# Patient Record
Sex: MALE | Race: WHITE | NOT HISPANIC OR LATINO | Employment: OTHER | URBAN - METROPOLITAN AREA
[De-identification: names, ages, dates, MRNs, and addresses within clinical notes are randomized per-mention and may not be internally consistent; named-entity substitution may affect disease eponyms.]

---

## 2017-01-09 DIAGNOSIS — K21.9 GASTROESOPHAGEAL REFLUX DISEASE WITHOUT ESOPHAGITIS: Primary | ICD-10-CM

## 2017-01-09 NOTE — TELEPHONE ENCOUNTER
Omeprazole      Last Written Prescription Date: 03/16/2016  Last Fill Quantity: 90,  # refills: 1   Last Office Visit with [facility], [facility] or [facility] prescribing provider: 09/28/2016                                         Next 5 appointments (look out 90 days)     Jan 12, 2017  8:00 AM   Return Visit with MARAL Caba MD   [facility]    [address] [address]   [phone]                  Humberto SUÁREZ (R)

## 2017-01-10 RX ORDER — OMEPRAZOLE 40 MG/1
40 CAPSULE, DELAYED RELEASE ORAL DAILY
Qty: 90 CAPSULE | Refills: 1 | Status: SHIPPED | OUTPATIENT
Start: 2017-01-10 | End: 2017-07-31

## 2017-02-27 DIAGNOSIS — R35.0 URINARY FREQUENCY: ICD-10-CM

## 2017-02-27 RX ORDER — TOLTERODINE 4 MG/1
4 CAPSULE, EXTENDED RELEASE ORAL DAILY
Qty: 90 CAPSULE | Refills: 1 | Status: SHIPPED | OUTPATIENT
Start: 2017-02-27 | End: 2017-09-05

## 2017-04-03 ENCOUNTER — TELEPHONE (OUTPATIENT)
Dept: FAMILY MEDICINE | Facility: CLINIC | Age: 62
End: 2017-04-03

## 2017-04-03 DIAGNOSIS — L29.9 ITCHING: ICD-10-CM

## 2017-04-03 RX ORDER — TRIAMCINOLONE ACETONIDE 1 MG/G
CREAM TOPICAL
Qty: 80 G | Refills: 2 | Status: SHIPPED | OUTPATIENT
Start: 2017-04-03 | End: 2017-05-16

## 2017-04-03 NOTE — TELEPHONE ENCOUNTER
Traiamcinolone cream      Last Written Prescription Date:  Not on med list  Last Fill Quantity: 0,   # refills: 0  Last Office Visit with [facility], [facility] or [facility] prescribing provider: 09/28/2016  Future Office visit:       Routing refill request to provider for review/approval because:  Drug not active on patient's medication list    Humberto SUÁREZ (R)

## 2017-05-16 DIAGNOSIS — L29.9 ITCHING: ICD-10-CM

## 2017-05-16 NOTE — TELEPHONE ENCOUNTER
Triamcinolone      Last Written Prescription Date:  4/3/2017  Last Fill Quantity: 80g,   # refills: 2  Last Office Visit with [facility], [facility] or [facility] prescribing provider: 9/28/2016  Future Office visit:       Routing refill request to provider for review/approval because:  na

## 2017-05-17 RX ORDER — TRIAMCINOLONE ACETONIDE 1 MG/G
CREAM TOPICAL
Qty: 80 G | Refills: 2 | Status: SHIPPED | OUTPATIENT
Start: 2017-05-17 | End: 2018-02-03

## 2017-06-08 RX ORDER — TRIAMTERENE AND HYDROCHLOROTHIAZIDE 37.5; 25 MG/1; MG/1
1 CAPSULE ORAL EVERY MORNING
Qty: 90 CAPSULE | Refills: 3 | OUTPATIENT
Start: 2017-06-08

## 2017-06-08 NOTE — TELEPHONE ENCOUNTER
Triamt/hctz      Last Written Prescription Date: 09/28/2016  Last Fill Quantity: 90, # refills: 3  Last Office Visit with [facility], [facility] or [facility] prescribing provider: 09/28/2016       Potassium   Date Value Ref Range Status   09/28/2016 4.1 3.4 - 5.3 mmol/L Final     Creatinine   Date Value Ref Range Status   09/28/2016 0.90 0.66 - 1.25 mg/dL Final     BP Readings from Last 3 Encounters:   12/21/16 135/80   12/13/16 136/84   12/01/16 136/86       Humberto SUÁREZ (R)

## 2017-07-25 ENCOUNTER — APPOINTMENT (OUTPATIENT)
Dept: GENERAL RADIOLOGY | Facility: CLINIC | Age: 62
End: 2017-07-25
Attending: EMERGENCY MEDICINE
Payer: COMMERCIAL

## 2017-07-25 ENCOUNTER — HOSPITAL ENCOUNTER (EMERGENCY)
Facility: CLINIC | Age: 62
Discharge: HOME | End: 2017-07-25
Attending: EMERGENCY MEDICINE | Admitting: EMERGENCY MEDICINE
Payer: COMMERCIAL

## 2017-07-25 VITALS
DIASTOLIC BLOOD PRESSURE: 88 MMHG | SYSTOLIC BLOOD PRESSURE: 135 MMHG | RESPIRATION RATE: 18 BRPM | OXYGEN SATURATION: 96 % | TEMPERATURE: 99 F

## 2017-07-25 DIAGNOSIS — J06.9 VIRAL URI: ICD-10-CM

## 2017-07-25 DIAGNOSIS — R50.9 FEVER AND CHILLS: ICD-10-CM

## 2017-07-25 LAB
ANION GAP SERPL CALCULATED.3IONS-SCNC: 10 MMOL/L (ref 3–14)
BASOPHILS # BLD AUTO: 0 10E9/L (ref 0–0.2)
BASOPHILS NFR BLD AUTO: 0.5 %
BUN SERPL-MCNC: 20 MG/DL (ref 7–30)
CALCIUM SERPL-MCNC: 8.8 MG/DL (ref 8.5–10.1)
CHLORIDE SERPL-SCNC: 96 MMOL/L (ref 94–109)
CO2 SERPL-SCNC: 23 MMOL/L (ref 20–32)
CREAT SERPL-MCNC: 1.01 MG/DL (ref 0.66–1.25)
DIFFERENTIAL METHOD BLD: ABNORMAL
EOSINOPHIL # BLD AUTO: 0 10E9/L (ref 0–0.7)
EOSINOPHIL NFR BLD AUTO: 0 %
ERYTHROCYTE [DISTWIDTH] IN BLOOD BY AUTOMATED COUNT: 15.1 % (ref 10–15)
GFR SERPL CREATININE-BSD FRML MDRD: 74 ML/MIN/1.7M2
GLUCOSE SERPL-MCNC: 145 MG/DL (ref 70–99)
HCT VFR BLD AUTO: 45.1 % (ref 40–53)
HGB BLD-MCNC: 14.5 G/DL (ref 13.3–17.7)
IMM GRANULOCYTES # BLD: 0 10E9/L (ref 0–0.4)
IMM GRANULOCYTES NFR BLD: 0.8 %
LACTATE BLD-SCNC: 1.3 MMOL/L (ref 0.7–2.1)
LYMPHOCYTES # BLD AUTO: 0.4 10E9/L (ref 0.8–5.3)
LYMPHOCYTES NFR BLD AUTO: 10.6 %
MCH RBC QN AUTO: 24 PG (ref 26.5–33)
MCHC RBC AUTO-ENTMCNC: 32.2 G/DL (ref 31.5–36.5)
MCV RBC AUTO: 75 FL (ref 78–100)
MONOCYTES # BLD AUTO: 0.2 10E9/L (ref 0–1.3)
MONOCYTES NFR BLD AUTO: 5.7 %
NEUTROPHILS # BLD AUTO: 3 10E9/L (ref 1.6–8.3)
NEUTROPHILS NFR BLD AUTO: 82.4 %
PLATELET # BLD AUTO: 132 10E9/L (ref 150–450)
POTASSIUM SERPL-SCNC: 3.8 MMOL/L (ref 3.4–5.3)
RBC # BLD AUTO: 6.03 10E12/L (ref 4.4–5.9)
SODIUM SERPL-SCNC: 129 MMOL/L (ref 133–144)
TROPONIN I SERPL-MCNC: NORMAL UG/L (ref 0–0.04)
WBC # BLD AUTO: 3.7 10E9/L (ref 4–11)

## 2017-07-25 PROCEDURE — 99285 EMERGENCY DEPT VISIT HI MDM: CPT | Mod: 25 | Performed by: EMERGENCY MEDICINE

## 2017-07-25 PROCEDURE — 99285 EMERGENCY DEPT VISIT HI MDM: CPT | Mod: 25

## 2017-07-25 PROCEDURE — 93005 ELECTROCARDIOGRAM TRACING: CPT

## 2017-07-25 PROCEDURE — 84484 ASSAY OF TROPONIN QUANT: CPT | Performed by: EMERGENCY MEDICINE

## 2017-07-25 PROCEDURE — 93010 ELECTROCARDIOGRAM REPORT: CPT | Performed by: EMERGENCY MEDICINE

## 2017-07-25 PROCEDURE — 71020 XR CHEST 2 VW: CPT

## 2017-07-25 PROCEDURE — 83605 ASSAY OF LACTIC ACID: CPT | Performed by: EMERGENCY MEDICINE

## 2017-07-25 PROCEDURE — 25000132 ZZH RX MED GY IP 250 OP 250 PS 637: Performed by: EMERGENCY MEDICINE

## 2017-07-25 PROCEDURE — 80048 BASIC METABOLIC PNL TOTAL CA: CPT | Performed by: EMERGENCY MEDICINE

## 2017-07-25 PROCEDURE — 25000128 H RX IP 250 OP 636: Performed by: EMERGENCY MEDICINE

## 2017-07-25 PROCEDURE — 85025 COMPLETE CBC W/AUTO DIFF WBC: CPT | Performed by: EMERGENCY MEDICINE

## 2017-07-25 RX ORDER — IBUPROFEN 400 MG/1
800 TABLET, FILM COATED ORAL ONCE
Status: COMPLETED | OUTPATIENT
Start: 2017-07-25 | End: 2017-07-25

## 2017-07-25 RX ADMIN — SODIUM CHLORIDE 1000 ML: 9 INJECTION, SOLUTION INTRAVENOUS at 04:02

## 2017-07-25 RX ADMIN — IBUPROFEN 800 MG: 400 TABLET ORAL at 04:03

## 2017-07-25 NOTE — ED PROVIDER NOTES
History     Chief Complaint   Patient presents with     Chest Pain     mid chest pain/shortness of breath since saturday, 7/22 at 2000 and woke up sunday am with headache asnd generalized weakness     HPI  Jamal Sanchez is a 62 year old male who presents for chest pain and shortness of breath.  Symptoms have been ongoing for the past 3 days, getting worse.  He reports it is associated with subjective fever and chills with diaphoresis.  He has felt weak and run down without an appetite.  He also has bitemporal headache, moderate in severity.  He has been taking acetaminophen with some improvement in symptoms.  He does have sore throat, denies cough, ear pain, abdominal pain, nausea, vomiting, diarrhea, dysuria, or rash.    Past medical history includes hypertension, BPH, GERD  Daily medications include omeprazole, Detrol, sildenafil, triamterene-hydrochlorothiazide  No known drug allergies  Former smoker, quit 2004; occasional alcohol use    I have reviewed the Medications, Allergies, Past Medical and Surgical History, and Social History in the Epic system.         Review of Systems  Pertinent positives and negatives listed in the HPI, all other systems reviewed and are negative.    Physical Exam   BP: (!) 147/100  Temp: 100.5  F (38.1  C)  Resp: 18  SpO2: 97 %  Physical Exam   Constitutional: He is oriented to person, place, and time. He appears well-developed and well-nourished. He appears distressed.   HENT:   Head: Normocephalic and atraumatic.   Right Ear: Tympanic membrane and external ear normal.   Left Ear: Tympanic membrane and external ear normal.   Nose: Nose normal.   Eyes: Conjunctivae are normal. No scleral icterus.   Neck: Normal range of motion.   Cardiovascular: Normal rate and regular rhythm.    Pulses:       Radial pulses are 2+ on the right side, and 2+ on the left side.        Dorsalis pedis pulses are 2+ on the right side, and 2+ on the left side.   Pulmonary/Chest: Effort normal. No  stridor. No respiratory distress.   Abdominal: Soft. He exhibits no distension.   Musculoskeletal: He exhibits no edema.   Neurological: He is alert and oriented to person, place, and time.   Skin: Skin is warm and dry. He is not diaphoretic.   Psychiatric: He has a normal mood and affect. His behavior is normal.   Nursing note and vitals reviewed.      ED Course     ED Course     Procedures             EKG Interpretation:      Interpreted by Humberto Rothman  Time reviewed: 0343  Symptoms at time of EKG: Chest pain   Rhythm: normal sinus   Rate: normal  Axis: normal  Ectopy: none  Conduction: normal  ST Segments/ T Waves: No ST-T wave changes  Q Waves: none  Comparison to prior: Unchanged    Clinical Impression: normal EKG      Critical Care time:  none          Labs Ordered and Resulted from Time of ED Arrival Up to the Time of Departure from the ED   BASIC METABOLIC PANEL - Abnormal; Notable for the following:        Result Value    Sodium 129 (*)     Glucose 145 (*)     All other components within normal limits   CBC WITH PLATELETS DIFFERENTIAL - Abnormal; Notable for the following:     WBC 3.7 (*)     RBC Count 6.03 (*)     MCV 75 (*)     MCH 24.0 (*)     RDW 15.1 (*)     Platelet Count 132 (*)     Absolute Lymphocytes 0.4 (*)     All other components within normal limits   TROPONIN I   LACTIC ACID WHOLE BLOOD   PERIPHERAL IV CATHETER       Assessments & Plan (with Medical Decision Making)   62-year-old male who presents for fever, chest pain, shortness of breath.  Differential includes pneumonia, viral URI, bronchitis, coronary artery disease.  Temperature 38.1 C, heart rate 98, blood pressure 147/100, SPO2 97% on room air.  EKG sinus rhythm without signs of ischemia.  Sodium 129, electrolytes otherwise normal.  White blood cell count 3.7, hemoglobin 0.5.  Lactate 1.3.  Troponin is undetectable.  Chest x-ray obtained, which is reviewed independently as well as radiology report reviewed, no signs of  infiltrate or pneumothorax.  On recheck the patient is feeling much better after acetaminophen and fluids.  He says his headache is nearly completely gone.  He is tolerating oral intake and is safe to discharge home with instructions to return if worse, otherwise follow up in clinic.  The patient is in agreement with this plan.    I have reviewed the nursing notes.    I have reviewed the findings, diagnosis, plan and need for follow up with the patient.       New Prescriptions    No medications on file       Final diagnoses:   Viral URI   Fever and chills       7/25/2017   [facility] EMERGENCY DEPARTMENT     Humberto Rothman MD  07/25/17 0613

## 2017-07-25 NOTE — DISCHARGE INSTRUCTIONS
Drink plenty of fluids.  Use acetaminophen and ibuprofen as needed for symptoms.  Return to the emergency department for severe headache, repeated vomiting, worsening symptoms, difficulty breathing, or other concerns.  Otherwise follow up care if not better in 2-3 days.

## 2017-07-25 NOTE — ED NOTES
mid chest pain/shortness of breath since saturday, 7/22 at 2000 and woke up sunday am with headache asnd generalized weakness.  Patient states he was doing a lot of physical work on Saturday with heat and humidity.  Patient states he had 8 beers and 8 shots of whisky on Saturday over a 12 hour period.

## 2017-07-25 NOTE — ED AVS SNAPSHOT
[facility] Emergency Department    [address] [address]    Phone:  [phone]    Fax:  [phone]                                       Jamal Sanchez   MRN: [id number]    Department:  [facility] Emergency Department   Date of Visit:  7/25/2017           After Visit Summary Signature Page     I have received my discharge instructions, and my questions have been answered. I have discussed any challenges I see with this plan with the nurse or doctor.    ..........................................................................................................................................  Patient/Patient Representative Signature      ..........................................................................................................................................  Patient Representative Print Name and Relationship to Patient    ..................................................               ................................................  Date                                            Time    ..........................................................................................................................................  Reviewed by Signature/Title    ...................................................              ..............................................  Date                                                            Time

## 2017-07-25 NOTE — ED AVS SNAPSHOT
[facility] Emergency Department    [address] [address]    Phone:  [phone]    Fax:  [phone]                                       Jamal Sanchez   MRN: [id number]    Department:  [facility] Emergency Department   Date of Visit:  7/25/2017           Patient Information     Date Of Birth          1955        Your diagnoses for this visit were:     Viral URI     Fever and chills        You were seen by Humberto Rothman MD.        Discharge Instructions       Drink plenty of fluids.  Use acetaminophen and ibuprofen as needed for symptoms.  Return to the emergency department for severe headache, repeated vomiting, worsening symptoms, difficulty breathing, or other concerns.  Otherwise follow up care if not better in 2-3 days.    24 Hour Appointment Hotline       To make an appointment at any [facility] clinic, call [phone] ([phone]). If you don't have a family doctor or clinic, we will help you find one. [facility] clinics are conveniently located to serve the needs of you and your family.             Review of your medicines      Our records show that you are taking the medicines listed below. If these are incorrect, please call your family doctor or clinic.        Dose / Directions Last dose taken    acetaminophen-codeine 300-30 MG per tablet   Commonly known as:  TYLENOL #3   Dose:  1-2 tablet   Quantity:  60 tablet        Take 1-2 tablets by mouth every 4 hours as needed for pain   Refills:  0        omeprazole 40 MG capsule   Commonly known as:  priLOSEC   Dose:  40 mg   Quantity:  90 capsule        Take 1 capsule (40 mg) by mouth daily Take 30-60 minutes before a meal.   Refills:  1        sildenafil 20 MG tablet   Commonly known as:  REVATIO/VIAGRA   Dose:   mg   Quantity:  50 tablet        Take 2-5 tablets ( mg) by mouth as needed for erectile insufficiency.  Never use with nitroglycerin, terazosin or doxazosin.   Refills:  3         tolterodine 4 MG 24 hr capsule   Commonly known as:  DETROL LA   Dose:  4 mg   Quantity:  90 capsule        Take 1 capsule (4 mg) by mouth daily   Refills:  1        triamcinolone 0.1 % cream   Commonly known as:  KENALOG   Quantity:  80 g        Apply sparingly to affected area three times daily as needed   Refills:  2        triamterene-hydrochlorothiazide 37.5-25 MG per capsule   Commonly known as:  DYAZIDE   Dose:  1 capsule   Quantity:  90 capsule        Take 1 capsule by mouth every morning   Refills:  3                Procedures and tests performed during your visit     Basic metabolic panel    CBC with platelets differential    EKG 12 lead    Lactic acid whole blood    Peripheral IV catheter    Troponin I    XR Chest 2 Views      Orders Needing Specimen Collection     None      Pending Results     Date and Time Order Name Status Description    7/25/2017 0351 XR Chest 2 Views Preliminary             Pending Culture Results     No orders found from 7/23/2017 to 7/26/2017.            Pending Results Instructions     If you had any lab results that were not finalized at the time of your Discharge, you can call the ED Lab Result RN at [phone]. You will be contacted by this team for any positive Lab results or changes in treatment. The nurses are available 7 days a week from 10A to 6:30P.  You can leave a message 24 hours per day and they will return your call.        Test Results From Your Hospital Stay        7/25/2017  4:23 AM      Component Results     Component Value Ref Range & Units Status    Sodium 129 (L) 133 - 144 mmol/L Final    Potassium 3.8 3.4 - 5.3 mmol/L Final    Chloride 96 94 - 109 mmol/L Final    Carbon Dioxide 23 20 - 32 mmol/L Final    Anion Gap 10 3 - 14 mmol/L Final    Glucose 145 (H) 70 - 99 mg/dL Final    Urea Nitrogen 20 7 - 30 mg/dL Final    Creatinine 1.01 0.66 - 1.25 mg/dL Final    GFR Estimate 74 >60 mL/min/1.7m2 Final    Non  GFR Calc    GFR Estimate If Black  >90   GFR Calc   >60 mL/min/1.7m2 Final    Calcium 8.8 8.5 - 10.1 mg/dL Final         7/25/2017  5:56 AM      Component Results     Component Value Ref Range & Units Status    WBC 3.7 (L) 4.0 - 11.0 10e9/L Final    RBC Count 6.03 (H) 4.4 - 5.9 10e12/L Final    Hemoglobin 14.5 13.3 - 17.7 g/dL Final    Hematocrit 45.1 40.0 - 53.0 % Final    MCV 75 (L) 78 - 100 fl Final    MCH 24.0 (L) 26.5 - 33.0 pg Final    MCHC 32.2 31.5 - 36.5 g/dL Final    RDW 15.1 (H) 10.0 - 15.0 % Final    Platelet Count 132 (L) 150 - 450 10e9/L Final    Diff Method Automated Method  Final    % Neutrophils 82.4 % Final    % Lymphocytes 10.6 % Final    % Monocytes 5.7 % Final    % Eosinophils 0.0 % Final    % Basophils 0.5 % Final    % Immature Granulocytes 0.8 % Final    Absolute Neutrophil 3.0 1.6 - 8.3 10e9/L Final    Absolute Lymphocytes 0.4 (L) 0.8 - 5.3 10e9/L Final    Absolute Monocytes 0.2 0.0 - 1.3 10e9/L Final    Absolute Eosinophils 0.0 0.0 - 0.7 10e9/L Final    Absolute Basophils 0.0 0.0 - 0.2 10e9/L Final    Abs Immature Granulocytes 0.0 0 - 0.4 10e9/L Final         7/25/2017  4:31 AM      Narrative     XR CHEST 2 VW  7/25/2017 4:15 AM      HISTORY: Chest pain, fever, shortness of breath.     COMPARISON: None.    FINDINGS: The heart size is normal. The lungs are clear. No  pneumothorax or pleural effusion.        Impression     IMPRESSION: No acute abnormality.         7/25/2017  4:23 AM      Component Results     Component Value Ref Range & Units Status    Troponin I ES  0.000 - 0.045 ug/L Final    <0.015  The 99th percentile for upper reference range is 0.045 ug/L.  Troponin values in   the range of 0.045 - 0.120 ug/L may be associated with risks of adverse   clinical events.           7/25/2017  4:08 AM      Component Results     Component Value Ref Range & Units Status    Lactic Acid 1.3 0.7 - 2.1 mmol/L Final                Thank you for choosing [facility]       Thank you for choosing [facility] for your care. Our  goal is always to provide you with excellent care. Hearing back from our patients is one way we can continue to improve our services. Please take a few minutes to complete the written survey that you may receive in the mail after you visit with us. Thank you!        [facility] Information     [facility] gives you secure access to your electronic health record. If you see a primary care provider, you can also send messages to your care team and make appointments. If you have questions, please call your primary care clinic.  If you do not have a primary care provider, please call [phone] and they will assist you.        Care EveryWhere ID     This is your Care EveryWhere ID. This could be used by other organizations to access your [place] medical records  [id number]        Equal Access to Services     BETSEY MULLIGAN : Rosalie Hernandez, gen vaca, mahendra perdue, berto caldwell. So [facility] [phone].    ATENCIÓN: Si habla español, tiene a zamarripa disposición servicios gratuitos de asistencia lingüística. Llame al [phone].    We comply with applicable federal civil rights laws and [place] laws. We do not discriminate on the basis of race, color, national origin, age, disability sex, sexual orientation or gender identity.            After Visit Summary       This is your record. Keep this with you and show to your community pharmacist(s) and doctor(s) at your next visit.

## 2017-07-31 DIAGNOSIS — K21.9 GASTROESOPHAGEAL REFLUX DISEASE WITHOUT ESOPHAGITIS: ICD-10-CM

## 2017-07-31 NOTE — TELEPHONE ENCOUNTER
tamsulosin      Last Written Prescription Date:  Not on med list  Last Fill Quantity: 0,   # refills: 0  Last Office Visit with [facility], [facility] or [facility] prescribing provider: 09/28/2016  Future Office visit:       Routing refill request to provider for review/approval because:  Drug not active on patient's medication list      Omeprazole      Last Written Prescription Date: 01/10/2017  Last Fill Quantity: 90,  # refills: 1   Last Office Visit with [facility], [facility] or [facility] prescribing provider: 09/28/2016                                               Humberto LANZA)

## 2017-08-01 RX ORDER — OMEPRAZOLE 40 MG/1
40 CAPSULE, DELAYED RELEASE ORAL DAILY
Qty: 30 CAPSULE | Refills: 0 | Status: SHIPPED | OUTPATIENT
Start: 2017-08-01 | End: 2017-09-27

## 2017-08-01 NOTE — TELEPHONE ENCOUNTER
Prescription approved per [facility] Refill Protocol.  Patient due for annual appt September 2017    Roseann SANABRIA Rn

## 2017-08-02 ENCOUNTER — OFFICE VISIT (OUTPATIENT)
Dept: FAMILY MEDICINE | Facility: CLINIC | Age: 62
End: 2017-08-02
Payer: COMMERCIAL

## 2017-08-02 VITALS
RESPIRATION RATE: 18 BRPM | TEMPERATURE: 97.3 F | HEIGHT: 70 IN | SYSTOLIC BLOOD PRESSURE: 136 MMHG | HEART RATE: 89 BPM | WEIGHT: 210 LBS | DIASTOLIC BLOOD PRESSURE: 85 MMHG | BODY MASS INDEX: 30.06 KG/M2

## 2017-08-02 DIAGNOSIS — L98.9 SKIN LESION: Primary | ICD-10-CM

## 2017-08-02 PROCEDURE — 99213 OFFICE O/P EST LOW 20 MIN: CPT | Performed by: FAMILY MEDICINE

## 2017-08-02 NOTE — PATIENT INSTRUCTIONS
Thank you for choosing [facility].  You may be receiving a survey in the mail from [facility] regarding your visit today.  Please take a few minutes to complete and return the survey to let us know how we are doing.      Our Clinic hours are:  Mondays    7:20 am - 7 pm  Tues -  Fri  7:20 am - 5 pm    Clinic Phone: [phone]    The clinic lab opens at 7:30 am Mon - Fri and appointments are required.    [facility]. [phone]  Monday-Thursday 8 am - 7pm  Tues/Wed/Fri 8 am - 5:30 pm

## 2017-08-02 NOTE — PROGRESS NOTES
SUBJECTIVE:                                                    Jamal Sanchez is a 62 year old male who presents to clinic today for the following health issues:      Chief Complaint   Patient presents with     Derm Problem     patient has 2 spots on his back he would like to have looked at.              Problem list and histories reviewed & adjusted, as indicated.  Additional history: as documented        Reviewed and updated as needed this visit by clinical staff  Tobacco  Allergies  Meds       Reviewed and updated as needed this visit by Provider      Further history obtained, clarified or corrected by physician:  He has some spots on his back that he is worried about. He does not think they're changing. He is wondering about having a number of these removed.    OBJECTIVE:  LUNGS: clear to auscultation, normal breath sounds  CV: RRR without murmur  ABD: BS+, soft, nontender, no masses, no hepatosplenomegaly  SKIN: No rashes or abnormalities, except for multiple macules and scaly papules on the back that all seem to be well-circumscribed and mostly flat.    ASSESSMENT:  Skin lesion    PLAN:  He has multiple lesions which appear to be benign and possibly keratoses however he is quite nervous about these so we'll set up a dermatology consultation.    Orders Placed This Encounter     DERMATOLOGY REFERRAL

## 2017-08-02 NOTE — MR AVS SNAPSHOT
After Visit Summary   8/2/2017    Jamal Sanchez    MRN: [id number]           Patient Information     Date Of Birth          1955        Visit Information        Provider Department      8/2/2017 3:20 PM Anjel Jimenes MD [facility]        Today's Diagnoses     Skin lesion    -  1      Care Instructions          Thank you for choosing [facility].  You may be receiving a survey in the mail from [facility] regarding your visit today.  Please take a few minutes to complete and return the survey to let us know how we are doing.      Our Clinic hours are:  Mondays    7:20 am - 7 pm  Tues -  Fri  7:20 am - 5 pm    Clinic Phone: [phone]    The clinic lab opens at 7:30 am Mon - Fri and appointments are required.    [facility]  Ph. [phone]  Monday-Thursday 8 am - 7pm  Tues/Wed/Fri 8 am - 5:30 pm                 Follow-ups after your visit        Additional Services     DERMATOLOGY REFERRAL       Your provider has referred you to: [facility]: [facility] [phone]   [email or web address]    Please be aware that coverage of these services is subject to the terms and limitations of your health insurance plan.  Call member services at your health plan with any benefit or coverage questions.      Please bring the following with you to your appointment:    (1) Any X-Rays, CTs or MRIs which have been performed.  Contact the facility where they were done to arrange for  prior to your scheduled appointment.    (2) List of current medications  (3) This referral request   (4) Any documents/labs given to you for this referral                  Who to contact     If you have questions or need follow up information about today's clinic visit or your schedule please contact [facility] directly at [phone].  Normal or non-critical lab and imaging results will be communicated to you by  "MyChart, letter or phone within 4 business days after the clinic has received the results. If you do not hear from us within 7 days, please contact the clinic through [employer] or phone. If you have a critical or abnormal lab result, we will notify you by phone as soon as possible.  Submit refill requests through [employer] or call your pharmacy and they will forward the refill request to us. Please allow 3 business days for your refill to be completed.          Additional Information About Your Visit        [employer] Information     [employer] gives you secure access to your electronic health record. If you see a primary care provider, you can also send messages to your care team and make appointments. If you have questions, please call your primary care clinic.  If you do not have a primary care provider, please call [phone] and they will assist you.        Care EveryWhere ID     This is your Care EveryWhere ID. This could be used by other organizations to access your [facility] medical records  [id number]        Your Vitals Were     Pulse Temperature Respirations Height BMI (Body Mass Index)       89 97.3  F (36.3  C) 18 5' 10\" (1.778 m) 30.13 kg/m2        Blood Pressure from Last 3 Encounters:   08/02/17 136/85   07/25/17 135/88   12/21/16 135/80    Weight from Last 3 Encounters:   08/02/17 210 lb (95.3 kg)   12/21/16 220 lb (99.8 kg)   12/13/16 220 lb (99.8 kg)              We Performed the Following     DERMATOLOGY REFERRAL        Primary Care Provider Office Phone # Fax #    Anjel Jimenes [phone]       [facility] [id number] [place] [id number]        Equal Access to Services     [facility]: Hadii aad ku hadasho Soomaali, waaxda luqadaha, qaybta kaalmada adeegyada, berto davis . So [facility] [phone].    ATENCIÓN: Si habla español, tiene a zamarripa disposición servicios gratuitos de asistencia lingüística. Llame al [phone].    We comply with " applicable federal civil rights laws and [place] laws. We do not discriminate on the basis of race, color, national origin, age, disability sex, sexual orientation or gender identity.            Thank you!     Thank you for choosing [facility]  for your care. Our goal is always to provide you with excellent care. Hearing back from our patients is one way we can continue to improve our services. Please take a few minutes to complete the written survey that you may receive in the mail after your visit with us. Thank you!             Your Updated Medication List - Protect others around you: Learn how to safely use, store and throw away your medicines at www.disposemymeds.org.          This list is accurate as of: 8/2/17  4:27 PM.  Always use your most recent med list.                   Brand Name Dispense Instructions for use Diagnosis    acetaminophen-codeine 300-30 MG per tablet    TYLENOL #3    60 tablet    Take 1-2 tablets by mouth every 4 hours as needed for pain    Post-op pain       omeprazole 40 MG capsule    priLOSEC    30 capsule    Take 1 capsule (40 mg) by mouth daily Take 30-60 minutes before a meal.    Gastroesophageal reflux disease without esophagitis       sildenafil 20 MG tablet    REVATIO/VIAGRA    50 tablet    Take 2-5 tablets ( mg) by mouth as needed for erectile insufficiency.  Never use with nitroglycerin, terazosin or doxazosin.    Combined arterial insufficiency and corporo-venous occlusive erectile dysfunction       tolterodine 4 MG 24 hr capsule    DETROL LA    90 capsule    Take 1 capsule (4 mg) by mouth daily    Urinary frequency       triamcinolone 0.1 % cream    KENALOG    80 g    Apply sparingly to affected area three times daily as needed    Itching       triamterene-hydrochlorothiazide 37.5-25 MG per capsule    DYAZIDE    90 capsule    Take 1 capsule by mouth every morning    Essential hypertension, hypertension with unspecified goal

## 2017-08-02 NOTE — NURSING NOTE
"Chief Complaint   Patient presents with     Derm Problem     patient has 2 spots on his back he would like to have looked at.        Initial /85  Pulse 89  Temp 97.3  F (36.3  C)  Resp 18  Ht 5' 10\" (1.778 m)  Wt 210 lb (95.3 kg)  BMI 30.13 kg/m2 Estimated body mass index is 30.13 kg/(m^2) as calculated from the following:    Height as of this encounter: 5' 10\" (1.778 m).    Weight as of this encounter: 210 lb (95.3 kg).  Medication Reconciliation: complete   Leila Blanco CMA      "

## 2017-08-26 ENCOUNTER — NURSE TRIAGE (OUTPATIENT)
Dept: NURSING | Facility: CLINIC | Age: 62
End: 2017-08-26

## 2017-08-26 NOTE — TELEPHONE ENCOUNTER
"Since 8/24/17 fatigued , muscle ache , intermittent  chills , has no thermometer .    Seen for Dehydrated 1 month ago , WBC's were off and Dx was  viral infection . Currently : voided at 730am and has saliva and intermittent dizziness , fluid intake is good but not eating much . Agrees to go to [facility] .Cara Gilbert RN [place] nurse advisors.    Reason for Disposition    [1] MODERATE weakness (i.e., interferes with work, school, normal activities) AND [2] cause unknown  (Exceptions: weakness with acute minor illness, or weakness from poor fluid intake)    Additional Information    Negative: Severe difficulty breathing (e.g., struggling for each breath, speaks in single words)    Negative: Shock suspected (e.g., cold/pale/clammy skin, too weak to stand, low BP, rapid pulse)    Negative: Difficult to awaken or acting confused  (e.g., disoriented, slurred speech)    Negative: [1] Fainted > 15 minutes ago AND [2] still feels too weak or dizzy to stand    Negative: [1] SEVERE weakness (i.e., unable to walk or barely able to walk, requires support) AND     [2] new onset or worsening    Negative: Sounds like a life-threatening emergency to the triager    Negative: Weakness of the face, arm or leg on one side of the body    Negative: [1] Known diabetic AND [2] weakness from low blood sugar (i.e., < 60 mg/dl or 3.5 mmol/l)    Negative: Heat exhaustion suspected (i.e., dehydration from heat exposure)    Negative: Vomiting is main symptom    Negative: Diarrhea is main symptom    Negative: Difficulty breathing    Negative: Heart beating < 50 beats per minute OR > 140 beats per minute    Negative: Extra heart beats OR irregular heart beating   (i.e., \"palpitations\")    Negative: Follows bleeding (e.g., from vomiting, rectum, vagina; EXCEPTION: small brief weakness from sight of a small amount blood)    Negative: Black or tarry bowel movements    Negative: [1] Drinking very little AND [2] dehydration suspected " (e.g., no urine > 12 hours, very dry mouth, very lightheaded)    Negative: Patient sounds very sick or weak to the triager    Protocols used: WEAKNESS (GENERALIZED) AND FATIGUE-ADULT-AH

## 2017-08-29 ENCOUNTER — OFFICE VISIT (OUTPATIENT)
Dept: FAMILY MEDICINE | Facility: CLINIC | Age: 62
End: 2017-08-29
Payer: COMMERCIAL

## 2017-08-29 VITALS
DIASTOLIC BLOOD PRESSURE: 71 MMHG | HEART RATE: 63 BPM | SYSTOLIC BLOOD PRESSURE: 118 MMHG | HEIGHT: 70 IN | TEMPERATURE: 98.3 F | BODY MASS INDEX: 30.06 KG/M2 | WEIGHT: 210 LBS | OXYGEN SATURATION: 98 %

## 2017-08-29 DIAGNOSIS — R42 DIZZINESS: ICD-10-CM

## 2017-08-29 DIAGNOSIS — M79.10 MYALGIA: ICD-10-CM

## 2017-08-29 DIAGNOSIS — R53.83 FATIGUE, UNSPECIFIED TYPE: Primary | ICD-10-CM

## 2017-08-29 DIAGNOSIS — R10.84 ABDOMINAL PAIN, GENERALIZED: ICD-10-CM

## 2017-08-29 DIAGNOSIS — R51.9 NONINTRACTABLE HEADACHE, UNSPECIFIED CHRONICITY PATTERN, UNSPECIFIED HEADACHE TYPE: ICD-10-CM

## 2017-08-29 LAB
BASOPHILS # BLD AUTO: 0 10E9/L (ref 0–0.2)
BASOPHILS NFR BLD AUTO: 0 %
DIFFERENTIAL METHOD BLD: ABNORMAL
EOSINOPHIL # BLD AUTO: 0 10E9/L (ref 0–0.7)
EOSINOPHIL NFR BLD AUTO: 0 %
ERYTHROCYTE [DISTWIDTH] IN BLOOD BY AUTOMATED COUNT: 17.3 % (ref 10–15)
ERYTHROCYTE [SEDIMENTATION RATE] IN BLOOD BY WESTERGREN METHOD: 9 MM/H (ref 0–20)
HCT VFR BLD AUTO: 39 % (ref 40–53)
HGB BLD-MCNC: 12.3 G/DL (ref 13.3–17.7)
LYMPHOCYTES # BLD AUTO: 3.1 10E9/L (ref 0.8–5.3)
LYMPHOCYTES NFR BLD AUTO: 53 %
MCH RBC QN AUTO: 24.6 PG (ref 26.5–33)
MCHC RBC AUTO-ENTMCNC: 31.5 G/DL (ref 31.5–36.5)
MCV RBC AUTO: 78 FL (ref 78–100)
METAMYELOCYTES # BLD: 0.2 10E9/L
METAMYELOCYTES NFR BLD MANUAL: 4 %
MONOCYTES # BLD AUTO: 0.8 10E9/L (ref 0–1.3)
MONOCYTES NFR BLD AUTO: 13 %
MYELOCYTES # BLD: 0.1 10E9/L
MYELOCYTES NFR BLD MANUAL: 2 %
NEUTROPHILS # BLD AUTO: 1.6 10E9/L (ref 1.6–8.3)
NEUTROPHILS NFR BLD AUTO: 28 %
PLATELET # BLD AUTO: 187 10E9/L (ref 150–450)
RBC # BLD AUTO: 4.99 10E12/L (ref 4.4–5.9)
WBC # BLD AUTO: 5.8 10E9/L (ref 4–11)

## 2017-08-29 PROCEDURE — 99000 SPECIMEN HANDLING OFFICE-LAB: CPT | Performed by: FAMILY MEDICINE

## 2017-08-29 PROCEDURE — 80053 COMPREHEN METABOLIC PANEL: CPT | Performed by: FAMILY MEDICINE

## 2017-08-29 PROCEDURE — 85025 COMPLETE CBC W/AUTO DIFF WBC: CPT | Performed by: FAMILY MEDICINE

## 2017-08-29 PROCEDURE — 99214 OFFICE O/P EST MOD 30 MIN: CPT | Performed by: FAMILY MEDICINE

## 2017-08-29 PROCEDURE — 86666 EHRLICHIA ANTIBODY: CPT | Mod: 90 | Performed by: FAMILY MEDICINE

## 2017-08-29 PROCEDURE — 36415 COLL VENOUS BLD VENIPUNCTURE: CPT | Performed by: FAMILY MEDICINE

## 2017-08-29 PROCEDURE — 85652 RBC SED RATE AUTOMATED: CPT | Performed by: FAMILY MEDICINE

## 2017-08-29 PROCEDURE — 86618 LYME DISEASE ANTIBODY: CPT | Performed by: FAMILY MEDICINE

## 2017-08-29 NOTE — NURSING NOTE
"Chief Complaint   Patient presents with     Dizziness     Headache     Fatigue       Initial /71  Pulse 63  Temp 98.3  F (36.8  C) (Tympanic)  Ht 5' 10\" (1.778 m)  Wt 210 lb (95.3 kg)  SpO2 98%  BMI 30.13 kg/m2 Estimated body mass index is 30.13 kg/(m^2) as calculated from the following:    Height as of this encounter: 5' 10\" (1.778 m).    Weight as of this encounter: 210 lb (95.3 kg).  Medication Reconciliation: complete    "

## 2017-08-29 NOTE — PROGRESS NOTES
SUBJECTIVE:   Jamal Sanchez is a 62 year old male who presents to clinic today for the following health issues:    Dizziness  Onset: 5 days ago    Description:   Do you feel faint:  YES  Does it feel like the surroundings (bed, room) are moving: no   Unsteady/off balance: YES  Have you passed out or fallen: no     Intensity: severe    Progression of Symptoms:  worsening and intermittent    Accompanying Signs & Symptoms:  Heart palpitations: YES  Nausea, vomiting: YES  Weakness in arms or legs: YES  Fatigue: YES  Vision or speech changes: YES  Ringing in ears (Tinnitus): YES- yesterday  Hearing Loss: no     History:   Head trauma/concussion hx: no   Previous similar symptoms: no   Recent bleeding history: no     Precipitating factors:   Worse with activity or head movement: YES  Any new medications (BP?): no   Alcohol/drug abuse/withdrawal: no     Alleviating factors:   Does staying in a fixed position give relief:  YES    Therapies Tried and outcome: none          Problem list and histories reviewed & adjusted, as indicated.  Additional history:         Reviewed and updated as needed this visit by clinical staffTobacco  Allergies  Meds  Med Hx  Surg Hx  Fam Hx  Soc Hx      Reviewed and updated as needed this visit by Provider      Further history obtained, clarified or corrected by physician:  He says last Thursday he felt fairly suddenly dizzy and then fatigue and for 2 or 3 days he continued with the dizziness and fogginess and seemingly difficulty concentrating and was sleeping long hours. He also had associated mild generalized abdominal discomfort, myalgias, headache. 2 days ago he took a Tylenol No. 3 and that relieved many of his symptoms and he has improved since then but he continues to have very mild headache, mild fogginess of thinking, and mild fatigue though he has been back to work. He did not have any fever or respiratory symptoms. He denies skin rash or joint inflammation. He doesn't know  "of any specific unusual exposures, no tick bites. Other review of systems is as follows.    ROS:  Eyes: Negative for vision changes or eye problems  ENT: No problems with ears, nose or throat.  No difficulty swallowing.  CV: No chest pains or palpitations  : No urinary frequency or dysuria, bladder or kidney problems  Psychiatric: No problems with anxiety, depression or mental health    OBJECTIVE:  /71  Pulse 63  Temp 98.3  F (36.8  C) (Tympanic)  Ht 5' 10\" (1.778 m)  Wt 210 lb (95.3 kg)  SpO2 98%  BMI 30.13 kg/m2   HEAD: AT/NC  EYES: PERRLA, EOMI, Sclerae clear, Fundi normal with sharp discs  EARS: TMs clear, canals normal  NOSE & THROAT: clear   LUNGS: clear to auscultation, normal breath sounds  CV: RRR without murmur  ABD: BS+, soft, nontender, no masses, no hepatosplenomegaly  EXTREMITIES: without joint tenderness, swelling or erythema.  No muscle tenderness or abnormality.  SKIN: No rashes or abnormalities  NEURO:non focal exam    ASSESSMENT:     Fatigue, unspecified type  Dizziness  Myalgia  Nonintractable headache, unspecified chronicity pattern, unspecified headache type  Abdominal pain, generalized    Etiology of these symptoms remains unknown and workup is needed. I strongly suspect viral syndrome that is resolving on its own.    PLAN:  Orders Placed This Encounter     CBC with platelets differential     Anaplasma phagocytoph antibody IgG IgM     Lyme Disease [place] with reflex to WB Serum     Comprehensive metabolic panel     Erythrocyte sedimentation rate auto             "

## 2017-08-29 NOTE — MR AVS SNAPSHOT
After Visit Summary   8/29/2017    Jamal Sanchez    MRN: [id number]           Patient Information     Date Of Birth          1955        Visit Information        Provider Department      8/29/2017 3:20 PM Anjel Jimenes MD [facility]        Today's Diagnoses     Fatigue, unspecified type    -  1    Dizziness        Myalgia        Nonintractable headache, unspecified chronicity pattern, unspecified headache type        Abdominal pain, generalized           Follow-ups after your visit        Who to contact     If you have questions or need follow up information about today's clinic visit or your schedule please contact [facility] directly at [phone].  Normal or non-critical lab and imaging results will be communicated to you by MyChart, letter or phone within 4 business days after the clinic has received the results. If you do not hear from us within 7 days, please contact the clinic through [employer] or phone. If you have a critical or abnormal lab result, we will notify you by phone as soon as possible.  Submit refill requests through [employer] or call your pharmacy and they will forward the refill request to us. Please allow 3 business days for your refill to be completed.          Additional Information About Your Visit        MyChart Information     [employer] gives you secure access to your electronic health record. If you see a primary care provider, you can also send messages to your care team and make appointments. If you have questions, please call your primary care clinic.  If you do not have a primary care provider, please call [phone] and they will assist you.        Care EveryWhere ID     This is your Care EveryWhere ID. This could be used by other organizations to access your [facility] medical records  [id number]        Your Vitals Were     Pulse Temperature Height Pulse Oximetry BMI (Body Mass Index)       63 98.3  F (36.8  C) (Tympanic)  "5' 10\" (1.778 m) 98% 30.13 kg/m2        Blood Pressure from Last 3 Encounters:   08/29/17 118/71   08/02/17 136/85   07/25/17 135/88    Weight from Last 3 Encounters:   08/29/17 210 lb (95.3 kg)   08/02/17 210 lb (95.3 kg)   12/21/16 220 lb (99.8 kg)              We Performed the Following     Anaplasma phagocytoph antibody IgG IgM     CBC with platelets differential     Comprehensive metabolic panel     Erythrocyte sedimentation rate auto     Lyme Disease [place] with reflex to WB Serum        Primary Care Provider Office Phone # Fax #    Anjel Jimenes [phone] [address] [facility] [address]        Equal Access to Services     [facility] : Hadii conchita felipe hadasho Soomaali, waaxda luqadaha, qaybta kaalmada adeegyada, berto davis . So [facility] [phone].    ATENCIÓN: Si habla español, tiene a zamarripa disposición servicios gratuitos de asistencia lingüística. Llame al [phone].    We comply with applicable federal civil rights laws and [place] laws. We do not discriminate on the basis of race, color, national origin, age, disability sex, sexual orientation or gender identity.            Thank you!     Thank you for choosing [facility]  for your care. Our goal is always to provide you with excellent care. Hearing back from our patients is one way we can continue to improve our services. Please take a few minutes to complete the written survey that you may receive in the mail after your visit with us. Thank you!             Your Updated Medication List - Protect others around you: Learn how to safely use, store and throw away your medicines at www.disposemymeds.org.          This list is accurate as of: 8/29/17  3:49 PM.  Always use your most recent med list.                   Brand Name Dispense Instructions for use Diagnosis    acetaminophen-codeine 300-30 MG per tablet    TYLENOL #3    60 tablet    Take 1-2 tablets by mouth every 4 hours " as needed for pain    Post-op pain       omeprazole 40 MG capsule    priLOSEC    30 capsule    Take 1 capsule (40 mg) by mouth daily Take 30-60 minutes before a meal.    Gastroesophageal reflux disease without esophagitis       sildenafil 20 MG tablet    REVATIO/VIAGRA    50 tablet    Take 2-5 tablets ( mg) by mouth as needed for erectile insufficiency.  Never use with nitroglycerin, terazosin or doxazosin.    Combined arterial insufficiency and corporo-venous occlusive erectile dysfunction       tolterodine 4 MG 24 hr capsule    DETROL LA    90 capsule    Take 1 capsule (4 mg) by mouth daily    Urinary frequency       triamcinolone 0.1 % cream    KENALOG    80 g    Apply sparingly to affected area three times daily as needed    Itching       triamterene-hydrochlorothiazide 37.5-25 MG per capsule    DYAZIDE    90 capsule    Take 1 capsule by mouth every morning    Essential hypertension, hypertension with unspecified goal

## 2017-08-29 NOTE — LETTER
[facility]  [address] [place] [address]  Phone: [phone]      9/6/2017     Jamal Sanchez  [id number] [place] [id number]      Dear Jamal:    Thank you for allowing me to participate in your care. Your recent test results were reviewed and listed below.  tests are acceptable, except hgb just slightly low, should recheck in 3-4 months     Your results are provided below for your review  Results for orders placed or performed in visit on 08/29/17   CBC with platelets differential   Result Value Ref Range    WBC 5.8 4.0 - 11.0 10e9/L    RBC Count 4.99 4.4 - 5.9 10e12/L    Hemoglobin 12.3 (L) 13.3 - 17.7 g/dL    Hematocrit 39.0 (L) 40.0 - 53.0 %    MCV 78 78 - 100 fl    MCH 24.6 (L) 26.5 - 33.0 pg    MCHC 31.5 31.5 - 36.5 g/dL    RDW 17.3 (H) 10.0 - 15.0 %    Platelet Count 187 150 - 450 10e9/L    Diff Method Manual Differential     % Neutrophils 28.0 %    % Lymphocytes 53.0 %    % Monocytes 13.0 %    % Eosinophils 0.0 %    % Basophils 0.0 %    % Metamyelocytes 4.0 %    % Myelocytes 2.0 %    Absolute Neutrophil 1.6 1.6 - 8.3 10e9/L    Absolute Lymphocytes 3.1 0.8 - 5.3 10e9/L    Absolute Monocytes 0.8 0.0 - 1.3 10e9/L    Absolute Eosinophils 0.0 0.0 - 0.7 10e9/L    Absolute Basophils 0.0 0.0 - 0.2 10e9/L    Absolute Metamyelocytes 0.2 (H) 0 10e9/L    Absolute Myelocytes 0.1 (H) 0 10e9/L   Anaplasma phagocytoph antibody IgG IgM   Result Value Ref Range    A Phagocytophil IgG <1:80 <1:80    A Phagocytophil IgM < 1:16 <1:16   Lyme Disease [place] with reflex to WB Serum   Result Value Ref Range    Lyme Disease Antibodies Serum 0.44 0.00 - 0.89   Comprehensive metabolic panel   Result Value Ref Range    Sodium 137 133 - 144 mmol/L    Potassium 4.3 3.4 - 5.3 mmol/L    Chloride 102 94 - 109 mmol/L    Carbon Dioxide 32 20 - 32 mmol/L    Anion Gap 3 3 - 14 mmol/L    Glucose 100 (H) 70 - 99 mg/dL    Urea Nitrogen 12 7 - 30 mg/dL    Creatinine 0.89 0.66 - 1.25 mg/dL    GFR Estimate  86 >60 mL/min/1.7m2    GFR Estimate If Black >90 >60 mL/min/1.7m2    Calcium 9.2 8.5 - 10.1 mg/dL    Bilirubin Total 0.3 0.2 - 1.3 mg/dL    Albumin 4.2 3.4 - 5.0 g/dL    Protein Total 7.6 6.8 - 8.8 g/dL    Alkaline Phosphatase 70 40 - 150 U/L    ALT 34 0 - 70 U/L    AST 23 0 - 45 U/L   Erythrocyte sedimentation rate auto   Result Value Ref Range    Sed Rate 9 0 - 20 mm/h                 Thank you for choosing [facility]. As a result, please continue with the treatment plan discussed in the office. Return as discussed or sooner if symptoms worsen or fail to improve. If you have any further questions or concerns, please do not hesitate to contact us.      Sincerely,        Dr. Anjel Jimenes

## 2017-08-30 LAB
ALBUMIN SERPL-MCNC: 4.2 G/DL (ref 3.4–5)
ALP SERPL-CCNC: 70 U/L (ref 40–150)
ALT SERPL W P-5'-P-CCNC: 34 U/L (ref 0–70)
ANION GAP SERPL CALCULATED.3IONS-SCNC: 3 MMOL/L (ref 3–14)
AST SERPL W P-5'-P-CCNC: 23 U/L (ref 0–45)
B BURGDOR IGG+IGM SER QL: 0.44 (ref 0–0.89)
BILIRUB SERPL-MCNC: 0.3 MG/DL (ref 0.2–1.3)
BUN SERPL-MCNC: 12 MG/DL (ref 7–30)
CALCIUM SERPL-MCNC: 9.2 MG/DL (ref 8.5–10.1)
CHLORIDE SERPL-SCNC: 102 MMOL/L (ref 94–109)
CO2 SERPL-SCNC: 32 MMOL/L (ref 20–32)
CREAT SERPL-MCNC: 0.89 MG/DL (ref 0.66–1.25)
GFR SERPL CREATININE-BSD FRML MDRD: 86 ML/MIN/1.7M2
GLUCOSE SERPL-MCNC: 100 MG/DL (ref 70–99)
POTASSIUM SERPL-SCNC: 4.3 MMOL/L (ref 3.4–5.3)
PROT SERPL-MCNC: 7.6 G/DL (ref 6.8–8.8)
SODIUM SERPL-SCNC: 137 MMOL/L (ref 133–144)

## 2017-09-01 LAB
A PHAGOCYTOPH IGG TITR SER IF: NORMAL {TITER}
A PHAGOCYTOPH IGM TITR SER IF: NORMAL {TITER}

## 2017-09-05 DIAGNOSIS — R35.0 URINARY FREQUENCY: ICD-10-CM

## 2017-09-05 NOTE — TELEPHONE ENCOUNTER
I left a message for Jamal to return our call regarding his refill request. When he was seen by Dr Caba on 12-1-16, he was supposed to return in 6 weeks for follow up. He need an appointment with Dr Caba and then will call fill his medication to make it to that appointment. Isadora CLARK RN

## 2017-09-15 DIAGNOSIS — I10 HYPERTENSION, GOAL BELOW 140/90: Primary | ICD-10-CM

## 2017-09-15 RX ORDER — TRIAMTERENE AND HYDROCHLOROTHIAZIDE 37.5; 25 MG/1; MG/1
1 CAPSULE ORAL EVERY MORNING
Qty: 90 CAPSULE | Refills: 3 | Status: SHIPPED | OUTPATIENT
Start: 2017-09-15 | End: 2018-10-08

## 2017-09-15 NOTE — TELEPHONE ENCOUNTER
Prescription approved per [facility] Refill Protocol.  Pt was seen and labs done 8/29/17, refill given.[email or web address]

## 2017-09-15 NOTE — TELEPHONE ENCOUNTER
Traimt-hctz      Last Written Prescription Date: 09/28/2016  Last Fill Quantity: 90, # refills: 3  Last Office Visit with [facility] or [facility] prescribing provider: 08/29/2017       Potassium   Date Value Ref Range Status   08/29/2017 4.3 3.4 - 5.3 mmol/L Final     Creatinine   Date Value Ref Range Status   08/29/2017 0.89 0.66 - 1.25 mg/dL Final     BP Readings from Last 3 Encounters:   08/29/17 118/71   08/02/17 136/85   07/25/17 135/88     Humberto SUÁREZ (R)

## 2017-09-19 NOTE — TELEPHONE ENCOUNTER
I left another message for Jamal to return our call regarding his refill request. See previous message. Isadora CLARK RN

## 2017-09-20 RX ORDER — TOLTERODINE 4 MG/1
4 CAPSULE, EXTENDED RELEASE ORAL DAILY
Qty: 30 CAPSULE | Refills: 0 | Status: SHIPPED | OUTPATIENT
Start: 2017-09-20 | End: 2017-09-26

## 2017-09-20 NOTE — TELEPHONE ENCOUNTER
Appointment scheduled for 10/3/17  Will refill through to appointment per note below.  Patient aware for additional refills, clinic appointment is needed.  Refill for 30 tablets sent.    Tarsha Louis   [facility]  RN

## 2017-09-26 DIAGNOSIS — R35.0 URINARY FREQUENCY: ICD-10-CM

## 2017-09-26 RX ORDER — TOLTERODINE 4 MG/1
4 CAPSULE, EXTENDED RELEASE ORAL DAILY
Qty: 30 CAPSULE | Refills: 0 | Status: SHIPPED | OUTPATIENT
Start: 2017-09-26 | End: 2017-10-03

## 2017-09-27 DIAGNOSIS — K21.9 GASTROESOPHAGEAL REFLUX DISEASE WITHOUT ESOPHAGITIS: ICD-10-CM

## 2017-09-27 NOTE — TELEPHONE ENCOUNTER
Omeprazole      Last Written Prescription Date: 08/01/2017  Last Fill Quantity: 30,  # refills: 0   Last Office Visit with [facility], [facility] or [facility] prescribing provider: 08/29/2017                                         Next 5 appointments (look out 90 days)     Oct 03, 2017  1:00 PM CDT   Return Visit with MARAL [facility]aba MD   [facility]    [address] [address]   [phone]                  Humberto SUÁREZ (R)

## 2017-09-28 NOTE — TELEPHONE ENCOUNTER
Routing refill request to provider for review/approval because:  Felipa given x1   Patient needs to be seen because it has been more than 1 year since last office visit for diagnoses for medication    Routing to provider.    Roseann SANABRIA Rn

## 2017-09-29 RX ORDER — OMEPRAZOLE 40 MG/1
40 CAPSULE, DELAYED RELEASE ORAL DAILY
Qty: 30 CAPSULE | Refills: 0 | Status: SHIPPED | OUTPATIENT
Start: 2017-09-29 | End: 2017-11-13

## 2017-10-03 ENCOUNTER — OFFICE VISIT (OUTPATIENT)
Dept: UROLOGY | Facility: CLINIC | Age: 62
End: 2017-10-03
Payer: COMMERCIAL

## 2017-10-03 VITALS
BODY MASS INDEX: 30.06 KG/M2 | SYSTOLIC BLOOD PRESSURE: 126 MMHG | DIASTOLIC BLOOD PRESSURE: 77 MMHG | TEMPERATURE: 98.1 F | HEART RATE: 76 BPM | WEIGHT: 210 LBS | HEIGHT: 70 IN

## 2017-10-03 DIAGNOSIS — R39.15 URINARY URGENCY: Primary | ICD-10-CM

## 2017-10-03 DIAGNOSIS — R35.0 URINARY FREQUENCY: ICD-10-CM

## 2017-10-03 PROCEDURE — 51798 US URINE CAPACITY MEASURE: CPT | Performed by: UROLOGY

## 2017-10-03 PROCEDURE — 99214 OFFICE O/P EST MOD 30 MIN: CPT | Mod: 25 | Performed by: UROLOGY

## 2017-10-03 RX ORDER — TAMSULOSIN HYDROCHLORIDE 0.4 MG/1
CAPSULE ORAL
COMMUNITY
Start: 2017-07-31 | End: 2017-10-03

## 2017-10-03 RX ORDER — TAMSULOSIN HYDROCHLORIDE 0.4 MG/1
0.4 CAPSULE ORAL DAILY
Qty: 90 CAPSULE | Refills: 3 | Status: SHIPPED | OUTPATIENT
Start: 2017-10-03 | End: 2017-12-19

## 2017-10-03 RX ORDER — TOLTERODINE 4 MG/1
4 CAPSULE, EXTENDED RELEASE ORAL DAILY
Qty: 90 CAPSULE | Refills: 3 | Status: SHIPPED | OUTPATIENT
Start: 2017-10-03 | End: 2017-11-07 | Stop reason: ALTCHOICE

## 2017-10-03 RX ORDER — OXYBUTYNIN CHLORIDE 10 MG/1
10 TABLET, EXTENDED RELEASE ORAL DAILY
Qty: 30 TABLET | Refills: 3 | Status: SHIPPED | OUTPATIENT
Start: 2017-10-03 | End: 2017-11-08 | Stop reason: DRUGHIGH

## 2017-10-03 NOTE — NURSING NOTE
"Chief Complaint   Patient presents with     RECHECK     medication - urinary frequency - up multiple times a night       Initial /77 (BP Location: Right arm, Patient Position: Chair, Cuff Size: Adult Regular)  Pulse 76  Temp 98.1  F (36.7  C) (Oral)  Ht 1.778 m (5' 10\")  Wt 95.3 kg (210 lb)  BMI 30.13 kg/m2 Estimated body mass index is 30.13 kg/(m^2) as calculated from the following:    Height as of this encounter: 1.778 m (5' 10\").    Weight as of this encounter: 95.3 kg (210 lb).  Medication Reconciliation: complete     Ivan Rolle CMA      "

## 2017-10-03 NOTE — NURSING NOTE
Active order to obtain bladder scan? Yes   Name of ordering provider:  Dr. Caba  Bladder scan preformed post void Yes  Bladder scan reveled 25ML  Provider notified?  Yes    Ivan Rolle CMA

## 2017-10-03 NOTE — MR AVS SNAPSHOT
After Visit Summary   10/3/2017    Jamal Sanchez    MRN: [id number]           Patient Information     Date Of Birth          1955        Visit Information        Provider Department      10/3/2017 1:00 PM MARAL Caba MD [facility]        Today's Diagnoses     Urinary urgency    -  1    Urinary frequency          Care Instructions    Per Physician's instructions            Follow-ups after your visit        Your next 10 appointments already scheduled     Nov 07, 2017  8:30 AM CST   Return Visit with MARAL Caba MD   [facility]    [address] [facility] [address]   [phone]              Who to contact     If you have questions or need follow up information about today's clinic visit or your schedule please contact [facility] directly at [phone].  Normal or non-critical lab and imaging results will be communicated to you by Skelta Softwarehart, letter or phone within 4 business days after the clinic has received the results. If you do not hear from us within 7 days, please contact the clinic through Sk[employer] or phone. If you have a critical or abnormal lab result, we will notify you by phone as soon as possible.  Submit refill requests through [employer]Tango or call your pharmacy and they will forward the refill request to us. Please allow 3 business days for your refill to be completed.          Additional Information About Your Visit        MyChart Information     [employer] gives you secure access to your electronic health record. If you see a primary care provider, you can also send messages to your care team and make appointments. If you have questions, please call your primary care clinic.  If you do not have a primary care provider, please call [phone] and they will assist you.        Care EveryWhere ID     This is your Care EveryWhere ID. This could be used by other organizations to access your [facility]  "medical records  [id number]        Your Vitals Were     Pulse Temperature Height BMI (Body Mass Index)          76 98.1  F (36.7  C) (Oral) 1.778 m (5' 10\") 30.13 kg/m2         Blood Pressure from Last 3 Encounters:   10/03/17 126/77   08/29/17 118/71   08/02/17 136/85    Weight from Last 3 Encounters:   10/03/17 95.3 kg (210 lb)   08/29/17 95.3 kg (210 lb)   08/02/17 95.3 kg (210 lb)              We Performed the Following     MEASURE POST-VOID RESIDUAL URINE/BLADDER CAPACITY, US NON-IMAGING          Today's Medication Changes          These changes are accurate as of: 10/3/17 11:59 PM.  If you have any questions, ask your nurse or doctor.               Start taking these medicines.        Dose/Directions    oxybutynin 10 MG 24 hr tablet   Commonly known as:  DITROPAN XL   Used for:  Urinary urgency   Started by:  MARAL Caba MD        Dose:  10 mg   Take 1 tablet (10 mg) by mouth daily   Quantity:  30 tablet   Refills:  3         These medicines have changed or have updated prescriptions.        Dose/Directions    tamsulosin 0.4 MG capsule   Commonly known as:  FLOMAX   This may have changed:    - how much to take  - how to take this  - when to take this   Used for:  Urinary urgency   Changed by:  MARAL Caba MD        Dose:  0.4 mg   Take 1 capsule (0.4 mg) by mouth daily   Quantity:  90 capsule   Refills:  3            Where to get your medicines      [facility] [place] [facility]  [address]  [address], [place] [address]    Hours:  [facility] Phone:  [phone]     oxybutynin 10 MG 24 hr tablet    tamsulosin 0.4 MG capsule    tolterodine 4 MG 24 hr capsule                Primary Care Provider Office Phone # Fax #    Anjel Jimenes [phone] [address] [facility] [address]        Equal Access to Services     ERWIN MULLIGAN AH: Hadii conchita felipe hadasho Soomaali, waaxda luqadaha, qaybta kaalmada " berto perduegregorio rsoaliebrendan tejadaaan ah. So [facility] [phone].    ATENCIÓN: Si habla kade, tiene a zamarripa disposición servicios gratuitos de asistencia lingüística. Alon al [phone].    We comply with applicable federal civil rights laws and [place] laws. We do not discriminate on the basis of race, color, national origin, age, disability, sex, sexual orientation, or gender identity.            Thank you!     Thank you for choosing [facility]  for your care. Our goal is always to provide you with excellent care. Hearing back from our patients is one way we can continue to improve our services. Please take a few minutes to complete the written survey that you may receive in the mail after your visit with us. Thank you!             Your Updated Medication List - Protect others around you: Learn how to safely use, store and throw away your medicines at www.disposemymeds.org.          This list is accurate as of: 10/3/17 11:59 PM.  Always use your most recent med list.                   Brand Name Dispense Instructions for use Diagnosis    omeprazole 40 MG capsule    priLOSEC    30 capsule    Take 1 capsule (40 mg) by mouth daily Take 30-60 minutes before a meal.    Gastroesophageal reflux disease without esophagitis       oxybutynin 10 MG 24 hr tablet    DITROPAN XL    30 tablet    Take 1 tablet (10 mg) by mouth daily    Urinary urgency       sildenafil 20 MG tablet    REVATIO    50 tablet    Take 2-5 tablets ( mg) by mouth as needed for erectile insufficiency.  Never use with nitroglycerin, terazosin or doxazosin.    Combined arterial insufficiency and corporo-venous occlusive erectile dysfunction       tamsulosin 0.4 MG capsule    FLOMAX    90 capsule    Take 1 capsule (0.4 mg) by mouth daily    Urinary urgency       tolterodine 4 MG 24 hr capsule    DETROL LA    90 capsule    Take 1 capsule (4 mg) by mouth daily Due for appt in Dec    Urinary frequency       triamcinolone 0.1 %  cream    KENALOG    80 g    Apply sparingly to affected area three times daily as needed    Itching       triamterene-hydrochlorothiazide 37.5-25 MG per capsule    DYAZIDE    90 capsule    Take 1 capsule by mouth every morning    Hypertension, goal below 140/90

## 2017-10-04 NOTE — PROGRESS NOTES
Appointment source: Established Patient  Patient name: Jamal Sanchez  Urology Staff: Matteo Caba MD    Subjective: This is a 62 year old year old male returning for follow up of urinary urgency and erectile dysfunction.    Objective:  Sildenafil is proving useful for erectile dysfunction.    He has noted increased urinary urgency.    Post void residual 25 cc.    Assessment:  Possible decline in effectiveness of tolterodine for control of urinary urgency.    Plan:  Will try oxybutynin as a replacement for tolterodine. Return in about a month for follow up.    Total time 25 minutes, counseling 15 minutes discussing urinary urgency.

## 2017-10-11 DIAGNOSIS — R30.0 DYSURIA: Primary | ICD-10-CM

## 2017-10-17 DIAGNOSIS — R30.0 DYSURIA: ICD-10-CM

## 2017-10-17 LAB
ALBUMIN UR-MCNC: NEGATIVE MG/DL
APPEARANCE UR: CLEAR
BILIRUB UR QL STRIP: NEGATIVE
COLOR UR AUTO: YELLOW
GLUCOSE UR STRIP-MCNC: NEGATIVE MG/DL
HGB UR QL STRIP: NEGATIVE
KETONES UR STRIP-MCNC: NEGATIVE MG/DL
LEUKOCYTE ESTERASE UR QL STRIP: NEGATIVE
NITRATE UR QL: NEGATIVE
PH UR STRIP: 6 PH (ref 5–7)
RBC #/AREA URNS AUTO: NORMAL /HPF
SOURCE: NORMAL
SP GR UR STRIP: <=1.005 (ref 1–1.03)
UROBILINOGEN UR STRIP-ACNC: 0.2 EU/DL (ref 0.2–1)
WBC #/AREA URNS AUTO: NORMAL /HPF

## 2017-10-17 PROCEDURE — 87086 URINE CULTURE/COLONY COUNT: CPT | Performed by: UROLOGY

## 2017-10-17 PROCEDURE — 81001 URINALYSIS AUTO W/SCOPE: CPT | Performed by: UROLOGY

## 2017-10-19 LAB
BACTERIA SPEC CULT: NO GROWTH
Lab: NORMAL
SPECIMEN SOURCE: NORMAL

## 2017-11-07 ENCOUNTER — OFFICE VISIT (OUTPATIENT)
Dept: UROLOGY | Facility: CLINIC | Age: 62
End: 2017-11-07
Payer: COMMERCIAL

## 2017-11-07 DIAGNOSIS — R39.15 URINARY URGENCY: Primary | ICD-10-CM

## 2017-11-07 LAB
ALBUMIN UR-MCNC: NEGATIVE MG/DL
APPEARANCE UR: CLEAR
BILIRUB UR QL STRIP: NEGATIVE
COLOR UR AUTO: YELLOW
GLUCOSE UR STRIP-MCNC: NEGATIVE MG/DL
HGB UR QL STRIP: NEGATIVE
KETONES UR STRIP-MCNC: NEGATIVE MG/DL
LEUKOCYTE ESTERASE UR QL STRIP: NEGATIVE
NITRATE UR QL: NEGATIVE
PH UR STRIP: 7 PH (ref 5–7)
RBC #/AREA URNS AUTO: NORMAL /HPF
SOURCE: NORMAL
SP GR UR STRIP: 1.01 (ref 1–1.03)
UROBILINOGEN UR STRIP-ACNC: 0.2 EU/DL (ref 0.2–1)
WBC #/AREA URNS AUTO: NORMAL /HPF

## 2017-11-07 PROCEDURE — 99213 OFFICE O/P EST LOW 20 MIN: CPT | Performed by: UROLOGY

## 2017-11-07 PROCEDURE — 81001 URINALYSIS AUTO W/SCOPE: CPT | Performed by: UROLOGY

## 2017-11-07 PROCEDURE — 87086 URINE CULTURE/COLONY COUNT: CPT | Performed by: UROLOGY

## 2017-11-07 RX ORDER — OXYBUTYNIN CHLORIDE 15 MG/1
15 TABLET, EXTENDED RELEASE ORAL DAILY
Qty: 30 TABLET | Refills: 3 | Status: SHIPPED | OUTPATIENT
Start: 2017-11-07 | End: 2017-12-19

## 2017-11-07 NOTE — MR AVS SNAPSHOT
After Visit Summary   11/7/2017    Jamal Sanchez    MRN: [id number]           Patient Information     Date Of Birth          1955        Visit Information        Provider Department      11/7/2017 8:30 AM MARAL Caba MD [facility]        Today's Diagnoses     Urinary urgency    -  1       Follow-ups after your visit        Your next 10 appointments already scheduled     Dec 19, 2017  3:30 PM CST   [facility] Visit with MARAL Caba MD   [facility]    [address] [facility] [id number] [phone]              Who to contact     If you have questions or need follow up information about today's clinic visit or your schedule please contact [facility] directly at [phone].  Normal or non-critical lab and imaging results will be communicated to you by MyChart, letter or phone within 4 business days after the clinic has received the results. If you do not hear from us within 7 days, please contact the clinic through MyChart or phone. If you have a critical or abnormal lab result, we will notify you by phone as soon as possible.  Submit refill requests through [employer] or call your pharmacy and they will forward the refill request to us. Please allow 3 business days for your refill to be completed.          Additional Information About Your Visit        MyChart Information     [employer] gives you secure access to your electronic health record. If you see a primary care provider, you can also send messages to your care team and make appointments. If you have questions, please call your primary care clinic.  If you do not have a primary care provider, please call [phone] and they will assist you.        Care EveryWhere ID     This is your Care EveryWhere ID. This could be used by other organizations to access your [place] medical records  [id number]         Blood Pressure from Last 3 Encounters:   10/03/17  126/77   08/29/17 118/71   08/02/17 136/85    Weight from Last 3 Encounters:   10/03/17 95.3 kg (210 lb)   08/29/17 95.3 kg (210 lb)   08/02/17 95.3 kg (210 lb)              We Performed the Following     UA with Microscopic     Urine Culture Aerobic Bacterial          Today's Medication Changes          These changes are accurate as of: 11/7/17 11:59 PM.  If you have any questions, ask your nurse or doctor.               These medicines have changed or have updated prescriptions.        Dose/Directions    oxybutynin chloride 15 MG Tb24   Commonly known as:  DITROPAN XL   This may have changed:    - medication strength  - how much to take   Used for:  Urinary urgency        Dose:  15 mg   Take 1 tablet (15 mg) by mouth daily   Quantity:  30 tablet   Refills:  3         Stop taking these medicines if you haven't already. Please contact your care team if you have questions.     tolterodine 4 MG 24 hr capsule   Commonly known as:  DETROL LA                Where to get your medicines      These medications were sent to [facility] [place] - [address]  [address], [place] [address]    Hours:  THOM[facility] Phone:  [phone]     oxybutynin chloride 15 MG Tb24                Primary Care Provider Office Phone # Fax #    Anjel Jimenes [phone] [address] [facility] [address]        Equal Access to Services     [facility] AH: Hadii conchita ku hadasho Soomaali, waaxda luqadaha, qaybta kaalmada adeegyada, berto [place]. So [place] [phone].    ATENCIÓN: Si habla español, tiene a zamarripa disposición servicios gratuitos de asistencia lingüística. Llame al [phone].    We comply with applicable federal civil rights laws and [place] laws. We do not discriminate on the basis of race, color, national origin, age, disability, sex, sexual orientation, or gender identity.            Thank you!     Thank you for  choosing [facility]  for your care. Our goal is always to provide you with excellent care. Hearing back from our patients is one way we can continue to improve our services. Please take a few minutes to complete the written survey that you may receive in the mail after your visit with us. Thank you!             Your Updated Medication List - Protect others around you: Learn how to safely use, store and throw away your medicines at www.disposemymeds.org.          This list is accurate as of: 11/7/17 11:59 PM.  Always use your most recent med list.                   Brand Name Dispense Instructions for use Diagnosis    omeprazole 40 MG capsule    priLOSEC    30 capsule    Take 1 capsule (40 mg) by mouth daily Take 30-60 minutes before a meal.    Gastroesophageal reflux disease without esophagitis       oxybutynin chloride 15 MG Tb24    DITROPAN XL    30 tablet    Take 1 tablet (15 mg) by mouth daily    Urinary urgency       sildenafil 20 MG tablet    REVATIO    50 tablet    Take 2-5 tablets ( mg) by mouth as needed for erectile insufficiency.  Never use with nitroglycerin, terazosin or doxazosin.    Combined arterial insufficiency and corporo-venous occlusive erectile dysfunction       tamsulosin 0.4 MG capsule    FLOMAX    90 capsule    Take 1 capsule (0.4 mg) by mouth daily    Urinary urgency       triamcinolone 0.1 % cream    KENALOG    80 g    Apply sparingly to affected area three times daily as needed    Itching       triamterene-hydrochlorothiazide 37.5-25 MG per capsule    DYAZIDE    90 capsule    Take 1 capsule by mouth every morning    Hypertension, goal below 140/90

## 2017-11-08 LAB
BACTERIA SPEC CULT: NORMAL
Lab: NORMAL
SPECIMEN SOURCE: NORMAL

## 2017-11-08 NOTE — PROGRESS NOTES
Appointment source: Established Patient  Patient name: Jamal Sanchez  Urology Staff: Matteo Caba MD    Subjective: This is a 62 year old year old male returning for follow up of urinary urgency    Objective:  Has had moderate improvement on 10 mg XR oxybutynin and will now try 15 mg XR oxybutynin.    Post void residual is 105 cc and will recheck in one month on his higher dose of anticholinergic.    Assessment:  Probable congenital neurogenic bladder with additional BPH component.    Plan:  Continue alpha blockade and anticholinergic therapy.    Return in one month.    Total time 20 minutes, counseling 15 minutes discussing urgency.

## 2017-11-13 DIAGNOSIS — K21.9 GASTROESOPHAGEAL REFLUX DISEASE WITHOUT ESOPHAGITIS: ICD-10-CM

## 2017-11-13 NOTE — TELEPHONE ENCOUNTER
Omeprazole      Last Written Prescription Date: 09/29/2017  Last Fill Quantity: 30,  # refills: 0   Last Office Visit with G, [facility] or [facility] prescribing provider: 08/29/2017                                         Next 5 appointments (look out 90 days)     Dec 19, 2017  3:30 PM CST   [facility] Visit with MARAL Caba MD   [facility]    [address] [facility] [facility] [address]   [phone]                    Humberto SUÁREZ (R)

## 2017-11-14 RX ORDER — OMEPRAZOLE 40 MG/1
40 CAPSULE, DELAYED RELEASE ORAL DAILY
Qty: 90 CAPSULE | Refills: 1 | Status: SHIPPED | OUTPATIENT
Start: 2017-11-14 | End: 2018-08-28

## 2017-11-29 DIAGNOSIS — N52.03 COMBINED ARTERIAL INSUFFICIENCY AND CORPORO-VENOUS OCCLUSIVE ERECTILE DYSFUNCTION: ICD-10-CM

## 2017-11-29 RX ORDER — SILDENAFIL CITRATE 20 MG/1
40-100 TABLET ORAL PRN
Qty: 50 TABLET | Refills: 3 | Status: SHIPPED | OUTPATIENT
Start: 2017-11-29 | End: 2018-12-19

## 2017-11-29 NOTE — TELEPHONE ENCOUNTER
Medication refilled per [facility] RN protocol. Pt has follow up appt 12/2017    Mirela COVARRUBIAS RN BSN PHN  [facility] Clin[facility]

## 2017-11-29 NOTE — TELEPHONE ENCOUNTER
sildenafil (REVATIO/VIAGRA) 20 MG tablet    Date Last Filled: 11/29/17  QTY: 30    [facility]

## 2017-12-19 ENCOUNTER — OFFICE VISIT (OUTPATIENT)
Dept: UROLOGY | Facility: CLINIC | Age: 62
End: 2017-12-19
Payer: COMMERCIAL

## 2017-12-19 VITALS
HEIGHT: 70 IN | HEART RATE: 61 BPM | DIASTOLIC BLOOD PRESSURE: 86 MMHG | WEIGHT: 202.2 LBS | SYSTOLIC BLOOD PRESSURE: 133 MMHG | BODY MASS INDEX: 28.95 KG/M2

## 2017-12-19 DIAGNOSIS — R39.15 URINARY URGENCY: ICD-10-CM

## 2017-12-19 DIAGNOSIS — R30.0 DYSURIA: Primary | ICD-10-CM

## 2017-12-19 PROCEDURE — 81001 URINALYSIS AUTO W/SCOPE: CPT | Performed by: UROLOGY

## 2017-12-19 PROCEDURE — 99213 OFFICE O/P EST LOW 20 MIN: CPT | Performed by: UROLOGY

## 2017-12-19 PROCEDURE — 87086 URINE CULTURE/COLONY COUNT: CPT | Performed by: UROLOGY

## 2017-12-19 RX ORDER — TAMSULOSIN HYDROCHLORIDE 0.4 MG/1
0.4 CAPSULE ORAL DAILY
Qty: 90 CAPSULE | Refills: 3 | Status: SHIPPED | OUTPATIENT
Start: 2017-12-19 | End: 2018-11-20

## 2017-12-19 RX ORDER — OXYBUTYNIN CHLORIDE 15 MG/1
15 TABLET, EXTENDED RELEASE ORAL DAILY
Qty: 90 TABLET | Refills: 3 | Status: SHIPPED | OUTPATIENT
Start: 2017-12-19 | End: 2018-11-20

## 2017-12-19 NOTE — NURSING NOTE
"Chief Complaint   Patient presents with     RECHECK       Initial /86 (BP Location: Right arm, Patient Position: Sitting, Cuff Size: Adult Regular)  Pulse 61  Ht 1.778 m (5' 10\")  Wt 91.7 kg (202 lb 3.2 oz)  BMI 29.01 kg/m2 Estimated body mass index is 29.01 kg/(m^2) as calculated from the following:    Height as of this encounter: 1.778 m (5' 10\").    Weight as of this encounter: 91.7 kg (202 lb 3.2 oz).  Medication Reconciliation: complete   Madalyn Aguila CMA      "

## 2017-12-19 NOTE — MR AVS SNAPSHOT
After Visit Summary   12/19/2017    Jamal Sanchez    MRN: [id number]           Patient Information     Date Of Birth          1955        Visit Information        Provider Department      12/19/2017 3:30 PM MARAL Caba MD [facility]        Today's Diagnoses     Dysuria    -  1    Urinary urgency           Follow-ups after your visit        Your next 10 appointments already scheduled     Jun 19, 2018  8:00 AM CDT   Return Visit with MARAL Caba MD   [facility] ([facility]    [address] [facility] [address]   [phone]              Future tests that were ordered for you today     Open Future Orders        Priority Expected Expires Ordered    MR Shoulder Right w/o Contrast Routine  12/19/2018 12/19/2017            Who to contact     If you have questions or need follow up information about today's clinic visit or your schedule please contact [facility] directly at [phone].  Normal or non-critical lab and imaging results will be communicated to you by [employer]hart, letter or phone within 4 business days after the clinic has received the results. If you do not hear from us within 7 days, please contact the clinic through [employer] or phone. If you have a critical or abnormal lab result, we will notify you by phone as soon as possible.  Submit refill requests through [employer] or call your pharmacy and they will forward the refill request to us. Please allow 3 business days for your refill to be completed.          Additional Information About Your Visit        MyChart Information     [employer] gives you secure access to your electronic health record. If you see a primary care provider, you can also send messages to your care team and make appointments. If you have questions, please call your primary care clinic.  If you do not have a primary care provider, please call [phone] and they will assist you.        Care  "EveryWhere ID     This is your Care EveryWhere ID. This could be used by other organizations to access your [place] medical records  [id number]        Your Vitals Were     Pulse Height BMI (Body Mass Index)             61 1.778 m (5' 10\") 29.01 kg/m2          Blood Pressure from Last 3 Encounters:   12/19/17 133/86   10/03/17 126/77   08/29/17 118/71    Weight from Last 3 Encounters:   12/19/17 91.7 kg (202 lb 3.2 oz)   10/03/17 95.3 kg (210 lb)   08/29/17 95.3 kg (210 lb)              We Performed the Following     UA with Microscopic     Urine Culture Aerobic Bacterial          Where to get your medicines      [facility] [address]  [address], [place] [address]    Hours:  [facility] Phone:  [phone]     oxybutynin chloride 15 MG Tb24    tamsulosin 0.4 MG capsule          Primary Care Provider Office Phone # Fax #    Anjel Jimenes [phone] [address] [facility] [address]        Equal Access to Services     BETSEY MULLIGAN AH: Hadii aad ku hadasho Soomaali, waaxda luqadaha, qaybta kaalmada adeegyada, waxay idiin hayabriln severo [place]. So [facility] [phone].    ATENCIÓN: Si habla español, tiene a zamarripa disposición servicios gratuitos de asistencia lingüística. Llame al [phone].    We comply with applicable federal civil rights laws and [place] laws. We do not discriminate on the basis of race, color, national origin, age, disability, sex, sexual orientation, or gender identity.            Thank you!     Thank you for choosing [facility]  for your care. Our goal is always to provide you with excellent care. Hearing back from our patients is one way we can continue to improve our services. Please take a few minutes to complete the written survey that you may receive in the mail after your visit with us. Thank you!             Your Updated Medication List - " Protect others around you: Learn how to safely use, store and throw away your medicines at www.disposemymeds.org.          This list is accurate as of: 12/19/17 11:59 PM.  Always use your most recent med list.                   Brand Name Dispense Instructions for use Diagnosis    omeprazole 40 MG capsule    priLOSEC    90 capsule    Take 1 capsule (40 mg) by mouth daily Take 30-60 minutes before a meal.    Gastroesophageal reflux disease without esophagitis       oxybutynin chloride 15 MG Tb24    DITROPAN XL    90 tablet    Take 1 tablet (15 mg) by mouth daily    Urinary urgency       sildenafil 20 MG tablet    REVATIO    50 tablet    Take 2-5 tablets ( mg) by mouth as needed for erectile insufficiency.  Never use with nitroglycerin, terazosin or doxazosin.    Combined arterial insufficiency and corporo-venous occlusive erectile dysfunction       tamsulosin 0.4 MG capsule    FLOMAX    90 capsule    Take 1 capsule (0.4 mg) by mouth daily    Urinary urgency       triamcinolone 0.1 % cream    KENALOG    80 g    Apply sparingly to affected area three times daily as needed    Itching       triamterene-hydrochlorothiazide 37.5-25 MG per capsule    DYAZIDE    90 capsule    Take 1 capsule by mouth every morning    Hypertension, goal below 140/90

## 2017-12-20 LAB
BACTERIA SPEC CULT: NO GROWTH
Lab: NORMAL
SPECIMEN SOURCE: NORMAL

## 2017-12-20 NOTE — PROGRESS NOTES
Appointment source: Established Patient  Patient name: Jamal Sanchez  Urology Staff: Matteo Caba MD    Subjective: This is a 62 year old year old male returning for follow up of urinary urgency    Objective:  Responded satisfactorily to an increased dose of oxybutynin from 10 mg XR to 15 mg XR.    Post void residual is negligible.    Plan:  Return for follow up in about 6 months.    Total time 20 minutes, counseling 15 minutes discussing urinary urgency

## 2017-12-28 ENCOUNTER — HOSPITAL ENCOUNTER (OUTPATIENT)
Dept: MRI IMAGING | Facility: CLINIC | Age: 62
Discharge: HOME | End: 2017-12-28
Attending: ORTHOPAEDIC SURGERY | Admitting: ORTHOPAEDIC SURGERY
Payer: COMMERCIAL

## 2017-12-28 DIAGNOSIS — M25.519 SHOULDER PAIN: ICD-10-CM

## 2017-12-28 PROCEDURE — 73221 MRI JOINT UPR EXTREM W/O DYE: CPT | Mod: RT

## 2018-01-09 ENCOUNTER — TRANSFERRED RECORDS (OUTPATIENT)
Dept: HEALTH INFORMATION MANAGEMENT | Facility: CLINIC | Age: 63
End: 2018-01-09

## 2018-01-15 ENCOUNTER — OFFICE VISIT (OUTPATIENT)
Dept: FAMILY MEDICINE | Facility: CLINIC | Age: 63
End: 2018-01-15
Payer: COMMERCIAL

## 2018-01-15 VITALS
DIASTOLIC BLOOD PRESSURE: 81 MMHG | WEIGHT: 206 LBS | RESPIRATION RATE: 18 BRPM | TEMPERATURE: 98.3 F | SYSTOLIC BLOOD PRESSURE: 132 MMHG | BODY MASS INDEX: 29.49 KG/M2 | HEART RATE: 71 BPM | HEIGHT: 70 IN

## 2018-01-15 DIAGNOSIS — K21.9 GASTROESOPHAGEAL REFLUX DISEASE WITHOUT ESOPHAGITIS: ICD-10-CM

## 2018-01-15 DIAGNOSIS — Z01.818 PREOP GENERAL PHYSICAL EXAM: Primary | ICD-10-CM

## 2018-01-15 DIAGNOSIS — I10 HYPERTENSION, GOAL BELOW 140/90: ICD-10-CM

## 2018-01-15 DIAGNOSIS — M75.101 TEAR OF RIGHT ROTATOR CUFF, UNSPECIFIED TEAR EXTENT: ICD-10-CM

## 2018-01-15 PROCEDURE — 99214 OFFICE O/P EST MOD 30 MIN: CPT | Performed by: FAMILY MEDICINE

## 2018-01-15 NOTE — PROGRESS NOTES
[facility] [place]  [address] [facility] [address]  [phone]  Dept: [phone]    PRE-OP EVALUATION:  Today's date: 1/15/2018    Jamal Sanchez (: 1955) presents for pre-operative evaluation assessment as requested by Dr. Sigala .  He requires evaluation and anesthesia risk assessment prior to undergoing surgery/procedure for treatment of  Right shoulder pain  .  Proposed procedure: Arthroscopy  Right shoulder     Date of Surgery/ Procedure: 18  Time of Surgery/ Procedure: ?  Hospital/Surgical Facility: [place]   Fax number for surgical facility:   Primary Physician: Anjel Jimenes  Type of Anesthesia Anticipated: General    Patient has a Health Care Directive or Living Will:  NO    1. NO - Do you have a history of heart attack, stroke, stent, bypass or surgery on an artery in the head, neck, heart or legs?  2. NO - Do you ever have any pain or discomfort in your chest?  3. NO - Do you have a history of  Heart Failure?  4. NO - Are you troubled by shortness of breath when: walking on the level, up a slight hill or at night?  5. NO - Do you currently have a cold, bronchitis or other respiratory infection?  6. NO - Do you have a cough, shortness of breath or wheezing?  7. NO - Do you sometimes get pains in the calves of your legs when you walk?  8. NO - Do you or anyone in your family have previous history of blood clots?  9. NO - Do you or does anyone in your family have a serious bleeding problem such as prolonged bleeding following surgeries or cuts?  10. NO - Have you ever had problems with anemia or been told to take iron pills?  11. NO - Have you had any abnormal blood loss such as black, tarry or bloody stools, or abnormal vaginal bleeding?  12. NO - Have you ever had a blood transfusion?  13. NO - Have you or any of your relatives ever had problems with anesthesia?  14. NO - Do you have sleep apnea, excessive snoring or daytime drowsiness?  15. NO - Do you  have any prosthetic heart valves?  16. NO - Do you have prosthetic joints?  17. NO - Is there any chance that you may be pregnant?        HPI:                                                      Brief HPI related to upcoming procedure:       He is scheduled for right rotator cuff repair. The surgeon is requesting consultation regarding anesthesia and surgical risk for this patient with respect to current and past medical conditions.      MEDICAL HISTORY:                                                    Patient Active Problem List    Diagnosis Date Noted     Appendicitis 01/06/2016     Priority: Medium     Hypertension, goal below 140/90 07/06/2015     Priority: Medium     Advanced directives, counseling/discussion 10/16/2013     Priority: Medium     Patient does not have an Advance/Health Care Directive (HCD), requests blank HCD form.    Nina Shukla  October 16, 2013         GERD (gastroesophageal reflux disease) 08/07/2013     Priority: Medium     Benign prostatic hypertrophy 04/16/2008     Priority: Medium     CARDIOVASCULAR SCREENING; LDL GOAL LESS THAN 160 10/31/2010     Priority: Low      Past Medical History:   Diagnosis Date     BPH (benign prostatic hyperplasia)      GERD (gastroesophageal reflux disease)      Hypertension      Past Surgical History:   Procedure Laterality Date     COLONOSCOPY  3/5/2008     COLONOSCOPY  10/9/2013    Procedure: COLONOSCOPY;;  Surgeon: Nicolas Lizama MD;  Location: [facility]     ESOPHAGOSCOPY, GASTROSCOPY, DUODENOSCOPY (EGD), COMBINED  10/9/2013    Procedure: COMBINED ESOPHAGOSCOPY, GASTROSCOPY, DUODENOSCOPY (EGD), BIOPSY SINGLE OR MULTIPLE;;  Surgeon: Nicolas Lizama MD;  Location: [facility]     LAPAROSCOPIC APPENDECTOMY N/A 1/7/2016    Procedure: LAPAROSCOPIC APPENDECTOMY;  Surgeon: Ab Guzman MD;  Location: [facility]     LAPAROSCOPIC HERNIORRHAPHY INGUINAL BILATERAL Bilateral 12/13/2016    Procedure: LAPAROSCOPIC HERNIORRHAPHY INGUINAL BILATERAL;  Surgeon:  "Ab Guzman MD;  Location: [facility]     Current Outpatient Prescriptions   Medication Sig Dispense Refill     sildenafil (REVATIO) 20 MG tablet Take 2-5 tablets ( mg) by mouth as needed for erectile insufficiency.  Never use with nitroglycerin, terazosin or doxazosin. 50 tablet 3     omeprazole (PRILOSEC) 40 MG capsule Take 1 capsule (40 mg) by mouth daily Take 30-60 minutes before a meal. 90 capsule 1     triamterene-hydrochlorothiazide (DYAZIDE) 37.5-25 MG per capsule Take 1 capsule by mouth every morning 90 capsule 3     oxybutynin chloride (DITROPAN XL) 15 MG TB24 Take 1 tablet (15 mg) by mouth daily 90 tablet 3     tamsulosin (FLOMAX) 0.4 MG capsule Take 1 capsule (0.4 mg) by mouth daily 90 capsule 3     triamcinolone (KENALOG) 0.1 % cream Apply sparingly to affected area three times daily as needed (Patient not taking: Reported on 1/15/2018) 80 g 2     OTC products: None, except as noted above    Allergies   Allergen Reactions     Nka [No Known Allergies]       Latex Allergy: NO    Social History   Substance Use Topics     Smoking status: Former Smoker     Types: Cigarettes     Quit date: 5/22/2004     Smokeless tobacco: Never Used     Alcohol use Yes      Comment: 6 pack beer on weekend.     History   Drug Use No       REVIEW OF SYSTEMS:                                                    Constitutional, neuro, ENT, endocrine, pulmonary, cardiac, gastrointestinal, genitourinary, musculoskeletal, integument and psychiatric systems are negative, except as otherwise noted.      EXAM:                                                    /81  Pulse 71  Temp 98.3  F (36.8  C)  Resp 18  Ht 5' 10\" (1.778 m)  Wt 206 lb (93.4 kg)  BMI 29.56 kg/m2    GENERAL APPEARANCE: healthy, alert and no distress     EYES: EOMI,  PERRL     HENT: ear canals and TM's normal and nose and mouth without ulcers or lesions     NECK: no adenopathy, no asymmetry, masses, or scars and thyroid normal to palpation     RESP: lungs " clear to auscultation - no rales, rhonchi or wheezes     CV: regular rates and rhythm, normal S1 S2, no S3 or S4 and no murmur, click or rub     ABDOMEN:  soft, nontender, no HSM or masses and bowel sounds normal     MS: extremities normal- no gross deformities noted, no evidence of inflammation in joints, FROM in all extremities.     SKIN: no suspicious lesions or rashes     NEURO: Normal strength and tone, sensory exam grossly normal, mentation intact and speech normal     PSYCH: mentation appears normal. and affect normal/bright     LYMPHATICS: No axillary, cervical, or supraclavicular nodes    DIAGNOSTICS:                                                    EK2017 appears normal, NSR, normal axis, normal intervals, no acute ST/T changes c/w ischemia, no LVH by voltage criteria, unchanged from previous tracings    Recent Labs   Lab Test  17   1545  17   0348   HGB  12.3*  14.5   PLT  187  132*   NA  137  129*   POTASSIUM  4.3  3.8   CR  0.89  1.01        IMPRESSION:                                                    Reason for surgery/procedure: rotator cuff tear  Diagnosis/reason for consult:    Preop general physical exam  Tear of right rotator cuff, unspecified tear extent  Hypertension, goal below 140/90  Gastroesophageal reflux disease without esophagitis      The proposed surgical procedure is considered INTERMEDIATE risk.    REVISED CARDIAC RISK INDEX  The patient has the following serious cardiovascular risks for perioperative complications such as (MI, PE, VFib and 3  AV Block):  No serious cardiac risks  INTERPRETATION: 0 risks: Class I (very low risk - 0.4% complication rate)    The patient has the following additional risks for perioperative complications:  No identified additional risks    No diagnosis found.    RECOMMENDATIONS:                                                          --Patient is to take all scheduled medications on the day of surgery EXCEPT for modifications listed  below.    APPROVAL GIVEN to proceed with proposed procedure, without further diagnostic evaluation       Signed Electronically by: Anjel Jimenes MD    Copy of this evaluation report is provided to requesting physician.    [facility] Preop Guidelines

## 2018-01-15 NOTE — NURSING NOTE
"Chief Complaint   Patient presents with     Pre-Op Exam       Initial /81  Pulse 71  Temp 98.3  F (36.8  C)  Resp 18  Ht 5' 10\" (1.778 m)  Wt 206 lb (93.4 kg)  BMI 29.56 kg/m2 Estimated body mass index is 29.56 kg/(m^2) as calculated from the following:    Height as of this encounter: 5' 10\" (1.778 m).    Weight as of this encounter: 206 lb (93.4 kg).  Medication Reconciliation: complete   Leila Blanco CMA      "

## 2018-01-15 NOTE — MR AVS SNAPSHOT
After Visit Summary   1/15/2018    Jamal Sanchez    MRN: [id number]           Patient Information     Date Of Birth          1955        Visit Information        Provider Department      1/15/2018 8:20 AM Anjel Jimenes MD [facility]        Today's Diagnoses     Preop general physical exam    -  1    Tear of right rotator cuff, unspecified tear extent        Hypertension, goal below 140/90        Gastroesophageal reflux disease without esophagitis          Care Instructions      Before Your Surgery      Call your surgeon if there is any change in your health. This includes signs of a cold or flu (such as a sore throat, runny nose, cough, rash or fever).    Do not smoke, drink alcohol or take over the counter medicine (unless your surgeon or primary care doctor tells you to) for the 24 hours before and after surgery.    If you take prescribed drugs: Follow your doctor s orders about which medicines to take and which to stop until after surgery.    Eating and drinking prior to surgery: follow the instructions from your surgeon    Take a shower or bath the night before surgery. Use the soap your surgeon gave you to gently clean your skin. If you do not have soap from your surgeon, use your regular soap. Do not shave or scrub the surgery site.  Wear clean pajamas and have clean sheets on your bed.           Follow-ups after your visit        Your next 10 appointments already scheduled     Jun 19, 2018  8:00 AM CDT   [facility]   [facility]    [address] [address] [phone]              Who to contact     If you have questions or need follow up information about today's clinic visit or your schedule please contact [facility] directly at [phone].  Normal or non-critical lab and imaging results will be communicated to you by MyChart, letter or phone within 4  "business days after the clinic has received the results. If you do not hear from us within 7 days, please contact the clinic through [employer] or phone. If you have a critical or abnormal lab result, we will notify you by phone as soon as possible.  Submit refill requests through [employer] or call your pharmacy and they will forward the refill request to us. Please allow 3 business days for your refill to be completed.          Additional Information About Your Visit        [employer] Information     [employer] gives you secure access to your electronic health record. If you see a primary care provider, you can also send messages to your care team and make appointments. If you have questions, please call your primary care clinic.  If you do not have a primary care provider, please call [phone] and they will assist you.        Care EveryWhere ID     This is your Care EveryWhere ID. This could be used by other organizations to access your [facility] medical records  [id number]        Your Vitals Were     Pulse Temperature Respirations Height BMI (Body Mass Index)       71 98.3  F (36.8  C) 18 5' 10\" (1.778 m) 29.56 kg/m2        Blood Pressure from Last 3 Encounters:   01/15/18 132/81   12/19/17 133/86   10/03/17 126/77    Weight from Last 3 Encounters:   01/15/18 206 lb (93.4 kg)   12/19/17 202 lb 3.2 oz (91.7 kg)   10/03/17 210 lb (95.3 kg)              Today, you had the following     No orders found for display       Primary Care Provider Office Phone # Fax #    Anjel Jimenes [phone] [address] [facility] [address]        Equal Access to Services     ERWIN MULLIGAN : Hadii conchita bennett Socarloz, waaxda luqadaha, qaybta kaalberto dockery. So [facility] [phone].    ATENCIÓN: Si habla español, tiene a zamarripa disposición servicios gratuitos de asistencia lingüística. Alon al [phone].    We comply with applicable federal civil rights laws and " [place] laws. We do not discriminate on the basis of race, color, national origin, age, disability, sex, sexual orientation, or gender identity.            Thank you!     Thank you for choosing [facility]  for your care. Our goal is always to provide you with excellent care. Hearing back from our patients is one way we can continue to improve our services. Please take a few minutes to complete the written survey that you may receive in the mail after your visit with us. Thank you!             Your Updated Medication List - Protect others around you: Learn how to safely use, store and throw away your medicines at www.disposemymeds.org.          This list is accurate as of: 1/15/18  8:43 AM.  Always use your most recent med list.                   Brand Name Dispense Instructions for use Diagnosis    omeprazole 40 MG capsule    priLOSEC    90 capsule    Take 1 capsule (40 mg) by mouth daily Take 30-60 minutes before a meal.    Gastroesophageal reflux disease without esophagitis       oxybutynin chloride 15 MG Tb24    DITROPAN XL    90 tablet    Take 1 tablet (15 mg) by mouth daily    Urinary urgency       sildenafil 20 MG tablet    REVATIO    50 tablet    Take 2-5 tablets ( mg) by mouth as needed for erectile insufficiency.  Never use with nitroglycerin, terazosin or doxazosin.    Combined arterial insufficiency and corporo-venous occlusive erectile dysfunction       tamsulosin 0.4 MG capsule    FLOMAX    90 capsule    Take 1 capsule (0.4 mg) by mouth daily    Urinary urgency       triamcinolone 0.1 % cream    KENALOG    80 g    Apply sparingly to affected area three times daily as needed    Itching       triamterene-hydrochlorothiazide 37.5-25 MG per capsule    DYAZIDE    90 capsule    Take 1 capsule by mouth every morning    Hypertension, goal below 140/90

## 2018-02-03 ENCOUNTER — MYC REFILL (OUTPATIENT)
Dept: FAMILY MEDICINE | Facility: CLINIC | Age: 63
End: 2018-02-03

## 2018-02-03 DIAGNOSIS — L29.9 ITCHING: ICD-10-CM

## 2018-02-05 RX ORDER — TRIAMCINOLONE ACETONIDE 1 MG/G
CREAM TOPICAL
Qty: 80 G | Refills: 1 | Status: SHIPPED | OUTPATIENT
Start: 2018-02-05 | End: 2018-11-20

## 2018-02-05 NOTE — TELEPHONE ENCOUNTER
Message from MyChart:  Original authorizing provider: MD Gopal Rincon would like a refill of the following medications:  triamcinolone (KENALOG) 0.1 % cream [Anjel Jimenes MD]    Preferred pharmacy: [facility] - [facility] [id number] [address]    Comment:

## 2018-02-09 ENCOUNTER — HOSPITAL ENCOUNTER (OUTPATIENT)
Dept: PHYSICAL THERAPY | Facility: CLINIC | Age: 63
Setting detail: THERAPIES SERIES
End: 2018-02-09
Attending: ORTHOPAEDIC SURGERY
Payer: COMMERCIAL

## 2018-02-09 ENCOUNTER — TRANSFERRED RECORDS (OUTPATIENT)
Dept: HEALTH INFORMATION MANAGEMENT | Facility: CLINIC | Age: 63
End: 2018-02-09

## 2018-02-09 PROCEDURE — 97110 THERAPEUTIC EXERCISES: CPT | Mod: GP | Performed by: PHYSICAL THERAPIST

## 2018-02-09 PROCEDURE — 97161 PT EVAL LOW COMPLEX 20 MIN: CPT | Mod: GP | Performed by: PHYSICAL THERAPIST

## 2018-02-09 PROCEDURE — 40000718 ZZHC STATISTIC PT DEPARTMENT ORTHO VISIT: Performed by: PHYSICAL THERAPIST

## 2018-02-09 NOTE — PROGRESS NOTES
Jamal Sanchez   PHYSICAL THERAPY EVALUATION    02/09/18 1300   General Information   Type of Visit Initial OP Ortho PT Evaluation   Start of Care Date 02/09/18   Referring Physician Dr. Rojas   Patient/Family Goals Statement regain full use R shoulder   Orders Evaluate and Treat   Date of Order 02/09/18   Insurance Type [employer]   Insurance Comments/Visits Authorized no limits   Medical Diagnosis RCR   Body Part(s)   Body Part(s) Shoulder   Presentation and Etiology   Pertinent history of current problem (include personal factors and/or comorbidities that impact the POC) R massive RCR 1/29/18, with bicep tenodesis.     Onset date of current episode/exacerbation 01/29/18   Current Level of Function   Patient role/employment history A. 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 No   Have you fallen and had an injury in the past year? No   Is patient a fall risk? No   Shoulder Objective Findings   Side (if bilateral, select both right and left) Right   Observation pt presents in immobilizer   Right Shoulder ER PROM 30*   Planned Therapy Interventions   Planned Therapy Interventions ROM;strengthening   Clinical Impression   Criteria for Skilled Therapeutic Interventions Met yes, treatment indicated   PT Diagnosis limited ROM and strength post op rotator cuff repair   Functional limitations due to impairments any R UE use   Clinical Presentation Evolving/Changing   Clinical Presentation Rationale no factors to affect plan   Clinical Decision Making (Complexity) Low complexity   Therapy Frequency 1 time/week   Predicted Duration of Therapy Intervention (days/wks) 12 wks   Risk & Benefits of therapy have been explained Yes   Patient, Family & other staff in agreement with plan of care Yes   Education Assessment   Preferred Learning Style Listening   Barriers to  Learning No barriers   ORTHO GOALS   PT Ortho Eval Goals 1;2;3   Ortho Goal 1   Goal Description will be able to reach lowest shelf of cupboard in 8wk   Target Date 04/09/18   Ortho Goal 2   Goal Description return to work in 11 wks - when FLMA expires   Target Date 04/24/18   Ortho Goal 3   Goal Description will be able to dress and do personal cares with ease in 8wks   Target Date 04/09/18   Total Evaluation Time   Total Evaluation Time 15   Kris Hoenk, PT #[id number]  [facility]

## 2018-02-15 ENCOUNTER — HOSPITAL ENCOUNTER (OUTPATIENT)
Dept: PHYSICAL THERAPY | Facility: CLINIC | Age: 63
Setting detail: THERAPIES SERIES
End: 2018-02-15
Attending: ORTHOPAEDIC SURGERY
Payer: COMMERCIAL

## 2018-02-15 PROCEDURE — 97110 THERAPEUTIC EXERCISES: CPT | Mod: GP | Performed by: PHYSICAL THERAPIST

## 2018-02-15 PROCEDURE — 40000718 ZZHC STATISTIC PT DEPARTMENT ORTHO VISIT: Performed by: PHYSICAL THERAPIST

## 2018-02-15 NOTE — ADDENDUM NOTE
Encounter addended by: Hoenk, Kris, PT on: 2/15/2018 10:01 AM<BR>     Actions taken: Flowsheet accepted

## 2018-02-22 ENCOUNTER — HOSPITAL ENCOUNTER (OUTPATIENT)
Dept: PHYSICAL THERAPY | Facility: CLINIC | Age: 63
Setting detail: THERAPIES SERIES
End: 2018-02-22
Attending: ORTHOPAEDIC SURGERY
Payer: COMMERCIAL

## 2018-02-22 PROCEDURE — 40000718 ZZHC STATISTIC PT DEPARTMENT ORTHO VISIT: Performed by: PHYSICAL THERAPIST

## 2018-02-22 PROCEDURE — 97110 THERAPEUTIC EXERCISES: CPT | Mod: GP | Performed by: PHYSICAL THERAPIST

## 2018-03-09 ENCOUNTER — HOSPITAL ENCOUNTER (OUTPATIENT)
Dept: PHYSICAL THERAPY | Facility: CLINIC | Age: 63
Setting detail: THERAPIES SERIES
End: 2018-03-09
Attending: ORTHOPAEDIC SURGERY
Payer: COMMERCIAL

## 2018-03-09 PROCEDURE — 40000718 ZZHC STATISTIC PT DEPARTMENT ORTHO VISIT: Performed by: PHYSICAL THERAPIST

## 2018-03-09 PROCEDURE — 97110 THERAPEUTIC EXERCISES: CPT | Mod: GP | Performed by: PHYSICAL THERAPIST

## 2018-03-09 PROCEDURE — 97140 MANUAL THERAPY 1/> REGIONS: CPT | Mod: GP | Performed by: PHYSICAL THERAPIST

## 2018-03-13 ENCOUNTER — TRANSFERRED RECORDS (OUTPATIENT)
Dept: HEALTH INFORMATION MANAGEMENT | Facility: CLINIC | Age: 63
End: 2018-03-13

## 2018-03-13 ENCOUNTER — MEDICAL CORRESPONDENCE (OUTPATIENT)
Dept: HEALTH INFORMATION MANAGEMENT | Facility: CLINIC | Age: 63
End: 2018-03-13

## 2018-03-15 ENCOUNTER — HOSPITAL ENCOUNTER (OUTPATIENT)
Dept: PHYSICAL THERAPY | Facility: CLINIC | Age: 63
Setting detail: THERAPIES SERIES
End: 2018-03-15
Attending: ORTHOPAEDIC SURGERY
Payer: COMMERCIAL

## 2018-03-15 PROCEDURE — 40000718 ZZHC STATISTIC PT DEPARTMENT ORTHO VISIT: Performed by: PHYSICAL THERAPIST

## 2018-03-15 PROCEDURE — 97110 THERAPEUTIC EXERCISES: CPT | Mod: GP | Performed by: PHYSICAL THERAPIST

## 2018-03-20 ENCOUNTER — HOSPITAL ENCOUNTER (OUTPATIENT)
Dept: PHYSICAL THERAPY | Facility: CLINIC | Age: 63
Setting detail: THERAPIES SERIES
End: 2018-03-20
Attending: ORTHOPAEDIC SURGERY
Payer: COMMERCIAL

## 2018-03-20 PROCEDURE — 97110 THERAPEUTIC EXERCISES: CPT | Mod: GP | Performed by: PHYSICAL THERAPIST

## 2018-03-20 PROCEDURE — 40000718 ZZHC STATISTIC PT DEPARTMENT ORTHO VISIT: Performed by: PHYSICAL THERAPIST

## 2018-03-27 ENCOUNTER — HOSPITAL ENCOUNTER (OUTPATIENT)
Dept: PHYSICAL THERAPY | Facility: CLINIC | Age: 63
Setting detail: THERAPIES SERIES
End: 2018-03-27
Attending: ORTHOPAEDIC SURGERY
Payer: COMMERCIAL

## 2018-03-27 PROCEDURE — 97110 THERAPEUTIC EXERCISES: CPT | Mod: GP | Performed by: PHYSICAL THERAPIST

## 2018-03-27 PROCEDURE — 40000718 ZZHC STATISTIC PT DEPARTMENT ORTHO VISIT: Performed by: PHYSICAL THERAPIST

## 2018-03-30 ENCOUNTER — TRANSFERRED RECORDS (OUTPATIENT)
Dept: HEALTH INFORMATION MANAGEMENT | Facility: CLINIC | Age: 63
End: 2018-03-30

## 2018-04-03 ENCOUNTER — HOSPITAL ENCOUNTER (OUTPATIENT)
Dept: PHYSICAL THERAPY | Facility: CLINIC | Age: 63
Setting detail: THERAPIES SERIES
End: 2018-04-03
Attending: ORTHOPAEDIC SURGERY
Payer: COMMERCIAL

## 2018-04-03 PROCEDURE — 40000718 ZZHC STATISTIC PT DEPARTMENT ORTHO VISIT: Performed by: PHYSICAL THERAPIST

## 2018-04-03 PROCEDURE — 97110 THERAPEUTIC EXERCISES: CPT | Mod: GP | Performed by: PHYSICAL THERAPIST

## 2018-04-09 ENCOUNTER — HOSPITAL ENCOUNTER (OUTPATIENT)
Dept: PHYSICAL THERAPY | Facility: CLINIC | Age: 63
Setting detail: THERAPIES SERIES
End: 2018-04-09
Attending: ORTHOPAEDIC SURGERY
Payer: COMMERCIAL

## 2018-04-09 PROCEDURE — 40000718 ZZHC STATISTIC PT DEPARTMENT ORTHO VISIT: Performed by: PHYSICAL THERAPIST

## 2018-04-09 PROCEDURE — 97110 THERAPEUTIC EXERCISES: CPT | Mod: GP | Performed by: PHYSICAL THERAPIST

## 2018-04-17 ENCOUNTER — HOSPITAL ENCOUNTER (OUTPATIENT)
Dept: PHYSICAL THERAPY | Facility: CLINIC | Age: 63
Setting detail: THERAPIES SERIES
End: 2018-04-17
Attending: ORTHOPAEDIC SURGERY
Payer: COMMERCIAL

## 2018-04-17 PROCEDURE — 97110 THERAPEUTIC EXERCISES: CPT | Mod: GP | Performed by: PHYSICAL THERAPIST

## 2018-04-17 PROCEDURE — 40000718 ZZHC STATISTIC PT DEPARTMENT ORTHO VISIT: Performed by: PHYSICAL THERAPIST

## 2018-04-17 NOTE — PROGRESS NOTES
Jamal Sanchez   PHYSICAL THERAPY PROGRESS NOTE  04/17/18 0900   Signing Clinician's Name / Credentials   Signing clinician's name / credentials Kris Hoenk, PT   Session Number   Session Number 10 medica   Ortho Goal 1   Goal Description will be able to reach lowest shelf of cupboard in 8wk  (can reach it but not lift anything)   Target Date 04/09/18   Date Met 03/27/18   Ortho Goal 2   Goal Description return to work in 11 wks - when FLMA expires   Target Date 04/24/18   Ortho Goal 3   Goal Description will be able to dress and do personal cares with ease in 8wks   Target Date 04/09/18   Date Met 04/09/18   Subjective Report   Subjective Report hurt his arm Friday, tried some weights at [facility] - knows he was told not to do any weights   Objective Measure 1   Details AROM flexion and ABD WFL,min scap elev at end range shld flex and abd, PROM ER 90* at 90abd, IR 55* at 90abd, IR behind back to L1 with pain, MMT IR 5, ER 4 with pain   Treatment Interventions   Interventions Therapeutic Procedure/Exercise   Therapeutic Procedure/exercise   Minutes 23   Skilled Intervention adding AROM , scap stab  progression of HEP to improve functional use   Patient Response understood cues for ex   Treatment Detail UBE 4min, Pulleys, SL ER 0# x25, active flex and scaption, wall push ups, PROM R shld   Progress suggested stretching only couple days, let shoulder ecover, then resume ex   Plan   Plan for next session cont 1x/wk, follow protocol, now doing active ex, no weights til 12 wks   Comments   Comments DOS 1/29/18   Kris Hoenk, PT #[id number]  [facility]

## 2018-04-20 ENCOUNTER — TRANSFERRED RECORDS (OUTPATIENT)
Dept: HEALTH INFORMATION MANAGEMENT | Facility: CLINIC | Age: 63
End: 2018-04-20

## 2018-04-24 ENCOUNTER — HOSPITAL ENCOUNTER (OUTPATIENT)
Dept: PHYSICAL THERAPY | Facility: CLINIC | Age: 63
Setting detail: THERAPIES SERIES
End: 2018-04-24
Attending: ORTHOPAEDIC SURGERY
Payer: COMMERCIAL

## 2018-04-24 PROCEDURE — 40000718 ZZHC STATISTIC PT DEPARTMENT ORTHO VISIT: Performed by: PHYSICAL THERAPIST

## 2018-04-24 PROCEDURE — 97110 THERAPEUTIC EXERCISES: CPT | Mod: GP | Performed by: PHYSICAL THERAPIST

## 2018-05-01 ENCOUNTER — HOSPITAL ENCOUNTER (OUTPATIENT)
Dept: PHYSICAL THERAPY | Facility: CLINIC | Age: 63
Setting detail: THERAPIES SERIES
End: 2018-05-01
Attending: ORTHOPAEDIC SURGERY
Payer: COMMERCIAL

## 2018-05-01 PROCEDURE — 97110 THERAPEUTIC EXERCISES: CPT | Mod: GP | Performed by: PHYSICAL THERAPIST

## 2018-05-01 PROCEDURE — 40000718 ZZHC STATISTIC PT DEPARTMENT ORTHO VISIT: Performed by: PHYSICAL THERAPIST

## 2018-05-15 ENCOUNTER — HOSPITAL ENCOUNTER (OUTPATIENT)
Dept: PHYSICAL THERAPY | Facility: CLINIC | Age: 63
Setting detail: THERAPIES SERIES
End: 2018-05-15
Attending: ORTHOPAEDIC SURGERY
Payer: COMMERCIAL

## 2018-05-15 PROCEDURE — 97110 THERAPEUTIC EXERCISES: CPT | Mod: GP | Performed by: PHYSICAL THERAPIST

## 2018-05-15 PROCEDURE — 40000718 ZZHC STATISTIC PT DEPARTMENT ORTHO VISIT: Performed by: PHYSICAL THERAPIST

## 2018-06-19 ENCOUNTER — OFFICE VISIT (OUTPATIENT)
Dept: UROLOGY | Facility: CLINIC | Age: 63
End: 2018-06-19
Payer: COMMERCIAL

## 2018-06-19 VITALS
RESPIRATION RATE: 16 BRPM | HEART RATE: 55 BPM | SYSTOLIC BLOOD PRESSURE: 133 MMHG | BODY MASS INDEX: 29.84 KG/M2 | TEMPERATURE: 97.7 F | WEIGHT: 208 LBS | DIASTOLIC BLOOD PRESSURE: 85 MMHG

## 2018-06-19 DIAGNOSIS — N30.00 ACUTE CYSTITIS WITHOUT HEMATURIA: Primary | ICD-10-CM

## 2018-06-19 DIAGNOSIS — R35.1 NOCTURIA: ICD-10-CM

## 2018-06-19 LAB
ALBUMIN UR-MCNC: NEGATIVE MG/DL
AMORPH CRY #/AREA URNS HPF: ABNORMAL /HPF
APPEARANCE UR: ABNORMAL
BILIRUB UR QL STRIP: NEGATIVE
COLOR UR AUTO: YELLOW
GLUCOSE UR STRIP-MCNC: NEGATIVE MG/DL
HGB UR QL STRIP: NEGATIVE
KETONES UR STRIP-MCNC: NEGATIVE MG/DL
LEUKOCYTE ESTERASE UR QL STRIP: NEGATIVE
NITRATE UR QL: NEGATIVE
PH UR STRIP: 7 PH (ref 5–7)
RBC #/AREA URNS AUTO: ABNORMAL /HPF
SOURCE: ABNORMAL
SP GR UR STRIP: 1.01 (ref 1–1.03)
UROBILINOGEN UR STRIP-ACNC: 0.2 EU/DL (ref 0.2–1)
WBC #/AREA URNS AUTO: ABNORMAL /HPF

## 2018-06-19 PROCEDURE — 51798 US URINE CAPACITY MEASURE: CPT | Performed by: UROLOGY

## 2018-06-19 PROCEDURE — 81001 URINALYSIS AUTO W/SCOPE: CPT | Performed by: UROLOGY

## 2018-06-19 PROCEDURE — 99214 OFFICE O/P EST MOD 30 MIN: CPT | Mod: 25 | Performed by: UROLOGY

## 2018-06-19 PROCEDURE — 87086 URINE CULTURE/COLONY COUNT: CPT | Performed by: UROLOGY

## 2018-06-19 RX ORDER — FINASTERIDE 5 MG/1
5 TABLET, FILM COATED ORAL DAILY
Qty: 90 TABLET | Refills: 3 | Status: SHIPPED | OUTPATIENT
Start: 2018-06-19 | End: 2019-10-01

## 2018-06-19 NOTE — MR AVS SNAPSHOT
After Visit Summary   6/19/2018    Jamal Sanchez    MRN: [id number]           Patient Information     Date Of Birth          1955        Visit Information        Provider Department      6/19/2018 8:00 AM MARAL Caba MD [facility]        Today's Diagnoses     Acute cystitis without hematuria    -  1    Nocturia          Care Instructions    Per Physician's instructions                    Follow-ups after your visit        Who to contact     If you have questions or need follow up information about today's clinic visit or your schedule please contact [facility] directly at [phone].  Normal or non-critical lab and imaging results will be communicated to you by AdExt[employer], letter or phone within 4 business days after the clinic has received the results. If you do not hear from us within 7 days, please contact the clinic through AdExtenth[employer] or phone. If you have a critical or abnormal lab result, we will notify you by phone as soon as possible.  Submit refill requests through [employer] or call your pharmacy and they will forward the refill request to us. Please allow 3 business days for your refill to be completed.          Additional Information About Your Visit        MyChart Information     [employer] gives you secure access to your electronic health record. If you see a primary care provider, you can also send messages to your care team and make appointments. If you have questions, please call your primary care clinic.  If you do not have a primary care provider, please call [phone] and they will assist you.        Care EveryWhere ID     This is your Care EveryWhere ID. This could be used by other organizations to access your [place] medical records  [id number]        Your Vitals Were     Pulse Temperature Respirations BMI (Body Mass Index)          55 97.7  F (36.5  C) (Oral) 16 29.84 kg/m2         Blood Pressure from Last 3 Encounters:   06/19/18 133/85    01/15/18 132/81   12/19/17 133/86    Weight from Last 3 Encounters:   06/19/18 94.3 kg (208 lb)   01/15/18 93.4 kg (206 lb)   12/19/17 91.7 kg (202 lb 3.2 oz)              We Performed the Following     MEASURE POST-VOID RESIDUAL URINE/BLADDER CAPACITY, US NON-IMAGING     UA with Microscopic     Urine Culture Aerobic Bacterial          Today's Medication Changes          These changes are accurate as of 6/19/18 11:37 AM.  If you have any questions, ask your nurse or doctor.               Start taking these medicines.        Dose/Directions    finasteride 5 MG tablet   Commonly known as:  PROSCAR   Used for:  Nocturia   Started by:  MARAL Caba MD        Dose:  5 mg   Take 1 tablet (5 mg) by mouth daily   Quantity:  90 tablet   Refills:  3            Where to get your medicines      These medications were sent to [facility] - - [place] - [id number] [address]    Hours:  THOM[facility] Phone:  [phone]     finasteride 5 MG tablet                Primary Care Provider Office Phone # Fax #    Anjel Jimenes [phone] [address] [facility] [address]        Equal Access to Services     BETSEY MULLIGAN AH: Hadii conchita felipe hadasho Soomaali, waaxda luqadaha, qaybta kaalmada adeegyada, waxay leonidin hayeliseo caldwell. So [facility] [phone].    ATENCIÓN: Si habla español, tiene a zamarripa disposición servicios gratuitos de asistencia lingüística. Llame al [phone].    We comply with applicable federal civil rights laws and [place] laws. We do not discriminate on the basis of race, color, national origin, age, disability, sex, sexual orientation, or gender identity.            Thank you!     Thank you for choosing [facility]  for your care. Our goal is always to provide you with excellent care. Hearing back from our patients is one way we can continue to improve our services. Please take  a few minutes to complete the written survey that you may receive in the mail after your visit with us. Thank you!             Your Updated Medication List - Protect others around you: Learn how to safely use, store and throw away your medicines at www.disposemymeds.org.          This list is accurate as of 6/19/18 11:37 AM.  Always use your most recent med list.                   Brand Name Dispense Instructions for use Diagnosis    finasteride 5 MG tablet    PROSCAR    90 tablet    Take 1 tablet (5 mg) by mouth daily    Nocturia       omeprazole 40 MG capsule    priLOSEC    90 capsule    Take 1 capsule (40 mg) by mouth daily Take 30-60 minutes before a meal.    Gastroesophageal reflux disease without esophagitis       oxybutynin chloride 15 MG Tb24    DITROPAN XL    90 tablet    Take 1 tablet (15 mg) by mouth daily    Urinary urgency       sildenafil 20 MG tablet    REVATIO    50 tablet    Take 2-5 tablets ( mg) by mouth as needed for erectile insufficiency.  Never use with nitroglycerin, terazosin or doxazosin.    Combined arterial insufficiency and corporo-venous occlusive erectile dysfunction       tamsulosin 0.4 MG capsule    FLOMAX    90 capsule    Take 1 capsule (0.4 mg) by mouth daily    Urinary urgency       triamcinolone 0.1 % cream    KENALOG    80 g    Apply sparingly to affected area three times daily as needed    Itching       triamterene-hydrochlorothiazide 37.5-25 MG per capsule    DYAZIDE    90 capsule    Take 1 capsule by mouth every morning    Hypertension, goal below 140/90

## 2018-06-19 NOTE — PROGRESS NOTES
Appointment source: Established Patient  Patient name: Jamal Sanchez  Urology Staff: Matteo Caba MD    Subjective: This is a 63 year old year old male returning for follow up of urinary urgency    Objective:  Continues to be responding well to oxybutynin and tamsulosin. Does have some issues with post void dribbling and nocturia and would like additional effort directed to control of these voiding problems.    Post void residual 68 cc.    Also has had moderate results with sildenafil at the 60 mg dose level.    Assessment:  Mild but bothersome bladder outlet obstruction symptoms and mild erectile dysfunction partially responding to sildenafil.    Plan:  Will add finasteride 5 mg daily for voiding symptoms and suggested gradually increasing the dose of sildenafil up to a maximum of 100 mg.    Return in one year for follow up.    Total time 25 minutes, counseling 15 minutes discussing bladder outlet obstruction.

## 2018-06-19 NOTE — NURSING NOTE
Active order to obtain bladder scan? Yes   Name of ordering provider: Dr. Caba  Bladder scan preformed post void Yes.  Bladder scan reveled 68ML  Provider notified?  Yes    Fabiano ANDRES CMA

## 2018-06-20 LAB
BACTERIA SPEC CULT: NORMAL
Lab: NORMAL
SPECIMEN SOURCE: NORMAL

## 2018-06-27 NOTE — PROGRESS NOTES
Jamal Sanchez   PHYSICAL THERAPY DISCHARGE  06/27/18 1500   Signing Clinician's Name / Credentials   Signing clinician's name / credentials Kris Hoenk, PT   Session Number   Session Number 13 medica, seen from 2/9/18-5/15/18   Ortho Goal 1   Goal Description will be able to reach lowest shelf of cupboard in 8wk  (can reach it but not lift anything)   Target Date 04/09/18   Date Met 03/27/18   Ortho Goal 2   Goal Description return to work in 11 wks - when FLMA expires  (self limiting)   Target Date 04/24/18   Date Met 05/01/18   Ortho Goal 3   Goal Description will be able to dress and do personal cares with ease in 8wks   Target Date 04/09/18   Date Met 04/09/18   Subjective Report   Subjective Report stil aches, very little is it painfree   Objective Measure 1   Objective Measure 4/17/18 data - pt did not attend a last session   Details AROM flexion and ABD WFL,min scap elev at end range shld flex and abd, PROM ER 90* at 90abd, IR 55* at 90abd, IR behind back to L1 with pain, MMT IR 5, ER 4 with pain   Therapeutic Procedure/exercise   Minutes 25   Skilled Intervention modify HEP,progression of strength ex to improve functional use   Patient Response cues to slow down   Treatment Detail UBE 3min, SL ER 2# x15, 0# x10, flex 1# x20, bent over shld ext 2# x25, rows 2# x25, counter push up x15, PROM R shld   Plan   Plan for next session see every other week, monitor strengthening  Patient failed to show for his last appt, now has not been seen in over 30 days and will be discharged at this time.  Final status not known.     Total Session Time   Timed Code Treatment Minutes 25   Total Treatment Time (sum of timed and untimed services) 25   Kris Hoenk, PT #[id number]  [facility]

## 2018-06-27 NOTE — ADDENDUM NOTE
Encounter addended by: Hoenk, Kris, PT on: 6/27/2018  9:54 AM<BR>     Actions taken: Flowsheet data copied forward, Sign clinical note, Flowsheet accepted, Episode resolved

## 2018-07-10 ENCOUNTER — TRANSFERRED RECORDS (OUTPATIENT)
Dept: HEALTH INFORMATION MANAGEMENT | Facility: CLINIC | Age: 63
End: 2018-07-10

## 2018-07-24 DIAGNOSIS — K21.9 GASTROESOPHAGEAL REFLUX DISEASE WITHOUT ESOPHAGITIS: ICD-10-CM

## 2018-07-24 NOTE — TELEPHONE ENCOUNTER
"Requested Prescriptions   Pending Prescriptions Disp Refills     omeprazole (PRILOSEC) 40 MG capsule [Pharmacy Med Name: OMEPRAZOLE 40 MG CAPSULE DR] 30 capsule      Sig: TAKE 1 CAPSULE BY MOUTH D AILY. TAKE 30-60 MINUTES BEFORE A MEAL.    PPI Protocol Passed    7/24/2018  8:39 AM       Passed - Not on Clopidogrel (unless Pantoprazole ordered)       Passed - No diagnosis of osteoporosis on record       Passed - Recent (12 mo) or future (30 days) visit within the authorizing provider's specialty    Patient had office visit in the last 12 months or has a visit in the next 30 days with authorizing provider or within the authorizing provider's specialty.  See \"Patient Info\" tab in inbasket, or \"Choose Columns\" in Meds & Orders section of the refill encounter.           Passed - Patient is age 18 or older        Last Written Prescription Date:  11/14/2017  Last Fill Quantity: 90,  # refills: 1   Last office visit: 1/15/2018 with prescribing provider:  Jalil   Future Office Visit:      "

## 2018-07-25 RX ORDER — OMEPRAZOLE 40 MG/1
CAPSULE, DELAYED RELEASE ORAL
Qty: 30 CAPSULE | OUTPATIENT
Start: 2018-07-25

## 2018-07-25 NOTE — TELEPHONE ENCOUNTER
Denied.  Note from pharmacy - was just refilled 7-24-18.  Patient is due for OV for further refills.  Cat SANABRIA RN

## 2018-08-28 DIAGNOSIS — K21.9 GASTROESOPHAGEAL REFLUX DISEASE WITHOUT ESOPHAGITIS: ICD-10-CM

## 2018-08-29 RX ORDER — OMEPRAZOLE 40 MG/1
CAPSULE, DELAYED RELEASE ORAL
Qty: 90 CAPSULE | Refills: 0 | Status: SHIPPED | OUTPATIENT
Start: 2018-08-29 | End: 2019-02-05

## 2018-08-29 NOTE — TELEPHONE ENCOUNTER
"Requested Prescriptions   Pending Prescriptions Disp Refills     omeprazole (PRILOSEC) 40 MG capsule [Pharmacy Med Name: OMEPRAZOLE 40 MG CAPSULE DR]  Last Written Prescription Date:  11/14/17  Last Fill Quantity: 90,  # refills: 1   Last office visit: 1/15/2018 with prescribing provider:  01/15/18   Future Office Visit:     30 capsule      Sig: TAKE 1 CAPSULE BY MOUTH D AILY. TAKE 30-60 MINUTES BEFORE A MEAL.    PPI Protocol Passed    8/28/2018 12:14 PM       Passed - Not on Clopidogrel (unless Pantoprazole ordered)       Passed - No diagnosis of osteoporosis on record       Passed - Recent (12 mo) or future (30 days) visit within the authorizing provider's specialty    Patient had office visit in the last 12 months or has a visit in the next 30 days with authorizing provider or within the authorizing provider's specialty.  See \"Patient Info\" tab in inbasket, or \"Choose Columns\" in Meds & Orders section of the refill encounter.           Passed - Patient is age 18 or older          "

## 2018-09-10 DIAGNOSIS — I10 HYPERTENSION, GOAL BELOW 140/90: Primary | ICD-10-CM

## 2018-09-10 NOTE — TELEPHONE ENCOUNTER
"Requested Prescriptions   Pending Prescriptions Disp Refills     triamterene-hydrochlorothiazide (MAXZIDE-25) 37.5-25 MG per tablet [Pharmacy Med Name: TRIAMTERENE-HYDROCHLOROTHIAZID 37.5-25  Last Written Prescription Date:  09/15/17  Last Fill Quantity: 90,  # refills: 3   Last office visit: 1/15/2018 with prescribing provider:  01/15/18   Future Office Visit:     MG TABLET] 90 tablet      Sig: TAKE 1 TABLET BY MOUTH EVERY MORNING    Diuretics (Including Combos) Protocol Failed    9/10/2018  8:00 AM       Failed - Normal serum creatinine on file in past 12 months    Recent Labs   Lab Test  08/29/17   1545   CR  0.89          Failed - Normal serum potassium on file in past 12 months    Recent Labs   Lab Test  08/29/17   1545   POTASSIUM  4.3          Failed - Normal serum sodium on file in past 12 months    Recent Labs   Lab Test  08/29/17   1545   NA  137          Passed - Blood pressure under 140/90 in past 12 months    BP Readings from Last 3 Encounters:   06/19/18 133/85   01/15/18 132/81   12/19/17 133/86          Passed - Recent (12 mo) or future (30 days) visit within the authorizing provider's specialty    Patient had office visit in the last 12 months or has a visit in the next 30 days with authorizing provider or within the authorizing provider's specialty.  See \"Patient Info\" tab in inbasket, or \"Choose Columns\" in Meds & Orders section of the refill encounter.           Passed - Patient is age 18 or older          "

## 2018-09-11 RX ORDER — TRIAMTERENE/HYDROCHLOROTHIAZID 37.5-25 MG
TABLET ORAL
Qty: 30 TABLET | Refills: 0 | Status: SHIPPED | OUTPATIENT
Start: 2018-09-11 | End: 2018-09-18

## 2018-09-11 NOTE — TELEPHONE ENCOUNTER
Medication is being filled for 1 time refill only due to:  Patient needs labs BMP. Future labs ordered No, has appt 9-18-18, will let MD order at that appt. Patient needs to be seen because he is due for HTN f/u.     Cat SANABRIA RN

## 2018-09-18 ENCOUNTER — OFFICE VISIT (OUTPATIENT)
Dept: FAMILY MEDICINE | Facility: CLINIC | Age: 63
End: 2018-09-18
Payer: COMMERCIAL

## 2018-09-18 VITALS
RESPIRATION RATE: 12 BRPM | TEMPERATURE: 98 F | SYSTOLIC BLOOD PRESSURE: 130 MMHG | HEART RATE: 74 BPM | DIASTOLIC BLOOD PRESSURE: 80 MMHG | BODY MASS INDEX: 29.78 KG/M2 | OXYGEN SATURATION: 96 % | WEIGHT: 208 LBS | HEIGHT: 70 IN

## 2018-09-18 DIAGNOSIS — Z11.3 SCREEN FOR STD (SEXUALLY TRANSMITTED DISEASE): Primary | ICD-10-CM

## 2018-09-18 DIAGNOSIS — Z11.59 NEED FOR HEPATITIS C SCREENING TEST: ICD-10-CM

## 2018-09-18 DIAGNOSIS — I10 HYPERTENSION, GOAL BELOW 140/90: ICD-10-CM

## 2018-09-18 LAB
ANION GAP SERPL CALCULATED.3IONS-SCNC: 4 MMOL/L (ref 3–14)
BUN SERPL-MCNC: 13 MG/DL (ref 7–30)
CALCIUM SERPL-MCNC: 8.7 MG/DL (ref 8.5–10.1)
CHLORIDE SERPL-SCNC: 100 MMOL/L (ref 94–109)
CO2 SERPL-SCNC: 31 MMOL/L (ref 20–32)
CREAT SERPL-MCNC: 0.92 MG/DL (ref 0.66–1.25)
GFR SERPL CREATININE-BSD FRML MDRD: 82 ML/MIN/1.7M2
GLUCOSE SERPL-MCNC: 85 MG/DL (ref 70–99)
POTASSIUM SERPL-SCNC: 3.8 MMOL/L (ref 3.4–5.3)
SODIUM SERPL-SCNC: 135 MMOL/L (ref 133–144)

## 2018-09-18 PROCEDURE — 86780 TREPONEMA PALLIDUM: CPT | Performed by: FAMILY MEDICINE

## 2018-09-18 PROCEDURE — 86706 HEP B SURFACE ANTIBODY: CPT | Performed by: FAMILY MEDICINE

## 2018-09-18 PROCEDURE — 99213 OFFICE O/P EST LOW 20 MIN: CPT | Performed by: FAMILY MEDICINE

## 2018-09-18 PROCEDURE — 87389 HIV-1 AG W/HIV-1&-2 AB AG IA: CPT | Performed by: FAMILY MEDICINE

## 2018-09-18 PROCEDURE — 80048 BASIC METABOLIC PNL TOTAL CA: CPT | Performed by: FAMILY MEDICINE

## 2018-09-18 PROCEDURE — 36415 COLL VENOUS BLD VENIPUNCTURE: CPT | Performed by: FAMILY MEDICINE

## 2018-09-18 PROCEDURE — 86803 HEPATITIS C AB TEST: CPT | Performed by: FAMILY MEDICINE

## 2018-09-18 RX ORDER — TRIAMTERENE/HYDROCHLOROTHIAZID 37.5-25 MG
1 TABLET ORAL EVERY MORNING
Qty: 90 TABLET | Refills: 3 | Status: SHIPPED | OUTPATIENT
Start: 2018-09-18 | End: 2019-10-19

## 2018-09-18 NOTE — MR AVS SNAPSHOT
"              After Visit Summary   9/18/2018    Jamal Sanchez    MRN: [id number]           Patient Information     Date Of Birth          1955        Visit Information        Provider Department      9/18/2018 4:20 PM Anjel Jimenes MD [facility]        Today's Diagnoses     Screen for STD (sexually transmitted disease)    -  1    Hypertension, goal below 140/90        Need for hepatitis C screening test           Follow-ups after your visit        Who to contact     If you have questions or need follow up information about today's clinic visit or your schedule please contact [facility] directly at [phone].  Normal or non-critical lab and imaging results will be communicated to you by MyChart, letter or phone within 4 business days after the clinic has received the results. If you do not hear from us within 7 days, please contact the clinic through [employer] or phone. If you have a critical or abnormal lab result, we will notify you by phone as soon as possible.  Submit refill requests through [email or web address] or call your pharmacy and they will forward the refill request to us. Please allow 3 business days for your refill to be completed.          Additional Information About Your Visit        MyChart Information     [email or web address] gives you secure access to your electronic health record. If you see a primary care provider, you can also send messages to your care team and make appointments. If you have questions, please call your primary care clinic.  If you do not have a primary care provider, please call [phone] and they will assist you.        Care EveryWhere ID     This is your Care EveryWhere ID. This could be used by other organizations to access your [place] medical records  [id number]        Your Vitals Were     Pulse Temperature Respirations Height Pulse Oximetry BMI (Body Mass Index)    74 98  F (36.7  C) (Tympanic) 12 5' 10\" (1.778 m) 96% 29.84 kg/m2       " Blood Pressure from Last 3 Encounters:   09/18/18 130/80   06/19/18 133/85   01/15/18 132/81    Weight from Last 3 Encounters:   09/18/18 208 lb (94.3 kg)   06/19/18 208 lb (94.3 kg)   01/15/18 206 lb (93.4 kg)              We Performed the Following     **Hepatitis C Screen Reflex to RNA FUTURE anytime     Basic metabolic panel     Hepatitis B Surface Antibody     HIV Antigen Antibody Combo     Treponema Abs w Reflex to RPR and Titer          Where to get your medicines      These medications were sent to [facility] - - [place], [place] - [address] [facility]  [id number] [place] [address], [facility] [address]    [facility]:  [facility] Phone:  [phone]     triamterene-hydrochlorothiazide 37.5-25 MG per tablet          Primary Care Provider Office Phone # Fax #    Anjel Jimenes [phone] [address] [facility] [address]        Equal Access to Services     [facility] AH: Hadii aad ku hadasho Soomaali, waaxda luqadaha, qaybta kaalmada adeegyada, waxay idiin hayeliseo davis . So [facility] [phone].    ATENCIÓN: Si habla español, tiene a zamarripa disposición servicios gratuitos de asistencia lingüística. Llame al [phone].    We comply with applicable federal civil rights laws and [place] laws. We do not discriminate on the basis of race, color, national origin, age, disability, sex, sexual orientation, or gender identity.            Thank you!     Thank you for choosing [facility]  for your care. Our goal is always to provide you with excellent care. Hearing back from our patients is one way we can continue to improve our services. Please take a few minutes to complete the written survey that you may receive in the mail after your visit with us. Thank you!             Your Updated Medication List - Protect others around you: Learn how to safely use, store and throw away your medicines at www.disposemymeds.org.          This  list is accurate as of 9/18/18  4:37 PM.  Always use your most recent med list.                   Brand Name Dispense Instructions for use Diagnosis    finasteride 5 MG tablet    PROSCAR    90 tablet    Take 1 tablet (5 mg) by mouth daily    Nocturia       omeprazole 40 MG capsule    priLOSEC    90 capsule    TAKE 1 CAPSULE BY MOUTH D AILY. TAKE 30-60 MINUTES BEFORE A MEAL.    Gastroesophageal reflux disease without esophagitis       oxybutynin chloride 15 MG Tb24    DITROPAN XL    90 tablet    Take 1 tablet (15 mg) by mouth daily    Urinary urgency       sildenafil 20 MG tablet    REVATIO    50 tablet    Take 2-5 tablets ( mg) by mouth as needed for erectile insufficiency.  Never use with nitroglycerin, terazosin or doxazosin.    Combined arterial insufficiency and corporo-venous occlusive erectile dysfunction       tamsulosin 0.4 MG capsule    FLOMAX    90 capsule    Take 1 capsule (0.4 mg) by mouth daily    Urinary urgency       triamcinolone 0.1 % cream    KENALOG    80 g    Apply sparingly to affected area three times daily as needed    Itching       * triamterene-hydrochlorothiazide 37.5-25 MG per capsule    DYAZIDE    90 capsule    Take 1 capsule by mouth every morning    Hypertension, goal below 140/90       * triamterene-hydrochlorothiazide 37.5-25 MG per tablet    MAXZIDE-25    90 tablet    Take 1 tablet by mouth every morning    Hypertension, goal below 140/90       * Notice:  This list has 2 medication(s) that are the same as other medications prescribed for you. Read the directions carefully, and ask your doctor or other care provider to review them with you.

## 2018-09-18 NOTE — LETTER
[facility]  [address]  [place] [address]  Phone: [phone]      9/19/2018     Jamal Sanchez  [address] [address] [place] [id number]      Dear Jamal:    Thank you for allowing me to participate in your care. Your recent test results were reviewed and listed below.      Your results are provided below for your review  Results for orders placed or performed in visit on 09/18/18   Basic metabolic panel   Result Value Ref Range    Sodium 135 133 - 144 mmol/L    Potassium 3.8 3.4 - 5.3 mmol/L    Chloride 100 94 - 109 mmol/L    Carbon Dioxide 31 20 - 32 mmol/L    Anion Gap 4 3 - 14 mmol/L    Glucose 85 70 - 99 mg/dL    Urea Nitrogen 13 7 - 30 mg/dL    Creatinine 0.92 0.66 - 1.25 mg/dL    GFR Estimate 82 >60 mL/min/1.7m2    GFR Estimate If Black >90 >60 mL/min/1.7m2    Calcium 8.7 8.5 - 10.1 mg/dL   HIV Antigen Antibody Combo   Result Value Ref Range    HIV Antigen Antibody Combo Nonreactive NR^Nonreactive       **Hepatitis C Screen Reflex to RNA FUTURE anytime   Result Value Ref Range    Hepatitis C Antibody Nonreactive NR^Nonreactive   Treponema Abs w Reflex to RPR and Titer   Result Value Ref Range    Treponema Antibodies Nonreactive NR^Nonreactive   Hepatitis B Surface Antibody   Result Value Ref Range    Hepatitis B Surface Antibody >1000.00 (H) <8.00 m[IU]/mL                 Thank you for choosing [place] [place]. As a result, please continue with the treatment plan discussed in the office. Return as discussed or sooner if symptoms worsen or fail to improve.     If you have any further questions or concerns, please do not hesitate to contact us.      Sincerely,        Dr. Anjel Jimenes/[facility]

## 2018-09-18 NOTE — PROGRESS NOTES
"  SUBJECTIVE:   Jamal Sanchez is a 63 year old male who presents to clinic today for the following health issues:      Hypertension Follow-up      Outpatient blood pressures are not being checked.    Low Salt Diet: not monitoring salt      Amount of exercise or physical activity: 2-3 days/week for an average of 45-60 minutes    Problems taking medications regularly: No    Medication side effects: none    Diet: regular (no restrictions)            Problem list and histories reviewed & adjusted, as indicated.  Additional history:         Reviewed and updated as needed this visit by clinical staff  Tobacco  Allergies  Meds  Med Hx  Surg Hx  Fam Hx  Soc Hx      Reviewed and updated as needed this visit by Provider      Further history obtained, clarified or corrected by physician:  He wants to be screened for STDs because he is getting a divorce.  He is here for refills for his blood pressure.  His blood pressure readings have been normal.    OBJECTIVE:  /80 (BP Location: Right arm, Patient Position: Chair, Cuff Size: Adult Regular)  Pulse 74  Temp 98  F (36.7  C) (Tympanic)  Resp 12  Ht 5' 10\" (1.778 m)  Wt 208 lb (94.3 kg)  SpO2 96%  BMI 29.84 kg/m2  LUNGS: clear to auscultation, normal breath sounds  CV: RRR without murmur  ABD: BS+, soft, nontender, no masses, no hepatosplenomegaly  EXTREMITIES: without joint tenderness, swelling or erythema.  No muscle tenderness or abnormality.  SKIN: No rashes or abnormalities  NEURO:non focal exam    ASSESSMENT:     Hypertension, goal below 140/90  Need for hepatitis C screening test  Screen for STD (sexually transmitted disease)    PLAN:  Orders Placed This Encounter     Basic metabolic panel     **Hepatitis C Screen Reflex to RNA FUTURE anytime     HIV Antigen Antibody Combo     Treponema Abs w Reflex to RPR and Titer     Hepatitis B Surface Antibody     triamterene-hydrochlorothiazide (MAXZIDE-25) 37.5-25 MG per tablet       "

## 2018-09-19 LAB
HBV SURFACE AB SERPL IA-ACNC: >1000 M[IU]/ML
HCV AB SERPL QL IA: NONREACTIVE
HIV 1+2 AB+HIV1 P24 AG SERPL QL IA: NONREACTIVE
T PALLIDUM AB SER QL: NONREACTIVE

## 2018-09-28 ENCOUNTER — TRANSFERRED RECORDS (OUTPATIENT)
Dept: HEALTH INFORMATION MANAGEMENT | Facility: CLINIC | Age: 63
End: 2018-09-28

## 2018-10-08 ENCOUNTER — OFFICE VISIT (OUTPATIENT)
Dept: FAMILY MEDICINE | Facility: CLINIC | Age: 63
End: 2018-10-08
Payer: COMMERCIAL

## 2018-10-08 VITALS
TEMPERATURE: 100.2 F | WEIGHT: 210 LBS | HEIGHT: 70 IN | SYSTOLIC BLOOD PRESSURE: 139 MMHG | HEART RATE: 81 BPM | OXYGEN SATURATION: 98 % | RESPIRATION RATE: 18 BRPM | DIASTOLIC BLOOD PRESSURE: 80 MMHG | BODY MASS INDEX: 30.06 KG/M2

## 2018-10-08 DIAGNOSIS — R10.9 ABDOMINAL WALL PAIN: ICD-10-CM

## 2018-10-08 DIAGNOSIS — R35.0 INCREASED FREQUENCY OF URINATION: Primary | ICD-10-CM

## 2018-10-08 DIAGNOSIS — M54.50 LOW BACK PAIN WITHOUT SCIATICA, UNSPECIFIED BACK PAIN LATERALITY, UNSPECIFIED CHRONICITY: ICD-10-CM

## 2018-10-08 LAB
ALBUMIN UR-MCNC: NEGATIVE MG/DL
APPEARANCE UR: CLEAR
BILIRUB UR QL STRIP: NEGATIVE
COLOR UR AUTO: YELLOW
GLUCOSE UR STRIP-MCNC: 100 MG/DL
HGB UR QL STRIP: NEGATIVE
KETONES UR STRIP-MCNC: NEGATIVE MG/DL
LEUKOCYTE ESTERASE UR QL STRIP: NEGATIVE
NITRATE UR QL: NEGATIVE
PH UR STRIP: 6 PH (ref 5–7)
SOURCE: ABNORMAL
SP GR UR STRIP: <=1.005 (ref 1–1.03)
UROBILINOGEN UR STRIP-ACNC: 0.2 EU/DL (ref 0.2–1)

## 2018-10-08 PROCEDURE — 99213 OFFICE O/P EST LOW 20 MIN: CPT | Performed by: FAMILY MEDICINE

## 2018-10-08 PROCEDURE — 81003 URINALYSIS AUTO W/O SCOPE: CPT | Performed by: FAMILY MEDICINE

## 2018-10-08 PROCEDURE — 87086 URINE CULTURE/COLONY COUNT: CPT | Performed by: FAMILY MEDICINE

## 2018-10-08 NOTE — MR AVS SNAPSHOT
After Visit Summary   10/8/2018    Jamal Sanchez    MRN: [id number]           Patient Information     Date Of Birth          1955        Visit Information        Provider Department      10/8/2018 4:40 PM Anjel Jimenes MD [facility]        Today's Diagnoses     Increased frequency of urination    -  1    Low back pain without sciatica, unspecified back pain laterality, unspecified chronicity        Abdominal wall pain          Care Instructions          Thank you for choosing [facility].  You may be receiving a survey in the mail from Eliecer Gutierrez regarding your visit today.  Please take a few minutes to complete and return the survey to let us know how we are doing.      Our Clinic hours are:  Mondays    7:20 am - 7 pm  Tues -  Fri  7:20 am - 5 pm    Clinic Phone: [phone]    The clinic lab opens at 7:30 am Mon - Fri and appointments are required.    [facility]  Ph. [phone]  Monday  8 am - 7pm  Tues - Fri 8 am - 5:30 pm                 Follow-ups after your visit        Who to contact     If you have questions or need follow up information about today's clinic visit or your schedule please contact [facility] directly at [phone].  Normal or non-critical lab and imaging results will be communicated to you by MyChart, letter or phone within 4 business days after the clinic has received the results. If you do not hear from us within 7 days, please contact the clinic through [employer] or phone. If you have a critical or abnormal lab result, we will notify you by phone as soon as possible.  Submit refill requests through [employer] or call your pharmacy and they will forward the refill request to us. Please allow 3 business days for your refill to be completed.          Additional Information About Your Visit        [employer] Information     [employer] gives you secure access to your electronic health record. If you see a primary  "care provider, you can also send messages to your care team and make appointments. If you have questions, please call your primary care clinic.  If you do not have a primary care provider, please call [phone] and they will assist you.        Care EveryWhere ID     This is your Care EveryWhere ID. This could be used by other organizations to access your [place] medical records  [id number]        Your Vitals Were     Pulse Temperature Respirations Height Pulse Oximetry BMI (Body Mass Index)    81 100.2  F (37.9  C) 18 5' 10\" (1.778 m) 98% 30.13 kg/m2       Blood Pressure from Last 3 Encounters:   10/08/18 139/80   09/18/18 130/80   06/19/18 133/85    Weight from Last 3 Encounters:   10/08/18 210 lb (95.3 kg)   09/18/18 208 lb (94.3 kg)   06/19/18 208 lb (94.3 kg)              We Performed the Following     UA reflex to Microscopic and Culture     Urine Culture Aerobic Bacterial          Today's Medication Changes          These changes are accurate as of 10/8/18  5:26 PM.  If you have any questions, ask your nurse or doctor.               These medicines have changed or have updated prescriptions.        Dose/Directions    triamterene-hydrochlorothiazide 37.5-25 MG per tablet   Commonly known as:  MAXZIDE-25   This may have changed:  Another medication with the same name was removed. Continue taking this medication, and follow the directions you see here.   Used for:  Hypertension, goal below 140/90   Changed by:  Anjel Jimenes MD        Dose:  1 tablet   Take 1 tablet by mouth every morning   Quantity:  90 tablet   Refills:  3                Primary Care Provider Office Phone # Fax #    Anjel Jimenes [phone] [address] [facility] [address]        Equal Access to Services     [facility] ISAAK AH: Rosalie Hernandez, gen vaca, qarupalta kaalberto dockery. So [place] [phone].    ATENCIÓN: Si grisella espdanielle, willem a zamarripa " disposición servicios gratuitos de asistencia lingüística. Alon cervantes [phone].    We comply with applicable federal civil rights laws and [place] laws. We do not discriminate on the basis of race, color, national origin, age, disability, sex, sexual orientation, or gender identity.            Thank you!     Thank you for choosing [facility]  for your care. Our goal is always to provide you with excellent care. Hearing back from our patients is one way we can continue to improve our services. Please take a few minutes to complete the written survey that you may receive in the mail after your visit with us. Thank you!             Your Updated Medication List - Protect others around you: Learn how to safely use, store and throw away your medicines at www.disposemymeds.org.          This list is accurate as of 10/8/18  5:26 PM.  Always use your most recent med list.                   Brand Name Dispense Instructions for use Diagnosis    finasteride 5 MG tablet    PROSCAR    90 tablet    Take 1 tablet (5 mg) by mouth daily    Nocturia       omeprazole 40 MG capsule    priLOSEC    90 capsule    TAKE 1 CAPSULE BY MOUTH D AILY. TAKE 30-60 MINUTES BEFORE A MEAL.    Gastroesophageal reflux disease without esophagitis       oxybutynin chloride 15 MG Tb24    DITROPAN XL    90 tablet    Take 1 tablet (15 mg) by mouth daily    Urinary urgency       sildenafil 20 MG tablet    REVATIO    50 tablet    Take 2-5 tablets ( mg) by mouth as needed for erectile insufficiency.  Never use with nitroglycerin, terazosin or doxazosin.    Combined arterial insufficiency and corporo-venous occlusive erectile dysfunction       tamsulosin 0.4 MG capsule    FLOMAX    90 capsule    Take 1 capsule (0.4 mg) by mouth daily    Urinary urgency       triamcinolone 0.1 % cream    KENALOG    80 g    Apply sparingly to affected area three times daily as needed    Itching       triamterene-hydrochlorothiazide 37.5-25 MG per  tablet    MAXZIDE-25    90 tablet    Take 1 tablet by mouth every morning    Hypertension, goal below 140/90

## 2018-10-08 NOTE — PROGRESS NOTES
"  SUBJECTIVE:   Jamal Sanchez is a 63 year old male who presents to clinic today for the following health issues:      Genitourinary - Male  Onset: x 2 days     Description:   Dysuria (painful urination): no   Hematuria (blood in urine): no   Frequency: YES  Are you urinating at night : YES  Hesitancy (delay in urine): no   Retention (unable to empty): YES  Decrease in urinary flow: YES  Incontinence: no     Progression of Symptoms:  worsening    Accompanying Signs & Symptoms:  Fever: YES  Back/Flank pain: YES  Urethral discharge: no   Testicle lumps/masses/pain: no   Nausea and/or vomiting: no   Abdominal pain: YES    History:   History of frequent UTI's: YES  History of kidney stones: no   History of hernias: YES  Personal or Family history of Prostate problems: YES  Sexually active: no     Precipitating factors:   none    Alleviating factors:  none          Problem list and histories reviewed & adjusted, as indicated.  Additional history:         Reviewed and updated as needed this visit by clinical staff       Reviewed and updated as needed this visit by Provider      Further history obtained, clarified or corrected by physician:  He says he been urinating a little more frequently and had some lower abdominal discomfort.  He has had multiple urine tests in the last several years and never have the cultures been positive.  He has had some bladder outlet obstruction symptoms treated by the urologist.  He does state that last week he was lifting some heavy logs and he developed back pain which then seemed to radiate around to the lower abdomen and it hurts when he coughs.    OBJECTIVE:  /80  Pulse 81  Temp 100.2  F (37.9  C)  Resp 18  Ht 5' 10\" (1.778 m)  Wt 210 lb (95.3 kg)  SpO2 98%  BMI 30.13 kg/m2  LUNGS: clear to auscultation, normal breath sounds  CV: RRR without murmur  ABD: BS+, soft, nontender other than slight discomfort to palpation against Valsalva maneuver in the lower abdomen, no " masses, no hepatosplenomegaly  EXTREMITIES: without joint tenderness, swelling or erythema.  No muscle tenderness or abnormality.  SKIN: No rashes or abnormalities  NEURO:non focal exam    ASSESSMENT:     Increased frequency of urination  Low back pain without sciatica, unspecified back pain laterality, unspecified chronicity  Abdominal wall pain     PLAN:  I told him we would culture the urine from today to be sure  I told him I suspect this is muscle strain and he needs to back off on lifting and use ibuprofen as needed  He did have some glucose in the urine though multiple urine tests recently and a recent BMP showed no signs of glucosuria or hyperglycemia.    Orders Placed This Encounter     UA reflex to Microscopic and Culture

## 2018-10-08 NOTE — PATIENT INSTRUCTIONS
Thank you for choosing [facility] ([facility]  You may be receiving a survey in the mail from [facility] regarding your visit today.  Please take a few minutes to complete and return the survey to let us know how we are doing.      Our Clinic hours are:  Mondays    7:20 am - 7 pm  Tues - Fri  7:20 am - 5 pm    Clinic Phone: [phone]    The clinic lab opens at 7:30 am Mon - Fri and appointments are required.    [facility]. [phone]  Monday  8 am - 7pm  Tues - Fri 8 am - 5:30 pm

## 2018-10-09 LAB
BACTERIA SPEC CULT: NO GROWTH
Lab: NORMAL
SPECIMEN SOURCE: NORMAL

## 2018-11-12 ENCOUNTER — OFFICE VISIT (OUTPATIENT)
Dept: FAMILY MEDICINE | Facility: CLINIC | Age: 63
End: 2018-11-12
Payer: COMMERCIAL

## 2018-11-12 VITALS
WEIGHT: 207 LBS | DIASTOLIC BLOOD PRESSURE: 62 MMHG | TEMPERATURE: 96.6 F | RESPIRATION RATE: 18 BRPM | HEART RATE: 67 BPM | SYSTOLIC BLOOD PRESSURE: 130 MMHG | HEIGHT: 70 IN | BODY MASS INDEX: 29.63 KG/M2 | OXYGEN SATURATION: 98 %

## 2018-11-12 DIAGNOSIS — M19.019 ARTHROPATHY OF SHOULDER REGION: ICD-10-CM

## 2018-11-12 DIAGNOSIS — I10 HYPERTENSION, GOAL BELOW 140/90: ICD-10-CM

## 2018-11-12 DIAGNOSIS — Z01.818 PREOP GENERAL PHYSICAL EXAM: Primary | ICD-10-CM

## 2018-11-12 DIAGNOSIS — K21.9 GASTROESOPHAGEAL REFLUX DISEASE WITHOUT ESOPHAGITIS: ICD-10-CM

## 2018-11-12 PROCEDURE — 99214 OFFICE O/P EST MOD 30 MIN: CPT | Performed by: FAMILY MEDICINE

## 2018-11-12 NOTE — MR AVS SNAPSHOT
After Visit Summary   11/12/2018    Jamal Sanchez    MRN: [id number]           Patient Information     Date Of Birth          1955        Visit Information        Provider Department      11/12/2018 4:20 PM Anjel Jimenes MD [facility]        Today's Diagnoses     Preop general physical exam    -  1    Arthropathy of shoulder region        Hypertension, goal below 140/90        Gastroesophageal reflux disease without esophagitis          Care Instructions      Before Your Surgery      Call your surgeon if there is any change in your health. This includes signs of a cold or flu (such as a sore throat, runny nose, cough, rash or fever).    Do not smoke, drink alcohol or take over the counter medicine (unless your surgeon or primary care doctor tells you to) for the 24 hours before and after surgery.    If you take prescribed drugs: Follow your doctor s orders about which medicines to take and which to stop until after surgery.    Eating and drinking prior to surgery: follow the instructions from your surgeon    Take a shower or bath the night before surgery. Use the soap your surgeon gave you to gently clean your skin. If you do not have soap from your surgeon, use your regular soap. Do not shave or scrub the surgery site.  Wear clean pajamas and have clean sheets on your bed.           Follow-ups after your visit        Who to contact     If you have questions or need follow up information about today's clinic visit or your schedule please contact [facility] directly at [phone].  Normal or non-critical lab and imaging results will be communicated to you by MyChart, letter or phone within 4 business days after the clinic has received the results. If you do not hear from us within 7 days, please contact the clinic through MyChart or phone. If you have a critical or abnormal lab result, we will notify you by phone as soon as possible.  Submit refill  "requests through [employer] or call your pharmacy and they will forward the refill request to us. Please allow 3 business days for your refill to be completed.          Additional Information About Your Visit        [employer] Information     [employer] gives you secure access to your electronic health record. If you see a primary care provider, you can also send messages to your care team and make appointments. If you have questions, please call your primary care clinic.  If you do not have a primary care provider, please call [phone] and they will assist you.        Care EveryWhere ID     This is your Care EveryWhere ID. This could be used by other organizations to access your [place] medical records  [id number]        Your Vitals Were     Pulse Temperature Respirations Height Pulse Oximetry BMI (Body Mass Index)    67 96.6  F (35.9  C) 18 5' 10\" (1.778 m) 98% 29.7 kg/m2       Blood Pressure from Last 3 Encounters:   11/12/18 130/62   10/08/18 139/80   09/18/18 130/80    Weight from Last 3 Encounters:   11/12/18 207 lb (93.9 kg)   10/08/18 210 lb (95.3 kg)   09/18/18 208 lb (94.3 kg)              Today, you had the following     No orders found for display       Primary Care Provider Office Phone # Fax #    Anjel Jimenes [phone] [address] [facility] [address]        Equal Access to Services     [facility]: Hadii aad ku hadasho Soomaali, waaxda luqadaha, qaybta kaalmada adeegyada, berto fernandes hayeliseo davis . So [facility] [phone].    ATENCIÓN: Si habla español, tiene a zamarripa disposición servicios gratuitos de asistencia lingüística. Llame al [phone].    We comply with applicable federal civil rights laws and [place] laws. We do not discriminate on the basis of race, color, national origin, age, disability, sex, sexual orientation, or gender identity.            Thank you!     Thank you for choosing [facility]  for your care. Our goal is " always to provide you with excellent care. Hearing back from our patients is one way we can continue to improve our services. Please take a few minutes to complete the written survey that you may receive in the mail after your visit with us. Thank you!             Your Updated Medication List - Protect others around you: Learn how to safely use, store and throw away your medicines at www.disposemymeds.org.          This list is accurate as of 11/12/18  4:47 PM.  Always use your most recent med list.                   Brand Name Dispense Instructions for use Diagnosis    finasteride 5 MG tablet    PROSCAR    90 tablet    Take 1 tablet (5 mg) by mouth daily    Nocturia       omeprazole 40 MG capsule    priLOSEC    90 capsule    TAKE 1 CAPSULE BY MOUTH D AILY. TAKE 30-60 MINUTES BEFORE A MEAL.    Gastroesophageal reflux disease without esophagitis       oxybutynin chloride 15 MG Tb24    DITROPAN XL    90 tablet    Take 1 tablet (15 mg) by mouth daily    Urinary urgency       sildenafil 20 MG tablet    REVATIO    50 tablet    Take 2-5 tablets ( mg) by mouth as needed for erectile insufficiency.  Never use with nitroglycerin, terazosin or doxazosin.    Combined arterial insufficiency and corporo-venous occlusive erectile dysfunction       tamsulosin 0.4 MG capsule    FLOMAX    90 capsule    Take 1 capsule (0.4 mg) by mouth daily    Urinary urgency       triamcinolone 0.1 % cream    KENALOG    80 g    Apply sparingly to affected area three times daily as needed    Itching       triamterene-hydrochlorothiazide 37.5-25 MG per tablet    MAXZIDE-25    90 tablet    Take 1 tablet by mouth every morning    Hypertension, goal below 140/90

## 2018-11-12 NOTE — PROGRESS NOTES
[facility]  [address] [facility] [address]  [phone]  Dept: [phone]    PRE-OP EVALUATION:  Today's date: 2018    Jamal Sanchez (: 1955) presents for pre-operative evaluation assessment as requested by Dr. Rojas .  He requires evaluation and anesthesia risk assessment prior to undergoing surgery/procedure for treatment of left shoulder pain  .    Proposed Surgery/ Procedure: Left shoulder rotator cuff repair   Date of Surgery/ Procedure:18  Time of Surgery/ Procedure: ?  Hospital/Surgical Facility: [facility]   Fax number for surgical facility: [phone]  Primary Physician: Anjel Jimenes  Type of Anesthesia Anticipated: General    Patient has a Health Care Directive or Living Will:  NO    1. NO - Do you have a history of heart attack, stroke, stent, bypass or surgery on an artery in the head, neck, heart or legs?  2. NO - Do you ever have any pain or discomfort in your chest?  3. NO - Do you have a history of  Heart Failure?  4. NO - Are you troubled by shortness of breath when: walking on the level, up a slight hill or at night?  5. NO - Do you currently have a cold, bronchitis or other respiratory infection?  6. NO - Do you have a cough, shortness of breath or wheezing?  7. NO - Do you sometimes get pains in the calves of your legs when you walk?  8. NO - Do you or anyone in your family have previous history of blood clots?  9. NO - Do you or does anyone in your family have a serious bleeding problem such as prolonged bleeding following surgeries or cuts?  10. NO - Have you ever had problems with anemia or been told to take iron pills?  11. NO - Have you had any abnormal blood loss such as black, tarry or bloody stools, or abnormal vaginal bleeding?  12. NO - Have you ever had a blood transfusion?  13. YES   - Have you or any of your relatives ever had problems with anesthesia?  14. NO - Do you have sleep apnea, excessive  snoring or daytime drowsiness?  15. NO - Do you have any prosthetic heart valves?  16. NO - Do you have prosthetic joints?  17. NO - Is there any chance that you may be pregnant?      HPI:     HPI related to upcoming procedure:       He is scheduled for left shoulder rotator cuff repair. The surgeon is requesting consultation regarding anesthesia and surgical risk for this patient with respect to current and past medical conditions.      MEDICAL HISTORY:     Patient Active Problem List    Diagnosis Date Noted     Appendicitis 01/06/2016     Priority: Medium     Hypertension, goal below 140/90 07/06/2015     Priority: Medium     Advanced directives, counseling/discussion 10/16/2013     Priority: Medium     Patient does not have an Advance/Health Care Directive (HCD), requests blank HCD form.    Nina Shulka  October 16, 2013         GERD (gastroesophageal reflux disease) 08/07/2013     Priority: Medium     Benign prostatic hypertrophy 04/16/2008     Priority: Medium     CARDIOVASCULAR SCREENING; LDL GOAL LESS THAN 160 10/31/2010     Priority: Low      Past Medical History:   Diagnosis Date     BPH (benign prostatic hyperplasia)      GERD (gastroesophageal reflux disease)      Hypertension      Past Surgical History:   Procedure Laterality Date     COLONOSCOPY  3/5/2008     COLONOSCOPY  10/9/2013    Procedure: COLONOSCOPY;;  Surgeon: Nicolas Lizama MD;  Location: [facility]     ESOPHAGOSCOPY, GASTROSCOPY, DUODENOSCOPY (EGD), COMBINED  10/9/2013    Procedure: COMBINED ESOPHAGOSCOPY, GASTROSCOPY, DUODENOSCOPY (EGD), BIOPSY SINGLE OR MULTIPLE;;  Surgeon: Nicolas Lizama MD;  Location: [facility]     LAPAROSCOPIC APPENDECTOMY N/A 1/7/2016    Procedure: LAPAROSCOPIC APPENDECTOMY;  Surgeon: Ab Guzman MD;  Location: [facility]     LAPAROSCOPIC HERNIORRHAPHY INGUINAL BILATERAL Bilateral 12/13/2016    Procedure: LAPAROSCOPIC HERNIORRHAPHY INGUINAL BILATERAL;  Surgeon: Ab Guzman MD;  Location: [facility]     Current  "Outpatient Prescriptions   Medication Sig Dispense Refill     finasteride (PROSCAR) 5 MG tablet Take 1 tablet (5 mg) by mouth daily 90 tablet 3     omeprazole (PRILOSEC) 40 MG capsule TAKE 1 CAPSULE BY MOUTH D AILY. TAKE 30-60 MINUTES BEFORE A MEAL. 90 capsule 0     sildenafil (REVATIO) 20 MG tablet Take 2-5 tablets ( mg) by mouth as needed for erectile insufficiency.  Never use with nitroglycerin, terazosin or doxazosin. 50 tablet 3     triamterene-hydrochlorothiazide (MAXZIDE-25) 37.5-25 MG per tablet Take 1 tablet by mouth every morning 90 tablet 3     oxybutynin chloride (DITROPAN XL) 15 MG TB24 Take 1 tablet (15 mg) by mouth daily (Patient not taking: Reported on 9/18/2018) 90 tablet 3     tamsulosin (FLOMAX) 0.4 MG capsule Take 1 capsule (0.4 mg) by mouth daily (Patient not taking: Reported on 9/18/2018) 90 capsule 3     triamcinolone (KENALOG) 0.1 % cream Apply sparingly to affected area three times daily as needed (Patient not taking: Reported on 11/12/2018) 80 g 1     OTC products: None, except as noted above    Allergies   Allergen Reactions     Nka [No Known Allergies]       Latex Allergy: NO    Social History   Substance Use Topics     Smoking status: Never Smoker     Smokeless tobacco: Never Used     Alcohol use Yes      Comment: 6 pack beer on weekend.     History   Drug Use No       REVIEW OF SYSTEMS:   Constitutional, neuro, ENT, endocrine, pulmonary, cardiac, gastrointestinal, genitourinary, musculoskeletal, integument and psychiatric systems are negative, except as otherwise noted.    EXAM:   /62 (BP Location: Right arm, Patient Position: Chair, Cuff Size: Adult Large)  Pulse 67  Temp 96.6  F (35.9  C)  Resp 18  Ht 5' 10\" (1.778 m)  Wt 207 lb (93.9 kg)  SpO2 98%  BMI 29.7 kg/m2    GENERAL APPEARANCE: healthy, alert and no distress     EYES: EOMI,  PERRL     HENT: ear canals and TM's normal and nose and mouth without ulcers or lesions     NECK: no adenopathy, no asymmetry, masses, " or scars and thyroid normal to palpation     RESP: lungs clear to auscultation - no rales, rhonchi or wheezes     CV: regular rates and rhythm, normal S1 S2, no S3 or S4 and no murmur, click or rub     ABDOMEN:  soft, nontender, no HSM or masses and bowel sounds normal     MS: extremities normal- no gross deformities noted, no evidence of inflammation in joints, FROM in all extremities.     SKIN: no suspicious lesions or rashes     NEURO: Normal strength and tone, sensory exam grossly normal, mentation intact and speech normal     PSYCH: mentation appears normal. and affect normal/bright     LYMPHATICS: No cervical adenopathy    DIAGNOSTICS:   EKG: Not indicated due to non-vascular surgery and low risk of event (age <65 and without cardiac risk factors)    Recent Labs   Lab Test  09/18/18   1635  08/29/17   1545  07/25/17   0348   HGB   --   12.3*  14.5   PLT   --   187  132*   NA  135  137  129*   POTASSIUM  3.8  4.3  3.8   CR  0.92  0.89  1.01        IMPRESSION:   Reason for surgery/procedure: shoulder djd  Diagnosis/reason for consult:    Preop general physical exam  Arthropathy of shoulder region  Hypertension, goal below 140/90  Gastroesophageal reflux disease without esophagitis      The proposed surgical procedure is considered INTERMEDIATE risk.    REVISED CARDIAC RISK INDEX  The patient has the following serious cardiovascular risks for perioperative complications such as (MI, PE, VFib and 3  AV Block):  No serious cardiac risks  INTERPRETATION: 1 risks: Class II (low risk - 0.9% complication rate)    The patient has the following additional risks for perioperative complications:  No identified additional risks      ICD-10-CM    1. Preop general physical exam Z01.818        RECOMMENDATIONS:         --Patient is to take all scheduled medications on the day of surgery EXCEPT for modifications listed below.    APPROVAL GIVEN to proceed with proposed procedure, without further diagnostic evaluation    He  reports difficulty with general anesthesia with lots of nausea and discomfort so he would prefer a regional anesthetic.       Signed Electronically by: Anjel Jimenes MD    Copy of this evaluation report is provided to requesting physician.    [place] Preop Guidelines    Revised Cardiac Risk Index

## 2018-11-20 ENCOUNTER — HOSPITAL ENCOUNTER (EMERGENCY)
Facility: CLINIC | Age: 63
Discharge: HOME | End: 2018-11-20
Attending: EMERGENCY MEDICINE | Admitting: EMERGENCY MEDICINE
Payer: COMMERCIAL

## 2018-11-20 VITALS
RESPIRATION RATE: 16 BRPM | WEIGHT: 210 LBS | DIASTOLIC BLOOD PRESSURE: 91 MMHG | HEIGHT: 70 IN | TEMPERATURE: 98.1 F | BODY MASS INDEX: 30.06 KG/M2 | SYSTOLIC BLOOD PRESSURE: 127 MMHG | OXYGEN SATURATION: 97 %

## 2018-11-20 DIAGNOSIS — K59.01 SLOW TRANSIT CONSTIPATION: ICD-10-CM

## 2018-11-20 PROCEDURE — 99284 EMERGENCY DEPT VISIT MOD MDM: CPT | Mod: Z6 | Performed by: EMERGENCY MEDICINE

## 2018-11-20 PROCEDURE — 25000132 ZZH RX MED GY IP 250 OP 250 PS 637: Performed by: EMERGENCY MEDICINE

## 2018-11-20 PROCEDURE — 99283 EMERGENCY DEPT VISIT LOW MDM: CPT | Performed by: EMERGENCY MEDICINE

## 2018-11-20 RX ORDER — CALCIUM CARBONATE 500 MG/1
1-2 TABLET, CHEWABLE ORAL DAILY PRN
COMMUNITY

## 2018-11-20 RX ORDER — HYDROXYZINE HYDROCHLORIDE 10 MG/1
10 TABLET, FILM COATED ORAL EVERY 6 HOURS PRN
COMMUNITY
End: 2019-01-21

## 2018-11-20 RX ORDER — OXYCODONE HYDROCHLORIDE 5 MG/1
5 TABLET ORAL
COMMUNITY
End: 2019-01-21

## 2018-11-20 RX ORDER — MAGNESIUM CARB/ALUMINUM HYDROX 105-160MG
296 TABLET,CHEWABLE ORAL ONCE
Status: COMPLETED | OUTPATIENT
Start: 2018-11-20 | End: 2018-11-20

## 2018-11-20 RX ORDER — DOCUSATE SODIUM 100 MG/1
100 CAPSULE, LIQUID FILLED ORAL ONCE
COMMUNITY
End: 2018-11-27

## 2018-11-20 RX ADMIN — MAGESIUM CITRATE 296 ML: 1.75 LIQUID ORAL at 16:07

## 2018-11-20 NOTE — ED AVS SNAPSHOT
[facility] Emergency Department    [address] [address]    Phone:  [phone]    Fax:  [phone]                                       Jamal Sanchez   MRN: [id number]    Department:  [facility] Emergency Department   Date of Visit:  11/20/2018           Patient Information     Date Of Birth          1955        Your diagnoses for this visit were:     Slow transit constipation Postoperative constipation, due to opiate analgesic.       You were seen by Yaniv Sheikh MD.      Follow-up Information     Follow up with [facility] Emergency Department.    Specialty:  EMERGENCY MEDICINE    Why:  If symptoms worsen, vomiting, new problems or concerns.    Contact information:    [address] [place] [id number] [phone]    Additional information:    The medical center is located at   [address] ([address] in [place], [place] miles north   of [place]).        Discharge Instructions         Use one or more over the counter meds as needed to prevent constipation from opiate analgesic (Percocet/Oxycodone):     Colace (stool softener)  Fiber supplement  Milk of Magnesia  Miralax powder daily  Dulcolax or Senokot (laxative)    These are available over-the-counter and can be used together or in combination, as needed to prevent constipation.          Constipation (Adult)  Constipation means that you have bowel movements that are less frequent than usual. Stools often become very hard and difficult to pass.  Constipation is very common. At some point in life, it affects almost everyone. Since everyone's bowel habits are different, what is constipation to one person may not be to another. Your healthcare provider may do tests to diagnose constipation. It depends on what he or she finds when evaluating you.    Symptoms of constipation include:    Abdominal pain    Bloating    Vomiting    Painful bowel movements    Itching, swelling,  bleeding, or pain around the anus  Causes  Constipation can have many causes. These include:    Diet low in fiber    Too much dairy    Not drinking enough liquids    Lack of exercise or physical activity (especially true for older adults)    Changes in lifestyle or daily routine, including pregnancy, aging, work, and travel    Frequent use or misuse of laxatives    Ignoring the urge to have a bowel movement or delaying it until later    Medicines, such as certain prescription pain medicines, iron supplements, antacids, certain antidepressants, and calcium supplements    Diseases like irritable bowel syndrome, bowel obstructions, stroke, diabetes, thyroid disease, Parkinson disease, hemorrhoids, and colon cancer  Complications  Potential complications of constipation can include:    Hemorrhoids    Rectal bleeding from hemorrhoids or anal fissures (skin tears)    Hernias    Dependency on laxatives    Chronic constipation    Fecal impaction, a severe form of constipation in which a large amount of hard stool is in your rectum that you can't pass    Bowel obstruction or perforation  Home care  All treatment should be done after talking with your healthcare provider. This is especially true if you have another medical problems, are taking prescription medicines, or are an older adult. Treatment most often involves lifestyle changes. You may also need medicines. Your healthcare provider will tell you which will work best for you. Follow the advice below to help avoid this problem in the future.  Lifestyle changes  These lifestyle changes can help prevent constipation:    Diet. Eat a high-fiber diet, with fresh fruit and vegetables, and reduce dairy intake, meats, and processed foods    Fluids. It's important to get enough fluids each day. Drink plenty of water when you eat more fiber. If you are on diet that limits the amount of fluid you can have, talk about this with your healthcare provider.    Regular exercise. Check  with your healthcare provider first.  Medicines  Take any medicines as directed. Some laxatives are safe to use only every now and then. Others can be taken on a regular basis. While laxatives don't cause bowel dependence, they are treating the symptoms. So your constipation may return if you don't make other changes. Talk with your healthcare provider or pharmacist if you have questions.  Prescription pain medicines can cause constipation. If you are taking this kind of medicine, ask your healthcare provider if you should also take a stool softener.  Medicines you may take to treat constipation include:    Fiber supplements    Stool softeners    Laxatives    Enemas    Rectal suppositories  Follow-up care  Follow up with your healthcare provider if symptoms don't get better in the next few days. You may need to have more tests or see a specialist.  Call 911  Call 911 if any of these occur:    Trouble breathing    Stiff, rigid abdomen that is severely painful to touch    Confusion    Fainting or loss of consciousness    Rapid heart rate    Chest pain  When to seek medical advice  Call your healthcare provider right away if any of these occur:    Fever of 100.4 F (38 C) or higher, or as directed by your healthcare provider    Failure to resume normal bowel movements    Pain in your abdomen or back gets worse    Nausea or vomiting    Swelling in your abdomen    Blood in the stool    Black, tarry stool    Involuntary weight loss    Weakness  Date Last Reviewed: 6/1/2018 2000-2018 [employer]. [address] [place] [address], [place] [address]. All rights reserved. This information is not intended as a substitute for professional medical care. Always follow your healthcare professional's instructions.          Eating a High-Fiber Diet  Fiber is what gives strength and structure to plants. Most grains, beans, vegetables, and fruits contain fiber. Foods rich in fiber are often low in calories and fat, and they fill  you up more. They may also reduce your risks for certain health problems. To find out the amount of fiber in canned, packaged, or frozen foods, read the Nutrition Facts label. It tells you how much fiber is in one serving.    Types of fiber and their benefits  There are two types of fiber: insoluble and soluble. They both aid digestion and help you maintain a healthy weight.    Insoluble fiber. This is found in whole grains, cereals, certain fruits and vegetables such as apple skin, corn, and carrots. Insoluble fiber may prevent constipation and reduce the risk for certain types of cancer. It is called insoluble because it does not dissolve in water.    Soluble fiber. This type of fiber is in oats, beans, and certain fruits and vegetables such as strawberries and peas. Soluble fiber can reduce cholesterol, which may help lower the risk for heart disease. It also helps control blood sugar levels.  Look for high-fiber foods  Try these foods to add fiber to your diet:    Whole-grain breads and cereals. Try to eat 6 to 8 ounces a day. Include wheat and oat bran cereals, whole-wheat muffins or toast, and corn tortillas in your meals.    Fruits. Try to eat 2 cups a day. Apples, oranges, strawberries, pears, and bananas are good sources. (Note: Fruit juice is low in fiber.)    Vegetables. Try to eat at least 2.5 cups a day. Add asparagus, carrots, broccoli, peas, and corn to your meals.    Beans. One cup of cooked lentils gives you over 15 grams of fiber. Try navy beans, lentils, and chickpeas.    Seeds. A small handful of seeds gives you about 3 grams of fiber. Try sunflower or gorge seeds.  Keep track of your fiber  Keep track of how much fiber you eat. Start by reading food labels. Then eat a variety of foods high in fiber. As you start to eat more fiber, ask your healthcare provider how much water you should be drinking to keep your digestive system working smoothly.  Aim for a certain amount of fiber in your diet each  day. If you are a woman, that amount is between 25 and 28 grams per day. Men should aim for 30 to 33 grams per day. After age 50, your daily fiber needs drop to 22 grams for women and 28 grams for men.  Before you reach for the fiber supplements, think about this. Fiber is found naturally in healthy whole foods. It gives you that feeling of fullness after you eat. Taking fiber supplements or eating fiber-enriched foods will not give you this full feeling.  Your fiber intake is a good measure for the quality of your overall diet. If you are missing out on your daily amount of fiber, you may be lacking other important nutrients as well.  Date Last Reviewed: 6/1/2017 2000-2018 [employer]. [address] [place] [address], [place] [address]. All rights reserved. This information is not intended as a substitute for professional medical care. Always follow your healthcare professional's instructions.          Treating Constipation    Constipation is a common and often uncomfortable problem. Constipation means you have bowel movements fewer than 3 times per week, or strain to pass hard, dry stool. It can last a short time. Or it can be a problem that never seems to go away. The good news is that it can often be treated and controlled.  Eat more fiber  One of the best ways to help treat constipation is to increase your fiber intake. You can do this either through diet or by using fiber supplements. Fiber (in whole grains, fruits, and vegetables) adds bulk and absorbs water to soften the stool. This helps the stool pass through the colon more easily. When you increase your fiber intake, do it slowly to avoid side effects such as bloating. Also increase the amount of water that you drink. Eating more of the following foods can add fiber to your diet.    High-fiber cereals    Whole grains, bran, and brown rice    Vegetables such as carrots, broccoli, and greens    Fresh fruits (especially apples, pears, and dried  fruits like raisins and apricots)    Nuts and legumes (especially beans such as lentils, kidney beans, and lima beans)  Get physically active  Exercise helps improve the working of your colon which helps ease constipation. Try to get some physical activity every day. If you haven t been active for a while, talk to your healthcare provider before starting again.  Laxatives  Your healthcare provider may suggest an over-the-counter product to help ease your constipation. He or she may suggest the use of bulk-forming agents or laxatives. The use of laxatives, if used as directed, is common and safe. Follow directions carefully when using them. See your healthcare provider for new-onset constipation, or long-term constipation, to rule out other causes such as medicines or thyroid disease.  Date Last Reviewed: 7/1/2016 2000-2018 [employer]. [address] [place] [address]. All rights reserved. This information is not intended as a substitute for professional medical care. Always follow your healthcare professional's instructions.          24 Hour Appointment Hotline       To make an appointment at any [facility], call [phone] ([phone]). If you don't have a family doctor or clinic, we will help you find one. [facility] clinics are conveniently located to serve the needs of you and your family.             Review of your medicines      Our records show that you are taking the medicines listed below. If these are incorrect, please call your family doctor or clinic.        Dose / Directions Last dose taken    calcium carbonate 500 MG chewable tablet   Commonly known as:  TUMS   Dose:  1 chew tab        Take 1 chew tab by mouth once   Refills:  0        docusate sodium 100 MG capsule   Commonly known as:  COLACE   Dose:  100 mg        Take 100 mg by mouth once   Refills:  0        finasteride 5 MG tablet   Commonly known as:  PROSCAR   Dose:  5 mg   Quantity:  90 tablet        Take  1 tablet (5 mg) by mouth daily   Refills:  3        hydrOXYzine 10 MG tablet   Commonly known as:  ATARAX   Dose:  10 mg        Take 10 mg by mouth every 6 hours as needed for itching (Nausea)   Refills:  0        omeprazole 40 MG capsule   Commonly known as:  priLOSEC   Quantity:  90 capsule        TAKE 1 CAPSULE BY MOUTH D AILY. TAKE 30-60 MINUTES BEFORE A MEAL.   Refills:  0        oxyCODONE IR 5 MG tablet   Commonly known as:  ROXICODONE   Dose:  5 mg        Take 5 mg by mouth every 3 hours Max of ten tablets in 24 hours   Refills:  0        sildenafil 20 MG tablet   Commonly known as:  REVATIO   Dose:   mg   Quantity:  50 tablet        Take 2-5 tablets ( mg) by mouth as needed for erectile insufficiency.  Never use with nitroglycerin, terazosin or doxazosin.   Refills:  3        triamterene-hydrochlorothiazide 37.5-25 MG per tablet   Commonly known as:  MAXZIDE-25   Dose:  1 tablet   Quantity:  90 tablet        Take 1 tablet by mouth every morning   Refills:  3                Information about OPIOIDS     PRESCRIPTION OPIOIDS: WHAT YOU NEED TO KNOW   We gave you an opioid (narcotic) pain medicine. It is important to manage your pain, but opioids are not always the best choice. You should first try all the other options your care team gave you. Take this medicine for as short a time (and as few doses) as possible.    Some activities can increase your pain, such as bandage changes or therapy sessions. It may help to take your pain medicine 30 to 60 minutes before these activities. Reduce your stress by getting enough sleep, working on hobbies you enjoy and practicing relaxation or meditation. Talk to your care team about ways to manage your pain beyond prescription opioids.    These medicines have risks:    DO NOT drive when on new or higher doses of pain medicine. These medicines can affect your alertness and reaction times, and you could be arrested for driving under the influence (DUI). If you need  to use opioids long-term, talk to your care team about driving.    DO NOT operate heavy machinery    DO NOT do any other dangerous activities while taking these medicines.    DO NOT drink any alcohol while taking these medicines.     If the opioid prescribed includes acetaminophen, DO NOT take with any other medicines that contain acetaminophen. Read all labels carefully. Look for the word  acetaminophen  or  Tylenol.  Ask your pharmacist if you have questions or are unsure.    You can get addicted to pain medicines, especially if you have a history of addiction (chemical, alcohol or substance dependence). Talk to your care team about ways to reduce this risk.    All opioids tend to cause constipation. Drink plenty of water and eat foods that have a lot of fiber, such as fruits, vegetables, prune juice, apple juice and high-fiber cereal. Take a laxative (Miralax, milk of magnesia, Colace, Senna) if you don t move your bowels at least every other day. Other side effects include upset stomach, sleepiness, dizziness, throwing up, tolerance (needing more of the medicine to have the same effect), physical dependence and slowed breathing.    Store your pills in a secure place, locked if possible. We will not replace any lost or stolen medicine. If you don t finish your medicine, please throw away (dispose) as directed by your pharmacist. The [place] [employer] has more information about safe disposal: [email or web address]        Orders Needing Specimen Collection     None      Pending Results     No orders found from 11/18/2018 to 11/21/2018.            Pending Culture Results     No orders found from 11/18/2018 to 11/21/2018.            Pending Results Instructions     If you had any lab results that were not finalized at the time of your Discharge, you can call the ED Lab Result RN at [phone]. You will be contacted by this team for any positive Lab  results or changes in treatment. The nurses are available 7 days a week from 10A to 6:30P.  You can leave a message 24 hours per day and they will return your call.        Test Results From Your Hospital Stay               Thank you for choosing [facility]       Thank you for choosing [facility] for your care. Our goal is always to provide you with excellent care. Hearing back from our patients is one way we can continue to improve our services. Please take a few minutes to complete the written survey that you may receive in the mail after you visit with us. Thank you!        [facility] Information     [facility] gives you secure access to your electronic health record. If you see a primary care provider, you can also send messages to your care team and make appointments. If you have questions, please call your primary care clinic.  If you do not have a primary care provider, please call [phone] and they will assist you.        Care EveryWhere ID     This is your Care EveryWhere ID. This could be used by other organizations to access your [facility] medical records  [id number]        Equal Access to Services     BETSEY MULLIGAN : Hadii conchita Hernandez, waaxda lio, qaybta kaalmasaurav perdue, berto davis . [facility] [phone].    ATENCIÓN: Si habla español, tiene a zamarripa disposición servicios gratuitos de asistencia lingüística. Llame al [phone].    We comply with applicable federal civil rights laws and [place] laws. We do not discriminate on the basis of race, color, national origin, age, disability, sex, sexual orientation, or gender identity.            After Visit Summary       This is your record. Keep this with you and show to your community pharmacist(s) and doctor(s) at your next visit.

## 2018-11-20 NOTE — DISCHARGE INSTRUCTIONS
Use one or more over the counter meds as needed to prevent constipation from opiate analgesic (Percocet/Oxycodone):     Colace (stool softener)  Fiber supplement  Milk of Magnesia  Miralax powder daily  Dulcolax or Senokot (laxative)    These are available over-the-counter and can be used together or in combination, as needed to prevent constipation.          Constipation (Adult)  Constipation means that you have bowel movements that are less frequent than usual. Stools often become very hard and difficult to pass.  Constipation is very common. At some point in life, it affects almost everyone. Since everyone's bowel habits are different, what is constipation to one person may not be to another. Your healthcare provider may do tests to diagnose constipation. It depends on what he or she finds when evaluating you.    Symptoms of constipation include:    Abdominal pain    Bloating    Vomiting    Painful bowel movements    Itching, swelling, bleeding, or pain around the anus  Causes  Constipation can have many causes. These include:    Diet low in fiber    Too much dairy    Not drinking enough liquids    Lack of exercise or physical activity (especially true for older adults)    Changes in lifestyle or daily routine, including pregnancy, aging, work, and travel    Frequent use or misuse of laxatives    Ignoring the urge to have a bowel movement or delaying it until later    Medicines, such as certain prescription pain medicines, iron supplements, antacids, certain antidepressants, and calcium supplements    Diseases like irritable bowel syndrome, bowel obstructions, stroke, diabetes, thyroid disease, Parkinson disease, hemorrhoids, and colon cancer  Complications  Potential complications of constipation can include:    Hemorrhoids    Rectal bleeding from hemorrhoids or anal fissures (skin tears)    Hernias    Dependency on laxatives    Chronic constipation    Fecal impaction, a severe form of constipation in which a  large amount of hard stool is in your rectum that you can't pass    Bowel obstruction or perforation  Home care  All treatment should be done after talking with your healthcare provider. This is especially true if you have another medical problems, are taking prescription medicines, or are an older adult. Treatment most often involves lifestyle changes. You may also need medicines. Your healthcare provider will tell you which will work best for you. Follow the advice below to help avoid this problem in the future.  Lifestyle changes  These lifestyle changes can help prevent constipation:    Diet. Eat a high-fiber diet, with fresh fruit and vegetables, and reduce dairy intake, meats, and processed foods    Fluids. It's important to get enough fluids each day. Drink plenty of water when you eat more fiber. If you are on diet that limits the amount of fluid you can have, talk about this with your healthcare provider.    Regular exercise. Check with your healthcare provider first.  Medicines  Take any medicines as directed. Some laxatives are safe to use only every now and then. Others can be taken on a regular basis. While laxatives don't cause bowel dependence, they are treating the symptoms. So your constipation may return if you don't make other changes. Talk with your healthcare provider or pharmacist if you have questions.  Prescription pain medicines can cause constipation. If you are taking this kind of medicine, ask your healthcare provider if you should also take a stool softener.  Medicines you may take to treat constipation include:    Fiber supplements    Stool softeners    Laxatives    Enemas    Rectal suppositories  Follow-up care  Follow up with your healthcare provider if symptoms don't get better in the next few days. You may need to have more tests or see a specialist.  Call 911  Call 911 if any of these occur:    Trouble breathing    Stiff, rigid abdomen that is severely painful to  touch    Confusion    Fainting or loss of consciousness    Rapid heart rate    Chest pain  When to seek medical advice  Call your healthcare provider right away if any of these occur:    Fever of 100.4 F (38 C) or higher, or as directed by your healthcare provider    Failure to resume normal bowel movements    Pain in your abdomen or back gets worse    Nausea or vomiting    Swelling in your abdomen    Blood in the stool    Black, tarry stool    Involuntary weight loss    Weakness  Date Last Reviewed: 6/1/2018 2000-2018 [employer]. [address] [place] [id number]. All rights reserved. This information is not intended as a substitute for professional medical care. Always follow your healthcare professional's instructions.          Eating a High-Fiber Diet  Fiber is what gives strength and structure to plants. Most grains, beans, vegetables, and fruits contain fiber. Foods rich in fiber are often low in calories and fat, and they fill you up more. They may also reduce your risks for certain health problems. To find out the amount of fiber in canned, packaged, or frozen foods, read the Nutrition Facts label. It tells you how much fiber is in one serving.    Types of fiber and their benefits  There are two types of fiber: insoluble and soluble. They both aid digestion and help you maintain a healthy weight.    Insoluble fiber. This is found in whole grains, cereals, certain fruits and vegetables such as apple skin, corn, and carrots. Insoluble fiber may prevent constipation and reduce the risk for certain types of cancer. It is called insoluble because it does not dissolve in water.    Soluble fiber. This type of fiber is in oats, beans, and certain fruits and vegetables such as strawberries and peas. Soluble fiber can reduce cholesterol, which may help lower the risk for heart disease. It also helps control blood sugar levels.  Look for high-fiber foods  Try these foods to add fiber to your  diet:    Whole-grain breads and cereals. Try to eat 6 to 8 ounces a day. Include wheat and oat bran cereals, whole-wheat muffins or toast, and corn tortillas in your meals.    Fruits. Try to eat 2 cups a day. Apples, oranges, strawberries, pears, and bananas are good sources. (Note: Fruit juice is low in fiber.)    Vegetables. Try to eat at least 2.5 cups a day. Add asparagus, carrots, broccoli, peas, and corn to your meals.    Beans. One cup of cooked lentils gives you over 15 grams of fiber. Try navy beans, lentils, and chickpeas.    Seeds. A small handful of seeds gives you about 3 grams of fiber. Try sunflower or gorge seeds.  Keep track of your fiber  Keep track of how much fiber you eat. Start by reading food labels. Then eat a variety of foods high in fiber. As you start to eat more fiber, ask your healthcare provider how much water you should be drinking to keep your digestive system working smoothly.  Aim for a certain amount of fiber in your diet each day. If you are a woman, that amount is between 25 and 28 grams per day. Men should aim for 30 to 33 grams per day. After age 50, your daily fiber needs drop to 22 grams for women and 28 grams for men.  Before you reach for the fiber supplements, think about this. Fiber is found naturally in healthy whole foods. It gives you that feeling of fullness after you eat. Taking fiber supplements or eating fiber-enriched foods will not give you this full feeling.  Your fiber intake is a good measure for the quality of your overall diet. If you are missing out on your daily amount of fiber, you may be lacking other important nutrients as well.  Date Last Reviewed: 6/1/2017 2000-2018 [employer]. [address] [place] [address], [place] [address]. All rights reserved. This information is not intended as a substitute for professional medical care. Always follow your healthcare professional's instructions.          Treating Constipation    Constipation is a common  and often uncomfortable problem. Constipation means you have bowel movements fewer than 3 times per week, or strain to pass hard, dry stool. It can last a short time. Or it can be a problem that never seems to go away. The good news is that it can often be treated and controlled.  Eat more fiber  One of the best ways to help treat constipation is to increase your fiber intake. You can do this either through diet or by using fiber supplements. Fiber (in whole grains, fruits, and vegetables) adds bulk and absorbs water to soften the stool. This helps the stool pass through the colon more easily. When you increase your fiber intake, do it slowly to avoid side effects such as bloating. Also increase the amount of water that you drink. Eating more of the following foods can add fiber to your diet.    High-fiber cereals    Whole grains, bran, and brown rice    Vegetables such as carrots, broccoli, and greens    Fresh fruits (especially apples, pears, and dried fruits like raisins and apricots)    Nuts and legumes (especially beans such as lentils, kidney beans, and lima beans)  Get physically active  Exercise helps improve the working of your colon which helps ease constipation. Try to get some physical activity every day. If you haven t been active for a while, talk to your healthcare provider before starting again.  Laxatives  Your healthcare provider may suggest an over-the-counter product to help ease your constipation. He or she may suggest the use of bulk-forming agents or laxatives. The use of laxatives, if used as directed, is common and safe. Follow directions carefully when using them. See your healthcare provider for new-onset constipation, or long-term constipation, to rule out other causes such as medicines or thyroid disease.  Date Last Reviewed: 7/1/2016 2000-2018 [employer]. [address] [place] [address], [place] [address]. All rights reserved. This information is not intended as a substitute  for professional medical care. Always follow your healthcare professional's instructions.

## 2018-11-20 NOTE — ED NOTES
Discharge instructions were reviewed with pt and pt's daughter. Pt was provided Mag Citrate to drink at home, per Provider's order. Pt was ambulatory from the ER.

## 2018-11-20 NOTE — ED PROVIDER NOTES
History     Chief Complaint   Patient presents with     Abdominal Pain     abd pain and constipation, on pain meds for shoulder surgery     Constipation     HPI  Jamal Sanchez is a 63 year old male who underwent left shoulder surgery 4 days ago who presents with postoperative constipation with migratory abdominal cramping.  Abdominal pains began a day or so after surgery and have been primarily located in the low mid abdomen and left mid abdomen. Currently he has no abdominal pain.  Abdominal pains, suddenly, wax and wane in intensity and well localized and nonradiating.  Nothing taken for pain relief.  No nausea or vomiting.  No fever or chills.  He is passing gas/flatus.  No BM since prior to surgery.  He took a dose of MiraLAX yesterday and today, he took a stool softener/Colace earlier today.  No vomiting.  No fever or chills.  No other acute complaints or concerns.  Past abdominal surgery: Laparoscopic appendectomy.    Problem List:    Patient Active Problem List    Diagnosis Date Noted     Appendicitis 01/06/2016     Priority: Medium     Hypertension, goal below 140/90 07/06/2015     Priority: Medium     Advanced directives, counseling/discussion 10/16/2013     Priority: Medium     Patient does not have an Advance/Health Care Directive (HCD), requests blank HCD form.    Nina Shukla  October 16, 2013         GERD (gastroesophageal reflux disease) 08/07/2013     Priority: Medium     Benign prostatic hypertrophy 04/16/2008     Priority: Medium     CARDIOVASCULAR SCREENING; LDL GOAL LESS THAN 160 10/31/2010     Priority: Low        Past Medical History:    Past Medical History:   Diagnosis Date     BPH (benign prostatic hyperplasia)      GERD (gastroesophageal reflux disease)      Hypertension        Past Surgical History:    Past Surgical History:   Procedure Laterality Date     ARTHROSCOPY SHOULDER RT/LT Right 01/2018     COLONOSCOPY  3/5/2008     COLONOSCOPY  10/9/2013    Procedure: COLONOSCOPY;;   Surgeon: Nicolas Lizama MD;  Location: [facility]     ESOPHAGOSCOPY, GASTROSCOPY, DUODENOSCOPY (EGD), COMBINED  10/9/2013    Procedure: COMBINED ESOPHAGOSCOPY, GASTROSCOPY, DUODENOSCOPY (EGD), BIOPSY SINGLE OR MULTIPLE;;  Surgeon: Nicolas Lizama MD;  Location: [facility]     LAPAROSCOPIC APPENDECTOMY N/A 1/7/2016    Procedure: LAPAROSCOPIC APPENDECTOMY;  Surgeon: Ab Guzman MD;  Location: [facility]     LAPAROSCOPIC HERNIORRHAPHY INGUINAL BILATERAL Bilateral 12/13/2016    Procedure: LAPAROSCOPIC HERNIORRHAPHY INGUINAL BILATERAL;  Surgeon: Ab Guzman MD;  Location: [facility]       Family History:    Family History   Problem Relation Age of Onset     Diabetes Mother      Cerebrovascular Disease Mother      Neurologic Disorder Father      parkinson's     Cancer - colorectal Maternal Grandfather      Hypertension Brother      Cerebrovascular Disease Brother      stroke     Neurologic Disorder Brother      Alcohol/Drug Brother      Obesity Sister        Social History:  Marital Status:   [2]  Social History   Substance Use Topics     Smoking status: Never Smoker     Smokeless tobacco: Never Used     Alcohol use Yes      Comment: 6 pack beer on weekend.        Medications:      calcium carbonate (TUMS) 500 MG chewable tablet   docusate sodium (COLACE) 100 MG capsule   finasteride (PROSCAR) 5 MG tablet   hydrOXYzine (ATARAX) 10 MG tablet   omeprazole (PRILOSEC) 40 MG capsule   oxyCODONE IR (ROXICODONE) 5 MG tablet   triamterene-hydrochlorothiazide (MAXZIDE-25) 37.5-25 MG per tablet   sildenafil (REVATIO) 20 MG tablet       Review of Systems   Constitutional: Negative.    Respiratory: Negative.    Cardiovascular: Negative.    Gastrointestinal: Positive for abdominal pain and constipation. Negative for abdominal distention, diarrhea, nausea and vomiting.   Genitourinary: Negative.    Musculoskeletal: Negative.    Skin: Negative.        Physical Exam   BP: (!) 127/91  Heart Rate: 95  Temp: 98.1  F (36.7  C)  Resp:  "16  Height: 177.8 cm (5' 10\")  Weight: 95.3 kg (210 lb)  SpO2: 97 %      Physical Exam   Constitutional: He is oriented to person, place, and time. He appears well-developed and well-nourished. No distress.   HENT:   Head: Normocephalic and atraumatic.   Mouth/Throat: Oropharynx is clear and moist.   Eyes: Conjunctivae and EOM are normal. No scleral icterus.   Neck: Normal range of motion. Neck supple. No tracheal deviation present.   Cardiovascular: Normal rate, regular rhythm and normal heart sounds.  Exam reveals no gallop and no friction rub.    No murmur heard.  Pulmonary/Chest: Effort normal and breath sounds normal. No respiratory distress. He has no wheezes. He has no rales.   Abdominal: Soft. Bowel sounds are normal. He exhibits no distension and no mass. There is no tenderness. There is no rebound and no guarding.   Musculoskeletal: Normal range of motion. He exhibits no edema.   Neurological: He is alert and oriented to person, place, and time.   Skin: Skin is warm and dry. No rash noted. He is not diaphoretic. No erythema. No pallor.   Psychiatric: He has a normal mood and affect. His behavior is normal.   Nursing note and vitals reviewed.      ED Course     ED Course     Procedures                 No results found for this or any previous visit (from the past 24 hour(s)).    Medications   magnesium citrate solution 296 mL (not administered)     We discussed the possibility of bowel obstruction and discussed performing imaging evaluation, and risks and benefits of this.  Informed decision making: He elected to defer imaging evaluation, which appears to be clinically acceptable and appropriate present time.    Assessments & Plan (with Medical Decision Making)   Postoperative constipation with colicky poorly localized intermittent abdominal pains consistent with benign intestinal spasm.  No nausea or vomiting.  Passing flatus and bowel sounds present on exam.  I doubt bowel obstruction and we elected to " defer imaging evaluation.  He was discharged with a bottle of magnesium citrate and provided instructions for supportive care and will return as needed for worsened condition or worsening symptoms, or new problems or concerns.    I have reviewed the nursing notes.    I have reviewed the findings, diagnosis, plan and need for follow up with the patient.      New Prescriptions    Magnesium citrate 1 bottle po       Final diagnoses:   Slow transit constipation - Postoperative constipation, due to opiate analgesic.       11/20/2018   [facility] EMERGENCY DEPARTMENT     Yaniv Sheikh MD  11/22/18 [id number]

## 2018-11-20 NOTE — ED AVS SNAPSHOT
[facility] Emergency Department    [address] [address]    Phone:  [phone]    Fax:  [phone]                                       Jamal Sanchez   MRN: [id number]    Department:  [facility] Emergency Department   Date of Visit:  11/20/2018           After Visit Summary Signature Page     I have received my discharge instructions, and my questions have been answered. I have discussed any challenges I see with this plan with the nurse or doctor.    ..........................................................................................................................................  Patient/Patient Representative Signature      ..........................................................................................................................................  Patient Representative Print Name and Relationship to Patient    ..................................................               ................................................  Date                                   Time    ..........................................................................................................................................  Reviewed by Signature/Title    ...................................................              ..............................................  Date                                               Time          22EPIC Rev 08/18

## 2018-11-20 NOTE — ED NOTES
Pt presented to the ER accompanied by his daughter, c/o Abd pain since Friday following L shoulder surgery.     Pt stated his last bowel movement was Friday. Pt stated he has been using Rx Oxycodone, stated he has used Vicky lax but has not used his Colace.     Pt stated he has BPH, taking Finasteride, stated he has been experiencing increased urgency but decreased volume.     Pt denied cardiac/ pulmonary Hx but stated he does get short of breath more easily since Friday than normal. Lung sounds were clear, pt denied chest pain/ pressure.

## 2018-11-22 ENCOUNTER — HOSPITAL ENCOUNTER (EMERGENCY)
Facility: CLINIC | Age: 63
Discharge: ANOTHER HOSPITAL | End: 2018-11-22
Attending: FAMILY MEDICINE | Admitting: FAMILY MEDICINE
Payer: COMMERCIAL

## 2018-11-22 ENCOUNTER — APPOINTMENT (OUTPATIENT)
Dept: CT IMAGING | Facility: CLINIC | Age: 63
End: 2018-11-22
Attending: FAMILY MEDICINE
Payer: COMMERCIAL

## 2018-11-22 ENCOUNTER — TRANSFERRED RECORDS (OUTPATIENT)
Dept: HEALTH INFORMATION MANAGEMENT | Facility: CLINIC | Age: 63
End: 2018-11-22

## 2018-11-22 VITALS
WEIGHT: 210 LBS | OXYGEN SATURATION: 93 % | DIASTOLIC BLOOD PRESSURE: 73 MMHG | BODY MASS INDEX: 30.06 KG/M2 | TEMPERATURE: 98.3 F | SYSTOLIC BLOOD PRESSURE: 117 MMHG | HEART RATE: 68 BPM | HEIGHT: 70 IN | RESPIRATION RATE: 18 BRPM

## 2018-11-22 DIAGNOSIS — K57.20 DIVERTICULITIS OF LARGE INTESTINE WITH PERFORATION WITHOUT BLEEDING: ICD-10-CM

## 2018-11-22 LAB
ALBUMIN SERPL-MCNC: 3.2 G/DL (ref 3.4–5)
ALP SERPL-CCNC: 134 U/L (ref 40–150)
ALT SERPL W P-5'-P-CCNC: 29 U/L (ref 0–70)
ALT SERPL-CCNC: 28 U/L (ref 0–45)
ANION GAP SERPL CALCULATED.3IONS-SCNC: 7 MMOL/L (ref 3–14)
AST SERPL W P-5'-P-CCNC: 26 U/L (ref 0–45)
AST SERPL-CCNC: 27 U/L (ref 0–40)
BASOPHILS # BLD AUTO: 0 10E9/L (ref 0–0.2)
BASOPHILS NFR BLD AUTO: 0.2 %
BILIRUB SERPL-MCNC: 0.6 MG/DL (ref 0.2–1.3)
BUN SERPL-MCNC: 18 MG/DL (ref 7–30)
CALCIUM SERPL-MCNC: 9.2 MG/DL (ref 8.5–10.1)
CHLORIDE SERPL-SCNC: 100 MMOL/L (ref 94–109)
CO2 SERPL-SCNC: 30 MMOL/L (ref 20–32)
CREAT SERPL-MCNC: 0.84 MG/DL (ref 0.66–1.25)
DIFFERENTIAL METHOD BLD: ABNORMAL
EOSINOPHIL # BLD AUTO: 0 10E9/L (ref 0–0.7)
EOSINOPHIL NFR BLD AUTO: 0.2 %
ERYTHROCYTE [DISTWIDTH] IN BLOOD BY AUTOMATED COUNT: 13.3 % (ref 10–15)
GFR SERPL CREATININE-BSD FRML MDRD: >90 ML/MIN/1.7M2
GLUCOSE SERPL-MCNC: 182 MG/DL (ref 70–99)
HCT VFR BLD AUTO: 42.4 % (ref 40–53)
HGB BLD-MCNC: 14.4 G/DL (ref 13.3–17.7)
IMM GRANULOCYTES # BLD: 0.1 10E9/L (ref 0–0.4)
IMM GRANULOCYTES NFR BLD: 0.8 %
INR PPP: 1.1 (ref 0.9–1.1)
LIPASE SERPL-CCNC: 70 U/L (ref 73–393)
LYMPHOCYTES # BLD AUTO: 0.7 10E9/L (ref 0.8–5.3)
LYMPHOCYTES NFR BLD AUTO: 7 %
MCH RBC QN AUTO: 30.8 PG (ref 26.5–33)
MCHC RBC AUTO-ENTMCNC: 34 G/DL (ref 31.5–36.5)
MCV RBC AUTO: 91 FL (ref 78–100)
MONOCYTES # BLD AUTO: 1.2 10E9/L (ref 0–1.3)
MONOCYTES NFR BLD AUTO: 11.5 %
NEUTROPHILS # BLD AUTO: 8.6 10E9/L (ref 1.6–8.3)
NEUTROPHILS NFR BLD AUTO: 80.3 %
NRBC # BLD AUTO: 0 10*3/UL
NRBC BLD AUTO-RTO: 0 /100
PLATELET # BLD AUTO: 262 10E9/L (ref 150–450)
POTASSIUM SERPL-SCNC: 4 MMOL/L (ref 3.4–5.3)
PROT SERPL-MCNC: 7.7 G/DL (ref 6.8–8.8)
RBC # BLD AUTO: 4.67 10E12/L (ref 4.4–5.9)
SODIUM SERPL-SCNC: 137 MMOL/L (ref 133–144)
WBC # BLD AUTO: 10.6 10E9/L (ref 4–11)

## 2018-11-22 PROCEDURE — 25000128 H RX IP 250 OP 636: Performed by: FAMILY MEDICINE

## 2018-11-22 PROCEDURE — 80053 COMPREHEN METABOLIC PANEL: CPT | Performed by: FAMILY MEDICINE

## 2018-11-22 PROCEDURE — 25000131 ZZH RX MED GY IP 250 OP 636 PS 637: Performed by: FAMILY MEDICINE

## 2018-11-22 PROCEDURE — 96365 THER/PROPH/DIAG IV INF INIT: CPT

## 2018-11-22 PROCEDURE — 25000132 ZZH RX MED GY IP 250 OP 250 PS 637: Performed by: FAMILY MEDICINE

## 2018-11-22 PROCEDURE — 99284 EMERGENCY DEPT VISIT MOD MDM: CPT | Mod: Z6 | Performed by: FAMILY MEDICINE

## 2018-11-22 PROCEDURE — 83690 ASSAY OF LIPASE: CPT | Performed by: FAMILY MEDICINE

## 2018-11-22 PROCEDURE — 25000125 ZZHC RX 250: Performed by: FAMILY MEDICINE

## 2018-11-22 PROCEDURE — 85025 COMPLETE CBC W/AUTO DIFF WBC: CPT | Performed by: FAMILY MEDICINE

## 2018-11-22 PROCEDURE — 96375 TX/PRO/DX INJ NEW DRUG ADDON: CPT

## 2018-11-22 PROCEDURE — 74177 CT ABD & PELVIS W/CONTRAST: CPT

## 2018-11-22 PROCEDURE — 99285 EMERGENCY DEPT VISIT HI MDM: CPT | Mod: 25

## 2018-11-22 PROCEDURE — 96361 HYDRATE IV INFUSION ADD-ON: CPT

## 2018-11-22 RX ORDER — ONDANSETRON 4 MG/1
4 TABLET, ORALLY DISINTEGRATING ORAL EVERY 6 HOURS PRN
Status: CANCELLED | OUTPATIENT
Start: 2018-11-22

## 2018-11-22 RX ORDER — KETOROLAC TROMETHAMINE 15 MG/ML
15 INJECTION, SOLUTION INTRAMUSCULAR; INTRAVENOUS ONCE
Status: COMPLETED | OUTPATIENT
Start: 2018-11-22 | End: 2018-11-22

## 2018-11-22 RX ORDER — ONDANSETRON 4 MG/1
4 TABLET, ORALLY DISINTEGRATING ORAL ONCE
Status: COMPLETED | OUTPATIENT
Start: 2018-11-22 | End: 2018-11-22

## 2018-11-22 RX ORDER — SODIUM CHLORIDE 9 MG/ML
INJECTION, SOLUTION INTRAVENOUS CONTINUOUS
Status: DISCONTINUED | OUTPATIENT
Start: 2018-11-22 | End: 2018-11-22 | Stop reason: HOSPADM

## 2018-11-22 RX ORDER — IOPAMIDOL 755 MG/ML
100 INJECTION, SOLUTION INTRAVASCULAR ONCE
Status: COMPLETED | OUTPATIENT
Start: 2018-11-22 | End: 2018-11-22

## 2018-11-22 RX ORDER — ONDANSETRON 2 MG/ML
4 INJECTION INTRAMUSCULAR; INTRAVENOUS ONCE
Status: COMPLETED | OUTPATIENT
Start: 2018-11-22 | End: 2018-11-22

## 2018-11-22 RX ORDER — NALOXONE HYDROCHLORIDE 0.4 MG/ML
.1-.4 INJECTION, SOLUTION INTRAMUSCULAR; INTRAVENOUS; SUBCUTANEOUS
Status: CANCELLED | OUTPATIENT
Start: 2018-11-22

## 2018-11-22 RX ORDER — ONDANSETRON 2 MG/ML
4 INJECTION INTRAMUSCULAR; INTRAVENOUS EVERY 6 HOURS PRN
Status: CANCELLED | OUTPATIENT
Start: 2018-11-22

## 2018-11-22 RX ADMIN — IOPAMIDOL 100 ML: 755 INJECTION, SOLUTION INTRAVENOUS at 04:40

## 2018-11-22 RX ADMIN — SODIUM CHLORIDE 66 ML: 9 INJECTION, SOLUTION INTRAVENOUS at 04:40

## 2018-11-22 RX ADMIN — DOCUSATE SODIUM 286 ML: 50 LIQUID ORAL at 03:10

## 2018-11-22 RX ADMIN — ONDANSETRON 4 MG: 4 TABLET, ORALLY DISINTEGRATING ORAL at 03:03

## 2018-11-22 RX ADMIN — SODIUM CHLORIDE 500 ML: 9 INJECTION, SOLUTION INTRAVENOUS at 04:22

## 2018-11-22 RX ADMIN — KETOROLAC TROMETHAMINE 15 MG: 15 INJECTION, SOLUTION INTRAMUSCULAR; INTRAVENOUS at 05:25

## 2018-11-22 RX ADMIN — ONDANSETRON 4 MG: 2 INJECTION INTRAMUSCULAR; INTRAVENOUS at 05:26

## 2018-11-22 RX ADMIN — SODIUM CHLORIDE: 9 INJECTION, SOLUTION INTRAVENOUS at 05:22

## 2018-11-22 RX ADMIN — TAZOBACTAM SODIUM AND PIPERACILLIN SODIUM 4.5 G: 500; 4 INJECTION, SOLUTION INTRAVENOUS at 05:29

## 2018-11-22 ASSESSMENT — ENCOUNTER SYMPTOMS
MUSCULOSKELETAL NEGATIVE: 1
NAUSEA: 0
ABDOMINAL DISTENTION: 0
RESPIRATORY NEGATIVE: 1
CONSTITUTIONAL NEGATIVE: 1
DIARRHEA: 0
CARDIOVASCULAR NEGATIVE: 1
ABDOMINAL PAIN: 1
CONSTIPATION: 1
VOMITING: 0

## 2018-11-22 NOTE — ED PROVIDER NOTES
HPI  Patient is a 63-year-old male presenting with concerns about constipation.  He had shoulder surgery on the left side about a week ago.  He was put on opioid medication for pain control.  Since he started this medicine he has not had a bowel movement (last Friday).  He was seen in the ED yesterday and had magnesium citrate by mouth with small amount of output but nothing substantial, per his report.  The note from yesterday is not available.  The patient also comments on having some right-sided abdominal discomfort that would come and go even prior to his shoulder surgery and the use of opioid medicine.  Known history of appendectomy.    The patient complains of primarily left-sided pain.  It is occasionally felt in the lower abdomen and sometimes in the left upper abdomen.  It is also felt occasionally in the low back and upper back.  He has had progressive pain since yesterday.  He has nausea now.  No vomiting.  No fever.  No focal abdominal tenderness.  No dysuria, urgency, or frequency.  He has not had pain or symptoms like this previously.    ROS: All other review of systems are negative other than that noted above.     Past Medical History:   Diagnosis Date     BPH (benign prostatic hyperplasia)      GERD (gastroesophageal reflux disease)      Hypertension      Past Surgical History:   Procedure Laterality Date     ARTHROSCOPY SHOULDER RT/LT Right 01/2018     COLONOSCOPY  3/5/2008     COLONOSCOPY  10/9/2013    Procedure: COLONOSCOPY;;  Surgeon: Nicolas Lizama MD;  Location: [facility]     ESOPHAGOSCOPY, GASTROSCOPY, DUODENOSCOPY (EGD), COMBINED  10/9/2013    Procedure: COMBINED ESOPHAGOSCOPY, GASTROSCOPY, DUODENOSCOPY (EGD), BIOPSY SINGLE OR MULTIPLE;;  Surgeon: Nicolas Lizama MD;  Location: [facility]     LAPAROSCOPIC APPENDECTOMY N/A 1/7/2016    Procedure: LAPAROSCOPIC APPENDECTOMY;  Surgeon: Ab Guzman MD;  Location: [facility]     LAPAROSCOPIC HERNIORRHAPHY INGUINAL BILATERAL Bilateral 12/13/2016  "   Procedure: LAPAROSCOPIC HERNIORRHAPHY INGUINAL BILATERAL;  Surgeon: bA Guzman MD;  Location: [facility]     Family History   Problem Relation Age of Onset     Diabetes Mother      Cerebrovascular Disease Mother      Neurologic Disorder Father      parkinson's     Cancer - colorectal Maternal Grandfather      Hypertension Brother      Cerebrovascular Disease Brother      stroke     Neurologic Disorder Brother      Alcohol/Drug Brother      Obesity Sister      Social History   Substance Use Topics     Smoking status: Never Smoker     Smokeless tobacco: Never Used     Alcohol use Yes      Comment: 6 pack beer on weekend.         PHYSICAL  /87 (BP Location: Right arm)  Pulse 68  Temp 98.3  F (36.8  C) (Oral)  Resp 18  Ht 1.778 m (5' 10\")  Wt 95.3 kg (210 lb)  SpO2 96%  BMI 30.13 kg/m2  General: Patient is alert and in mild to moderate distress.  Frustrated.  Neurological: Alert.  Moving upper and lower extremities equally, bilaterally.  Head / Neck: Atraumatic.  Ears: Not done.  Eyes: Pupils are equal, round, and reactive.  Normal conjunctiva.  Nose: Midline.  No epistaxis.  Mouth / Throat: No ulcerations or lesions.  Upper pharynx is not erythematous.  Moist.  Respiratory: No respiratory distress. CTA B.  Cardiovascular: Regular rhythm.  Peripheral extremities are warm.  No edema.  No calf tenderness.  Abdomen / Pelvis: Mild to moderate distention.  Mild discomfort when palpating throughout.  Soft throughout.  Genitalia: Not done.  Musculoskeletal: No tenderness over major muscles and joints.  Skin: No evidence of rash or trauma.        ED COURSE  0240.  The patient has a history and physical examination that is consistent with constipation.  Pink lady Fleet enema given here.  Zofran given here.  If not improving with medications here in broader workup will be obtained.    Labs Ordered and Resulted from Time of ED Arrival Up to the Time of Departure from the ED   CBC WITH PLATELETS DIFFERENTIAL - " Abnormal; Notable for the following:        Result Value    Absolute Neutrophil 8.6 (*)     Absolute Lymphocytes 0.7 (*)     All other components within normal limits   COMPREHENSIVE METABOLIC PANEL - Abnormal; Notable for the following:     Glucose 182 (*)     Albumin 3.2 (*)     All other components within normal limits   LIPASE - Abnormal; Notable for the following:     Lipase 70 (*)     All other components within normal limits   SOAP SUDS ENEMA     0411.  The patient has had mild results after the pink lady enema and soapsuds enema.  He feels less distended his lower abdomen but he continues to have discomfort and nausea.  Further work up no ordered including blood and CT scan.    IMAGING  Images reviewed by me.  Radiology report also reviewed.  Abd/pelvis CT, IV contrast only TRAUMA  / AAA   Preliminary Result   IMPRESSION:   1. Findings most consistent with perforated diverticulitis.   2. Two associated localized gas and fluid collections in the lower   abdomen and pelvis as described compatible with evolving abscesses,   though these are not well encapsulated at this time.   3. Additional scattered free air in the mid and upper abdomen.   4. Several mildly dilated small bowel loops likely ileus related to   above findings and peritonitis.      Findings discussed with the ER physician at 5:05 a.m.        0509.  Spoke with the radiologist on the phone.  There is evidence of perforated diverticulitis with early abscess formation and diffuse contamination within the peritoneum.  IV Zosyn ordered.  Toradol and Zofran ordered.  His vital signs are excellent.  Normal saline infusion at 150 mL/h.  I am awaiting callback from the hospitalist.      0549.  The general surgeon here, Dr. Jacinto, tells me that the patient will most likely need a percutaneous drain given the size of his fluid collection on CT imaging.  Unfortunately, we did not have interventional radiology here over the weekend and so he has asked that the  patient be transferred to a different hospital.  The patient has [employer] insurance.  I asked that [facility] be called first.  They do not have beds available.  They did recommend that I transfer the patient to [facility] but they will not have any ability to provide an escalated level of care compared to [facility].  Therefore, I am calling [facility] to see if they have bed availability.    Medications   piperacillin-tazobactam (ZOSYN) intermittent infusion 4.5 g (4.5 g Intravenous New Bag 11/22/18 0529)   sodium chloride 0.9% infusion ( Intravenous New Bag 11/22/18 0522)   ondansetron (ZOFRAN-ODT) ODT tab 4 mg (4 mg Oral Given 11/22/18 0303)   pink lady enema (286 mLs Rectal Given 11/22/18 0310)   0.9% sodium chloride BOLUS (0 mLs Intravenous Stopped 11/22/18 0514)   iopamidol (ISOVUE-370) solution 100 mL (100 mLs Intravenous Given 11/22/18 0440)   sodium chloride 0.9 % bag 500mL for CT scan flush use (66 mLs Intravenous Given 11/22/18 0440)   ketorolac (TORADOL) injection 15 mg (15 mg Intravenous Given 11/22/18 0525)   ondansetron (ZOFRAN) injection 4 mg (4 mg Intravenous Given 11/22/18 0526)       IMPRESSION    ICD-10-CM    1. Diverticulitis of large intestine with perforation without bleeding K57.20            Critical Care time:  none                    Bernard Mabry MD  11/22/18 9921

## 2018-11-22 NOTE — ED TRIAGE NOTES
"Pt states \"they gave me opioids after my surgery and I have been backed up.\" Pt states pain and abdominal distention have become worse and he started vomiting last night. Pt denies blood in his secretions. States his last normal BM was a week ago. States was seen here for the same thing yesterday.  "

## 2018-11-22 NOTE — ED NOTES
"Pt sitting on the bedside commode stating, \"not making much progress.\" Pt states he has only passed small amount of soft stool. Soft stool noted, no solid formed stool noted. Pt states, \"but I do feel less distended.\"  "

## 2018-11-22 NOTE — ED NOTES
Enema explained to pt. Instructed to retain for as long as possible. Bedside commode placed at bedside with call light in reach.

## 2018-11-23 LAB
CREAT SERPL-MCNC: 0.86 MG/DL (ref 0.7–1.3)
CREAT SERPL-MCNC: 0.86 MG/DL (ref 0.7–1.3)
GFR SERPL CREATININE-BSD FRML MDRD: >60 ML/MIN/1.73M2
GFR SERPL CREATININE-BSD FRML MDRD: >60 ML/MIN/1.73M2
GLUCOSE SERPL-MCNC: 103 MG/DL (ref 70–125)
GLUCOSE SERPL-MCNC: 103 MG/DL (ref 70–125)
POTASSIUM SERPL-SCNC: 4 MMOL/L (ref 3.5–5)
POTASSIUM SERPL-SCNC: 4 MMOL/L (ref 3.5–5)

## 2018-11-27 ENCOUNTER — HOSPITAL ENCOUNTER (EMERGENCY)
Facility: CLINIC | Age: 63
Discharge: HOME | End: 2018-11-27
Attending: PHYSICIAN ASSISTANT | Admitting: PHYSICIAN ASSISTANT
Payer: COMMERCIAL

## 2018-11-27 ENCOUNTER — APPOINTMENT (OUTPATIENT)
Dept: CT IMAGING | Facility: CLINIC | Age: 63
End: 2018-11-27
Attending: PHYSICIAN ASSISTANT
Payer: COMMERCIAL

## 2018-11-27 VITALS
BODY MASS INDEX: 28.63 KG/M2 | HEIGHT: 70 IN | OXYGEN SATURATION: 98 % | WEIGHT: 200 LBS | DIASTOLIC BLOOD PRESSURE: 87 MMHG | TEMPERATURE: 97.6 F | RESPIRATION RATE: 16 BRPM | SYSTOLIC BLOOD PRESSURE: 130 MMHG

## 2018-11-27 DIAGNOSIS — T81.9XXA COMPLICATION OF PROCEDURE, INITIAL ENCOUNTER: Primary | ICD-10-CM

## 2018-11-27 LAB
ALBUMIN SERPL-MCNC: 3.4 G/DL (ref 3.4–5)
ALP SERPL-CCNC: 115 U/L (ref 40–150)
ALT SERPL W P-5'-P-CCNC: 44 U/L (ref 0–70)
ANION GAP SERPL CALCULATED.3IONS-SCNC: 8 MMOL/L (ref 3–14)
AST SERPL W P-5'-P-CCNC: 29 U/L (ref 0–45)
BASOPHILS # BLD AUTO: 0.1 10E9/L (ref 0–0.2)
BASOPHILS NFR BLD AUTO: 0.5 %
BILIRUB SERPL-MCNC: 0.5 MG/DL (ref 0.2–1.3)
BUN SERPL-MCNC: 15 MG/DL (ref 7–30)
CALCIUM SERPL-MCNC: 9.1 MG/DL (ref 8.5–10.1)
CHLORIDE SERPL-SCNC: 103 MMOL/L (ref 94–109)
CO2 SERPL-SCNC: 25 MMOL/L (ref 20–32)
CREAT SERPL-MCNC: 0.9 MG/DL (ref 0.66–1.25)
DIFFERENTIAL METHOD BLD: ABNORMAL
EOSINOPHIL # BLD AUTO: 0 10E9/L (ref 0–0.7)
EOSINOPHIL NFR BLD AUTO: 0.2 %
ERYTHROCYTE [DISTWIDTH] IN BLOOD BY AUTOMATED COUNT: 13.1 % (ref 10–15)
GFR SERPL CREATININE-BSD FRML MDRD: 85 ML/MIN/1.7M2
GLUCOSE SERPL-MCNC: 115 MG/DL (ref 70–99)
HCT VFR BLD AUTO: 44.4 % (ref 40–53)
HEMOCCULT STL QL: NORMAL
HGB BLD-MCNC: 14.5 G/DL (ref 13.3–17.7)
IMM GRANULOCYTES # BLD: 0.2 10E9/L (ref 0–0.4)
IMM GRANULOCYTES NFR BLD: 1.1 %
INTERNAL QC OK POCT: YES
LYMPHOCYTES # BLD AUTO: 2.3 10E9/L (ref 0.8–5.3)
LYMPHOCYTES NFR BLD AUTO: 16.3 %
MCH RBC QN AUTO: 29.5 PG (ref 26.5–33)
MCHC RBC AUTO-ENTMCNC: 32.7 G/DL (ref 31.5–36.5)
MCV RBC AUTO: 90 FL (ref 78–100)
MONOCYTES # BLD AUTO: 0.7 10E9/L (ref 0–1.3)
MONOCYTES NFR BLD AUTO: 5 %
NEUTROPHILS # BLD AUTO: 10.8 10E9/L (ref 1.6–8.3)
NEUTROPHILS NFR BLD AUTO: 76.9 %
NRBC # BLD AUTO: 0 10*3/UL
NRBC BLD AUTO-RTO: 0 /100
PLATELET # BLD AUTO: 415 10E9/L (ref 150–450)
POTASSIUM SERPL-SCNC: 4.2 MMOL/L (ref 3.4–5.3)
PROT SERPL-MCNC: 7.7 G/DL (ref 6.8–8.8)
RBC # BLD AUTO: 4.91 10E12/L (ref 4.4–5.9)
SODIUM SERPL-SCNC: 136 MMOL/L (ref 133–144)
TEST CARD LOT NUMBER: NORMAL
WBC # BLD AUTO: 14 10E9/L (ref 4–11)

## 2018-11-27 PROCEDURE — 74177 CT ABD & PELVIS W/CONTRAST: CPT

## 2018-11-27 PROCEDURE — 99285 EMERGENCY DEPT VISIT HI MDM: CPT | Mod: 25 | Performed by: PHYSICIAN ASSISTANT

## 2018-11-27 PROCEDURE — 85025 COMPLETE CBC W/AUTO DIFF WBC: CPT | Performed by: PHYSICIAN ASSISTANT

## 2018-11-27 PROCEDURE — 99285 EMERGENCY DEPT VISIT HI MDM: CPT | Mod: Z6 | Performed by: PHYSICIAN ASSISTANT

## 2018-11-27 PROCEDURE — 80053 COMPREHEN METABOLIC PANEL: CPT | Performed by: PHYSICIAN ASSISTANT

## 2018-11-27 PROCEDURE — 25000125 ZZHC RX 250: Performed by: PHYSICIAN ASSISTANT

## 2018-11-27 PROCEDURE — 25000128 H RX IP 250 OP 636: Performed by: PHYSICIAN ASSISTANT

## 2018-11-27 PROCEDURE — 82272 OCCULT BLD FECES 1-3 TESTS: CPT | Performed by: PHYSICIAN ASSISTANT

## 2018-11-27 RX ORDER — IOPAMIDOL 755 MG/ML
97 INJECTION, SOLUTION INTRAVASCULAR ONCE
Status: COMPLETED | OUTPATIENT
Start: 2018-11-27 | End: 2018-11-27

## 2018-11-27 RX ORDER — METRONIDAZOLE 500 MG/1
500 TABLET ORAL 3 TIMES DAILY
COMMUNITY
Start: 2018-11-26 | End: 2018-12-06

## 2018-11-27 RX ORDER — CIPROFLOXACIN 500 MG/1
500 TABLET, FILM COATED ORAL 2 TIMES DAILY
COMMUNITY
Start: 2018-11-26 | End: 2018-12-06

## 2018-11-27 RX ADMIN — IOPAMIDOL 97 ML: 755 INJECTION, SOLUTION INTRAVENOUS at 10:44

## 2018-11-27 RX ADMIN — SODIUM CHLORIDE 64 ML: 9 INJECTION, SOLUTION INTRAVENOUS at 10:45

## 2018-11-27 NOTE — ED AVS SNAPSHOT
[facility] Emergency Department    [address] [address]    Phone:  [phone]    Fax:  [phone]                                       Jamal Sanchez   MRN: [id number]    Department:  [facility] Emergency Department   Date of Visit:  11/27/2018           After Visit Summary Signature Page     I have received my discharge instructions, and my questions have been answered. I have discussed any challenges I see with this plan with the nurse or doctor.    ..........................................................................................................................................  Patient/Patient Representative Signature      ..........................................................................................................................................  Patient Representative Print Name and Relationship to Patient    ..................................................               ................................................  Date                                   Time    ..........................................................................................................................................  Reviewed by Signature/Title    ...................................................              ..............................................  Date                                               Time          22EPIC Rev 08/18

## 2018-11-27 NOTE — ED AVS SNAPSHOT
[facility]    [address] [address]    Phone:  [phone]    Fax:  [phone]                                       Jamal Sanchez   MRN: [id number]    Department:  [facility]   Date of Visit:  11/27/2018           Patient Information     Date Of Birth          1955        Your diagnoses for this visit were:     Complication of procedure, initial encounter Bloody drainage in DANIELA abdominal drain       You were seen by Ghazal Shepard PA-C.      Follow-up Information     Follow up with [facility].    Why:  Tomorrow for follow-up as scheduled    Contact information:    [address] [place] [id number] [phone]        Follow up with [facility].    Specialty:  EMERGENCY MEDICINE    Why:  As needed, If symptoms worsen    Contact information:    [address] [place] [id number] [phone]    Additional information:    The medical center is located at   [address] [place] [address] (between [facility] and   [facility] in [place], four miles north   of [place]).      Your next 10 appointments already scheduled     Nov 30, 2018  3:00 PM CST   Office Visit with Anjel Jimenes MD   [facility] ([facility])    [address] [place] [id number] [phone]           Bring a current list of meds and any records pertaining to this visit. For Physicals, please bring immunization records and any forms needing to be filled out. Please arrive 10 minutes early to complete paperwork.              24 Hour Appointment Hotline       To make an appointment at any [facility], call [phone] ([phone]). If you don't have a family doctor or clinic, we will help you find one. [facility] are conveniently located to serve the needs of you and your family.             Review of your medicines      Our records show that you are  taking the medicines listed below. If these are incorrect, please call your family doctor or clinic.        Dose / Directions Last dose taken    calcium carbonate 500 MG chewable tablet   Commonly known as:  TUMS   Dose:  1 chew tab        Take 1 chew tab by mouth once   Refills:  0        ciprofloxacin 500 MG tablet   Commonly known as:  CIPRO   Dose:  500 mg        Take 500 mg by mouth 2 times daily 0600, 4 pm   Refills:  0        finasteride 5 MG tablet   Commonly known as:  PROSCAR   Dose:  5 mg   Quantity:  90 tablet        Take 1 tablet (5 mg) by mouth daily   Refills:  3        hydrOXYzine 10 MG tablet   Commonly known as:  ATARAX   Dose:  10 mg        Take 10 mg by mouth every 6 hours as needed for itching (Nausea)   Refills:  0        metroNIDAZOLE 500 MG tablet   Commonly known as:  FLAGYL   Dose:  500 mg        Take 500 mg by mouth 3 times daily   Refills:  0        omeprazole 40 MG DR capsule   Commonly known as:  priLOSEC   Quantity:  90 capsule        TAKE 1 CAPSULE BY MOUTH D AILY. TAKE 30-60 MINUTES BEFORE A MEAL.   Refills:  0        oxyCODONE 5 MG tablet   Commonly known as:  ROXICODONE   Dose:  5 mg        Take 5 mg by mouth every 3 hours Max of ten tablets in 24 hours   Refills:  0        sildenafil 20 MG tablet   Commonly known as:  REVATIO   Dose:   mg   Quantity:  50 tablet        Take 2-5 tablets ( mg) by mouth as needed for erectile insufficiency.  Never use with nitroglycerin, terazosin or doxazosin.   Refills:  3        triamterene-hydrochlorothiazide 37.5-25 MG per tablet   Commonly known as:  MAXZIDE-25   Dose:  1 tablet   Quantity:  90 tablet        Take 1 tablet by mouth every morning   Refills:  3                Procedures and tests performed during your visit     CBC with platelets differential    CT Abdomen Pelvis w Contrast    Comprehensive metabolic panel    Peripheral IV catheter      Orders Needing Specimen Collection     None      Pending Results     Date and Time  Order Name Status Description    11/27/2018 1034 CT Abdomen Pelvis w Contrast Preliminary             Pending Culture Results     No orders found from 11/25/2018 to 11/28/2018.            Pending Results Instructions     If you had any lab results that were not finalized at the time of your Discharge, you can call the ED Lab Result RN at [phone]. You will be contacted by this team for any positive Lab results or changes in treatment. The nurses are available 7 days a week from 10A to 6:30P.  You can leave a message 24 hours per day and they will return your call.        Test Results From Your Hospital Stay        11/27/2018 10:33 AM      Component Results     Component Value Ref Range & Units Status    WBC 14.0 (H) 4.0 - 11.0 10e9/L Final    RBC Count 4.91 4.4 - 5.9 10e12/L Final    Hemoglobin 14.5 13.3 - 17.7 g/dL Final    Hematocrit 44.4 40.0 - 53.0 % Final    MCV 90 78 - 100 fl Final    MCH 29.5 26.5 - 33.0 pg Final    MCHC 32.7 31.5 - 36.5 g/dL Final    RDW 13.1 10.0 - 15.0 % Final    Platelet Count 415 150 - 450 10e9/L Final    Diff Method Automated Method  Final    % Neutrophils 76.9 % Final    % Lymphocytes 16.3 % Final    % Monocytes 5.0 % Final    % Eosinophils 0.2 % Final    % Basophils 0.5 % Final    % Immature Granulocytes 1.1 % Final    Nucleated RBCs 0 0 /100 Final    Absolute Neutrophil 10.8 (H) 1.6 - 8.3 10e9/L Final    Absolute Lymphocytes 2.3 0.8 - 5.3 10e9/L Final    Absolute Monocytes 0.7 0.0 - 1.3 10e9/L Final    Absolute Eosinophils 0.0 0.0 - 0.7 10e9/L Final    Absolute Basophils 0.1 0.0 - 0.2 10e9/L Final    Abs Immature Granulocytes 0.2 0 - 0.4 10e9/L Final    Absolute Nucleated RBC 0.0  Final         11/27/2018 10:51 AM      Component Results     Component Value Ref Range & Units Status    Sodium 136 133 - 144 mmol/L Final    Potassium 4.2 3.4 - 5.3 mmol/L Final    Chloride 103 94 - 109 mmol/L Final    Carbon Dioxide 25 20 - 32 mmol/L Final    Anion Gap 8 3 - 14 mmol/L Final    Glucose  115 (H) 70 - 99 mg/dL Final    Urea Nitrogen 15 7 - 30 mg/dL Final    Creatinine 0.90 0.66 - 1.25 mg/dL Final    GFR Estimate 85 >60 mL/min/1.7m2 Final    Non  GFR Calc    GFR Estimate If Black >90 >60 mL/min/1.7m2 Final    African American GFR Calc    Calcium 9.1 8.5 - 10.1 mg/dL Final    Bilirubin Total 0.5 0.2 - 1.3 mg/dL Final    Albumin 3.4 3.4 - 5.0 g/dL Final    Protein Total 7.7 6.8 - 8.8 g/dL Final    Alkaline Phosphatase 115 40 - 150 U/L Final    ALT 44 0 - 70 U/L Final    AST 29 0 - 45 U/L Final         11/27/2018 11:15 AM      Narrative     CT ABDOMEN/PELVIS WITH CONTRAST November 27, 2018 10:57 AM     HISTORY: Left lower abdominal drain in place for perforated  diverticulitis/peritonitis, now with bloody drainage from drain and  increased pain with flushing drain.     TECHNIQUE: 97 ml Isovue 370. Radiation dose for this scan was reduced  using automated exposure control, adjustment of the mA and/or kV  according to patient size, or iterative reconstruction technique.    COMPARISON: 11/22/2018.    FINDINGS: Included lung bases are unremarkable.    The liver, gall bladder, spleen and pancreas are unremarkable.    No adrenal lesions. No kidney stones or hydronephrosis. No suspicious  renal lesions. No retroperitoneal adenopathy or evidence of aortic  aneurysm.    There is a pigtail drain in place in the mid to lower abdomen. The  previously seen abscess has decreased significantly in size with a  small pocket remaining inferiorly measuring approximately 4.5 x 2.5 cm  in size. There is inflammation in the mesentery surrounding this  region.    There is no free air. There is new small amount of free fluid  surrounding the dome of the liver. Small amount of probable free fluid  axial series 2 images 41-45 adjacent to a somewhat thickened loop of  bowel. No evidence of dilated bowel or bowel obstruction.        Impression     IMPRESSION:   1. There is a pigtail catheter for drainage in place in  the lower  midabdomen. There is a 4.5 x 2.5 cm residual fluid collection just  inferior to the catheter. Its possible this may communicate and likely  does communicate with the main abscess.  2. Small amount of free fluid surrounding the dome of the liver is new  since the comparison study. Small amount of free fluid in the abdomen  just medial to the drain. There is a thickened loop of bowel in this  region as well likely reactive.  3. Previously seen free air has resolved. Mild inflammation in the  mesentery surrounding the drained area.                Thank you for choosing [facility]       Thank you for choosing [facility] for your care. Our goal is always to provide you with excellent care. Hearing back from our patients is one way we can continue to improve our services. Please take a few minutes to complete the written survey that you may receive in the mail after you visit with us. Thank you!        [facility] Information     [facility] gives you secure access to your electronic health record. If you see a primary care provider, you can also send messages to your care team and make appointments. If you have questions, please call your primary care clinic.  If you do not have a primary care provider, please call [phone] and they will assist you.        Care EveryWhere ID     This is your Care EveryWhere ID. This could be used by other organizations to access your [facility] medical records  [id number]        Equal Access to Services     BETSEY MULLIGAN : Rosalie mcintosho Socarloz, waaxda luqadaha, qaybta kaalmada adeegyada, berto caldwell. So [facility] [phone].    ATENCIÓN: Si habla español, tiene a zamarripa disposición servicios gratuitos de asistencia lingüística. Llame al [phone].    We comply with applicable federal civil rights laws and [place] laws. We do not discriminate on the basis of race, color, national origin, age, disability, sex, sexual orientation, or gender  identity.            After Visit Summary       This is your record. Keep this with you and show to your community pharmacist(s) and doctor(s) at your next visit.

## 2018-11-27 NOTE — ED NOTES
Pt arrives with DANIELA left lower abdomen -was discharged from [facility] yesterday d/t ruptured diverticulum - now with increased bleeding and discharge from site - states fluid is now more serous whereas prior it was a bit foamy and mixed. Patient denies any fever or vomiting. Also c/o black stools and this is new for him.

## 2018-11-27 NOTE — ED PROVIDER NOTES
History     Chief Complaint   Patient presents with     Drainage from Incision     blood in abd drain-also having black stools     HPI  Jamal Sanchez is a 63 year old male with history of hypertension, GERD, BPH who presents with complaints of bloody drainage coming from abdominal drain since last night along with black stools.  Patient was transferred to [facility] 5 days ago at which point an abdominal drain was placed via IR for treatment of perforated diverticulitis associated with evolving abscesses and peritonitis.  Patient was discharged from the hospital yesterday and states things had been improving until he noticed that the drainage was a brighter red in consistency which is a change from the pink frothy drainage he had been experiencing since the drain placement.  Patient states he also tried to flush his drain and experienced severe pain and reported that the surrounding dressing became wet and saturated.  Patient is concerned his drain may be dislodged.  Patient has noticed that since yesterday his stools are black.  Patient does not take any blood thinners.  Denies fevers, chills, nausea, vomiting, diarrhea, constipation, chest pain, shortness of breath, or urinary symptoms.  Pt is taking Ciprofloxacin and Flagyl since discharge.  [facility] completed patient's CT-guided percutaneous drain placement into the sigmoid diverticular abscess.        CT abdomen/pelvis results from 5 days ago:  IMPRESSION:   1. Findings most consistent with perforated diverticulitis.   2. Two associated localized gas and fluid collections in the lower   abdomen and pelvis as described compatible with evolving abscesses,   though these are not well encapsulated at this time.   3. Additional scattered free air in the mid and upper abdomen.   4. Several mildly dilated small bowel loops likely ileus related to   above findings and peritonitis.         Problem List:    Patient Active Problem List     Diagnosis Date Noted     Appendicitis 01/06/2016     Priority: Medium     Hypertension, goal below 140/90 07/06/2015     Priority: Medium     Advanced directives, counseling/discussion 10/16/2013     Priority: Medium     Patient does not have an Advance/Health Care Directive (HCD), requests blank HCD form.    Nina Bourgeoisdomenicoon  October 16, 2013         GERD (gastroesophageal reflux disease) 08/07/2013     Priority: Medium     Benign prostatic hypertrophy 04/16/2008     Priority: Medium     CARDIOVASCULAR SCREENING; LDL GOAL LESS THAN 160 10/31/2010     Priority: Low        Past Medical History:    Past Medical History:   Diagnosis Date     BPH (benign prostatic hyperplasia)      GERD (gastroesophageal reflux disease)      Hypertension        Past Surgical History:    Past Surgical History:   Procedure Laterality Date     ARTHROSCOPY SHOULDER RT/LT Right 01/2018     COLONOSCOPY  3/5/2008     COLONOSCOPY  10/9/2013    Procedure: COLONOSCOPY;;  Surgeon: Nicolas Lizama MD;  Location: [facility]     ESOPHAGOSCOPY, GASTROSCOPY, DUODENOSCOPY (EGD), COMBINED  10/9/2013    Procedure: COMBINED ESOPHAGOSCOPY, GASTROSCOPY, DUODENOSCOPY (EGD), BIOPSY SINGLE OR MULTIPLE;;  Surgeon: Nicolas Lizama MD;  Location: [facility]     LAPAROSCOPIC APPENDECTOMY N/A 1/7/2016    Procedure: LAPAROSCOPIC APPENDECTOMY;  Surgeon: Ab Guzman MD;  Location: [facility]     LAPAROSCOPIC HERNIORRHAPHY INGUINAL BILATERAL Bilateral 12/13/2016    Procedure: LAPAROSCOPIC HERNIORRHAPHY INGUINAL BILATERAL;  Surgeon: Ab Guzman MD;  Location: [facility]       Family History:    Family History   Problem Relation Age of Onset     Diabetes Mother      Cerebrovascular Disease Mother      Neurologic Disorder Father      parkinson's     Cancer - colorectal Maternal Grandfather      Hypertension Brother      Cerebrovascular Disease Brother      stroke     Neurologic Disorder Brother      Alcohol/Drug Brother      Obesity Sister        Social History:  Marital  "Status:   [2]  Social History   Substance Use Topics     Smoking status: Never Smoker     Smokeless tobacco: Never Used     Alcohol use Yes      Comment: 6 pack beer on weekend.        Medications:      calcium carbonate (TUMS) 500 MG chewable tablet   ciprofloxacin (CIPRO) 500 MG tablet   finasteride (PROSCAR) 5 MG tablet   hydrOXYzine (ATARAX) 10 MG tablet   metroNIDAZOLE (FLAGYL) 500 MG tablet   omeprazole (PRILOSEC) 40 MG capsule   oxyCODONE IR (ROXICODONE) 5 MG tablet   sildenafil (REVATIO) 20 MG tablet   triamterene-hydrochlorothiazide (MAXZIDE-25) 37.5-25 MG per tablet         Review of Systems   Constitutional: Negative.    Respiratory: Negative.    Cardiovascular: Negative.    Gastrointestinal:        Increased abdominal pain with attempting to flush abdominal drain.  Black stools   Genitourinary: Negative.    Musculoskeletal: Negative.    Skin:        Blood in abdominal drain   All other systems reviewed and are negative.      Physical Exam   BP: 130/87  Heart Rate: 92  Temp: 97.6  F (36.4  C)  Resp: 16  Height: 177.8 cm (5' 10\")  Weight: 90.7 kg (200 lb)  SpO2: 98 %      Physical Exam   Constitutional: He appears well-developed and well-nourished. No distress.   HENT:   Head: Normocephalic and atraumatic.   Eyes: Conjunctivae and EOM are normal. Pupils are equal, round, and reactive to light.   Neck: Normal range of motion. Neck supple.   Cardiovascular: Normal rate, regular rhythm and normal heart sounds.    Pulmonary/Chest: Effort normal and breath sounds normal. No respiratory distress. He has no wheezes. He has no rales.   Abdominal: Soft. He exhibits no distension. There is tenderness. There is no rigidity, no rebound and no guarding.   Mild tenderness to left side of abdomen surrounding abdominal drain that is in place.  No erythema or warmth to the skin surrounding the drain.  There is bloody output in the abdominal drain   Genitourinary: Rectum normal. Rectal exam shows no external " hemorrhoid, no fissure, no mass, no tenderness, anal tone normal and guaiac negative stool.   Musculoskeletal: Normal range of motion.   Neurological: He is alert.   Skin: Skin is warm and dry. No rash noted.   Psychiatric: He has a normal mood and affect.       ED Course     ED Course     Procedures    No results found for this or any previous visit (from the past 24 hour(s)).    Medications   iopamidol (ISOVUE-370) solution 97 mL (97 mLs Intravenous Given 11/27/18 1044)   sodium chloride 0.9 % bag 500mL for CT scan flush use (64 mLs Intravenous Given 11/27/18 1045)     Results for orders placed or performed during the hospital encounter of 11/27/18   CT Abdomen Pelvis w Contrast    Narrative    CT ABDOMEN/PELVIS WITH CONTRAST November 27, 2018 10:57 AM     HISTORY: Left lower abdominal drain in place for perforated  diverticulitis/peritonitis, now with bloody drainage from drain and  increased pain with flushing drain.     TECHNIQUE: 97 ml Isovue 370. Radiation dose for this scan was reduced  using automated exposure control, adjustment of the mA and/or kV  according to patient size, or iterative reconstruction technique.    COMPARISON: 11/22/2018.    FINDINGS: Included lung bases are unremarkable.    The liver, gall bladder, spleen and pancreas are unremarkable.    No adrenal lesions. No kidney stones or hydronephrosis. No suspicious  renal lesions. No retroperitoneal adenopathy or evidence of aortic  aneurysm.    There is a pigtail drain in place in the mid to lower abdomen. The  previously seen abscess has decreased significantly in size with a  small pocket remaining inferiorly measuring approximately 4.5 x 2.5 cm  in size. There is inflammation in the mesentery surrounding this  region.    There is no free air. There is new small amount of free fluid  surrounding the dome of the liver. Small amount of probable free fluid  axial series 2 images 41-45 adjacent to a somewhat thickened loop of  bowel. No evidence  of dilated bowel or bowel obstruction.      Impression    IMPRESSION:   1. There is a pigtail catheter for drainage in place in the lower  midabdomen. There is a 4.5 x 2.5 cm residual fluid collection just  inferior to the catheter. Its possible this may communicate and likely  does communicate with the main abscess.  2. Small amount of free fluid surrounding the dome of the liver is new  since the comparison study. Small amount of free fluid in the abdomen  just medial to the drain. There is a thickened loop of bowel in this  region as well likely reactive.  3. Previously seen free air has resolved. Mild inflammation in the  mesentery surrounding the drained area.    JACQUELINE AGUERO MD   CBC with platelets differential   Result Value Ref Range    WBC 14.0 (H) 4.0 - 11.0 10e9/L    RBC Count 4.91 4.4 - 5.9 10e12/L    Hemoglobin 14.5 13.3 - 17.7 g/dL    Hematocrit 44.4 40.0 - 53.0 %    MCV 90 78 - 100 fl    MCH 29.5 26.5 - 33.0 pg    MCHC 32.7 31.5 - 36.5 g/dL    RDW 13.1 10.0 - 15.0 %    Platelet Count 415 150 - 450 10e9/L    Diff Method Automated Method     % Neutrophils 76.9 %    % Lymphocytes 16.3 %    % Monocytes 5.0 %    % Eosinophils 0.2 %    % Basophils 0.5 %    % Immature Granulocytes 1.1 %    Nucleated RBCs 0 0 /100    Absolute Neutrophil 10.8 (H) 1.6 - 8.3 10e9/L    Absolute Lymphocytes 2.3 0.8 - 5.3 10e9/L    Absolute Monocytes 0.7 0.0 - 1.3 10e9/L    Absolute Eosinophils 0.0 0.0 - 0.7 10e9/L    Absolute Basophils 0.1 0.0 - 0.2 10e9/L    Abs Immature Granulocytes 0.2 0 - 0.4 10e9/L    Absolute Nucleated RBC 0.0    Comprehensive metabolic panel   Result Value Ref Range    Sodium 136 133 - 144 mmol/L    Potassium 4.2 3.4 - 5.3 mmol/L    Chloride 103 94 - 109 mmol/L    Carbon Dioxide 25 20 - 32 mmol/L    Anion Gap 8 3 - 14 mmol/L    Glucose 115 (H) 70 - 99 mg/dL    Urea Nitrogen 15 7 - 30 mg/dL    Creatinine 0.90 0.66 - 1.25 mg/dL    GFR Estimate 85 >60 mL/min/1.7m2    GFR Estimate If Black >90 >60 mL/min/1.7m2     Calcium 9.1 8.5 - 10.1 mg/dL    Bilirubin Total 0.5 0.2 - 1.3 mg/dL    Albumin 3.4 3.4 - 5.0 g/dL    Protein Total 7.7 6.8 - 8.8 g/dL    Alkaline Phosphatase 115 40 - 150 U/L    ALT 44 0 - 70 U/L    AST 29 0 - 45 U/L   Occult blood stool POCT   Result Value Ref Range    Occult Blood neg neg    Internal QC OK Yes     Test Card Lot Number [id number]        Assessments & Plan (with Medical Decision Making)     Pt is a 63 year old male with history of hypertension, GERD, BPH who presents with complaints of bloody drainage coming from abdominal drain since last night along with black stools.  Patient was transferred to [facility] 5 days ago at which point an abdominal drain was placed via IR for treatment of perforated diverticulitis associated with evolving abscesses and peritonitis.  Patient was discharged from the hospital yesterday and states things had been improving until he noticed that the drainage was a brighter red in consistency which is a change from the pink frothy drainage he had been experiencing since the drain placement.  Patient states he also tried to flush his drain and experienced severe pain and reported that the surrounding dressing became wet and saturated.  Patient is concerned his drain may be dislodged.  Patient has noticed that since yesterday his stools are black.  Patient does not take any blood thinners.  Pt is taking Ciprofloxacin and Flagyl since discharge.  [facility] completed patient's CT-guided percutaneous drain placement into the sigmoid diverticular abscess.  Pt is afebrile on arrival.  Exam as above.  White count is elevated to 14,000 today.  Normal hemoglobin.  Comprehensive metabolic panel is normal.  CT of the abdomen and pelvis shows a pigtail catheter in place to lower mid-abdomen with a residual fluid collection just inferior to the catheter and it is possible that this communicates with the main abscess.  Small amount of free fluid around liver is new as  well as small amount of free fluid in the abdomen just medial to the drain.  Mild surrounding inflammation around drained area also noted as well as a likely reactive thickened loop of bowel in the region.  Discussed results with patient.  Also discussed patient's case with on-call nurse practitioner, Brittany Hedrick, through [facility] with the interventional radiology team.  She states they will schedule an appointment for patient to be seen tomorrow and call him with this appointment time.  Since patient does not have increased amount of drainage from his drain and given his stable CT findings, she feels it is appropriate for him to follow-up as an outpatient tomorrow and I feel this is appropriate as well.  I do not see any emergent cause for transfer of patient at this time.  She states patients may have developed an increase in bloody drainage output from the drain with increased activity and/or possible tugging of the drain that can occur.  Patient states he was doing some increased activity that may have contributed.  Patient's hemoglobin has remained stable.  Nurse practitioner states that patient does not need to flush his drain as long as it continues to drain since he is experiencing pain with doing so (they will help patient with flushing tomorrow).  Discussed this plan with patient and he is in agreement with the plan.  Return precautions were reviewed.  Hand-outs were provided.    Patient was instructed to follow-up with [facility] tomorrow as scheduled for continued care and management or sooner if new or worsening symptoms.  He is to return to the ED for persistent and/or worsening symptoms.  Patient expressed understanding of the diagnosis and plan and was discharged home in good condition.    I have reviewed the nursing notes.    I have reviewed the findings, diagnosis, plan and need for follow up with the patient.    Discharge Medication List as of 11/27/2018 12:51 PM          Final  diagnoses:   Complication of procedure, initial encounter - Bloody drainage in DANIELA abdominal drain       11/27/2018   [facility] EMERGENCY DEPARTMENT      Disclaimer:  This note consists of symbols derived from keyboarding, dictation and/or voice recognition software.  As a result, there may be errors in the script that have gone undetected.  Please consider this when interpreting information found in this chart.     Ghazal Shepard PA-C  11/28/18 2542

## 2018-11-28 ENCOUNTER — HOSPITAL ENCOUNTER (OUTPATIENT)
Dept: INTERVENTIONAL RADIOLOGY/VASCULAR | Facility: HOSPITAL | Age: 63
Discharge: HOME | End: 2018-11-28
Admitting: RADIOLOGY

## 2018-11-28 ENCOUNTER — RECORDS - HEALTHEAST (OUTPATIENT)
Dept: ADMINISTRATIVE | Facility: OTHER | Age: 63
End: 2018-11-28

## 2018-11-28 ENCOUNTER — TRANSFERRED RECORDS (OUTPATIENT)
Dept: HEALTH INFORMATION MANAGEMENT | Facility: CLINIC | Age: 63
End: 2018-11-28

## 2018-11-28 DIAGNOSIS — L02.91 ABSCESS: ICD-10-CM

## 2018-11-28 ASSESSMENT — ENCOUNTER SYMPTOMS
CARDIOVASCULAR NEGATIVE: 1
RESPIRATORY NEGATIVE: 1
CONSTITUTIONAL NEGATIVE: 1
MUSCULOSKELETAL NEGATIVE: 1

## 2018-11-29 ENCOUNTER — HOSPITAL ENCOUNTER (OUTPATIENT)
Dept: CT IMAGING | Facility: HOSPITAL | Age: 63
Discharge: HOME | End: 2018-11-29

## 2018-11-29 DIAGNOSIS — L02.91 ABSCESS: ICD-10-CM

## 2018-11-30 ENCOUNTER — TRANSFERRED RECORDS (OUTPATIENT)
Dept: HEALTH INFORMATION MANAGEMENT | Facility: CLINIC | Age: 63
End: 2018-11-30

## 2018-11-30 ENCOUNTER — OFFICE VISIT (OUTPATIENT)
Dept: FAMILY MEDICINE | Facility: CLINIC | Age: 63
End: 2018-11-30
Payer: COMMERCIAL

## 2018-11-30 VITALS
HEART RATE: 80 BPM | RESPIRATION RATE: 20 BRPM | TEMPERATURE: 98.2 F | BODY MASS INDEX: 28.04 KG/M2 | WEIGHT: 195.4 LBS | DIASTOLIC BLOOD PRESSURE: 68 MMHG | SYSTOLIC BLOOD PRESSURE: 112 MMHG

## 2018-11-30 DIAGNOSIS — K57.32 DIVERTICULITIS OF COLON: Primary | ICD-10-CM

## 2018-11-30 PROCEDURE — 99213 OFFICE O/P EST LOW 20 MIN: CPT | Performed by: FAMILY MEDICINE

## 2018-11-30 RX ORDER — MULTIVITAMIN WITH IRON
1 TABLET ORAL 2 TIMES DAILY
COMMUNITY
End: 2019-10-01

## 2018-11-30 ASSESSMENT — PAIN SCALES - GENERAL: PAINLEVEL: NO PAIN (0)

## 2018-11-30 NOTE — MR AVS SNAPSHOT
After Visit Summary   11/30/2018    Jamal Sanchez    MRN: [id number]           Patient Information     Date Of Birth          1955        Visit Information        Provider Department      11/30/2018 3:00 PM Anjel Jimenes MD [facility]        Today's Diagnoses     Diverticulitis of colon    -  1      Care Instructions          Thank you for choosing [facility].  You may be receiving a survey in the mail from [facility] regarding your visit today.  Please take a few minutes to complete and return the survey to let us know how we are doing.      Our Clinic hours are:  Mondays    7:20 am - 7 pm  Tues -  Fri  7:20 am - 5 pm    Clinic Phone: [phone]    The clinic lab opens at 7:30 am Mon - Fri and appointments are required.    [facility] [place]  Ph. [phone]  Monday  8 am - 7pm  Tues - Fri 8 am - 5:30 pm                 Follow-ups after your visit        Who to contact     If you have questions or need follow up information about today's clinic visit or your schedule please contact [facility] directly at [phone].  Normal or non-critical lab and imaging results will be communicated to you by MyChart, letter or phone within 4 business days after the clinic has received the results. If you do not hear from us within 7 days, please contact the clinic through [employer] or phone. If you have a critical or abnormal lab result, we will notify you by phone as soon as possible.  Submit refill requests through [employer] or call your pharmacy and they will forward the refill request to us. Please allow 3 business days for your refill to be completed.          Additional Information About Your Visit        MyChart Information     [employer] gives you secure access to your electronic health record. If you see a primary care provider, you can also send messages to your care team and make appointments. If you have questions, please call your primary  [facility] clinic.  If you do not have a primary care provider, please call [phone] and they will assist you.        Care EveryWhere ID     This is your Care EveryWhere ID. This could be used by other organizations to access your [facility] medical records  [id number]        Your Vitals Were     Pulse Temperature Respirations BMI (Body Mass Index)          80 98.2  F (36.8  C) (Tympanic) 20 28.04 kg/m2         Blood Pressure from Last 3 Encounters:   11/30/18 112/68   11/27/18 130/87   11/22/18 117/73    Weight from Last 3 Encounters:   11/30/18 195 lb 6.4 oz (88.6 kg)   11/27/18 200 lb (90.7 kg)   11/22/18 210 lb (95.3 kg)              Today, you had the following     No orders found for display         Today's Medication Changes          These changes are accurate as of 11/30/18  3:54 PM.  If you have any questions, ask your nurse or doctor.               These medicines have changed or have updated prescriptions.        Dose/Directions    finasteride 5 MG tablet   Commonly known as:  PROSCAR   This may have changed:  when to take this   Used for:  Nocturia        Dose:  5 mg   Take 1 tablet (5 mg) by mouth daily   Quantity:  90 tablet   Refills:  3                Primary Care Provider Office Phone # Fax #    Anjel Jimenes [phone] [address] [facility] [address]        Equal Access to Services     [facility]: Hadii conchita felipe hadnahumo Socarloz, waaxda luqadaha, qaybta kaalmada nette, berto caldwell. So [facility] [phone].    ATENCIÓN: Si habla español, tiene a zamarripa disposición servicios gratuitos de asistencia lingüística. Llame al [phone].    We comply with applicable federal civil rights laws and [place] laws. We do not discriminate on the basis of race, color, national origin, age, disability, sex, sexual orientation, or gender identity.            Thank you!     Thank you for choosing [facility]  for your care. Our goal is  always to provide you with excellent care. Hearing back from our patients is one way we can continue to improve our services. Please take a few minutes to complete the written survey that you may receive in the mail after your visit with us. Thank you!             Your Updated Medication List - Protect others around you: Learn how to safely use, store and throw away your medicines at www.disposemymeds.org.          This list is accurate as of 11/30/18  3:54 PM.  Always use your most recent med list.                   Brand Name Dispense Instructions for use Diagnosis    calcium carbonate 500 MG chewable tablet    TUMS     Take 1 chew tab by mouth daily as needed        ciprofloxacin 500 MG tablet    CIPRO     Take 500 mg by mouth 2 times daily 0600, 4 pm        finasteride 5 MG tablet    PROSCAR    90 tablet    Take 1 tablet (5 mg) by mouth daily    Nocturia       FOLIC ACID PO      Take 800 mcg by mouth 2 times daily        hydrOXYzine 10 MG tablet    ATARAX     Take 10 mg by mouth every 6 hours as needed for itching (Nausea)        magnesium 250 MG tablet      Take 1 tablet by mouth 2 times daily        metroNIDAZOLE 500 MG tablet    FLAGYL     Take 500 mg by mouth 3 times daily        MULTI VITAMIN PO           omeprazole 40 MG DR capsule    priLOSEC    90 capsule    TAKE 1 CAPSULE BY MOUTH D AILY. TAKE 30-60 MINUTES BEFORE A MEAL.    Gastroesophageal reflux disease without esophagitis       oxyCODONE 5 MG tablet    ROXICODONE     Take 5 mg by mouth every 3 hours Max of ten tablets in 24 hours        sildenafil 20 MG tablet    REVATIO    50 tablet    Take 2-5 tablets ( mg) by mouth as needed for erectile insufficiency.  Never use with nitroglycerin, terazosin or doxazosin.    Combined arterial insufficiency and corporo-venous occlusive erectile dysfunction       triamterene-HCTZ 37.5-25 MG tablet    MAXZIDE-25    90 tablet    Take 1 tablet by mouth every morning    Hypertension, goal below 140/90

## 2018-11-30 NOTE — PROGRESS NOTES
SUBJECTIVE:   Jamal Sanchez is a 63 year old male who presents to clinic today for the following health issues:          Hospital Follow-up Visit:    Hospital/Nursing Home/IP Rehab Facility: [facility]  Date of Admission: 11/21/18  Date of Discharge: 11/26/18  Reason(s) for Admission: diverticulitis            Problems taking medications regularly:  None       Medication changes since discharge: None       Problems adhering to non-medication therapy:  none    Summary of hospitalization:  [facility] hospital discharge summary reviewed  Diagnostic Tests/Treatments reviewed.  Follow up needed: none  Other Healthcare Providers Involved in Patient s Care:         None  Update since discharge: improved.     Post Discharge Medication Reconciliation: .  Plan of care communicated with      Coding guidelines for this visit:  Type of Medical   Decision Making Face-to-Face Visit       within 7 Days of discharge Face-to-Face Visit        within 14 days of discharge   Moderate Complexity [id number]   High Complexity 48729 [id number]                  Problem list and histories reviewed & adjusted, as indicated.  Additional history:         Reviewed and updated as needed this visit by clinical staff  Tobacco  Allergies  Meds       Reviewed and updated as needed this visit by Provider      Further history obtained, clarified or corrected by physician:  He was hospitalized for what was apparently ruptured diverticulitis.  He has a drain still in place but he says he is markedly better.  He is taking Cipro and Flagyl.  He has follow-up next week for CT scan and possible drain removal.    OBJECTIVE:  /68 (Cuff Size: Adult Large)  Pulse 80  Temp 98.2  F (36.8  C) (Tympanic)  Resp 20  Wt 195 lb 6.4 oz (88.6 kg)  BMI 28.04 kg/m2  LUNGS: clear to auscultation, normal breath sounds  CV: RRR without murmur  ABD: BS+, soft, nontender, no masses, no hepatosplenomegaly, there is a bulb type drain in place in the left  lower quadrant  EXTREMITIES: without joint tenderness, swelling or erythema.  No muscle tenderness or abnormality.  SKIN: No rashes or abnormalities  NEURO:non focal exam    ASSESSMENT:  Diverticulitis of colon    PLAN:  Continue antibiotics  Monitor carefully for fever or other worsening symptoms  Follow-up as scheduled.

## 2018-11-30 NOTE — PATIENT INSTRUCTIONS
Thank you for choosing [facility].  You may be receiving a survey in the mail from [facility] regarding your visit today.  Please take a few minutes to complete and return the survey to let us know how we are doing.      Our Clinic hours are:  Mondays    7:20 am - 7 pm  Tues - Fri  7:20 am - 5 pm    Clinic Phone: [phone]    The clinic lab opens at 7:30 am Mon - Fri and appointments are required.    [facility]. [phone]  Monday  8 am - 7pm  Tues - Fri 8 am - 5:30 pm

## 2018-12-05 ENCOUNTER — TRANSFERRED RECORDS (OUTPATIENT)
Dept: HEALTH INFORMATION MANAGEMENT | Facility: CLINIC | Age: 63
End: 2018-12-05

## 2018-12-05 ENCOUNTER — HOSPITAL ENCOUNTER (OUTPATIENT)
Dept: CT IMAGING | Facility: HOSPITAL | Age: 63
Discharge: HOME | End: 2018-12-05
Admitting: RADIOLOGY

## 2018-12-05 ENCOUNTER — HOSPITAL ENCOUNTER (OUTPATIENT)
Dept: INTERVENTIONAL RADIOLOGY/VASCULAR | Facility: HOSPITAL | Age: 63
Discharge: HOME | End: 2018-12-05

## 2018-12-05 DIAGNOSIS — L02.91 ABSCESS: ICD-10-CM

## 2018-12-05 ASSESSMENT — MIFFLIN-ST. JEOR: SCORE: 1688.44

## 2018-12-06 ENCOUNTER — HOSPITAL ENCOUNTER (OUTPATIENT)
Dept: PHYSICAL THERAPY | Facility: CLINIC | Age: 63
Setting detail: THERAPIES SERIES
End: 2018-12-06
Attending: ORTHOPAEDIC SURGERY
Payer: COMMERCIAL

## 2018-12-06 PROCEDURE — 97110 THERAPEUTIC EXERCISES: CPT | Mod: GP | Performed by: PHYSICAL THERAPIST

## 2018-12-06 PROCEDURE — 40000718 ZZHC STATISTIC PT DEPARTMENT ORTHO VISIT: Performed by: PHYSICAL THERAPIST

## 2018-12-06 NOTE — PROGRESS NOTES
Jamal Sanchez   PHYSICAL THERAPY EVALUATION    12/06/18 1100   General Information   Type of Visit Initial OP Ortho PT Evaluation   Start of Care Date 12/06/18   Referring Physician DR. Rojas   Patient/Family Goals Statement regain full use L shoulder   Orders Evaluate and Treat   Date of Order 11/30/18   Insurance Type [employer]   Insurance Comments/Visits Authorized no limits   Medical Diagnosis L RCR massive   Surgical/Medical history reviewed Yes   Body Part(s)   Body Part(s) Shoulder   Presentation and Etiology   Pertinent history of current problem (include personal factors and/or comorbidities that impact the POC) L RCR 11/16/18, Pain 3-7/10, aches.  Then was sick after in hospital, unrelated reasons.  HX R RCR 1/29/18   Onset date of current episode/exacerbation 11/16/18   Current Level of Function   Patient role/employment history A. 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 No   Have you fallen and had an injury in the past year? No   Is patient a fall risk? No   Shoulder Objective Findings   Side (if bilateral, select both right and left) Left   Left Shoulder ER PROM 60*   Planned Therapy Interventions   Planned Therapy Interventions ROM;strengthening   Clinical Impression   Criteria for Skilled Therapeutic Interventions Met yes, treatment indicated   PT Diagnosis limited ROM and strength post op rotator cuff repair   Functional limitations due to impairments any L UE use   Clinical Presentation Evolving/Changing   Clinical Presentation Rationale no factors to affect plan   Clinical Decision Making (Complexity) Low complexity   Therapy Frequency 1 time/week   Predicted Duration of Therapy Intervention (days/wks) 12 wks   Risk & Benefits of therapy have been explained Yes   Patient, Family & other staff in agreement with plan of care Yes   Education  Assessment   Preferred Learning Style Listening   Barriers to Learning No barriers   ORTHO GOALS   PT Ortho Eval Goals 1;2;3   Ortho Goal 1   Goal Description will be able to reach lowest shelf of cupboard in 8wk   Target Date 01/31/19   Ortho Goal 2   Goal Description pt will be able to chop wood for wood stove heating in 12wk   Target Date 02/28/19   Ortho Goal 3   Goal Description will be able to dress and do personal cares with ease in 8wks   Target Date 01/31/19   Total Evaluation Time   Total Evaluation Time 15   Kris Hoenk, PT #[id number]  [facility]

## 2018-12-19 DIAGNOSIS — N52.03 COMBINED ARTERIAL INSUFFICIENCY AND CORPORO-VENOUS OCCLUSIVE ERECTILE DYSFUNCTION: ICD-10-CM

## 2018-12-19 RX ORDER — SILDENAFIL CITRATE 20 MG/1
40-100 TABLET ORAL PRN
Qty: 50 TABLET | Refills: 5 | Status: SHIPPED | OUTPATIENT
Start: 2018-12-19 | End: 2019-07-02

## 2018-12-19 NOTE — TELEPHONE ENCOUNTER
Reason for Call:  Medication or medication refill:    Do you use a [facility]?  Name of the pharmacy and phone number for the current request[phone]    Name of the medication requested: sildenafil    Other request: LOV:  6/19/18  LAST REFILL:  11/5/18      Can we leave a detailed message on this number? YES    Phone number patient can be reached at: Home number on file [phone] (home)    Best Time: any     Call taken on 12/19/2018 at 10:14 AM by Myra Monterroso

## 2018-12-28 ENCOUNTER — HOSPITAL ENCOUNTER (OUTPATIENT)
Dept: PHYSICAL THERAPY | Facility: CLINIC | Age: 63
Setting detail: THERAPIES SERIES
End: 2018-12-28
Attending: ORTHOPAEDIC SURGERY
Payer: COMMERCIAL

## 2018-12-28 ENCOUNTER — TRANSFERRED RECORDS (OUTPATIENT)
Dept: HEALTH INFORMATION MANAGEMENT | Facility: CLINIC | Age: 63
End: 2018-12-28

## 2018-12-28 PROCEDURE — 97110 THERAPEUTIC EXERCISES: CPT | Mod: GP | Performed by: PHYSICAL THERAPIST

## 2019-01-03 ENCOUNTER — HOSPITAL ENCOUNTER (OUTPATIENT)
Dept: PHYSICAL THERAPY | Facility: CLINIC | Age: 64
Setting detail: THERAPIES SERIES
End: 2019-01-03
Attending: ORTHOPAEDIC SURGERY
Payer: COMMERCIAL

## 2019-01-03 PROCEDURE — 97110 THERAPEUTIC EXERCISES: CPT | Mod: GP | Performed by: PHYSICAL THERAPIST

## 2019-01-07 ENCOUNTER — OFFICE VISIT (OUTPATIENT)
Dept: FAMILY MEDICINE | Facility: CLINIC | Age: 64
End: 2019-01-07
Payer: COMMERCIAL

## 2019-01-07 VITALS
TEMPERATURE: 97.8 F | HEART RATE: 65 BPM | HEIGHT: 70 IN | DIASTOLIC BLOOD PRESSURE: 82 MMHG | RESPIRATION RATE: 20 BRPM | SYSTOLIC BLOOD PRESSURE: 134 MMHG | BODY MASS INDEX: 28.49 KG/M2 | OXYGEN SATURATION: 96 % | WEIGHT: 199 LBS

## 2019-01-07 DIAGNOSIS — K57.32 DIVERTICULITIS OF COLON: Primary | ICD-10-CM

## 2019-01-07 LAB
ERYTHROCYTE [DISTWIDTH] IN BLOOD BY AUTOMATED COUNT: 14 % (ref 10–15)
HCT VFR BLD AUTO: 45.6 % (ref 40–53)
HGB BLD-MCNC: 14.7 G/DL (ref 13.3–17.7)
MCH RBC QN AUTO: 29.5 PG (ref 26.5–33)
MCHC RBC AUTO-ENTMCNC: 32.2 G/DL (ref 31.5–36.5)
MCV RBC AUTO: 91 FL (ref 78–100)
PLATELET # BLD AUTO: 232 10E9/L (ref 150–450)
RBC # BLD AUTO: 4.99 10E12/L (ref 4.4–5.9)
WBC # BLD AUTO: 8.2 10E9/L (ref 4–11)

## 2019-01-07 PROCEDURE — 36415 COLL VENOUS BLD VENIPUNCTURE: CPT | Performed by: NURSE PRACTITIONER

## 2019-01-07 PROCEDURE — 85027 COMPLETE CBC AUTOMATED: CPT | Performed by: NURSE PRACTITIONER

## 2019-01-07 PROCEDURE — 99214 OFFICE O/P EST MOD 30 MIN: CPT | Performed by: NURSE PRACTITIONER

## 2019-01-07 RX ORDER — METRONIDAZOLE 500 MG/1
500 TABLET ORAL 3 TIMES DAILY
Qty: 30 TABLET | Refills: 0 | Status: SHIPPED | OUTPATIENT
Start: 2019-01-07 | End: 2019-06-18

## 2019-01-07 RX ORDER — CIPROFLOXACIN 500 MG/1
500 TABLET, FILM COATED ORAL 2 TIMES DAILY
Qty: 20 TABLET | Refills: 0 | Status: SHIPPED | OUTPATIENT
Start: 2019-01-07 | End: 2019-06-18

## 2019-01-07 ASSESSMENT — MIFFLIN-ST. JEOR: SCORE: 1698.91

## 2019-01-07 NOTE — PROGRESS NOTES
"  SUBJECTIVE:   Jamal Sanchez is a 63 year old male who presents to clinic today for the following health issues:      ABDOMINAL   PAIN     Onset: one week    Description:   Character: Sharp;  Usually dull, but has been sharp  Location: left lower quadrant  Radiation: None    Intensity: mild, sometimes moderate    Progression of Symptoms:  Constant dull ache    Accompanying Signs & Symptoms:  Fever/Chills?: no   Gas/Bloating: YES- tiny bit  Nausea: no   Vomitting: no   Diarrhea?: no   Has had soft stool since end of November  Constipation:no   Dysuria or Hematuria: no    History:   Trauma: no   Previous similar pain: YES   Previous tests done: CT  Hospitalized with diverticulitis end of Nobember 2018  Is going to be traveling tomorrow to [place]- is concerned for reoccurance    Precipitating factors:   Does the pain change with:     Food: no      BM: no     Urination: no     Alleviating factors:  none    Therapies Tried and outcome: took a stool softener    LMP:             Problem list and histories reviewed & adjusted, as indicated.  Additional history: as documented    Reviewed and updated as needed this visit by clinical staff  Tobacco  Allergies  Meds       Reviewed and updated as needed this visit by Provider         ROS:  Constitutional, HEENT, cardiovascular, pulmonary, gi and gu systems are negative, except as otherwise noted.    OBJECTIVE:     /82 (BP Location: Right arm)   Pulse 65   Temp 97.8  F (36.6  C) (Tympanic)   Resp 20   Ht 1.778 m (5' 10\")   Wt 90.3 kg (199 lb)   SpO2 96%   BMI 28.55 kg/m    Body mass index is 28.55 kg/m .  GENERAL: healthy, alert and no distress  NECK: no adenopathy, no asymmetry, masses, or scars and thyroid normal to palpation  RESP: lungs clear to auscultation - no rales, rhonchi or wheezes  CV: regular rate and rhythm, normal S1 S2, no S3 or S4, no murmur, click or rub, no peripheral edema and peripheral pulses strong  ABDOMEN: soft, nondistended, no " hepatosplenomegaly, no masses and bowel sounds normal. Tender in the LLQ  MS: no gross musculoskeletal defects noted, no edema    Diagnostic Test Results:  Results for orders placed or performed in visit on 01/07/19 (from the past 24 hour(s))   CBC with platelets   Result Value Ref Range    WBC 8.2 4.0 - 11.0 10e9/L    RBC Count 4.99 4.4 - 5.9 10e12/L    Hemoglobin 14.7 13.3 - 17.7 g/dL    Hematocrit 45.6 40.0 - 53.0 %    MCV 91 78 - 100 fl    MCH 29.5 26.5 - 33.0 pg    MCHC 32.2 31.5 - 36.5 g/dL    RDW 14.0 10.0 - 15.0 %    Platelet Count 232 150 - 450 10e9/L       ASSESSMENT/PLAN:       ICD-10-CM    1. Diverticulitis of colon K57.32 CBC with platelets     ciprofloxacin (CIPRO) 500 MG tablet     metroNIDAZOLE (FLAGYL) 500 MG tablet     Pain is c/w diverticulitis.  WBC normal today - no need for imaging at this time.  Will treat with bowel rest and antibiotics.  He may travel tomorrow as scheduled.  Follow up for any worsening of symptoms, or failure to improve within 48-72 hours.      Patient Instructions   For diverticulitis:    Prescription written for antibiotics to be taken as prescribed.  Advised to continue entire course of antibiotics despite symptom improvement.    Clear liquids for next 1-2 days.  If symptoms improve may slowly advance diet starting with full liquids (cream soups, pudding, juices, etc...) and then slowly adding in soft foods (eggs, yogurt, etc...).    Return to clinic or go to emergency department immediately if you have increasing pain, fever, inability to tolerate fluids, vomiting or bloody stools.            The risks, benefits and treatment options of prescribed medications or other treatments have been discussed with the patient. The patient verbalized their understanding and should call or follow up if no improvement or if they develop further problems.    HERBERTH [facility]

## 2019-01-07 NOTE — PATIENT INSTRUCTIONS
For diverticulitis:    Prescription written for antibiotics to be taken as prescribed.  Advised to continue entire course of antibiotics despite symptom improvement.    Clear liquids for next 1-2 days.  If symptoms improve may slowly advance diet starting with full liquids (cream soups, pudding, juices, etc...) and then slowly adding in soft foods (eggs, yogurt, etc...).    Return to clinic or go to emergency department immediately if you have increasing pain, fever, inability to tolerate fluids, vomiting or bloody stools.            Thank you for choosing [facility] ([facility]  You may be receiving a survey in the mail from [employer] regarding your visit today.  Please take a few minutes to complete and return the survey to let us know how we are doing.      If you have questions or concerns, please contact us via [facility] or you can contact your care team at [phone].    Our Clinic hours are:  Monday 6:40 am  to 7:00 pm  Tuesday -Friday 6:40 am to 5:00 pm    The [place] outpatient lab hours are:  Monday - Friday 6:10 am to 4:45 pm  Saturdays 7:00 am to 11:00 am  Appointments are required, call [phone]    If you have clinical questions after hours or would like to schedule an appointment,  call the clinic at [phone].

## 2019-01-21 ENCOUNTER — OFFICE VISIT (OUTPATIENT)
Dept: FAMILY MEDICINE | Facility: CLINIC | Age: 64
End: 2019-01-21
Payer: COMMERCIAL

## 2019-01-21 VITALS
SYSTOLIC BLOOD PRESSURE: 118 MMHG | RESPIRATION RATE: 18 BRPM | WEIGHT: 202 LBS | DIASTOLIC BLOOD PRESSURE: 70 MMHG | HEART RATE: 67 BPM | OXYGEN SATURATION: 97 % | HEIGHT: 70 IN | TEMPERATURE: 98.3 F | BODY MASS INDEX: 28.92 KG/M2

## 2019-01-21 DIAGNOSIS — K57.30 DIVERTICULOSIS OF LARGE INTESTINE WITHOUT HEMORRHAGE: ICD-10-CM

## 2019-01-21 DIAGNOSIS — K57.32 DIVERTICULITIS OF COLON: Primary | ICD-10-CM

## 2019-01-21 PROCEDURE — 99213 OFFICE O/P EST LOW 20 MIN: CPT | Performed by: FAMILY MEDICINE

## 2019-01-21 ASSESSMENT — MIFFLIN-ST. JEOR: SCORE: 1707.52

## 2019-01-21 NOTE — PATIENT INSTRUCTIONS
Our Clinic hours are:  Mondays    7:20 am - 7 pm  Tues -  Fri  7:20 am - 5 pm    Clinic Phone: [phone]    The clinic lab opens at 7:30 am Mon - Fri and appointments are required.    [facility]. [phone]  Monday  8 am - 7pm  Tues - Fri 8 am - 5:30 pm

## 2019-01-21 NOTE — PROGRESS NOTES
"  SUBJECTIVE:   Jamal Sanchez is a 64 year old male who presents to clinic today for the following health issues:      Chief Complaint   Patient presents with     Bowel Problems     patient would like to discuss his long  history of Diverticulitis              Problem list and histories reviewed & adjusted, as indicated.  Additional history:         Reviewed and updated as needed this visit by clinical staff  Tobacco  Allergies       Reviewed and updated as needed this visit by Provider       Further history obtained, clarified or corrected by physician:  He is very concerned about developing a repeat episode of severe diverticulitis like he had several months ago.  He was recently treated for possible early diverticulitis with antibiotics which he has now finished and he is feeling well.  That did not require any hospital stay.  He wants to know if there is specific diet he needs to be adhering to and he would like to talk to a dietitian about that.    OBJECTIVE:  /70   Pulse 67   Temp 98.3  F (36.8  C)   Resp 18   Ht 1.778 m (5' 10\")   Wt 91.6 kg (202 lb)   SpO2 97%   BMI 28.98 kg/m    LUNGS: clear to auscultation, normal breath sounds  CV: RRR without murmur  ABD: BS+, soft, nontender, no masses, no hepatosplenomegaly    ASSESSMENT:     Diverticulitis of colon  Diverticulosis of large intestine without hemorrhage    PLAN:  I talked to him at length about diet, fiber, fluids  We talked at length about symptoms of recurring diverticulitis for which he should get attention promptly  At his request we will have him talk to the dietitian.    Orders Placed This Encounter     NUTRITION REFERRAL       "

## 2019-01-22 ENCOUNTER — HOSPITAL ENCOUNTER (OUTPATIENT)
Dept: NUTRITION | Facility: CLINIC | Age: 64
Discharge: HOME | End: 2019-01-22
Attending: FAMILY MEDICINE | Admitting: FAMILY MEDICINE
Payer: COMMERCIAL

## 2019-01-22 ENCOUNTER — HOSPITAL ENCOUNTER (OUTPATIENT)
Dept: PHYSICAL THERAPY | Facility: CLINIC | Age: 64
Setting detail: THERAPIES SERIES
End: 2019-01-22
Attending: ORTHOPAEDIC SURGERY
Payer: COMMERCIAL

## 2019-01-22 PROCEDURE — 97802 MEDICAL NUTRITION INDIV IN: CPT | Performed by: DIETITIAN, REGISTERED

## 2019-01-22 PROCEDURE — 97110 THERAPEUTIC EXERCISES: CPT | Mod: GP | Performed by: PHYSICAL THERAPIST

## 2019-01-22 NOTE — PROGRESS NOTES
"[facility]  Medical Nutrition Therapy    Visit Type: Initial Assessment    Jamal Sanchez referred by Anjel Jimenes MD for MNT related to Diverticulosis    Nutrition Assessment:    Anthropometrics  Height: 5'10\"      Weight: 202 lbs/92 kg      IBW (kg): 75 kg    BMI: 29.0 (overweight range)       Nutrition History  -Pt was dx w/diverticulitis in Nov. '18; extreme abdominal pain, admitted to hospital  -No abdominal pain since Nov, and has regular stools   -Pt was recently recommended to follow a high-fiber diet by his provider, but he hasn't started following this yet, and had many questions  -Pt lives alone and doesn't cook  -Pt reports that he knows he doesn't drink enough water; has sx of dry throat daily; likes to eat frozen and cold foods   -Dislikes milk products- makes him feel nauseated; takes Vit D daily    Nutrition Prescription  Energy: 3910-7141 kcal/day  (25-30kcal/kg Adj BW 79 kg) Protein: 79-95 g/day  (1-1.2 g/kg Adj BW 79 kg) Fluid: 2400 mL/day  (1 mL/kcal)      Fiber: 36-40 g/day  (NCM guidelines)       Food Record  6am: small bowl of ice cream   6:30am: 1 Tb honey  9am: split top wheat toast with butter  10am: boiled egg, cheese ball, 2 granola bars, coffee  11:30: fast food- [employer] or [employer] fish fillets or hamburger & fries or [employer] chicken  2-3pm: frozen avocado cubes  4-6pm: fruit- banana, tangerines   6-8pm: nuts & raisins or PB&J sandwich or whatever he has available  Beverages: 16oz pH water, sometimes 6oz juice   -Pt grazes on food throughout the day and doesn't have structured meal times  -Pt reports that his food intake varies each day and depends on what's available  -Usual dietary intake appears unbalanced, inadequate in fruit, vegies, dairy, and fiber, adequate in overall kcal and protein  -Fluid intake is inadequate (approximately 900mL/day) and only meets 35% of his estimated needs     Nutrition Diagnosis:  PES: Inadequate fluid intake r/t food and nutrition " knowledge deficit aeb fluid intake of 900mL per day, fluid intake meeting 35% of estimated needs, and symptom of dry throat     Nutrition Intervention:  -Educated pt on the importance of hydration and encouraged pt to strive to drink more fluids throughout the day, focusing on water as his main choice   -Educated pt on diverticulitis and diverticulosis nutrition recommendations- follow a high fiber diet, emphasizing insoluble fiber sources; during times of inflammation, follow a low fiber diet   -Educated pt on ways to increase fiber in the diet- choosing WGs, fruits, veggies, nuts and seeds; try flaxseed, Benefiber, lentils  -Discussed general healthy nutrition recommendations for a balanced diet; used MyPlate as an example for meal planning- went over recommended foods/not recommended foods and areas in his diet where he can improve using very simple cooking methods  -Discussed probiotics and prebiotics and recommended adding them to his diet; try yogurt and kefir, can make a smoothie for a better taste  -Educated pt on label reading   -Discussed ways to add calcium to his diet- he likes cottage cheese, try yogurt    Nutrition Goals:  1) Increase daily fiber intake to meet estimated needs of 36-40g/day  2) Increase daily fluid intake to meet estimated needs of 2400 mL/day     Nutrition Follow Up / Monitoring:  Food/beverage intake, weight, diverticulosis sx     Nutrition Recommendations:  Follow-up not recommended at this time unless patient feels the need for one (this was discussed with patient).   Patient has RD contact information to call/email if needed.    Start Time: 12:57pm  End Time: 2:02pm       Macarena Perez RD, EVERT  Clinical Dietitian  [facility]: [phone]  [facility]: [phone]

## 2019-01-29 ENCOUNTER — HOSPITAL ENCOUNTER (OUTPATIENT)
Dept: PHYSICAL THERAPY | Facility: CLINIC | Age: 64
Setting detail: THERAPIES SERIES
End: 2019-01-29
Attending: ORTHOPAEDIC SURGERY
Payer: COMMERCIAL

## 2019-01-29 PROCEDURE — 97110 THERAPEUTIC EXERCISES: CPT | Mod: GP | Performed by: PHYSICAL THERAPIST

## 2019-02-05 ENCOUNTER — TRANSFERRED RECORDS (OUTPATIENT)
Dept: HEALTH INFORMATION MANAGEMENT | Facility: CLINIC | Age: 64
End: 2019-02-05

## 2019-02-05 ENCOUNTER — HOSPITAL ENCOUNTER (OUTPATIENT)
Dept: PHYSICAL THERAPY | Facility: CLINIC | Age: 64
Setting detail: THERAPIES SERIES
End: 2019-02-05
Attending: ORTHOPAEDIC SURGERY
Payer: COMMERCIAL

## 2019-02-05 DIAGNOSIS — K21.9 GASTROESOPHAGEAL REFLUX DISEASE WITHOUT ESOPHAGITIS: ICD-10-CM

## 2019-02-05 PROCEDURE — 97110 THERAPEUTIC EXERCISES: CPT | Mod: GP | Performed by: PHYSICAL THERAPIST

## 2019-02-05 NOTE — PROGRESS NOTES
Jamal Sanchez   PHYSICAL THERAPY PROGRESS NOTE  02/05/19 0800   Signing Clinician's Name / Credentials   Signing clinician's name / credentials Kris Hoenk, PT   Session Number   Session Number 6 HP   Ortho Goal 1   Goal Description will be able to reach lowest shelf of cupboard in 8wk  (can reach, not with any weight)   Target Date 01/31/19   Date Met 01/22/19   Ortho Goal 2   Goal Description pt will be able to chop wood/chain saw for wood stove heating in 12wk   Target Date 02/28/19   Ortho Goal 3   Goal Description will be able to dress and do personal cares with ease in 8wks   Target Date 01/31/19   Date Met 02/05/19   Subjective Report   Subjective Report aches at rest more than his other RCR did, can't sleep on it, not lifting anything yet,   Objective Measure 1   Details active flexion 125*, abd 90*, IR behind back to L5, all pain end range. PROM felx 145*, abd 100*   Therapeutic Procedure/exercise   Therapeutic Procedures: strength, endurance, ROM, flexibillity minutes (35727) 25   Skilled Intervention add AROM, scap stab   Patient Response flexion most sx   Treatment Detail UBE 3min, pulleys flex and abd,  SL ER x20, bentr over shld exten x20, rows x20, add horiz abd x20, active flexion x 15, scaption x8 fatigues,  cues for painfree ranges , PROM L shld   Plan   Plan for next session see Clive nieves MD, do note   Total Session Time   Timed Code Treatment Minutes 25   Total Treatment Time (sum of timed and untimed services) 25   Kris Hoenk, PT #[id number]  [facility]

## 2019-02-06 RX ORDER — OMEPRAZOLE 40 MG/1
CAPSULE, DELAYED RELEASE ORAL
Qty: 90 CAPSULE | Refills: 1 | Status: SHIPPED | OUTPATIENT
Start: 2019-02-06 | End: 2019-08-10

## 2019-02-06 NOTE — TELEPHONE ENCOUNTER
"Requested Prescriptions   Pending Prescriptions Disp Refills     omeprazole (PRILOSEC) 40 MG DR capsule [Pharmacy Med Name: OMEPRAZOLE 40 MG CAPSULE DR]  Last Written Prescription Date:  08/29/2018  Last Fill Quantity: 90,  # refills: 0   Last office visit: 1/21/2019 with prescribing provider:  radha   Future Office Visit:     90 capsule 0     Sig: TAKE 1 CAPSULE BY MOUTH D AILY. TAKE 30-60 MINUTES BEFORE A MEAL.    PPI Protocol Passed - 2/5/2019  3:33 PM       Passed - Not on Clopidogrel (unless Pantoprazole ordered)       Passed - No diagnosis of osteoporosis on record       Passed - Recent (12 mo) or future (30 days) visit within the authorizing provider's specialty    Patient had office visit in the last 12 months or has a visit in the next 30 days with authorizing provider or within the authorizing provider's specialty.  See \"Patient Info\" tab in inbasket, or \"Choose Columns\" in Meds & Orders section of the refill encounter.             Passed - Medication is active on med list       Passed - Patient is age 18 or older        Humberto Gilliam RT (R)    "

## 2019-03-13 ENCOUNTER — HOSPITAL ENCOUNTER (OUTPATIENT)
Dept: PHYSICAL THERAPY | Facility: CLINIC | Age: 64
Setting detail: THERAPIES SERIES
End: 2019-03-13
Attending: ORTHOPAEDIC SURGERY
Payer: COMMERCIAL

## 2019-03-13 PROCEDURE — 97110 THERAPEUTIC EXERCISES: CPT | Mod: GP | Performed by: PHYSICAL THERAPIST

## 2019-04-02 ENCOUNTER — TRANSFERRED RECORDS (OUTPATIENT)
Dept: HEALTH INFORMATION MANAGEMENT | Facility: CLINIC | Age: 64
End: 2019-04-02

## 2019-04-17 ENCOUNTER — OFFICE VISIT (OUTPATIENT)
Dept: FAMILY MEDICINE | Facility: CLINIC | Age: 64
End: 2019-04-17
Payer: COMMERCIAL

## 2019-04-17 VITALS
DIASTOLIC BLOOD PRESSURE: 84 MMHG | TEMPERATURE: 98.2 F | HEIGHT: 70 IN | RESPIRATION RATE: 20 BRPM | SYSTOLIC BLOOD PRESSURE: 138 MMHG | HEART RATE: 65 BPM | OXYGEN SATURATION: 95 % | WEIGHT: 209 LBS | BODY MASS INDEX: 29.92 KG/M2

## 2019-04-17 DIAGNOSIS — J98.01 BRONCHOSPASM: ICD-10-CM

## 2019-04-17 DIAGNOSIS — J06.9 VIRAL URI WITH COUGH: Primary | ICD-10-CM

## 2019-04-17 PROCEDURE — 99214 OFFICE O/P EST MOD 30 MIN: CPT | Performed by: FAMILY MEDICINE

## 2019-04-17 RX ORDER — ALBUTEROL SULFATE 90 UG/1
2 AEROSOL, METERED RESPIRATORY (INHALATION) EVERY 4 HOURS PRN
Qty: 17 G | Refills: 3 | Status: SHIPPED | OUTPATIENT
Start: 2019-04-17 | End: 2019-10-01

## 2019-04-17 ASSESSMENT — MIFFLIN-ST. JEOR: SCORE: 1739.27

## 2019-04-17 NOTE — PROGRESS NOTES
SUBJECTIVE:   Jamal Sanchez is a 64 year old male who presents to clinic today for the following   health issues:      ENT Symptoms             Symptoms: cc Present Absent Comment   Fever/Chills  x  Mostly chills yesterday.  Had to wear a sweater and stocking cap at work.  Will get sweaty when moving around.   Fatigue  x     Muscle Aches  x  Achy feeling with walking.    Eye Irritation   x    Sneezing  x     Nasal Romie/Drg  x     Sinus Pressure/Pain   x    Loss of smell  x     Dental pain   x    Sore Throat  x     Swollen Glands   x    Ear Pain/Fullness   x    Cough x x  Cough-can feel he has phlegm, not coughing any out.   Wheeze  x  Some wheezing.   Chest Pain  x  States his lungs hurt.   Shortness of breath  x  Some symptoms at times, such as when carrying in wood at his home.   Rash   x    Other  x  This morning had a soft bowel movement.     Symptom duration:  Started to feel ill over the weekend.     Symptom severity:  Monday when getting to work symptoms were getting worse.   Treatments tried:  Sudafed type medication as needed.   Contacts:  Neighbor who was ill with a respiratory illness.           Current Outpatient Medications:      calcium carbonate (TUMS) 500 MG chewable tablet, Take 1 chew tab by mouth daily as needed , Disp: , Rfl:      FOLIC ACID PO, Take 800 mcg by mouth 2 times daily, Disp: , Rfl:      magnesium 250 MG tablet, Take 1 tablet by mouth 2 times daily, Disp: , Rfl:      Multiple Vitamin (MULTI VITAMIN PO), , Disp: , Rfl:      omeprazole (PRILOSEC) 40 MG DR capsule, TAKE 1 CAPSULE BY MOUTH D AILY. TAKE 30-60 MINUTES BEFORE A MEAL., Disp: 90 capsule, Rfl: 1     sildenafil (REVATIO) 20 MG tablet, Take 2-5 tablets ( mg) by mouth as needed for erectile insufficiency.  Never use with nitroglycerin, terazosin or doxazosin., Disp: 50 tablet, Rfl: 5     triamterene-hydrochlorothiazide (MAXZIDE-25) 37.5-25 MG per tablet, Take 1 tablet by mouth every morning, Disp: 90 tablet, Rfl: 3      "finasteride (PROSCAR) 5 MG tablet, Take 1 tablet (5 mg) by mouth daily (Patient not taking: Reported on 4/17/2019), Disp: 90 tablet, Rfl: 3  Patient Active Problem List   Diagnosis     Benign prostatic hypertrophy     CARDIOVASCULAR SCREENING; LDL GOAL LESS THAN 160     GERD (gastroesophageal reflux disease)     Advanced directives, counseling/discussion     Hypertension, goal below 140/90     Appendicitis     Diverticulosis of large intestine without hemorrhage     Diverticulitis of colon       Blood pressure 138/84, pulse 65, temperature 98.2  F (36.8  C), temperature source Tympanic, resp. rate 20, height 1.778 m (5' 10\"), weight 94.8 kg (209 lb), SpO2 95 %.    Exam:  GENERAL APPEARANCE: healthy, alert and no distress  EYES: EOMI,  PERRL  HENT: ear canals and TM's normal and nose and mouth without ulcers or lesions  NECK: no adenopathy, no asymmetry, masses, or scars and thyroid normal to palpation  RESP: no rales or rhonchi and expiratory wheezes bilaterally  CV: regular rates and rhythm, normal S1 S2, no S3 or S4 and no murmur, click or rub -  MS: extremities normal- no gross deformities noted, no evidence of inflammation in joints, FROM in all extremities.  SKIN: no suspicious lesions or rashes  PSYCH: mentation appears normal and affect normal/bright    PFM is 435/575.       (J06.9,  B97.89) Viral URI with cough  (primary encounter diagnosis)  Comment:   Plan: use the symptomatic therapies such as fluids, Robitussin DM cough med, elevate the head of the bed.   Avoid contagious exposures. See below.     (J98.01) Bronchospasm  Comment:   Plan: albuterol (PROAIR HFA/PROVENTIL HFA/VENTOLIN         HFA) 108 (90 Base) MCG/ACT inhaler        We will teach the use of the inhaler. Use as needed. Identify triggers to avoid. The dose is 2 puffs  by one minute, if tight.   Use prevention. Refills are done. Follow up as needed.     Jason Rees        "

## 2019-04-17 NOTE — PATIENT INSTRUCTIONS
Thank you for choosing [facility].  You may be receiving an email and/or telephone survey request from [facility] [employer] regarding your visit today.  Please take a few minutes to respond to the survey to let us know how we are doing.      If you have questions or concerns, please contact us via [employer] [facility] or you can contact your care team at [phone].    Our Clinic hours are:  Monday 6:40 am  to 7:00 pm  Tuesday -Friday 6:40 am to 5:00 pm    The [place] outpatient lab hours are:  Monday - Friday 6:10 am to 4:45 pm  Saturdays 7:00 am to 11:00 am  Appointments are required, call [phone]    If you have clinical questions after hours or would like to schedule an appointment,  call the clinic at [phone].    (J06.9,  B97.89) Viral URI with cough  (primary encounter diagnosis)  Comment:   Plan: use the symptomatic therapies such as fluids, Robitussin DM cough med, elevate the head of the bed.   Avoid contagious exposures. See below.     (J98.01) Bronchospasm  Comment:   Plan: albuterol (PROAIR HFA/PROVENTIL HFA/VENTOLIN         HFA) 108 (90 Base) MCG/ACT inhaler        We will teach the use of the inhaler. Use as needed. Identify triggers to avoid. The dose is 2 puffs  by one minute, if tight.   Use prevention. Refills are done. Follow up as needed.

## 2019-04-23 NOTE — ADDENDUM NOTE
Encounter addended by: Hoenk, Kris, PT on: 4/23/2019 9:31 AM   Actions taken: Sign clinical note, Flowsheet accepted, Episode resolved

## 2019-04-23 NOTE — PROGRESS NOTES
Jamal Sanchez   PHYSICAL THERAPY DISCHARGE  04/23/19 1500   Signing Clinician's Name / Credentials   Signing clinician's name / credentials Kris Hoenk, PT   Session Number   Session Number 7 HP seen from 12/6/18-3/13/19 - many large gaps in attendance, due to pt out of town   Progress Note/Recertification   Progress Note Completed Date 02/05/19   Ortho Goal 1   Goal Description will be able to reach lowest shelf of cupboard in 8wk  (can reach, not with any weight)   Target Date 01/31/19   Date Met 01/22/19   Ortho Goal 2   Goal Description pt will be able to chop wood/chain saw for wood stove heating in 12wk  (chain sawing a little, heats with wood)   Target Date 02/28/19   Ortho Goal 3   Goal Description will be able to dress and do personal cares with ease in 8wks   Target Date 01/31/19   Date Met 02/05/19   Subjective Report   Subjective Report was out of town for a week in Feb, hasn't been able to get back in   Objective Measure 1   Details AROM WNL, MMT ER 4/5 mod pain, flex 4/5 mild pain, IR 5   Therapeutic Procedure/exercise   Therapeutic Procedures: strength, endurance, ROM, flexibillity minutes (29651) 30   Skilled Intervention progression of weights, now 4months post op   Treatment Detail SL ER 2# x25, flexion 1# x20, grn TB exten x20, rows x20, IR x20, wall push up x20   Plan   Plan for next session Plan had been to see in 2wks, before MD again  Patient has not been seen in over 30 days and will be discharged at this time.  Final status as above, goals as noted     Total Session Time   Timed Code Treatment Minutes 30   Total Treatment Time (sum of timed and untimed services) 30   Kris Hoenk, PT #[id number]  [facility]

## 2019-06-18 ENCOUNTER — OFFICE VISIT (OUTPATIENT)
Dept: FAMILY MEDICINE | Facility: CLINIC | Age: 64
End: 2019-06-18
Payer: COMMERCIAL

## 2019-06-18 ENCOUNTER — ANCILLARY PROCEDURE (OUTPATIENT)
Dept: GENERAL RADIOLOGY | Facility: CLINIC | Age: 64
End: 2019-06-18
Attending: NURSE PRACTITIONER
Payer: COMMERCIAL

## 2019-06-18 VITALS
TEMPERATURE: 98 F | DIASTOLIC BLOOD PRESSURE: 88 MMHG | HEIGHT: 70 IN | BODY MASS INDEX: 30.92 KG/M2 | OXYGEN SATURATION: 98 % | HEART RATE: 75 BPM | WEIGHT: 216 LBS | RESPIRATION RATE: 12 BRPM | SYSTOLIC BLOOD PRESSURE: 134 MMHG

## 2019-06-18 DIAGNOSIS — M67.40 GANGLION CYST: ICD-10-CM

## 2019-06-18 DIAGNOSIS — R22.32 NODULE OF FINGER OF LEFT HAND: ICD-10-CM

## 2019-06-18 DIAGNOSIS — R22.32 NODULE OF FINGER OF LEFT HAND: Primary | ICD-10-CM

## 2019-06-18 PROCEDURE — 73140 X-RAY EXAM OF FINGER(S): CPT | Mod: LT

## 2019-06-18 PROCEDURE — 99213 OFFICE O/P EST LOW 20 MIN: CPT | Performed by: NURSE PRACTITIONER

## 2019-06-18 ASSESSMENT — MIFFLIN-ST. JEOR: SCORE: 1771.02

## 2019-06-18 NOTE — PATIENT INSTRUCTIONS
Patient Education     The nodules at the middle joint and end joint of the fingers are Heberden's and Tyler's nodes- they are related to osteoarthritis- wear and tear on the joints over time.    Ganglion Cyst: Hand    A ganglion cyst is a firm, fluid-filled lump that can suddenly appear on the front or back of the wrist or at the base of a finger. These cysts grow from normal tissue in the wrist and fingers, and range in size from a pea to a peach pit. Although ganglion cysts are common, they don t spread, and they don t become cancerous. They can occur after an injury, but many times it isn t known why they grow. Ganglion cysts can change in size, and may go away on their own.  What are the symptoms of a ganglion cyst?  A ganglion cyst is sometimes painful, especially when it first occurs. Constantly using your hand or wrist can make the cyst enlarge and hurt more. Some hand and wrist movements, such as grasping things, may also be difficult.  How does a ganglion cyst develop?  Your wrist and hand are made up of many small bones that meet at joints. Tendons attach muscles to the bones at the joints. The tendons allow the joints to bend and straighten. Both tendons and joints are lined with tissue called synovium. This tissue makes a thick fluid that keeps the joints and tendons moving easily. Sometimes the tissue balloons out from the joint or tendons and forms a cyst. As the cyst fills with fluid and grows, it appears as a lump you can feel.  Where do ganglion cysts occur?  A ganglion cyst can occur anywhere on the hand near a joint. Cysts most commonly appear on the back or palm side of the wrist, or on the palm at the base of a finger. Your doctor can usually diagnose a cyst by examining the lump. He or she may draw off a little fluid or order an X-ray to rule out other problems.  How is a ganglion cyst treated?  Your healthcare provider may just watch your ganglion cyst. Many shrink and become painless  without treatment. Some disappear altogether. If the cyst is unsightly or painful, or makes it hard for you to use your hand, your healthcare provider can treat it or, if needed, remove it surgically.  Nonsurgical treatment  To shrink the cyst, your provider may remove (aspirate) the fluid with a needle. If the cyst hurts, your provider may also give you an injection of an anti-inflammatory, such as cortisone, to relieve the irritation. Your hand may then be wrapped to help keep the cyst from recurring.  Surgery  If the cyst reappears after treatment, your healthcare provider may remove it surgically. A section of the tissue that lines the joint or tendon is removed along with the cyst. This helps prevent another cyst from forming, although recurrence of the cyst is still possible after surgery. Usually, only your hand or arm is numbed, and you can go home a few hours after surgery. Your hand may be in a splint for several days.  Date Last Reviewed: 9/1/2017 2000-2018 [employer]. [address] [place] [address]. All rights reserved. This information is not intended as a substitute for professional medical care. Always follow your healthcare professional's instructions.           Patient Education     Osteoarthritis: Common Sites  Osteoarthritis (OA) is sometimes called degenerative joint disease or wear-and-tear arthritis. It's the most common type of arthritis. In OA, the cartilage wears away. Cartilage covers the ends of bones and acts as a cushion. If enough cartilage wears away, bone rubs against bone. The joint changes in OA cause pain, stiffness, and trouble moving. OA may occur in any joint. Some joints that are commonly affected are the spine, neck,  hips, knees, fingers, and toes.  Neck    Joints between small bones in the neck may wear out. Pain may travel to the shoulder or the base of the skull.  Lumbar spine  Bony spurs may form on the joints between the vertebrae (spinal  bones). And disks (cushions of cartilage between vertebrae) may wear down. Pain may affect the lower back or leg.  Hip  Cartilage damage can occur in the large  ball and socket  joint that connects the pelvis and thighbone (femur). Pain may travel to the groin, buttocks, or knee.  Knee  The cartilage in the knee joint may wear down. Weakness or instability in the knee joint may make walking or climbing stairs difficult.  Fingers  Finger joints may become enlarged and knobby. Grasping objects may be hard, especially if the joint at the base of the thumb is affected.  Toes  Toes may be affected. Arthritis may cause a bunion, a bump at the base of the big toe. Standing or walking may be painful.  Date Last Reviewed: 6/1/2018 2000-2018 [employer]. [address] [place] [id number]. All rights reserved. This information is not intended as a substitute for professional medical care. Always follow your healthcare professional's instructions.           Patient Education     What Is Osteoarthritis?  There are about 100 different types of arthritis. In general, arthritis means problems with the joints. A joint is a point in the body where 2 or more bones come together. Arthritis may also cause problems in the tissue near the joints, including muscles, tendons, and ligaments. And, in some types of arthritis, the entire body can be affected.  Osteoarthritis (OA) is sometimes called degenerative joint disease, or wear-and-tear arthritis. It's the most common type of arthritis. In OA, the cartilage wears away. Cartilage is a slick tissue that covers the ends of the bones. It acts as a cushion and allows them to glide smoothly against each other. When the cartilage wears away, bone rubs against bone. This causes pain, swelling, stiffness, and difficulty moving. Risk factors for developing OA include obesity, being older than 40, past joint trauma, concomitant inflammatory arthritis, repetitive joint use,  and a family history of OA.      Normal knee Knee with arthritis   Symptoms  OA can affect any joint. Weight-bearing joints, such as the hips and knees, are often affected. Common symptoms are joint pain and stiffness. Pain and stiffness may get worse with inactivity or overuse. For example, you may have more stiffness first thing in the morning, usually for less than 30 minutes. Or you may have stiffness after sitting for a long time. This might be while sitting at a movie. You may also have more pain in your hips or knees if you walk farther than you normally do.  Other common symptoms are:    Weak muscles    Unstable or wobbly joints    Grinding or crackling noises with motion    Joints with swelling or bumps    Unable to bend and straighten joints (reduced range of motion)  If you have any of these joint changes, make an appointment to see your healthcare provider. The two of you can work together to create a treatment plan that may help lessen your pain and stiffness and prevent symptoms from getting worse.  Date Last Reviewed: 6/1/2018 2000-2018 [employer] [address] [place] [address], [place] [address]. All rights reserved. This information is not intended as a substitute for professional medical care. Always follow your healthcare professional's instructions.

## 2019-06-18 NOTE — PROGRESS NOTES
Subjective     Jamal Sanchez is a 64 year old male who presents to clinic today for the following health issues:    HPI   Lumps on hand       Duration: years    Description (location/character/radiation): Patient has a lump that is able to move around under the skin on his right middle finger, also one on his left middle finger     Intensity:  mild    Accompanying signs and symptoms: none    History (similar episodes/previous evaluation): had a lump on his upper eyelid that he had removed years ago     Precipitating or alleviating factors: None    Therapies tried and outcome: None       Patient Active Problem List   Diagnosis     Benign prostatic hypertrophy     CARDIOVASCULAR SCREENING; LDL GOAL LESS THAN 160     GERD (gastroesophageal reflux disease)     Advanced directives, counseling/discussion     Hypertension, goal below 140/90     Appendicitis     Diverticulosis of large intestine without hemorrhage     Diverticulitis of colon     Past Surgical History:   Procedure Laterality Date     ARTHROSCOPY SHOULDER RT/LT Right 01/2018     COLONOSCOPY  3/5/2008     COLONOSCOPY  10/9/2013    Procedure: COLONOSCOPY;;  Surgeon: Nicolas Lizama MD;  Location: [facility]     ESOPHAGOSCOPY, GASTROSCOPY, DUODENOSCOPY (EGD), COMBINED  10/9/2013    Procedure: COMBINED ESOPHAGOSCOPY, GASTROSCOPY, DUODENOSCOPY (EGD), BIOPSY SINGLE OR MULTIPLE;;  Surgeon: Nicolas Lizama MD;  Location: [facility]     LAPAROSCOPIC APPENDECTOMY N/A 1/7/2016    Procedure: LAPAROSCOPIC APPENDECTOMY;  Surgeon: Ab Guzman MD;  Location: [facility]     LAPAROSCOPIC HERNIORRHAPHY INGUINAL BILATERAL Bilateral 12/13/2016    Procedure: LAPAROSCOPIC HERNIORRHAPHY INGUINAL BILATERAL;  Surgeon: Ab Guzman MD;  Location: [facility]       Social History     Tobacco Use     Smoking status: Never Smoker     Smokeless tobacco: Never Used   Substance Use Topics     Alcohol use: Yes     Comment: 6 pack beer on weekend.     Family History   Problem Relation Age of  "Onset     Diabetes Mother      Cerebrovascular Disease Mother      Neurologic Disorder Father         parkinson's     Cancer - colorectal Maternal Grandfather      Hypertension Brother      Cerebrovascular Disease Brother         stroke     Neurologic Disorder Brother      Alcohol/Drug Brother      Obesity Sister              Reviewed and updated as needed this visit by Provider         Review of Systems   ROS COMP: Constitutional, HEENT, cardiovascular, pulmonary, gi and gu systems are negative, except as otherwise noted.      Objective    /88 (BP Location: Right arm, Patient Position: Sitting, Cuff Size: Adult Large)   Pulse 75   Temp 98  F (36.7  C) (Tympanic)   Resp 12   Ht 1.778 m (5' 10\")   Wt 98 kg (216 lb)   SpO2 98%   BMI 30.99 kg/m    Body mass index is 30.99 kg/m .  Physical Exam   GENERAL: healthy, alert and no distress  MS: RUE exam shows small pea size mobile cystic lesion to palmar side of middle finger between PIP joint and MCP joint and LUE exam shows non-mobile nodular lesion to left middle finger between PIP and DIP joints. Multiple fingers with DIP nodular lesions consistent with Heberden's nodules.   SKIN: no suspicious lesions or rashes  NEURO: Normal strength and tone, mentation intact and speech normal    Diagnostic Test Results:  Labs reviewed in Epic  Xray - FINGER LEFT TWO OR MORE VIEWS June 18, 2019 2:54 PM      HISTORY: Evaluate nodule, rule out arthritis. Nodule of finger of left  hand.     COMPARISON: None.                                                                       IMPRESSION: Three views of the left middle finger. No fracture or  dislocation. Mild osteoarthritis in the IP joints. No other bony  abnormality.     TATI GOYAL MD        Assessment & Plan       ICD-10-CM    1. Nodule of finger of left hand R22.32 XR Finger Left G/E 2 Views     ORTHOPEDICS ADULT REFERRAL   2. Ganglion cyst M67.40 ORTHOPEDICS ADULT REFERRAL        BMI:   Estimated body mass index " "is 30.99 kg/m  as calculated from the following:    Height as of this encounter: 1.778 m (5' 10\").    Weight as of this encounter: 98 kg (216 lb).           CONSULTATION/REFERRAL to ORTHO- eval nodule and removal of cystic lesion.     FUTURE APPOINTMENTS:       - RETURN TO CLINIC for new or worsening symptoms.     Patient Instructions       Patient Education     The nodules at the middle joint and end joint of the fingers are Heberden's and Tyler's nodes- they are related to osteoarthritis- wear and tear on the joints over time.    Ganglion Cyst: Hand    A ganglion cyst is a firm, fluid-filled lump that can suddenly appear on the front or back of the wrist or at the base of a finger. These cysts grow from normal tissue in the wrist and fingers, and range in size from a pea to a peach pit. Although ganglion cysts are common, they don t spread, and they don t become cancerous. They can occur after an injury, but many times it isn t known why they grow. Ganglion cysts can change in size, and may go away on their own.  What are the symptoms of a ganglion cyst?  A ganglion cyst is sometimes painful, especially when it first occurs. Constantly using your hand or wrist can make the cyst enlarge and hurt more. Some hand and wrist movements, such as grasping things, may also be difficult.  How does a ganglion cyst develop?  Your wrist and hand are made up of many small bones that meet at joints. Tendons attach muscles to the bones at the joints. The tendons allow the joints to bend and straighten. Both tendons and joints are lined with tissue called synovium. This tissue makes a thick fluid that keeps the joints and tendons moving easily. Sometimes the tissue balloons out from the joint or tendons and forms a cyst. As the cyst fills with fluid and grows, it appears as a lump you can feel.  Where do ganglion cysts occur?  A ganglion cyst can occur anywhere on the hand near a joint. Cysts most commonly appear on the back or " palm side of the wrist, or on the palm at the base of a finger. Your doctor can usually diagnose a cyst by examining the lump. He or she may draw off a little fluid or order an X-ray to rule out other problems.  How is a ganglion cyst treated?  Your healthcare provider may just watch your ganglion cyst. Many shrink and become painless without treatment. Some disappear altogether. If the cyst is unsightly or painful, or makes it hard for you to use your hand, your healthcare provider can treat it or, if needed, remove it surgically.  Nonsurgical treatment  To shrink the cyst, your provider may remove (aspirate) the fluid with a needle. If the cyst hurts, your provider may also give you an injection of an anti-inflammatory, such as cortisone, to relieve the irritation. Your hand may then be wrapped to help keep the cyst from recurring.  Surgery  If the cyst reappears after treatment, your healthcare provider may remove it surgically. A section of the tissue that lines the joint or tendon is removed along with the cyst. This helps prevent another cyst from forming, although recurrence of the cyst is still possible after surgery. Usually, only your hand or arm is numbed, and you can go home a few hours after surgery. Your hand may be in a splint for several days.  Date Last Reviewed: 9/1/2017 2000-2018 [employer]. [address] [place] [address], [place] [address]. All rights reserved. This information is not intended as a substitute for professional medical care. Always follow your healthcare professional's instructions.           Patient Education     Osteoarthritis: Common Sites  Osteoarthritis (OA) is sometimes called degenerative joint disease or wear-and-tear arthritis. It's the most common type of arthritis. In OA, the cartilage wears away. Cartilage covers the ends of bones and acts as a cushion. If enough cartilage wears away, bone rubs against bone. The joint changes in OA cause pain, stiffness, and  trouble moving. OA may occur in any joint. Some joints that are commonly affected are the spine, neck,  hips, knees, fingers, and toes.  Neck    Joints between small bones in the neck may wear out. Pain may travel to the shoulder or the base of the skull.  Lumbar spine  Bony spurs may form on the joints between the vertebrae (spinal bones). And disks (cushions of cartilage between vertebrae) may wear down. Pain may affect the lower back or leg.  Hip  Cartilage damage can occur in the large  ball and socket  joint that connects the pelvis and thighbone (femur). Pain may travel to the groin, buttocks, or knee.  Knee  The cartilage in the knee joint may wear down. Weakness or instability in the knee joint may make walking or climbing stairs difficult.  Fingers  Finger joints may become enlarged and knobby. Grasping objects may be hard, especially if the joint at the base of the thumb is affected.  Toes  Toes may be affected. Arthritis may cause a bunion, a bump at the base of the big toe. Standing or walking may be painful.  Date Last Reviewed: 6/1/2018 2000-2018 [employer]. [address] [place] [address]. All rights reserved. This information is not intended as a substitute for professional medical care. Always follow your healthcare professional's instructions.           Patient Education     What Is Osteoarthritis?  There are about 100 different types of arthritis. In general, arthritis means problems with the joints. A joint is a point in the body where 2 or more bones come together. Arthritis may also cause problems in the tissue near the joints, including muscles, tendons, and ligaments. And, in some types of arthritis, the entire body can be affected.  Osteoarthritis (OA) is sometimes called degenerative joint disease, or wear-and-tear arthritis. It's the most common type of arthritis. In OA, the cartilage wears away. Cartilage is a slick tissue that covers the ends of the bones. It  acts as a cushion and allows them to glide smoothly against each other. When the cartilage wears away, bone rubs against bone. This causes pain, swelling, stiffness, and difficulty moving. Risk factors for developing OA include obesity, being older than 40, past joint trauma, concomitant inflammatory arthritis, repetitive joint use, and a family history of OA.      Normal knee Knee with arthritis   Symptoms  OA can affect any joint. Weight-bearing joints, such as the hips and knees, are often affected. Common symptoms are joint pain and stiffness. Pain and stiffness may get worse with inactivity or overuse. For example, you may have more stiffness first thing in the morning, usually for less than 30 minutes. Or you may have stiffness after sitting for a long time. This might be while sitting at a movie. You may also have more pain in your hips or knees if you walk farther than you normally do.  Other common symptoms are:    Weak muscles    Unstable or wobbly joints    Grinding or crackling noises with motion    Joints with swelling or bumps    Unable to bend and straighten joints (reduced range of motion)  If you have any of these joint changes, make an appointment to see your healthcare provider. The two of you can work together to create a treatment plan that may help lessen your pain and stiffness and prevent symptoms from getting worse.  Date Last Reviewed: 6/1/2018 2000-2018 [employer]. [address] [place] [address], [place] [address]. All rights reserved. This information is not intended as a substitute for professional medical care. Always follow your healthcare professional's instructions.               No follow-ups on file.    HERBERTH Camejo [facility]

## 2019-06-19 NOTE — RESULT ENCOUNTER NOTE
Carley Berry    Your xray report shows mild arthritis in the fingers, no other abnormalities. Please let us know if you have any questions.     Thanks for coming in to the clinic.    HERBERTH March CNP

## 2019-06-28 ENCOUNTER — TRANSFERRED RECORDS (OUTPATIENT)
Dept: HEALTH INFORMATION MANAGEMENT | Facility: CLINIC | Age: 64
End: 2019-06-28

## 2019-07-02 ENCOUNTER — APPOINTMENT (OUTPATIENT)
Dept: CT IMAGING | Facility: CLINIC | Age: 64
End: 2019-07-02
Attending: STUDENT IN AN ORGANIZED HEALTH CARE EDUCATION/TRAINING PROGRAM
Payer: COMMERCIAL

## 2019-07-02 ENCOUNTER — HOSPITAL ENCOUNTER (EMERGENCY)
Facility: CLINIC | Age: 64
Discharge: HOME | End: 2019-07-02
Attending: STUDENT IN AN ORGANIZED HEALTH CARE EDUCATION/TRAINING PROGRAM | Admitting: STUDENT IN AN ORGANIZED HEALTH CARE EDUCATION/TRAINING PROGRAM
Payer: COMMERCIAL

## 2019-07-02 VITALS
WEIGHT: 215 LBS | TEMPERATURE: 97.4 F | RESPIRATION RATE: 18 BRPM | HEART RATE: 72 BPM | BODY MASS INDEX: 30.78 KG/M2 | SYSTOLIC BLOOD PRESSURE: 131 MMHG | DIASTOLIC BLOOD PRESSURE: 83 MMHG | OXYGEN SATURATION: 93 % | HEIGHT: 70 IN

## 2019-07-02 DIAGNOSIS — K57.92 ACUTE DIVERTICULITIS: ICD-10-CM

## 2019-07-02 PROBLEM — F10.10 ETOH ABUSE: Status: ACTIVE | Noted: 2018-11-25

## 2019-07-02 PROBLEM — R10.84 GENERALIZED ABDOMINAL PAIN: Status: ACTIVE | Noted: 2018-11-22

## 2019-07-02 LAB
ALBUMIN SERPL-MCNC: 4 G/DL (ref 3.4–5)
ALBUMIN UR-MCNC: NEGATIVE MG/DL
ALP SERPL-CCNC: 91 U/L (ref 40–150)
ALT SERPL W P-5'-P-CCNC: 26 U/L (ref 0–70)
ANION GAP SERPL CALCULATED.3IONS-SCNC: 6 MMOL/L (ref 3–14)
APPEARANCE UR: CLEAR
AST SERPL W P-5'-P-CCNC: 14 U/L (ref 0–45)
BASOPHILS # BLD AUTO: 0 10E9/L (ref 0–0.2)
BASOPHILS NFR BLD AUTO: 0.2 %
BILIRUB SERPL-MCNC: 1 MG/DL (ref 0.2–1.3)
BILIRUB UR QL STRIP: NEGATIVE
BUN SERPL-MCNC: 17 MG/DL (ref 7–30)
CALCIUM SERPL-MCNC: 9.1 MG/DL (ref 8.5–10.1)
CHLORIDE SERPL-SCNC: 105 MMOL/L (ref 94–109)
CO2 SERPL-SCNC: 26 MMOL/L (ref 20–32)
COLOR UR AUTO: YELLOW
CREAT SERPL-MCNC: 0.91 MG/DL (ref 0.66–1.25)
DIFFERENTIAL METHOD BLD: ABNORMAL
EOSINOPHIL # BLD AUTO: 0 10E9/L (ref 0–0.7)
EOSINOPHIL NFR BLD AUTO: 0.1 %
ERYTHROCYTE [DISTWIDTH] IN BLOOD BY AUTOMATED COUNT: 13 % (ref 10–15)
GFR SERPL CREATININE-BSD FRML MDRD: 88 ML/MIN/{1.73_M2}
GLUCOSE SERPL-MCNC: 126 MG/DL (ref 70–99)
GLUCOSE UR STRIP-MCNC: NEGATIVE MG/DL
HCT VFR BLD AUTO: 47.5 % (ref 40–53)
HGB BLD-MCNC: 15.3 G/DL (ref 13.3–17.7)
HGB UR QL STRIP: NEGATIVE
IMM GRANULOCYTES # BLD: 0.3 10E9/L (ref 0–0.4)
IMM GRANULOCYTES NFR BLD: 1.4 %
KETONES UR STRIP-MCNC: NEGATIVE MG/DL
LEUKOCYTE ESTERASE UR QL STRIP: NEGATIVE
LIPASE SERPL-CCNC: 77 U/L (ref 73–393)
LYMPHOCYTES # BLD AUTO: 1.5 10E9/L (ref 0.8–5.3)
LYMPHOCYTES NFR BLD AUTO: 8.5 %
MCH RBC QN AUTO: 28.9 PG (ref 26.5–33)
MCHC RBC AUTO-ENTMCNC: 32.2 G/DL (ref 31.5–36.5)
MCV RBC AUTO: 90 FL (ref 78–100)
MONOCYTES # BLD AUTO: 0.9 10E9/L (ref 0–1.3)
MONOCYTES NFR BLD AUTO: 5.3 %
NEUTROPHILS # BLD AUTO: 15 10E9/L (ref 1.6–8.3)
NEUTROPHILS NFR BLD AUTO: 84.5 %
NITRATE UR QL: NEGATIVE
NRBC # BLD AUTO: 0 10*3/UL
NRBC BLD AUTO-RTO: 0 /100
PH UR STRIP: 7 PH (ref 5–7)
PLATELET # BLD AUTO: 228 10E9/L (ref 150–450)
POTASSIUM SERPL-SCNC: 3.9 MMOL/L (ref 3.4–5.3)
PROT SERPL-MCNC: 7.9 G/DL (ref 6.8–8.8)
RBC # BLD AUTO: 5.3 10E12/L (ref 4.4–5.9)
SODIUM SERPL-SCNC: 137 MMOL/L (ref 133–144)
SOURCE: NORMAL
SP GR UR STRIP: 1 (ref 1–1.03)
UROBILINOGEN UR STRIP-MCNC: 0 MG/DL (ref 0–2)
WBC # BLD AUTO: 17.7 10E9/L (ref 4–11)

## 2019-07-02 PROCEDURE — 99284 EMERGENCY DEPT VISIT MOD MDM: CPT | Mod: Z6 | Performed by: STUDENT IN AN ORGANIZED HEALTH CARE EDUCATION/TRAINING PROGRAM

## 2019-07-02 PROCEDURE — 85025 COMPLETE CBC W/AUTO DIFF WBC: CPT | Performed by: STUDENT IN AN ORGANIZED HEALTH CARE EDUCATION/TRAINING PROGRAM

## 2019-07-02 PROCEDURE — 81001 URINALYSIS AUTO W/SCOPE: CPT | Performed by: STUDENT IN AN ORGANIZED HEALTH CARE EDUCATION/TRAINING PROGRAM

## 2019-07-02 PROCEDURE — 83690 ASSAY OF LIPASE: CPT | Performed by: STUDENT IN AN ORGANIZED HEALTH CARE EDUCATION/TRAINING PROGRAM

## 2019-07-02 PROCEDURE — 80053 COMPREHEN METABOLIC PANEL: CPT | Performed by: STUDENT IN AN ORGANIZED HEALTH CARE EDUCATION/TRAINING PROGRAM

## 2019-07-02 PROCEDURE — 25800030 ZZH RX IP 258 OP 636: Performed by: STUDENT IN AN ORGANIZED HEALTH CARE EDUCATION/TRAINING PROGRAM

## 2019-07-02 PROCEDURE — 96361 HYDRATE IV INFUSION ADD-ON: CPT | Performed by: STUDENT IN AN ORGANIZED HEALTH CARE EDUCATION/TRAINING PROGRAM

## 2019-07-02 PROCEDURE — 25000125 ZZHC RX 250: Performed by: STUDENT IN AN ORGANIZED HEALTH CARE EDUCATION/TRAINING PROGRAM

## 2019-07-02 PROCEDURE — 25000128 H RX IP 250 OP 636: Performed by: STUDENT IN AN ORGANIZED HEALTH CARE EDUCATION/TRAINING PROGRAM

## 2019-07-02 PROCEDURE — 99285 EMERGENCY DEPT VISIT HI MDM: CPT | Mod: 25 | Performed by: STUDENT IN AN ORGANIZED HEALTH CARE EDUCATION/TRAINING PROGRAM

## 2019-07-02 PROCEDURE — 96365 THER/PROPH/DIAG IV INF INIT: CPT | Mod: 59 | Performed by: STUDENT IN AN ORGANIZED HEALTH CARE EDUCATION/TRAINING PROGRAM

## 2019-07-02 PROCEDURE — 74177 CT ABD & PELVIS W/CONTRAST: CPT

## 2019-07-02 RX ORDER — METRONIDAZOLE 500 MG/1
500 TABLET ORAL 3 TIMES DAILY
Qty: 30 TABLET | Refills: 0 | Status: SHIPPED | OUTPATIENT
Start: 2019-07-02 | End: 2019-10-01

## 2019-07-02 RX ORDER — OXYCODONE HYDROCHLORIDE 5 MG/1
5 TABLET ORAL EVERY 6 HOURS PRN
Qty: 10 TABLET | Refills: 0 | Status: SHIPPED | OUTPATIENT
Start: 2019-07-02 | End: 2019-10-01

## 2019-07-02 RX ORDER — KETOROLAC TROMETHAMINE 15 MG/ML
15 INJECTION, SOLUTION INTRAMUSCULAR; INTRAVENOUS ONCE
Status: COMPLETED | OUTPATIENT
Start: 2019-07-02 | End: 2019-07-02

## 2019-07-02 RX ORDER — CIPROFLOXACIN 500 MG/1
500 TABLET, FILM COATED ORAL 2 TIMES DAILY
Qty: 20 TABLET | Refills: 0 | Status: SHIPPED | OUTPATIENT
Start: 2019-07-02 | End: 2019-10-01

## 2019-07-02 RX ORDER — IBUPROFEN 200 MG
800 TABLET ORAL EVERY 8 HOURS PRN
COMMUNITY
End: 2023-05-22

## 2019-07-02 RX ORDER — IOPAMIDOL 755 MG/ML
100 INJECTION, SOLUTION INTRAVASCULAR ONCE
Status: COMPLETED | OUTPATIENT
Start: 2019-07-02 | End: 2019-07-02

## 2019-07-02 RX ORDER — CEFTRIAXONE SODIUM 1 G/50ML
1 INJECTION, SOLUTION INTRAVENOUS ONCE
Status: COMPLETED | OUTPATIENT
Start: 2019-07-02 | End: 2019-07-02

## 2019-07-02 RX ADMIN — KETOROLAC TROMETHAMINE 15 MG: 15 INJECTION, SOLUTION INTRAMUSCULAR; INTRAVENOUS at 09:34

## 2019-07-02 RX ADMIN — SODIUM CHLORIDE, POTASSIUM CHLORIDE, SODIUM LACTATE AND CALCIUM CHLORIDE 1000 ML: 600; 310; 30; 20 INJECTION, SOLUTION INTRAVENOUS at 09:34

## 2019-07-02 RX ADMIN — IOPAMIDOL 100 ML: 755 INJECTION, SOLUTION INTRAVENOUS at 09:54

## 2019-07-02 RX ADMIN — SODIUM CHLORIDE 66 ML: 9 INJECTION, SOLUTION INTRAVENOUS at 09:54

## 2019-07-02 RX ADMIN — CEFTRIAXONE SODIUM 1 G: 1 INJECTION, SOLUTION INTRAVENOUS at 14:01

## 2019-07-02 ASSESSMENT — MIFFLIN-ST. JEOR: SCORE: 1766.48

## 2019-07-02 NOTE — ED AVS SNAPSHOT
[facility]  [address] [facility] [address]  Phone:  [phone]  Fax:  [phone]                                    Jamal Sanchez   MRN: [id number]    Department:  [facility]   Date of Visit:  7/2/2019           After Visit Summary Signature Page    I have received my discharge instructions, and my questions have been answered. I have discussed any challenges I see with this plan with the nurse or doctor.    ..........................................................................................................................................  Patient/Patient Representative Signature      ..........................................................................................................................................  Patient Representative Print Name and Relationship to Patient    ..................................................               ................................................  Date                                   Time    ..........................................................................................................................................  Reviewed by Signature/Title    ...................................................              ..............................................  Date                                               Time          22EPIC Rev 08/18

## 2019-07-02 NOTE — ED PROVIDER NOTES
History     Chief Complaint   Patient presents with     Abdominal Pain     HPI  Jamal Sanchez is a 64 year old male with past medical history which includes BPH, GERD, hypertension, and diverticulitis who presents for evaluation of abdominal pain.  Patient describes abrupt onset severe left lower abdominal pain beginning around 5:30 PM last evening.  The pain has persisted since onset, does not radiate and is not described as cramping.  He has had 3 healthy bowel movements overnight but does not describe diarrhea-like or with bright red blood.  He has also been without fever, chills, chest pain, cough, back pain, upper abdominal pain, nausea or vomiting.  No history of kidney stone or infection per patient.  He has taken no symptomatic medications today.    Allergies:  Allergies   Allergen Reactions     Nka [No Known Allergies]        Problem List:    Patient Active Problem List    Diagnosis Date Noted     Diverticulosis of large intestine without hemorrhage 01/21/2019     Priority: Medium     Diverticulitis of colon 01/21/2019     Priority: Medium     ETOH abuse 11/25/2018     Priority: Medium     Acute diverticulitis 11/22/2018     Priority: Medium     Generalized abdominal pain 11/22/2018     Priority: Medium     Appendicitis 01/06/2016     Priority: Medium     Hypertension, goal below 140/90 07/06/2015     Priority: Medium     Advanced directives, counseling/discussion 10/16/2013     Priority: Medium     Patient does not have an Advance/Health Care Directive (HCD), requests blank HCD form.    Nina Shukla  October 16, 2013         GERD (gastroesophageal reflux disease) 08/07/2013     Priority: Medium     Benign prostatic hypertrophy 04/16/2008     Priority: Medium     CARDIOVASCULAR SCREENING; LDL GOAL LESS THAN 160 10/31/2010     Priority: Low        Past Medical History:    Past Medical History:   Diagnosis Date     BPH (benign prostatic hyperplasia)      GERD (gastroesophageal reflux disease)       Hypertension        Past Surgical History:    Past Surgical History:   Procedure Laterality Date     ARTHROSCOPY SHOULDER RT/LT Right 01/2018     COLONOSCOPY  3/5/2008     COLONOSCOPY  10/9/2013    Procedure: COLONOSCOPY;;  Surgeon: Nicolas Lizama MD;  Location: [facility]     ESOPHAGOSCOPY, GASTROSCOPY, DUODENOSCOPY (EGD), COMBINED  10/9/2013    Procedure: COMBINED ESOPHAGOSCOPY, GASTROSCOPY, DUODENOSCOPY (EGD), BIOPSY SINGLE OR MULTIPLE;;  Surgeon: Nicolas Lizama MD;  Location: [facility]     LAPAROSCOPIC APPENDECTOMY N/A 1/7/2016    Procedure: LAPAROSCOPIC APPENDECTOMY;  Surgeon: Ab Guzman MD;  Location: [facility]     LAPAROSCOPIC HERNIORRHAPHY INGUINAL BILATERAL Bilateral 12/13/2016    Procedure: LAPAROSCOPIC HERNIORRHAPHY INGUINAL BILATERAL;  Surgeon: Ab Guzman MD;  Location: [facility]       Family History:    Family History   Problem Relation Age of Onset     Diabetes Mother      Cerebrovascular Disease Mother      Neurologic Disorder Father         parkinson's     Cancer - colorectal Maternal Grandfather      Hypertension Brother      Cerebrovascular Disease Brother         stroke     Neurologic Disorder Brother      Alcohol/Drug Brother      Obesity Sister        Social History:  Marital Status:   [2]  Social History     Tobacco Use     Smoking status: Never Smoker     Smokeless tobacco: Never Used   Substance Use Topics     Alcohol use: Yes     Comment: 6 pack beer on weekend.     Drug use: No        Medications:      Ca Carbonate-Mag Hydroxide (ROLAIDS PO)   ciprofloxacin (CIPRO) 500 MG tablet   ibuprofen (ADVIL/MOTRIN) 200 MG tablet   metroNIDAZOLE (FLAGYL) 500 MG tablet   omeprazole (PRILOSEC) 40 MG DR capsule   oxyCODONE (ROXICODONE) 5 MG tablet   triamterene-hydrochlorothiazide (MAXZIDE-25) 37.5-25 MG per tablet   albuterol (PROAIR HFA/PROVENTIL HFA/VENTOLIN HFA) 108 (90 Base) MCG/ACT inhaler   calcium carbonate (TUMS) 500 MG chewable tablet   finasteride (PROSCAR) 5 MG tablet   FOLIC  "ACID PO   magnesium 250 MG tablet         Review of Systems  Constitutional:  Negative for fever or chills.  Cardiovascular:  Negative for chest pain.  Respiratory:  Negative for cough or shortness of breath.  Gastrointestinal: Positive for left lower abdominal pain.  Negative for nausea, vomiting, constipation or diarrhea.  Denies blood with bowel movements.  Genitourinary:  Negative for dysuria or hematuria.  Musculoskeletal: Negative for back or flank pain.  Neurological:  Negative for dizziness.    All others reviewed and are negative.      Physical Exam   BP: (!) 163/95  Pulse: 80  Heart Rate: 78  Temp: 97.4  F (36.3  C)  Resp: 18  Height: 177.8 cm (5' 10\")  Weight: 97.5 kg (215 lb)  SpO2: 97 %      Physical Exam  Constitutional:  Well developed, well nourished.  Appears nontoxic and in no acute distress.    HENT:  Normocephalic and atraumatic.  Symmetric in appearance.  Eyes:  Conjunctivae are normal.  Neck:  Neck supple.  Cardiovascular:  No cyanosis.  RRR.  No audible murmurs noted.    Respiratory:  Effort normal without sign of respiratory distress.  CTAB without diminished regions.    Gastrointestinal:  Soft nondistended abdomen.  LLQ tenderness with guarding.  No rigidity or rebound tenderness.  Negative Weems's sign.  Negative McBurney's point.   Musculoskeletal:  Moves extremities spontaneously.  Neurological:  Patient is alert.  Skin:  Skin is warm and dry.  Psychiatric:  Normal mood and affect.      ED Course        Procedures              Critical Care time:                 Results for orders placed or performed during the hospital encounter of 07/02/19 (from the past 24 hour(s))   CBC with platelets differential   Result Value Ref Range    WBC 17.7 (H) 4.0 - 11.0 10e9/L    RBC Count 5.30 4.4 - 5.9 10e12/L    Hemoglobin 15.3 13.3 - 17.7 g/dL    Hematocrit 47.5 40.0 - 53.0 %    MCV 90 78 - 100 fl    MCH 28.9 26.5 - 33.0 pg    MCHC 32.2 31.5 - 36.5 g/dL    RDW 13.0 10.0 - 15.0 %    Platelet Count 228 " 150 - 450 10e9/L    Diff Method Automated Method     % Neutrophils 84.5 %    % Lymphocytes 8.5 %    % Monocytes 5.3 %    % Eosinophils 0.1 %    % Basophils 0.2 %    % Immature Granulocytes 1.4 %    Nucleated RBCs 0 0 /100    Absolute Neutrophil 15.0 (H) 1.6 - 8.3 10e9/L    Absolute Lymphocytes 1.5 0.8 - 5.3 10e9/L    Absolute Monocytes 0.9 0.0 - 1.3 10e9/L    Absolute Eosinophils 0.0 0.0 - 0.7 10e9/L    Absolute Basophils 0.0 0.0 - 0.2 10e9/L    Abs Immature Granulocytes 0.3 0 - 0.4 10e9/L    Absolute Nucleated RBC 0.0    Comprehensive metabolic panel   Result Value Ref Range    Sodium 137 133 - 144 mmol/L    Potassium 3.9 3.4 - 5.3 mmol/L    Chloride 105 94 - 109 mmol/L    Carbon Dioxide 26 20 - 32 mmol/L    Anion Gap 6 3 - 14 mmol/L    Glucose 126 (H) 70 - 99 mg/dL    Urea Nitrogen 17 7 - 30 mg/dL    Creatinine 0.91 0.66 - 1.25 mg/dL    GFR Estimate 88 >60 mL/min/[1.73_m2]    GFR Estimate If Black >90 >60 mL/min/[1.73_m2]    Calcium 9.1 8.5 - 10.1 mg/dL    Bilirubin Total 1.0 0.2 - 1.3 mg/dL    Albumin 4.0 3.4 - 5.0 g/dL    Protein Total 7.9 6.8 - 8.8 g/dL    Alkaline Phosphatase 91 40 - 150 U/L    ALT 26 0 - 70 U/L    AST 14 0 - 45 U/L   Lipase   Result Value Ref Range    Lipase 77 73 - 393 U/L   CT Abdomen Pelvis w Contrast    Narrative    CT ABDOMEN AND PELVIS WITH CONTRAST   7/2/2019 10:09 AM     HISTORY: Left lower abdominal pain, history of diverticulitis.    TECHNIQUE:  100 mL Isovue 370. Radiation dose for this scan was  reduced using automated exposure control, adjustment of the mA and/or  kV according to patient size, or iterative reconstruction technique.    COMPARISON: 1/27/2018 CT scan.    FINDINGS:  Mild right base atelectasis. Small hiatal hernia.    The liver, gall bladder, spleen and pancreas are unremarkable.    No adrenal lesions.  No kidney stones or hydronephrosis. No suspicious  renal lesions.  No retroperitoneal adenopathy or evidence of aortic  aneurysm.    Scans through the pelvis  demonstrate a normal appearing bladder.  No  pelvic adenopathy.    There is inflammation surrounding the mid sigmoid colon, best seen on  axial series 2 image 64, consistent with acute diverticulitis. No  evidence of free air, abscess, or free fluid. No dilated bowel.      Impression    IMPRESSION:  1. Acute mid sigmoid diverticulitis with pericolonic inflammation but  no abscess, free air, or free fluid.  2. Mild right base atelectasis. Small hiatal hernia.   UA reflex to Microscopic and Culture   Result Value Ref Range    Color Urine Yellow     Appearance Urine Clear     Glucose Urine Negative NEG^Negative mg/dL    Bilirubin Urine Negative NEG^Negative    Ketones Urine Negative NEG^Negative mg/dL    Specific Gravity Urine 1.005 1.003 - 1.035    Blood Urine Negative NEG^Negative    pH Urine 7.0 5.0 - 7.0 pH    Protein Albumin Urine Negative NEG^Negative mg/dL    Urobilinogen mg/dL 0.0 0.0 - 2.0 mg/dL    Nitrite Urine Negative NEG^Negative    Leukocyte Esterase Urine Negative NEG^Negative    Source Midstream Urine        Medications   cefTRIAXone in d5w (ROCEPHIN) intermittent infusion 1 g (1 g Intravenous New Bag 7/2/19 1401)   ketorolac (TORADOL) injection 15 mg (15 mg Intravenous Given 7/2/19 0934)   lactated ringers BOLUS 1,000 mL (0 mLs Intravenous Stopped 7/2/19 1254)   iopamidol (ISOVUE-370) solution 100 mL (100 mLs Intravenous Given 7/2/19 0954)   sodium chloride 0.9 % bag 500mL for CT scan flush use (66 mLs Intravenous Given 7/2/19 0954)       Assessments & Plan (with Medical Decision Making)   Jamal Sanchez is a 64 year old male who presented to the department for evaluation of left lower abdominal pain which is quite severe and began abruptly yesterday evening.  He does have a history of one previous episode of diverticulitis from November 2018 which appears to have been complicated with an eventual diverticular abscess requiring CT-guided drainage and prolonged hospital stay.  He is afebrile but CBC  indicates leukocytosis and he does have left lower abdominal tenderness on examination.  No sign of ureteral calculus or pyelonephritis.  CT scan of abdomen/pelvis independently reviewed and there is a sigmoid diverticulitis, radiologist reads no abscess, free air, or free fluid.  Patient will be treated with oral antibiotics ciprofloxacin and metronidazole, drinks alcohol once per week but will refrain during antibiotic therapy.  He agrees with discharge plan including strict return instructions for increasing pain, fever, lethargy, or developing concerns.          Disclaimer:  This note consists of symbols derived from keyboarding, dictation, and/or voice recognition software.  As a result, there may be errors in the script that have gone undetected.  Please consider this when interpreting information found in the chart.        I have reviewed the nursing notes.    I have reviewed the findings, diagnosis, plan and need for follow up with the patient.          Medication List      Started    ciprofloxacin 500 MG tablet  Commonly known as:  CIPRO  500 mg, Oral, 2 TIMES DAILY     metroNIDAZOLE 500 MG tablet  Commonly known as:  FLAGYL  500 mg, Oral, 3 TIMES DAILY     oxyCODONE 5 MG tablet  Commonly known as:  ROXICODONE  5 mg, Oral, EVERY 6 HOURS PRN            Final diagnoses:   Acute diverticulitis       7/2/2019   [facility] EMERGENCY DEPARTMENT     Abner Cui DO  07/02/19 1411

## 2019-07-10 ENCOUNTER — OFFICE VISIT (OUTPATIENT)
Dept: FAMILY MEDICINE | Facility: CLINIC | Age: 64
End: 2019-07-10
Payer: COMMERCIAL

## 2019-07-10 VITALS
HEIGHT: 70 IN | OXYGEN SATURATION: 99 % | RESPIRATION RATE: 16 BRPM | BODY MASS INDEX: 29.92 KG/M2 | DIASTOLIC BLOOD PRESSURE: 70 MMHG | TEMPERATURE: 97.6 F | HEART RATE: 59 BPM | SYSTOLIC BLOOD PRESSURE: 130 MMHG | WEIGHT: 209 LBS

## 2019-07-10 DIAGNOSIS — Z23 NEED FOR VACCINATION: ICD-10-CM

## 2019-07-10 DIAGNOSIS — M67.431 GANGLION CYST OF WRIST, RIGHT: ICD-10-CM

## 2019-07-10 DIAGNOSIS — Z01.818 PREOP GENERAL PHYSICAL EXAM: Primary | ICD-10-CM

## 2019-07-10 LAB
ANION GAP SERPL CALCULATED.3IONS-SCNC: 3 MMOL/L (ref 3–14)
BASOPHILS # BLD AUTO: 0 10E9/L (ref 0–0.2)
BASOPHILS NFR BLD AUTO: 0.7 %
BUN SERPL-MCNC: 18 MG/DL (ref 7–30)
CALCIUM SERPL-MCNC: 9.5 MG/DL (ref 8.5–10.1)
CHLORIDE SERPL-SCNC: 100 MMOL/L (ref 94–109)
CO2 SERPL-SCNC: 30 MMOL/L (ref 20–32)
CREAT SERPL-MCNC: 0.98 MG/DL (ref 0.66–1.25)
DIFFERENTIAL METHOD BLD: NORMAL
EOSINOPHIL # BLD AUTO: 0.1 10E9/L (ref 0–0.7)
EOSINOPHIL NFR BLD AUTO: 2.1 %
ERYTHROCYTE [DISTWIDTH] IN BLOOD BY AUTOMATED COUNT: 13 % (ref 10–15)
GFR SERPL CREATININE-BSD FRML MDRD: 80 ML/MIN/{1.73_M2}
GLUCOSE SERPL-MCNC: 125 MG/DL (ref 70–99)
HCT VFR BLD AUTO: 46.2 % (ref 40–53)
HGB BLD-MCNC: 15 G/DL (ref 13.3–17.7)
LYMPHOCYTES # BLD AUTO: 2.3 10E9/L (ref 0.8–5.3)
LYMPHOCYTES NFR BLD AUTO: 36.9 %
MCH RBC QN AUTO: 28.7 PG (ref 26.5–33)
MCHC RBC AUTO-ENTMCNC: 32.5 G/DL (ref 31.5–36.5)
MCV RBC AUTO: 89 FL (ref 78–100)
MONOCYTES # BLD AUTO: 0.8 10E9/L (ref 0–1.3)
MONOCYTES NFR BLD AUTO: 12.4 %
NEUTROPHILS # BLD AUTO: 2.9 10E9/L (ref 1.6–8.3)
NEUTROPHILS NFR BLD AUTO: 47.9 %
PLATELET # BLD AUTO: 284 10E9/L (ref 150–450)
POTASSIUM SERPL-SCNC: 3.9 MMOL/L (ref 3.4–5.3)
RBC # BLD AUTO: 5.22 10E12/L (ref 4.4–5.9)
SODIUM SERPL-SCNC: 133 MMOL/L (ref 133–144)
WBC # BLD AUTO: 6.1 10E9/L (ref 4–11)

## 2019-07-10 PROCEDURE — 85025 COMPLETE CBC W/AUTO DIFF WBC: CPT | Performed by: NURSE PRACTITIONER

## 2019-07-10 PROCEDURE — 90471 IMMUNIZATION ADMIN: CPT | Performed by: NURSE PRACTITIONER

## 2019-07-10 PROCEDURE — 90714 TD VACC NO PRESV 7 YRS+ IM: CPT | Performed by: NURSE PRACTITIONER

## 2019-07-10 PROCEDURE — 99214 OFFICE O/P EST MOD 30 MIN: CPT | Mod: 25 | Performed by: NURSE PRACTITIONER

## 2019-07-10 PROCEDURE — 80048 BASIC METABOLIC PNL TOTAL CA: CPT | Performed by: NURSE PRACTITIONER

## 2019-07-10 PROCEDURE — 36415 COLL VENOUS BLD VENIPUNCTURE: CPT | Performed by: NURSE PRACTITIONER

## 2019-07-10 ASSESSMENT — MIFFLIN-ST. JEOR: SCORE: 1739.27

## 2019-07-10 NOTE — PROGRESS NOTES
[facility]  [address] [facility] [address]  [phone]  Dept: [phone]    PRE-OP EVALUATION:  Today's date: 7/10/2019    Jamal Sanchez (: 1955) presents for pre-operative evaluation assessment as requested by Dr. Rojas.  He requires evaluation and anesthesia risk assessment prior to undergoing surgery/procedure for treatment of nodule of finger of right hand .    Proposed Surgery/ Procedure: Surgical excision of ganglion cyst  Date of Surgery/ Procedure: 2019  Time of Surgery/ Procedure: [facility]  Hospital/Surgical Facility: [facility]  Fax number for surgical facility: [phone]  Primary Physician: Anjel Jimenes  Type of Anesthesia Anticipated: Local with MAC    Patient has a Health Care Directive or Living Will:  NO    1. NO - Do you have a history of heart attack, stroke, stent, bypass or surgery on an artery in the head, neck, heart or legs?  2. NO - Do you ever have any pain or discomfort in your chest?  3. NO - Do you have a history of  Heart Failure?  4. NO - Are you troubled by shortness of breath when: walking on the level, up a slight hill or at night?  5. NO - Do you currently have a cold, bronchitis or other respiratory infection?  6. NO - Do you have a cough, shortness of breath or wheezing?  7. NO - Do you sometimes get pains in the calves of your legs when you walk?  8. NO - Do you or anyone in your family have previous history of blood clots?  9. NO - Do you or does anyone in your family have a serious bleeding problem such as prolonged bleeding following surgeries or cuts?  10. NO - Have you ever had problems with anemia or been told to take iron pills?  11. NO - Have you had any abnormal blood loss such as black, tarry or bloody stools, or abnormal vaginal bleeding?  12. NO - Have you ever had a blood transfusion?  13. YES - HAVE YOU OR ANY OF YOUR RELATIVES EVER HAD PROBLEMS WITH ANESTHESIA? Has trouble coming out of  anesthesia  14. NO - Do you have sleep apnea, excessive snoring or daytime drowsiness?  15. NO - Do you have any prosthetic heart valves?  16. NO - Do you have prosthetic joints?  17. NO - Is there any chance that you may be pregnant?      HPI:     HPI related to upcoming procedure: ganglion cyst on bilateral middle fingers, will have right one removed.    He was recently seen and treated for diverticulitis. He is finishing antibiotics for that and states he feels back to his normal self. No diarrhea or pain. WBC is back down to normal.    Due for tetanus and will get that today.    See problem list for active medical problems.  Problems all longstanding and stable, except as noted/documented.  See ROS for pertinent symptoms related to these conditions.      MEDICAL HISTORY:     Patient Active Problem List    Diagnosis Date Noted     Diverticulosis of large intestine without hemorrhage 01/21/2019     Priority: Medium     Diverticulitis of colon 01/21/2019     Priority: Medium     ETOH abuse 11/25/2018     Priority: Medium     Acute diverticulitis 11/22/2018     Priority: Medium     Generalized abdominal pain 11/22/2018     Priority: Medium     Appendicitis 01/06/2016     Priority: Medium     Hypertension, goal below 140/90 07/06/2015     Priority: Medium     Advanced directives, counseling/discussion 10/16/2013     Priority: Medium     Patient does not have an Advance/Health Care Directive (HCD), requests blank HCD form.    Nina Shukla  October 16, 2013         GERD (gastroesophageal reflux disease) 08/07/2013     Priority: Medium     Benign prostatic hypertrophy 04/16/2008     Priority: Medium     CARDIOVASCULAR SCREENING; LDL GOAL LESS THAN 160 10/31/2010     Priority: Low      Past Medical History:   Diagnosis Date     BPH (benign prostatic hyperplasia)      Diverticulitis      GERD (gastroesophageal reflux disease)      Hypertension      Past Surgical History:   Procedure Laterality Date     ARTHROSCOPY  SHOULDER RT/LT Right 01/2018     COLONOSCOPY  3/5/2008     COLONOSCOPY  10/9/2013    Procedure: COLONOSCOPY;;  Surgeon: Nicolas Lizama MD;  Location: [facility]     ESOPHAGOSCOPY, GASTROSCOPY, DUODENOSCOPY (EGD), COMBINED  10/9/2013    Procedure: COMBINED ESOPHAGOSCOPY, GASTROSCOPY, DUODENOSCOPY (EGD), BIOPSY SINGLE OR MULTIPLE;;  Surgeon: Nicolas Lizama MD;  Location: [facility]     LAPAROSCOPIC APPENDECTOMY N/A 1/7/2016    Procedure: LAPAROSCOPIC APPENDECTOMY;  Surgeon: Ab Guzman MD;  Location: [facility]     LAPAROSCOPIC HERNIORRHAPHY INGUINAL BILATERAL Bilateral 12/13/2016    Procedure: LAPAROSCOPIC HERNIORRHAPHY INGUINAL BILATERAL;  Surgeon: Ab Guzman MD;  Location: [facility]     Current Outpatient Medications   Medication Sig Dispense Refill     Ca Carbonate-Mag Hydroxide (ROLAIDS PO) Take 1 tablet by mouth daily as needed       calcium carbonate (TUMS) 500 MG chewable tablet Take 1 chew tab by mouth daily as needed        ciprofloxacin (CIPRO) 500 MG tablet Take 1 tablet (500 mg) by mouth 2 times daily for 10 days 20 tablet 0     ibuprofen (ADVIL/MOTRIN) 200 MG tablet Take 800 mg by mouth every 8 hours as needed        metroNIDAZOLE (FLAGYL) 500 MG tablet Take 1 tablet (500 mg) by mouth 3 times daily for 10 days 30 tablet 0     omeprazole (PRILOSEC) 40 MG DR capsule TAKE 1 CAPSULE BY MOUTH D AILY. TAKE 30-60 MINUTES BEFORE A MEAL. (Patient taking differently: TAKE 1 CAPSULE BY MOUTH DAILY as needed. TAKE 30-60 MINUTES BEFORE A MEAL.) 90 capsule 1     Probiotic Product (PROBIOTIC ADVANCED PO)        triamterene-hydrochlorothiazide (MAXZIDE-25) 37.5-25 MG per tablet Take 1 tablet by mouth every morning 90 tablet 3     albuterol (PROAIR HFA/PROVENTIL HFA/VENTOLIN HFA) 108 (90 Base) MCG/ACT inhaler Inhale 2 puffs into the lungs every 4 hours as needed (Patient not taking: Reported on 6/18/2019) 17 g 3     finasteride (PROSCAR) 5 MG tablet Take 1 tablet (5 mg) by mouth daily (Patient not taking: Reported on  "6/18/2019) 90 tablet 3     FOLIC ACID PO Take 800 mcg by mouth 2 times daily       magnesium 250 MG tablet Take 1 tablet by mouth 2 times daily       oxyCODONE (ROXICODONE) 5 MG tablet Take 1 tablet (5 mg) by mouth every 6 hours as needed for severe pain (Patient not taking: Reported on 7/10/2019) 10 tablet 0     OTC products: None, except as noted above    Allergies   Allergen Reactions     Nka [No Known Allergies]       Latex Allergy: NO    Social History     Tobacco Use     Smoking status: Never Smoker     Smokeless tobacco: Never Used   Substance Use Topics     Alcohol use: Yes     Comment: 6 pack beer on weekend.     History   Drug Use No       REVIEW OF SYSTEMS:   CONSTITUTIONAL: NEGATIVE for fever, chills, change in weight  INTEGUMENTARY/SKIN: NEGATIVE for worrisome rashes, moles or lesions  EYES: NEGATIVE for vision changes or irritation  ENT/MOUTH: NEGATIVE for ear, mouth and throat problems  RESP: NEGATIVE for significant cough or SOB  BREAST: NEGATIVE for masses, tenderness or discharge  CV: NEGATIVE for chest pain, palpitations or peripheral edema  GI: NEGATIVE for nausea, abdominal pain, heartburn, or change in bowel habits  : NEGATIVE for frequency, dysuria, or hematuria  MUSCULOSKELETAL: NEGATIVE for significant arthralgias or myalgia  NEURO: NEGATIVE for weakness, dizziness or paresthesias  ENDOCRINE: NEGATIVE for temperature intolerance, skin/hair changes  HEME: NEGATIVE for bleeding problems  PSYCHIATRIC: NEGATIVE for changes in mood or affect    EXAM:   /70 (BP Location: Right arm, Patient Position: Chair, Cuff Size: Adult Regular)   Pulse 59   Temp 97.6  F (36.4  C) (Oral)   Resp 16   Ht 1.778 m (5' 10\")   Wt 94.8 kg (209 lb)   SpO2 99%   BMI 29.99 kg/m      GENERAL APPEARANCE: healthy, alert and no distress     EYES: EOMI,  PERRL     HENT: ear canals and TM's normal and nose and mouth without ulcers or lesions     NECK: no adenopathy, no asymmetry, masses, or scars and thyroid " normal to palpation     RESP: lungs clear to auscultation - no rales, rhonchi or wheezes     CV: regular rates and rhythm, normal S1 S2, no S3 or S4 and no murmur, click or rub     ABDOMEN:  soft, nontender, no HSM or masses and bowel sounds normal     MS: extremities normal- no gross deformities noted, no evidence of inflammation in joints, FROM in all extremities.     SKIN: no suspicious lesions or rashes     NEURO: Normal strength and tone, sensory exam grossly normal, mentation intact and speech normal     PSYCH: mentation appears normal. and affect normal/bright     LYMPHATICS: No cervical adenopathy    DIAGNOSTICS:     Labs Drawn and in Process:   Unresulted Labs Ordered in the Past 30 Days of this Admission     Date and Time Order Name Status Description    7/10/2019 0819 BASIC METABOLIC PANEL In process           Recent Labs   Lab Test 07/02/19  0927 01/07/19  0841 11/27/18  1017  11/22/18   HGB 15.3 14.7 14.5   < >  --     232 415   < >  --    INR  --   --   --   --  1.10     --  136   < >  --    POTASSIUM 3.9  --  4.2   < >  --    CR 0.91  --  0.90   < >  --     < > = values in this interval not displayed.        IMPRESSION:   Reason for surgery/procedure: ganglion cyst  Diagnosis/reason for consult: evaluation for anesthesia risk    The proposed surgical procedure is considered LOW risk.    REVISED CARDIAC RISK INDEX  The patient has the following serious cardiovascular risks for perioperative complications such as (MI, PE, VFib and 3  AV Block):  No serious cardiac risks  INTERPRETATION: 0 risks: Class I (very low risk - 0.4% complication rate)    The patient has the following additional risks for perioperative complications:  No identified additional risks      ICD-10-CM    1. Preop general physical exam Z01.818 CBC with platelets differential     Basic metabolic panel   2. Ganglion cyst of wrist, right M67.431    3. Need for vaccination Z23 TD PRSERV FREE >=7 YRS ADS IM [[id number]]     1st   Administration  [[id number]]       RECOMMENDATIONS:     --Consult hospital rounder / IM to assist post-op medical management    --Patient is to take all scheduled medications on the day of surgery EXCEPT for modifications listed below.    APPROVAL GIVEN to proceed with proposed procedure, without further diagnostic evaluation       Signed Electronically by: HERBERTH Sinha CNP    Copy of this evaluation report is provided to requesting physician.    [facility] Preop Guidelines    Revised Cardiac Risk Index

## 2019-07-10 NOTE — NURSING NOTE
Screening Questionnaire for Adult Immunization    Are you sick today?   No   Do you have allergies to medications, food, a vaccine component or latex?   No   Have you ever had a serious reaction after receiving a vaccination?   No   Do you have a long-term health problem with heart disease, lung disease, asthma, kidney disease, metabolic disease (e.g. diabetes), anemia, or other blood disorder?   No   Do you have cancer, leukemia, HIV/AIDS, or any other immune system problem?   No   In the past 3 months, have you taken medications that affect  your immune system, such as prednisone, other steroids, or anticancer drugs; drugs for the treatment of rheumatoid arthritis, Crohn s disease, or psoriasis; or have you had radiation treatments?   No   Have you had a seizure, or a brain or other nervous system problem?   No   During the past year, have you received a transfusion of blood or blood     products, or been given immune (gamma) globulin or antiviral drug?   No   For women: Are you pregnant or is there a chance you could become        pregnant during the next month?   No   Have you received any vaccinations in the past 4 weeks?   No     Immunization questionnaire answers were all negative.        Per orders of Janis Francis, injection of Td given by Nina Shukla. Patient instructed to remain in clinic for 15 minutes afterwards, and to report any adverse reaction to me immediately.       Screening performed by Nina Shukla on 7/10/2019 at 8:45 AM.

## 2019-08-02 ENCOUNTER — TRANSFERRED RECORDS (OUTPATIENT)
Dept: HEALTH INFORMATION MANAGEMENT | Facility: CLINIC | Age: 64
End: 2019-08-02

## 2019-08-03 DIAGNOSIS — L29.9 ITCHING: Primary | ICD-10-CM

## 2019-08-05 RX ORDER — TRIAMCINOLONE ACETONIDE 1 MG/G
CREAM TOPICAL
Qty: 80 G | Refills: 0 | Status: SHIPPED | OUTPATIENT
Start: 2019-08-05 | End: 2020-08-13

## 2019-08-05 NOTE — TELEPHONE ENCOUNTER
Routing refill request to provider for review/approval because:  Drug not active on patient's medication list    Janis Shelton RN

## 2019-08-05 NOTE — TELEPHONE ENCOUNTER
"Requested Prescriptions   Pending Prescriptions Disp Refills     triamcinolone (KENALOG) 0.1 % external cream [Pharmacy Med Name: TRIAMCINOLONE ACETONIDE 0.1 % CREAM (G)] 80 g      Sig: APPLY TO AFFECTED AREA THREE TIMES A DAY AS NEEDED  Last Written Prescription Date:  9/18/2018  Last Fill Quantity: 90,  # refills: 3   Last office visit: 7/10/2019 with prescribing provider:  Tito   Future Office Visit:           Topical Steroids and Nonsteroidals Protocol Failed - 8/3/2019  8:55 AM        Failed - Medication is active on med list        Passed - Patient is age 6 or older        Passed - Authorizing prescriber's most recent note related to this medication read.     If refill request is for ophthalmic use, please forward request to provider for approval.          Passed - High potency steroid not ordered        Passed - Recent (12 mo) or future (30 days) visit within the authorizing provider's specialty     Patient had office visit in the last 12 months or has a visit in the next 30 days with authorizing provider or within the authorizing provider's specialty.  See \"Patient Info\" tab in inbasket, or \"Choose Columns\" in Meds & Orders section of the refill encounter.                "

## 2019-08-10 DIAGNOSIS — K21.9 GASTROESOPHAGEAL REFLUX DISEASE WITHOUT ESOPHAGITIS: ICD-10-CM

## 2019-08-12 RX ORDER — OMEPRAZOLE 40 MG/1
CAPSULE, DELAYED RELEASE ORAL
Qty: 90 CAPSULE | Refills: 3 | Status: SHIPPED | OUTPATIENT
Start: 2019-08-12 | End: 2020-08-18

## 2019-08-12 NOTE — TELEPHONE ENCOUNTER
"Requested Prescriptions   Pending Prescriptions Disp Refills     omeprazole (PRILOSEC) 40 MG DR capsule [Pharmacy Med Name: OMEPRAZOLE 40 MG CAPSULE DR] 90 capsule 1     Sig: TAKE 1 CAPSULE BY MOUTH D AILY. TAKE 30-60 MINUTES BEFORE A MEAL.       PPI Protocol Passed - 8/10/2019  8:01 AM        Passed - Not on Clopidogrel (unless Pantoprazole ordered)        Passed - No diagnosis of osteoporosis on record        Passed - Recent (12 mo) or future (30 days) visit within the authorizing provider's specialty     Patient had office visit in the last 12 months or has a visit in the next 30 days with authorizing provider or within the authorizing provider's specialty.  See \"Patient Info\" tab in inbasket, or \"Choose Columns\" in Meds & Orders section of the refill encounter.              Passed - Medication is active on med list        Passed - Patient is age 18 or older        Last Written Prescription Date:  2/6/2019  Last Fill Quantity: 90,  # refills: 1   Last office visit: 1/21/2019 with prescribing provider:  Jalil  7/10/2019 with Tito   Future Office Visit:      "

## 2019-09-27 ENCOUNTER — TRANSFERRED RECORDS (OUTPATIENT)
Dept: HEALTH INFORMATION MANAGEMENT | Facility: CLINIC | Age: 64
End: 2019-09-27

## 2019-10-01 ENCOUNTER — OFFICE VISIT (OUTPATIENT)
Dept: FAMILY MEDICINE | Facility: CLINIC | Age: 64
End: 2019-10-01
Payer: COMMERCIAL

## 2019-10-01 VITALS
WEIGHT: 213.2 LBS | DIASTOLIC BLOOD PRESSURE: 78 MMHG | BODY MASS INDEX: 30.52 KG/M2 | SYSTOLIC BLOOD PRESSURE: 126 MMHG | OXYGEN SATURATION: 96 % | HEART RATE: 68 BPM | HEIGHT: 70 IN

## 2019-10-01 DIAGNOSIS — Z01.818 PREOP GENERAL PHYSICAL EXAM: Primary | ICD-10-CM

## 2019-10-01 DIAGNOSIS — Z12.5 SCREENING PSA (PROSTATE SPECIFIC ANTIGEN): ICD-10-CM

## 2019-10-01 DIAGNOSIS — M67.40 GANGLION CYST: ICD-10-CM

## 2019-10-01 LAB
ANION GAP SERPL CALCULATED.3IONS-SCNC: 4 MMOL/L (ref 3–14)
BUN SERPL-MCNC: 19 MG/DL (ref 7–30)
CALCIUM SERPL-MCNC: 9.5 MG/DL (ref 8.5–10.1)
CHLORIDE SERPL-SCNC: 104 MMOL/L (ref 94–109)
CO2 SERPL-SCNC: 29 MMOL/L (ref 20–32)
CREAT SERPL-MCNC: 0.92 MG/DL (ref 0.66–1.25)
GFR SERPL CREATININE-BSD FRML MDRD: 86 ML/MIN/{1.73_M2}
GLUCOSE SERPL-MCNC: 91 MG/DL (ref 70–99)
POTASSIUM SERPL-SCNC: 5 MMOL/L (ref 3.4–5.3)
PSA SERPL-ACNC: 1.63 UG/L (ref 0–4)
SODIUM SERPL-SCNC: 137 MMOL/L (ref 133–144)

## 2019-10-01 PROCEDURE — 93000 ELECTROCARDIOGRAM COMPLETE: CPT | Performed by: NURSE PRACTITIONER

## 2019-10-01 PROCEDURE — 36415 COLL VENOUS BLD VENIPUNCTURE: CPT | Performed by: NURSE PRACTITIONER

## 2019-10-01 PROCEDURE — G0103 PSA SCREENING: HCPCS | Performed by: NURSE PRACTITIONER

## 2019-10-01 PROCEDURE — 99214 OFFICE O/P EST MOD 30 MIN: CPT | Performed by: NURSE PRACTITIONER

## 2019-10-01 PROCEDURE — 80048 BASIC METABOLIC PNL TOTAL CA: CPT | Performed by: NURSE PRACTITIONER

## 2019-10-01 ASSESSMENT — MIFFLIN-ST. JEOR: SCORE: 1758.32

## 2019-10-01 NOTE — PROGRESS NOTES
[facility]  [address] [facility] [address]  [phone]  Dept: [phone]    PRE-OP EVALUATION:  Today's date: 10/1/2019    Jamal Sanchez (: 1955) presents for pre-operative evaluation assessment as requested by Dr. Adebayo Sigala.  He requires evaluation and anesthesia risk assessment prior to undergoing surgery/procedure for treatment of ganglion cyst .    Proposed Surgery/ Procedure: ganglion cyst removal on left middle finger  Date of Surgery/ Procedure: 10/11/2019  Time of Surgery/ Procedure: to be determined  Hospital/Surgical Facility: [facility]  Fax number for surgical facility: [phone]  TELEPHONE NUMBER: [phone]  Primary Physician: Anjel Jimenes  Type of Anesthesia Anticipated: to be determined    Patient has a Health Care Directive or Living Will:  NO    1. NO - Do you have a history of heart attack, stroke, stent, bypass or surgery on an artery in the head, neck, heart or legs?  2. NO - Do you ever have any pain or discomfort in your chest?  3. NO - Do you have a history of  Heart Failure?  4. NO - Are you troubled by shortness of breath when: walking on the level, up a slight hill or at night?  5. NO - Do you currently have a cold, bronchitis or other respiratory infection?  6. NO - Do you have a cough, shortness of breath or wheezing?  7. NO - Do you sometimes get pains in the calves of your legs when you walk?  8. NO - Do you or anyone in your family have previous history of blood clots?  9. NO - Do you or does anyone in your family have a serious bleeding problem such as prolonged bleeding following surgeries or cuts?  10. NO - Have you ever had problems with anemia or been told to take iron pills?  11. NO - Have you had any abnormal blood loss such as black, tarry or bloody stools, or abnormal vaginal bleeding?  12. NO - Have you ever had a blood transfusion?  13. NO - Have you or any of your relatives ever had problems with  anesthesia?  14. NO - Do you have sleep apnea, excessive snoring or daytime drowsiness?  15. NO - Do you have any prosthetic heart valves?  16. NO - Do you have prosthetic joints?  17. NO - Is there any chance that you may be pregnant?      HPI:     HPI related to upcoming procedure: Ganglion cyst on left middle finger at distal joint.  This came on a year or so ago and mildly larger over the year.  No pain or irritation.  Had one removed on the right middle finger a couple months ago.      HYPERTENSION - Patient has longstanding history of HTN , currently denies any symptoms referable to elevated blood pressure. Specifically denies chest pain, palpitations, dyspnea, orthopnea, PND or peripheral edema. Blood pressure readings have been in normal range. Current medication regimen is as listed below. Patient denies any side effects of medication.       MEDICAL HISTORY:     Patient Active Problem List    Diagnosis Date Noted     Diverticulosis of large intestine without hemorrhage 01/21/2019     Priority: Medium     Diverticulitis of colon 01/21/2019     Priority: Medium     ETOH abuse 11/25/2018     Priority: Medium     Acute diverticulitis 11/22/2018     Priority: Medium     Generalized abdominal pain 11/22/2018     Priority: Medium     Appendicitis 01/06/2016     Priority: Medium     Hypertension, goal below 140/90 07/06/2015     Priority: Medium     Advanced directives, counseling/discussion 10/16/2013     Priority: Medium     Patient does not have an Advance/Health Care Directive (HCD), requests blank HCD form.    Nina Shukla  October 16, 2013         GERD (gastroesophageal reflux disease) 08/07/2013     Priority: Medium     Benign prostatic hypertrophy 04/16/2008     Priority: Medium     CARDIOVASCULAR SCREENING; LDL GOAL LESS THAN 160 10/31/2010     Priority: Low      Past Medical History:   Diagnosis Date     BPH (benign prostatic hyperplasia)      Diverticulitis      GERD (gastroesophageal reflux disease)       Hypertension      Past Surgical History:   Procedure Laterality Date     ARTHROSCOPY SHOULDER RT/LT Right 01/2018     COLONOSCOPY  3/5/2008     COLONOSCOPY  10/9/2013    Procedure: COLONOSCOPY;;  Surgeon: Nicolas Lizama MD;  Location: [facility]     ESOPHAGOSCOPY, GASTROSCOPY, DUODENOSCOPY (EGD), COMBINED  10/9/2013    Procedure: COMBINED ESOPHAGOSCOPY, GASTROSCOPY, DUODENOSCOPY (EGD), BIOPSY SINGLE OR MULTIPLE;;  Surgeon: Nicolas Lizama MD;  Location: [facility]     LAPAROSCOPIC APPENDECTOMY N/A 1/7/2016    Procedure: LAPAROSCOPIC APPENDECTOMY;  Surgeon: Ab Guzman MD;  Location: [facility]     LAPAROSCOPIC HERNIORRHAPHY INGUINAL BILATERAL Bilateral 12/13/2016    Procedure: LAPAROSCOPIC HERNIORRHAPHY INGUINAL BILATERAL;  Surgeon: Ab Guzman MD;  Location: [facility]     Current Outpatient Medications   Medication Sig Dispense Refill     Ca Carbonate-Mag Hydroxide (ROLAIDS PO) Take 1 tablet by mouth daily as needed       calcium carbonate (TUMS) 500 MG chewable tablet Take 1 chew tab by mouth daily as needed        ibuprofen (ADVIL/MOTRIN) 200 MG tablet Take 800 mg by mouth every 8 hours as needed        omeprazole (PRILOSEC) 40 MG DR capsule TAKE 1 CAPSULE BY MOUTH D AILY. TAKE 30-60 MINUTES BEFORE A MEAL. 90 capsule 3     triamterene-hydrochlorothiazide (MAXZIDE-25) 37.5-25 MG per tablet Take 1 tablet by mouth every morning 90 tablet 3     triamcinolone (KENALOG) 0.1 % external cream APPLY TO AFFECTED AREA THREE TIMES A DAY AS NEEDED (Patient not taking: Reported on 10/1/2019) 80 g 0     OTC products: None, except as noted above    Allergies   Allergen Reactions     Nka [No Known Allergies]       Latex Allergy: NO    Social History     Tobacco Use     Smoking status: Never Smoker     Smokeless tobacco: Never Used   Substance Use Topics     Alcohol use: Yes     Comment: 6 pack beer on weekend.     History   Drug Use No       REVIEW OF SYSTEMS:   CONSTITUTIONAL: NEGATIVE for fever, chills, change in  "weight  INTEGUMENTARY/SKIN: NEGATIVE for worrisome rashes, moles or lesions  EYES: NEGATIVE for vision changes or irritation  ENT/MOUTH: NEGATIVE for ear, mouth and throat problems  RESP: NEGATIVE for significant cough or SOB  CV: NEGATIVE for chest pain, palpitations or peripheral edema  GI: NEGATIVE for nausea, abdominal pain, heartburn, or change in bowel habits  : NEGATIVE for frequency, dysuria, or hematuria  MUSCULOSKELETAL: NEGATIVE for significant arthralgias or myalgia  NEURO: NEGATIVE for weakness, dizziness or paresthesias  ENDOCRINE: NEGATIVE for temperature intolerance, skin/hair changes  HEME: NEGATIVE for bleeding problems  PSYCHIATRIC: NEGATIVE for changes in mood or affect    EXAM:   /78 (BP Location: Right arm, Patient Position: Sitting, Cuff Size: Adult Large)   Pulse 68   Ht 1.778 m (5' 10\")   Wt 96.7 kg (213 lb 3.2 oz)   SpO2 96%   BMI 30.59 kg/m      GENERAL APPEARANCE: healthy, alert and no distress     EYES: EOMI,  PERRL     HENT: ear canals and TM's normal and nose and mouth without ulcers or lesions     NECK: no adenopathy, no asymmetry, masses, or scars and thyroid normal to palpation     RESP: lungs clear to auscultation - no rales, rhonchi or wheezes     CV: regular rates and rhythm, normal S1 S2, no S3 or S4 and no murmur, click or rub     ABDOMEN:  soft, nontender, no HSM or masses and bowel sounds normal     MS: extremities normal- no gross deformities noted, no evidence of inflammation in joints, FROM in all extremities.     SKIN: no suspicious lesions or rashes     NEURO: Normal strength and tone, sensory exam grossly normal, mentation intact and speech normal     PSYCH: mentation appears normal. and affect normal/bright     LYMPHATICS: No cervical adenopathy    DIAGNOSTICS:   EKG: Normal Sinus Rhythm, normal axis, normal intervals, no acute ST/T changes c/w ischemia, no LVH by voltage criteria, unchanged from previous tracings    Recent Labs   Lab Test 07/10/19  0820 " 07/02/19  0927  11/22/18   HGB 15.0 15.3   < >  --     228   < >  --    INR  --   --   --  1.10    137   < >  --    POTASSIUM 3.9 3.9   < >  --    CR 0.98 0.91   < >  --     < > = values in this interval not displayed.     Last Comprehensive Metabolic Panel:  Sodium   Date Value Ref Range Status   10/01/2019 137 133 - 144 mmol/L Final     Potassium   Date Value Ref Range Status   10/01/2019 5.0 3.4 - 5.3 mmol/L Final     Chloride   Date Value Ref Range Status   10/01/2019 104 94 - 109 mmol/L Final     Carbon Dioxide   Date Value Ref Range Status   10/01/2019 29 20 - 32 mmol/L Final     Anion Gap   Date Value Ref Range Status   10/01/2019 4 3 - 14 mmol/L Final     Glucose   Date Value Ref Range Status   10/01/2019 91 70 - 99 mg/dL Final     Comment:     Non Fasting     Urea Nitrogen   Date Value Ref Range Status   10/01/2019 19 7 - 30 mg/dL Final     Creatinine   Date Value Ref Range Status   10/01/2019 0.92 0.66 - 1.25 mg/dL Final     GFR Estimate   Date Value Ref Range Status   10/01/2019 86 >60 mL/min/[1.73_m2] Final     Comment:     Non  GFR Calc  Starting 12/18/2018, serum creatinine based estimated GFR (eGFR) will be   calculated using the Chronic Kidney Disease Epidemiology Collaboration   (CKD-EPI) equation.       Calcium   Date Value Ref Range Status   10/01/2019 9.5 8.5 - 10.1 mg/dL Final     IMPRESSION:   Reason for surgery/procedure: Removal of ganglion cyst on left distal joint of the middle finger  Diagnosis/reason for consult: Pre-operative H&P    The proposed surgical procedure is considered INTERMEDIATE risk.    REVISED CARDIAC RISK INDEX  The patient has the following serious cardiovascular risks for perioperative complications such as (MI, PE, VFib and 3  AV Block):  No serious cardiac risks  INTERPRETATION: 0 risks: Class I (very low risk - 0.4% complication rate)    The patient has the following additional risks for perioperative complications:  No identified  additional risks      ICD-10-CM    1. Preop general physical exam Z01.818    2. Ganglion cyst M67.40        RECOMMENDATIONS:       Cardiovascular Risk  Performs 4 METs exercise without symptoms (Light housework (dusting, washing dishes), Climb a flight of stairs and Shoveling) .       --Patient is to take all scheduled medications on the day of surgery EXCEPT for modifications listed below.  No NSAIDS 7 days prior to surgery.    APPROVAL GIVEN to proceed with proposed procedure, without further diagnostic evaluation       Signed Electronically by: Tami Abebe NP    Copy of this evaluation report is provided to requesting physician.    [place] Preop Guidelines    Revised Cardiac Risk Index

## 2019-10-01 NOTE — LETTER
October 2, 2019      Jamal Sanchez  [address] [place] [id number]        Dear ,    We are writing to inform you of your test results.    Labs came back normal.    Resulted Orders   Basic metabolic panel  (Ca, Cl, CO2, Creat, Gluc, K, Na, BUN)   Result Value Ref Range    Sodium 137 133 - 144 mmol/L    Potassium 5.0 3.4 - 5.3 mmol/L    Chloride 104 94 - 109 mmol/L    Carbon Dioxide 29 20 - 32 mmol/L    Anion Gap 4 3 - 14 mmol/L    Glucose 91 70 - 99 mg/dL      Comment:      Non Fasting    Urea Nitrogen 19 7 - 30 mg/dL    Creatinine 0.92 0.66 - 1.25 mg/dL    GFR Estimate 86 >60 mL/min/[1.73_m2]      Comment:      Non  GFR Calc  Starting 12/18/2018, serum creatinine based estimated GFR (eGFR) will be   calculated using the Chronic Kidney Disease Epidemiology Collaboration   (CKD-EPI) equation.      GFR Estimate If Black >90 >60 mL/min/[1.73_m2]      Comment:       GFR Calc  Starting 12/18/2018, serum creatinine based estimated GFR (eGFR) will be   calculated using the Chronic Kidney Disease Epidemiology Collaboration   (CKD-EPI) equation.      Calcium 9.5 8.5 - 10.1 mg/dL   PSA, screen   Result Value Ref Range    PSA 1.63 0 - 4 ug/L      Comment:      Assay Method:  Chemiluminescence using Siemens Vista analyzer       If you have any questions or concerns, please call the clinic at the number listed above.       Sincerely,        Tami Abebe NP

## 2019-10-19 DIAGNOSIS — I10 HYPERTENSION, GOAL BELOW 140/90: ICD-10-CM

## 2019-10-21 RX ORDER — TRIAMTERENE/HYDROCHLOROTHIAZID 37.5-25 MG
1 TABLET ORAL EVERY MORNING
Qty: 90 TABLET | Refills: 1 | Status: SHIPPED | OUTPATIENT
Start: 2019-10-21 | End: 2020-03-30

## 2019-10-21 NOTE — TELEPHONE ENCOUNTER
"Requested Prescriptions   Pending Prescriptions Disp Refills     triamterene-HCTZ (MAXZIDE-25) 37.5-25 MG tablet [Pharmacy Med Name: TRIAMTERENE-HYDROCHLOROTHIAZID 37.5-25 MG TABLET] 90 tablet 3     Sig: Take 1 tablet by mouth every morning       Diuretics (Including Combos) Protocol Passed - 10/19/2019  8:00 AM        Passed - Blood pressure under 140/90 in past 12 months     BP Readings from Last 3 Encounters:   10/01/19 126/78   07/10/19 130/70   07/02/19 131/83                 Passed - Recent (12 mo) or future (30 days) visit within the authorizing provider's specialty     Patient has had an office visit with the authorizing provider or a provider within the authorizing providers department within the previous 12 mos or has a future within next 30 days. See \"Patient Info\" tab in inbasket, or \"Choose Columns\" in Meds & Orders section of the refill encounter.              Passed - Medication is active on med list        Passed - Patient is age 18 or older        Passed - Normal serum creatinine on file in past 12 months     Recent Labs   Lab Test 10/01/19  1536   CR 0.92              Passed - Normal serum potassium on file in past 12 months     Recent Labs   Lab Test 10/01/19  1536   POTASSIUM 5.0                    Passed - Normal serum sodium on file in past 12 months     Recent Labs   Lab Test 10/01/19  1536                 Last Written Prescription Date:  9/18/18  Last Fill Quantity: 90,  # refills: 3   Last office visit: 10/1/2019 with prescribing provider:     Future Office Visit:      "

## 2019-10-25 ENCOUNTER — TRANSFERRED RECORDS (OUTPATIENT)
Dept: HEALTH INFORMATION MANAGEMENT | Facility: CLINIC | Age: 64
End: 2019-10-25

## 2019-11-21 ENCOUNTER — OFFICE VISIT (OUTPATIENT)
Dept: FAMILY MEDICINE | Facility: CLINIC | Age: 64
End: 2019-11-21
Payer: COMMERCIAL

## 2019-11-21 VITALS
HEIGHT: 70 IN | HEART RATE: 62 BPM | BODY MASS INDEX: 29.99 KG/M2 | WEIGHT: 209.5 LBS | DIASTOLIC BLOOD PRESSURE: 82 MMHG | SYSTOLIC BLOOD PRESSURE: 128 MMHG | TEMPERATURE: 98 F | OXYGEN SATURATION: 96 %

## 2019-11-21 DIAGNOSIS — Z00.00 ANNUAL PHYSICAL EXAM: Primary | ICD-10-CM

## 2019-11-21 DIAGNOSIS — Z23 NEED FOR VACCINATION: ICD-10-CM

## 2019-11-21 DIAGNOSIS — N52.9 ERECTILE DYSFUNCTION, UNSPECIFIED ERECTILE DYSFUNCTION TYPE: ICD-10-CM

## 2019-11-21 LAB
CHOLEST SERPL-MCNC: 184 MG/DL
HDLC SERPL-MCNC: 61 MG/DL
LDLC SERPL CALC-MCNC: 103 MG/DL
NONHDLC SERPL-MCNC: 123 MG/DL
TRIGL SERPL-MCNC: 99 MG/DL

## 2019-11-21 PROCEDURE — 80061 LIPID PANEL: CPT | Performed by: NURSE PRACTITIONER

## 2019-11-21 PROCEDURE — 99397 PER PM REEVAL EST PAT 65+ YR: CPT | Mod: 25 | Performed by: NURSE PRACTITIONER

## 2019-11-21 PROCEDURE — G0009 ADMIN PNEUMOCOCCAL VACCINE: HCPCS | Performed by: NURSE PRACTITIONER

## 2019-11-21 PROCEDURE — 90670 PCV13 VACCINE IM: CPT | Performed by: NURSE PRACTITIONER

## 2019-11-21 PROCEDURE — 36415 COLL VENOUS BLD VENIPUNCTURE: CPT | Performed by: NURSE PRACTITIONER

## 2019-11-21 RX ORDER — SILDENAFIL CITRATE 20 MG/1
TABLET ORAL
Refills: 5 | COMMUNITY
Start: 2019-10-28 | End: 2020-03-30

## 2019-11-21 RX ORDER — TADALAFIL 10 MG/1
TABLET ORAL
Qty: 30 TABLET | Refills: 11 | Status: SHIPPED | OUTPATIENT
Start: 2019-11-21 | End: 2020-01-14

## 2019-11-21 ASSESSMENT — MIFFLIN-ST. JEOR: SCORE: 1741.54

## 2019-11-21 ASSESSMENT — ACTIVITIES OF DAILY LIVING (ADL): CURRENT_FUNCTION: NO ASSISTANCE NEEDED

## 2019-11-21 NOTE — LETTER
November 22, 2019      Jamal Sanchez  [address] [facility] [address]        Dear ,    We are writing to inform you of your test results.    Cholesterol level normal.   Normal triglycerides.     Resulted Orders   Lipid panel reflex to direct LDL Fasting   Result Value Ref Range    Cholesterol 184 <200 mg/dL    Triglycerides 99 <150 mg/dL      Comment:      Fasting specimen    HDL Cholesterol 61 >39 mg/dL    LDL Cholesterol Calculated 103 (H) <100 mg/dL      Comment:      Above desirable:  100-129 mg/dl  Borderline High:  130-159 mg/dL  High:             160-189 mg/dL  Very high:       >189 mg/dl      Non HDL Cholesterol 123 <130 mg/dL       If you have any questions or concerns, please call the clinic at the number listed above.       Sincerely,        HERBERTH Thacker CNP

## 2019-11-21 NOTE — PROGRESS NOTES
"SUBJECTIVE:   Jamal Sanchez is a 64 year old male who presents for Preventive Visit.  Chief Complaint   Patient presents with     Physical     Blood Draw     Fasting for labs      Medication Request     Would like to dicuss getting a higher dose of his ED medication      Are you in the first 12 months of your Medicare coverage?  Yes,  Visual Acuity:  Right Eye: 20/25   Left Eye: 20/25      Healthy Habits:    In general, how would you rate your overall health?  Good    Frequency of exercise:  None    Duration of exercise:  Less than 15 minutes    Do you usually eat at least 4 servings of fruit and vegetables a day, include whole grains    & fiber and avoid regularly eating high fat or \"junk\" foods?  No    Taking medications regularly:  Yes    Barriers to taking medications:  None    Medication side effects:  None    Ability to successfully perform activities of daily living:  No assistance needed    Home Safety:  No safety concerns identified    Hearing Impairment:  No hearing concerns    In the past 6 months, have you been bothered by leaking of urine?  No    In general, how would you rate your overall mental or emotional health?  Good      PHQ-2 Total Score:    Additional concerns today:  Yes    Do you feel safe in your environment? Yes    Have you ever done Advance Care Planning? (For example, a Health Directive, POLST, or a discussion with a medical provider or your loved ones about your wishes): No, advance care planning information given to patient to review.  Advanced care planning was discussed at today's visit.    Fall risk  Fallen 2 or more times in the past year?: No  Any fall with injury in the past year?: No    Cognitive Screening   1) Repeat 3 items (Leader, Season, Table)    2) Clock draw: NORMAL  3) 3 item recall: Recalls 3 objects  Results: 3 items recalled: COGNITIVE IMPAIRMENT LESS LIKELY    Mini-CogTM Copyright S [employer]. Licensed by the author for use in [facility]; reprinted " "with permission (clarence@.[facility]). All rights reserved.      Do you have sleep apnea, excessive snoring or daytime drowsiness?: no    Reviewed and updated as needed this visit by clinical staff  Tobacco  Allergies  Meds  Med Hx  Surg Hx  Fam Hx  Soc Hx        Reviewed and updated as needed this visit by Provider        Social History     Tobacco Use     Smoking status: Never Smoker     Smokeless tobacco: Never Used   Substance Use Topics     Alcohol use: Yes     Comment: 6 pack beer on weekend.     If you drink alcohol do you typically have >3 drinks per day or >7 drinks per week? no      Current providers sharing in care for this patient include: Patient Care Team:  Anjel Jimenes MD as PCP - General  Anjel Jimenes MD as Assigned PCP    The following health maintenance items are reviewed in Epic and correct as of today:  Health Maintenance   Topic Date Due     ZOSTER IMMUNIZATION (2 of 3) 01/25/2016     ADVANCE CARE PLANNING  10/16/2018     MEDICARE ANNUAL WELLNESS VISIT  01/19/2019     FALL RISK ASSESSMENT  01/19/2019     PNEUMOCOCCAL IMMUNIZATION 65+ LOW/MEDIUM RISK (1 of 2 - PCV13) 01/19/2019     LIPID  09/28/2021     COLONOSCOPY  09/18/2023     DTAP/TDAP/TD IMMUNIZATION (3 - Td) 07/10/2029     HEPATITIS C SCREENING  Completed     HIV SCREENING  Completed     PHQ-2  Completed     INFLUENZA VACCINE  Completed     AORTIC ANEURYSM SCREENING (SYSTEM ASSIGNED)  Completed     IPV IMMUNIZATION  Aged Out     MENINGITIS IMMUNIZATION  Aged Out         Review of Systems  Constitutional, HEENT, cardiovascular, pulmonary, gi and gu systems are negative, except as otherwise noted.    OBJECTIVE:   There were no vitals taken for this visit. Estimated body mass index is 30.59 kg/m  as calculated from the following:    Height as of 10/1/19: 1.778 m (5' 10\").    Weight as of 10/1/19: 96.7 kg (213 lb 3.2 oz).  Physical Exam  GENERAL: healthy, alert and no distress  EYES: Eyes grossly normal to inspection, PERRL and " "conjunctivae and sclerae normal  HENT: ear canals and TM's normal, nose and mouth without ulcers or lesions  NECK: no adenopathy, no asymmetry, masses, or scars and thyroid normal to palpation  RESP: lungs clear to auscultation - no rales, rhonchi or wheezes  CV: regular rate and rhythm, normal S1 S2, no S3 or S4, no murmur, click or rub, no peripheral edema and peripheral pulses strong  MS: no gross musculoskeletal defects noted, no edema  SKIN: no suspicious lesions or rashes  NEURO: Normal strength and tone, mentation intact and speech normal  PSYCH: mentation appears normal, affect normal/bright    Diagnostic Test Results:  Labs reviewed in Epic    ASSESSMENT / PLAN:   1. Annual physical exam    - Lipid panel reflex to direct LDL Fasting    2. Need for vaccination    - Pneumococcal vaccine 13 valent PCV13 IM (Prevnar) [25076]  - 1st  Administration  [61653]    3. Erectile dysfunction, unspecified erectile dysfunction type  -states Revatio did not work well as it used to in the past, recommended to try Cialis, he can always go back on Revatio if he feels Cialis not beneficial   - tadalafil (CIALIS) 10 MG tablet; Take 10-20 mg 30 minutes prior sexual intercourse as needed  Dispense: 30 tablet; Refill: 11    COUNSELING:  Reviewed preventive health counseling, as reflected in patient instructions       Regular exercise       Healthy diet/nutrition    Estimated body mass index is 30.59 kg/m  as calculated from the following:    Height as of 10/1/19: 1.778 m (5' 10\").    Weight as of 10/1/19: 96.7 kg (213 lb 3.2 oz).    Weight management plan: Discussed healthy diet and exercise guidelines     reports that he has never smoked. He has never used smokeless tobacco.      Appropriate preventive services were discussed with this patient, including applicable screening as appropriate for cardiovascular disease, diabetes, osteopenia/osteoporosis, and glaucoma.  As appropriate for age/gender, discussed screening for " colorectal cancer, prostate cancer, breast cancer, and cervical cancer. Checklist reviewing preventive services available has been given to the patient.    Reviewed patients plan of care and provided an AVS. The Basic Care Plan (routine screening as documented in Health Maintenance) for Jamal meets the Care Plan requirement. This Care Plan has been established and reviewed with the Patient.    Counseling Resources:  ATP IV Guidelines  Pooled Cohorts Equation Calculator  Breast Cancer Risk Calculator  FRAX Risk Assessment  ICSI Preventive Guidelines  Dietary Guidelines for Americans, 2010  USDA's MyPlate  ASA Prophylaxis  Lung CA Screening    HERBERTH Thacker [facility]    Identified Health Risks:

## 2019-11-21 NOTE — PATIENT INSTRUCTIONS
pine bark extract and L-arginine could help treat ED issues    Try Cialis 10-20 mg as needed 30 minutes prior sexual intercourse      Labs today: check cholesterol

## 2020-01-13 ENCOUNTER — OFFICE VISIT (OUTPATIENT)
Dept: UROLOGY | Facility: CLINIC | Age: 65
End: 2020-01-13
Payer: MEDICARE

## 2020-01-13 VITALS
TEMPERATURE: 97.2 F | HEART RATE: 65 BPM | RESPIRATION RATE: 12 BRPM | DIASTOLIC BLOOD PRESSURE: 98 MMHG | SYSTOLIC BLOOD PRESSURE: 159 MMHG

## 2020-01-13 DIAGNOSIS — N52.9 ERECTILE DYSFUNCTION, UNSPECIFIED ERECTILE DYSFUNCTION TYPE: ICD-10-CM

## 2020-01-13 PROCEDURE — 99214 OFFICE O/P EST MOD 30 MIN: CPT | Mod: 25 | Performed by: UROLOGY

## 2020-01-13 PROCEDURE — 51798 US URINE CAPACITY MEASURE: CPT | Performed by: UROLOGY

## 2020-01-13 NOTE — NURSING NOTE
Active order to obtain bladder scan? Yes   Name of ordering provider:  Rosales   Bladder scan preformed post void Yes.  Bladder scan reveled 61ML  Provider notified?  Yes    Sharifa ROSALES CMA

## 2020-01-14 RX ORDER — TADALAFIL 10 MG/1
TABLET ORAL
Qty: 30 TABLET | Refills: 11 | Status: SHIPPED | OUTPATIENT
Start: 2020-01-14 | End: 2021-03-22

## 2020-01-14 NOTE — PROGRESS NOTES
Appointment source: Established Patient  Patient name: Jamal Henriquezhaway  Urology Staff: Matteo Caba MD    Subjective: This is a 64 year old year old male returning for follow up of voiding symptoms and erectile dysfunction.    Voiding is currently satisfactory without any medications. Stopped finasteride, oxybutynin and tamsulosin.    Has used sildenafil for erectile dysfunction without success.    Has mild degree of penile curvature when erect. Does not interfere with intercourse.    Also has a prescription for tadalafil that has not been filled.    Objective:  Unremarkable physical examination.  Abdomen benign  External genitalia normal  Prostate benign.    Assessment:  History of voiding symptoms not currently causing a problem. Erectile dysfunction failing to respond to phosphodiesterase inhibition.     Plan:  Will prescribe alprostadil for intra cavernosal injection erectile dysfunction therapy.    Total time 30 minutes, counseling 25 minutes discussing erectile dysfunction management.

## 2020-03-30 ENCOUNTER — VIRTUAL VISIT (OUTPATIENT)
Dept: FAMILY MEDICINE | Facility: CLINIC | Age: 65
End: 2020-03-30
Payer: MEDICARE

## 2020-03-30 ENCOUNTER — TELEPHONE (OUTPATIENT)
Dept: FAMILY MEDICINE | Facility: CLINIC | Age: 65
End: 2020-03-30

## 2020-03-30 DIAGNOSIS — I10 HYPERTENSION, GOAL BELOW 140/90: ICD-10-CM

## 2020-03-30 PROCEDURE — 99213 OFFICE O/P EST LOW 20 MIN: CPT | Mod: TEL | Performed by: FAMILY MEDICINE

## 2020-03-30 RX ORDER — TRIAMTERENE AND HYDROCHLOROTHIAZIDE 75; 50 MG/1; MG/1
1 TABLET ORAL DAILY
Qty: 90 TABLET | Refills: 3 | Status: SHIPPED | OUTPATIENT
Start: 2020-03-30 | End: 2021-02-22

## 2020-03-30 NOTE — PROGRESS NOTES
"  Subjective     Jamal Sanchez is a 65 year old male who is being evaluated via a billable telephone visit.      The patient has been notified of following:     \"This telephone visit will be conducted via a call between you and your physician/provider. We have found that certain health care needs can be provided without the need for a physical exam.  This service lets us provide the care you need with a short phone conversation.  If a prescription is necessary we can send it directly to your pharmacy.  If lab work is needed we can place an order for that and you can then stop by our lab to have the test done at a later time.    If during the course of the call the physician/provider feels a telephone visit is not appropriate, you will not be charged for this service.\"     Physician has received verbal consent for a Telephone Visit from the patient? Yes    Jamal Sanchez complains of   Chief Complaint   Patient presents with     Hypertension     his BP has been running high since he retired in Jan . 160/90       ALLERGIES  Nka [no known allergies]    Chief Complaint   Patient presents with     Hypertension     his BP has been running high since he retired in Jan . 160/90       Hypertension Follow-up      Do you check your blood pressure regularly outside of the clinic? Yes     Are you following a low salt diet? Yes    Are your blood pressures ever more than 140 on the top number (systolic) OR more   than 90 on the bottom number (diastolic), for example 140/90? Yes      How many servings of fruits and vegetables do you eat daily?  2-3    On average, how many sweetened beverages do you drink each day (Examples: soda, juice, sweet tea, etc.  Do NOT count diet or artificially sweetened beverages)?   0    How many days per week do you exercise enough to make your heart beat faster? 5    How many minutes a day do you exercise enough to make your heart beat faster? 30 - 60    How many days per week do you miss taking " your medication? 0                 Reviewed and updated as needed this visit by Provider         Review of Systems          Objective   Reported vitals:  There were no vitals taken for this visit.     Psych: Alert and oriented times 3; coherent speech, normal   rate and volume, able to articulate logical thoughts, able   to abstract reason, no tangential thoughts, no hallucinations   or delusions  His affect is      Diagnostic Test Results:  Labs reviewed in Epic        Assessment/Plan:  1. Hypertension, goal below 140/90  Increase dose since   - triamterene-HCTZ (DYAZIDE) 50-25 MG capsule; Take 1 capsule by mouth every morning  Dispense: 90 capsule; Refill: 3    No follow-ups on file.      Phone call duration:  6 minutes    Anjel Jimenes MD

## 2020-03-30 NOTE — TELEPHONE ENCOUNTER
Harshad from [facility] in [place] is calling to get Dr. Jimenes to order either 37.5/25, or 75mg/25. Please re e-scribe this medication to [facility] in [facility]

## 2020-03-30 NOTE — TELEPHONE ENCOUNTER
Reason for call:  Patient reporting a symptom    Symptom or request: [facility] Dyazide 50/25mg caps strength is not made.  [facility]    Duration (how long have symptoms been present): ongoing    Have you been treated for this before? Yes    Additional comments:     Phone Number patient can be reached at:  Other phone number:  [phone]    Best Time:  any    Can we leave a detailed message on this number:  YES    Call taken on 3/30/2020 at 11:35 AM by Barbara Johnson

## 2020-04-02 NOTE — TELEPHONE ENCOUNTER
Was this resent?  Looks like it was resent, but not the dose pharmacy wants?    Routed to RN to please advise.

## 2020-08-13 DIAGNOSIS — L29.9 ITCHING: ICD-10-CM

## 2020-08-13 RX ORDER — TRIAMCINOLONE ACETONIDE 1 MG/G
CREAM TOPICAL 3 TIMES DAILY
Qty: 80 G | Refills: 0 | Status: SHIPPED | OUTPATIENT
Start: 2020-08-13 | End: 2021-02-22

## 2020-08-15 DIAGNOSIS — K21.9 GASTROESOPHAGEAL REFLUX DISEASE WITHOUT ESOPHAGITIS: ICD-10-CM

## 2020-08-17 NOTE — TELEPHONE ENCOUNTER
"Requested Prescriptions   Pending Prescriptions Disp Refills     omeprazole (PRILOSEC) 40 MG DR capsule [Pharmacy Med Name: omeprazole 40 mg capsule,delayed release] 90 capsule 3     Sig: TAKE 1 CAPSULE BY MOUTH D AILY. TAKE 30-60 MINUTES BEFORE A MEAL.       PPI Protocol Passed - 8/15/2020  8:00 AM        Passed - Not on Clopidogrel (unless Pantoprazole ordered)        Passed - No diagnosis of osteoporosis on record        Passed - Recent (12 mo) or future (30 days) visit within the authorizing provider's specialty     Patient has had an office visit with the authorizing provider or a provider within the authorizing providers department within the previous 12 mos or has a future within next 30 days. See \"Patient Info\" tab in inbasket, or \"Choose Columns\" in Meds & Orders section of the refill encounter.              Passed - Medication is active on med list        Passed - Patient is age 18 or older             "

## 2020-08-18 RX ORDER — OMEPRAZOLE 40 MG/1
CAPSULE, DELAYED RELEASE ORAL
Qty: 90 CAPSULE | Refills: 1 | Status: SHIPPED | OUTPATIENT
Start: 2020-08-18 | End: 2021-02-01

## 2020-11-16 ENCOUNTER — HEALTH MAINTENANCE LETTER (OUTPATIENT)
Age: 65
End: 2020-11-16

## 2021-01-15 ENCOUNTER — HEALTH MAINTENANCE LETTER (OUTPATIENT)
Age: 66
End: 2021-01-15

## 2021-02-01 DIAGNOSIS — K21.9 GASTROESOPHAGEAL REFLUX DISEASE WITHOUT ESOPHAGITIS: ICD-10-CM

## 2021-02-01 RX ORDER — OMEPRAZOLE 40 MG/1
40 CAPSULE, DELAYED RELEASE ORAL DAILY
Qty: 90 CAPSULE | Refills: 0 | Status: SHIPPED | OUTPATIENT
Start: 2021-02-01 | End: 2021-02-22

## 2021-02-01 NOTE — TELEPHONE ENCOUNTER
"Requested Prescriptions   Pending Prescriptions Disp Refills     omeprazole (PRILOSEC) 40 MG DR capsule 30 capsule 0     Sig: Take 1 capsule (40 mg) by mouth daily       PPI Protocol Passed - 2/1/2021 10:05 AM        Passed - Not on Clopidogrel (unless Pantoprazole ordered)        Passed - No diagnosis of osteoporosis on record        Passed - Recent (12 mo) or future (30 days) visit within the authorizing provider's specialty     Patient has had an office visit with the authorizing provider or a provider within the authorizing providers department within the previous 12 mos or has a future within next 30 days. See \"Patient Info\" tab in inbasket, or \"Choose Columns\" in Meds & Orders section of the refill encounter.              Passed - Medication is active on med list        Passed - Patient is age 18 or older             "

## 2021-02-22 ENCOUNTER — OFFICE VISIT (OUTPATIENT)
Dept: FAMILY MEDICINE | Facility: CLINIC | Age: 66
End: 2021-02-22
Payer: MEDICARE

## 2021-02-22 VITALS
RESPIRATION RATE: 16 BRPM | HEIGHT: 70 IN | WEIGHT: 216.4 LBS | OXYGEN SATURATION: 98 % | SYSTOLIC BLOOD PRESSURE: 152 MMHG | TEMPERATURE: 98.3 F | HEART RATE: 79 BPM | BODY MASS INDEX: 30.98 KG/M2 | DIASTOLIC BLOOD PRESSURE: 96 MMHG

## 2021-02-22 DIAGNOSIS — L29.9 ITCHING: ICD-10-CM

## 2021-02-22 DIAGNOSIS — Z00.00 ENCOUNTER FOR MEDICARE ANNUAL WELLNESS EXAM: Primary | ICD-10-CM

## 2021-02-22 DIAGNOSIS — I10 HYPERTENSION, GOAL BELOW 140/90: ICD-10-CM

## 2021-02-22 DIAGNOSIS — K21.9 GASTROESOPHAGEAL REFLUX DISEASE WITHOUT ESOPHAGITIS: ICD-10-CM

## 2021-02-22 LAB
ANION GAP SERPL CALCULATED.3IONS-SCNC: 6 MMOL/L (ref 3–14)
BUN SERPL-MCNC: 23 MG/DL (ref 7–30)
CALCIUM SERPL-MCNC: 9.4 MG/DL (ref 8.5–10.1)
CHLORIDE SERPL-SCNC: 102 MMOL/L (ref 94–109)
CHOLEST SERPL-MCNC: 225 MG/DL
CO2 SERPL-SCNC: 28 MMOL/L (ref 20–32)
CREAT SERPL-MCNC: 0.89 MG/DL (ref 0.66–1.25)
GFR SERPL CREATININE-BSD FRML MDRD: 88 ML/MIN/{1.73_M2}
GLUCOSE SERPL-MCNC: 90 MG/DL (ref 70–99)
HDLC SERPL-MCNC: 60 MG/DL
LDLC SERPL CALC-MCNC: 123 MG/DL
NONHDLC SERPL-MCNC: 165 MG/DL
POTASSIUM SERPL-SCNC: 4.2 MMOL/L (ref 3.4–5.3)
SODIUM SERPL-SCNC: 136 MMOL/L (ref 133–144)
TRIGL SERPL-MCNC: 209 MG/DL

## 2021-02-22 PROCEDURE — 80061 LIPID PANEL: CPT | Performed by: FAMILY MEDICINE

## 2021-02-22 PROCEDURE — 99397 PER PM REEVAL EST PAT 65+ YR: CPT | Performed by: FAMILY MEDICINE

## 2021-02-22 PROCEDURE — 36415 COLL VENOUS BLD VENIPUNCTURE: CPT | Performed by: FAMILY MEDICINE

## 2021-02-22 PROCEDURE — 80048 BASIC METABOLIC PNL TOTAL CA: CPT | Performed by: FAMILY MEDICINE

## 2021-02-22 RX ORDER — TRIAMTERENE AND HYDROCHLOROTHIAZIDE 75; 50 MG/1; MG/1
1 TABLET ORAL DAILY
Qty: 90 TABLET | Refills: 3 | Status: ON HOLD | OUTPATIENT
Start: 2021-02-22 | End: 2022-04-06

## 2021-02-22 RX ORDER — OMEPRAZOLE 40 MG/1
40 CAPSULE, DELAYED RELEASE ORAL DAILY
Qty: 90 CAPSULE | Refills: 0 | Status: SHIPPED | OUTPATIENT
Start: 2021-02-22 | End: 2021-07-07

## 2021-02-22 RX ORDER — TRIAMCINOLONE ACETONIDE 1 MG/G
CREAM TOPICAL 3 TIMES DAILY
Qty: 80 G | Refills: 3 | Status: SHIPPED | OUTPATIENT
Start: 2021-02-22 | End: 2021-08-09

## 2021-02-22 ASSESSMENT — MIFFLIN-ST. JEOR: SCORE: 1762.83

## 2021-02-22 NOTE — NURSING NOTE
"Initial BP (!) 152/96   Pulse 79   Temp 98.3  F (36.8  C) (Tympanic)   Resp 16   Ht 1.778 m (5' 10\")   Wt 98.2 kg (216 lb 6.4 oz)   SpO2 98%   BMI 31.05 kg/m   Estimated body mass index is 31.05 kg/m  as calculated from the following:    Height as of this encounter: 1.778 m (5' 10\").    Weight as of this encounter: 98.2 kg (216 lb 6.4 oz). .      "
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
## 2021-02-22 NOTE — LETTER
February 23, 2021      Jamal Sanchez  [id number] [facility] [id number]        Dear ,    We are writing to inform you of your test results.    Your test results fall within the expected range(s) or remain unchanged from previous results.  Please continue with current treatment plan.    Resulted Orders   Lipid panel reflex to direct LDL Fasting   Result Value Ref Range    Cholesterol 225 (H) <200 mg/dL      Comment:      Desirable:       <200 mg/dl    Triglycerides 209 (H) <150 mg/dL      Comment:      Borderline high:  150-199 mg/dl  High:             200-499 mg/dl  Very high:       >499 mg/dl      HDL Cholesterol 60 >39 mg/dL    LDL Cholesterol Calculated 123 (H) <100 mg/dL      Comment:      Above desirable:  100-129 mg/dl  Borderline High:  130-159 mg/dL  High:             160-189 mg/dL  Very high:       >189 mg/dl      Non HDL Cholesterol 165 (H) <130 mg/dL      Comment:      Above Desirable:  130-159 mg/dl  Borderline high:  160-189 mg/dl  High:             190-219 mg/dl  Very high:       >219 mg/dl     Basic metabolic panel   Result Value Ref Range    Sodium 136 133 - 144 mmol/L    Potassium 4.2 3.4 - 5.3 mmol/L    Chloride 102 94 - 109 mmol/L    Carbon Dioxide 28 20 - 32 mmol/L    Anion Gap 6 3 - 14 mmol/L    Glucose 90 70 - 99 mg/dL    Urea Nitrogen 23 7 - 30 mg/dL    Creatinine 0.89 0.66 - 1.25 mg/dL    GFR Estimate 88 >60 mL/min/[1.73_m2]      Comment:      Non  GFR Calc  Starting 12/18/2018, serum creatinine based estimated GFR (eGFR) will be   calculated using the Chronic Kidney Disease Epidemiology Collaboration   (CKD-EPI) equation.      GFR Estimate If Black >90 >60 mL/min/[1.73_m2]      Comment:       GFR Calc  Starting 12/18/2018, serum creatinine based estimated GFR (eGFR) will be   calculated using the Chronic Kidney Disease Epidemiology Collaboration   (CKD-EPI) equation.      Calcium 9.4 8.5 - 10.1 mg/dL     inform patient tests are acceptable    If you have any questions or concerns, please call the clinic at the number listed above.       Sincerely,      Anjel Jimenes MD

## 2021-02-22 NOTE — PATIENT INSTRUCTIONS
Patient Education   Personalized Prevention Plan  You are due for the preventive services outlined below.  Your care team is available to assist you in scheduling these services.  If you have already completed any of these items, please share that information with your care team to update in your medical record.  Health Maintenance Due   Topic Date Due     Zoster (Shingles) Vaccine (2 of 3) 01/25/2016     Flu Vaccine (1) 09/01/2020     Annual Wellness Visit  11/21/2020     FALL RISK ASSESSMENT  11/21/2020     PHQ-2  01/01/2021     Pneumococcal Vaccine (2 of 2 - PPSV23) 11/21/2020

## 2021-03-01 ENCOUNTER — ALLIED HEALTH/NURSE VISIT (OUTPATIENT)
Dept: FAMILY MEDICINE | Facility: CLINIC | Age: 66
End: 2021-03-01
Payer: COMMERCIAL

## 2021-03-01 DIAGNOSIS — Z23 NEED FOR PROPHYLACTIC VACCINATION AND INOCULATION AGAINST INFLUENZA: Primary | ICD-10-CM

## 2021-03-01 PROCEDURE — 90662 IIV NO PRSV INCREASED AG IM: CPT

## 2021-03-01 PROCEDURE — 90471 IMMUNIZATION ADMIN: CPT

## 2021-03-01 PROCEDURE — 99207 PR NO CHARGE NURSE ONLY: CPT

## 2021-03-22 DIAGNOSIS — N52.9 ERECTILE DYSFUNCTION, UNSPECIFIED ERECTILE DYSFUNCTION TYPE: ICD-10-CM

## 2021-03-22 RX ORDER — TADALAFIL 10 MG/1
TABLET ORAL
Qty: 30 TABLET | Refills: 0 | Status: SHIPPED | OUTPATIENT
Start: 2021-03-22 | End: 2021-07-09

## 2021-03-22 NOTE — TELEPHONE ENCOUNTER
Requested Prescriptions   Pending Prescriptions Disp Refills     tadalafil (CIALIS) 10 MG tablet 30 tablet 11     Sig: Take 10-20 mg 30 minutes prior sexual intercourse as needed       There is no refill protocol information for this order        Last office visit: 1/13/2020 with prescribing provider:  Dr. Caba   Future Office Visit:          Denise Behrendt  Specialty CSS

## 2021-06-02 VITALS — BODY MASS INDEX: 28.63 KG/M2 | WEIGHT: 200 LBS | HEIGHT: 70 IN

## 2021-06-22 NOTE — H&P
[facility] Radiology History and Physical Note    Procedure Requested: Abscessogram   Requesting Provider: Bethany Scott CNP    HPI: Jamal Sanchez is a 63 y.o. old male history of diverticular abscess s/p CT guided 12 Fr drain placement 11/22/18. Last abscessogram 11-28-18 with moderate residual abscess. Presents for F/U abscessogram. C/O intermittent leaking at drain exit site. Denies fevers or pain.      Flushes 10 ML two times a day  DRAIN OP approx 30 ml/day   Culture 11-22-18  Escherichia coli Abnormal        3+ Streptococcus anginosus Abnormal        3+ Streptococcus viridans group Abnormal        1+ Pseudomonas aeruginosa Abnormal        1+ Alcaligenes faecalis Abnormal      NOTE: patient request to attempt abscessogram without sedation; ok to use sedation if needed per patient    IMAGING:   Abscessogram 11-28-18  FINDINGS:  Aspiration from the existing abscess catheter yielded thick dark red output. Abscessogram by a contrast injection through the catheter demonstrated a moderate  sized fluid collection without fistulous connection to the colon. The catheter controls the   fluid collection well. This was confirmed in multiple obliquities.      IMPRESSION:   Moderate residual abscess without evidence of fistula, overall improved from the prior study. Continue flushing twice a day. Return to IR in one week with follow-up abscessogram and CT of the pelvis with contrast.    NPO Status: Mn  Anticoagulation/Antiplatelets/Bleeding tendencies: NONE   Antibiotics: NONE for IR     REVIEW OF SYMPTOMS: as above otherwise remainder 10 point ROS negative    PAST MEDICAL HISTORY:   No past medical history on file.   Patient Active Problem List   Diagnosis     Acute diverticulitis     Generalized abdominal pain     ETOH abuse         PAST SURGICAL HISTORY:  Past Surgical History:   Procedure Laterality Date     APPENDECTOMY       ROTATOR CUFF REPAIR Left        ALLERGIES:  Patient has no known  "allergies.    MEDICATIONS:  Current Outpatient Medications   Medication Sig Dispense Refill     calcium, as carbonate, (TUMS) 200 mg calcium (500 mg) chewable tablet Chew 1 tablet as needed for heartburn.       ciprofloxacin HCl (CIPRO) 500 MG tablet Take 1 tablet (500 mg total) by mouth 2 times a day at 6:00 am and 4:00 pm for 10 days. 20 tablet 0     finasteride (PROSCAR) 5 mg tablet Take 5 mg by mouth every evening .             hydrOXYzine HCl (ATARAX) 10 MG tablet Take 10 mg by mouth every 6 (six) hours as needed (pain).       metroNIDAZOLE (FLAGYL) 500 MG tablet Take 1 tablet (500 mg total) by mouth 3 (three) times a day for 10 days. 30 tablet 0     omeprazole (PRILOSEC) 40 MG capsule Take 40 mg by mouth daily as needed.       sildenafil, antihypertensive, (REVATIO) 20 mg tablet Take  mg by mouth daily as needed (for E.D.).       sodium chloride (NS) injection Irrigate with 10 mL as directed 2 (two) times a day Flush drain (into patient).. 280 mL 3     triamterene-hydrochlorothiazide (MAXZIDE-25) 37.5-25 mg per tablet Take 1 tablet by mouth daily.       No current facility-administered medications for this encounter.        LABS:NONE   EXAM:  There were no vitals taken for this visit.  General: Stable. In no acute distress.  Neuro: A&O x 3. Moves all extremities equally.  Resp: Lungs clear to auscultation bilaterally.  Cardio: S1S2 and reg, without murmur, clicks or rubs  Abdomen: Soft, non-distended, non-tender, positive bowel sounds.      Pre-Sedation Assessment:  Mallampati Airway Classification: Class 2: upper half of tonsil fossa visible  Previous reaction to anesthesia/sedation: yes-\" feel hungover\" with anesthesia   Sedation plan based on assessment: Moderate  Sleep Apnea: no  Dentures: no  COPD: no  ASA Classification: ASA 3 - Patient with moderate systemic disease with functional limitations    ASSESSMENT/PLAN:   64 yo male with diverticular abscess s/p CT guided 12 Fr drain placement " 11/22/18  Abscessogram with possible drain intervention with sedation as needed     The procedure, risks and moderate sedation were discussed with the patient, all questions answered and patient agrees to proceed with the procedure. Written consent obtained.    Bethany Scott CNP  [facility]: Interventional Radiology   [phone]

## 2021-06-22 NOTE — H&P
[facility] Radiology History and Physical Note  Date/Time: 11/28/2018/11:54 AM    Procedure Requested: Abscessogram   Requesting Provider: Gunnar Linn CNP    HPI: Jamal Sanchez is a 63 y.o. male with history of diverticular abscess s/p CT guided 12 Fr drain placement 11/22/18. Patient called [facility] yesterday with c/o change in character of drainage to more of a bloody drainage (15-20 mL per day) and increase in pain with flushing. I asked the schedulers to get the patient scheduled for CT/abscessogram same day. Patient wasn't able to get himself to [facility] so presented to [facility] ED instead. CT done which showed drainage catheter in place with residual fluid collection inferior the catheter. ED provider called me yesterday with this information, I recommended patient be set up for follow up abscessogram today for which he presents. Denies fevers, chills. Has tenderness and pain at drain site. Patient drove himself today and will not be able to get a ride home.     Drain cultures:  3+ Escherichia coli, 3+ Streptococcus anginous, 3+ Streptococcus viridans group,  1+ Pseudomonas aeruginosa   Drain flushing: 10 mL NS two times a day  Drain outputs: 15-20 mL per day    IMAGING: Obtained via [facility]   CT ABDOMEN/PELVIS WITH CONTRAST November 27, 2018 10:57 AM     HISTORY: Left lower abdominal drain in place for perforated  diverticulitis/peritonitis, now with bloody drainage from drain and  increased pain with flushing drain.   IMPRESSION:   1. There is a pigtail catheter for drainage in place in the lower  midabdomen. There is a 4.5 x 2.5 cm residual fluid collection just  inferior to the catheter. Its possible this may communicate and likely  does communicate with the main abscess.  2. Small amount of free fluid surrounding the dome of the liver is new  since the comparison study. Small amount of free fluid in the abdomen  just medial to the drain. There is a thickened loop of bowel in this  region as  well likely reactive.  3. Previously seen free air has resolved. Mild inflammation in the  mesentery surrounding the drained area.    NPO Status: [place]  Anticoagulation/Antiplatelets/Bleeding tendencies: None  Antibiotics: PO Cipro/Flagyl    REVIEW OF SYMPTOMS: A 10 point comprehensive review of systems was negative except as noted above in HPI    PAST MEDICAL HISTORY:   No past medical history on file.    PAST SURGICAL HISTORY:   Past Surgical History:   Procedure Laterality Date     APPENDECTOMY       ROTATOR CUFF REPAIR Left        ALLERGIES:  Patient has no known allergies.    MEDICATIONS:  Current Outpatient Medications   Medication Sig Dispense Refill     calcium, as carbonate, (TUMS) 200 mg calcium (500 mg) chewable tablet Chew 1 tablet as needed for heartburn.       ciprofloxacin HCl (CIPRO) 500 MG tablet Take 1 tablet (500 mg total) by mouth 2 times a day at 6:00 am and 4:00 pm for 10 days. 20 tablet 0     finasteride (PROSCAR) 5 mg tablet Take 5 mg by mouth every evening .             hydrOXYzine HCl (ATARAX) 10 MG tablet Take 10 mg by mouth every 6 (six) hours as needed (pain).       metroNIDAZOLE (FLAGYL) 500 MG tablet Take 1 tablet (500 mg total) by mouth 3 (three) times a day for 10 days. 30 tablet 0     omeprazole (PRILOSEC) 40 MG capsule Take 40 mg by mouth daily as needed.       sildenafil, antihypertensive, (REVATIO) 20 mg tablet Take  mg by mouth daily as needed (for E.D.).       sodium chloride (NS) injection Irrigate with 10 mL as directed 2 (two) times a day Flush drain (into patient).. 280 mL 3     triamterene-hydrochlorothiazide (MAXZIDE-25) 37.5-25 mg per tablet Take 1 tablet by mouth daily.       Current Facility-Administered Medications   Medication Dose Route Frequency Provider Last Rate Last Dose     sodium chloride bacteriostatic 0.9 % injection 0.1-0.3 mL  0.1-0.3 mL Subcutaneous PRN Brittany Hedrick, CNP         sodium chloride flush 3 mL (NS)  3 mL Intravenous Line Care Brittany Hedrick,  CNP           EXAM:  BP (!) 138/91   Pulse 78   Resp 16   SpO2 100%   General: Stable. In no acute distress.  Neuro: A&O x 3.   Resp: Non labored breathing   Abdomen: Pelvic drain exit site dressing with bloody drainage, to bulb suction with scant amount of bloody drainage in bulb.     ASSESSMENT: 63 year old male with diverticular abscess s/p CT guided 12 Fr drain placement 11/22 , pain and leaking with flushing, bloody output    PLAN: Abscessogram with possible drain reposition, exchange or removal (no sedation as patient does not have  or ride home, he is willing to attempt with local anesthetic only)     The procedure and risks were discussed with the patient, all questions answered and patient agrees to proceed with the procedure. Written consent obtained.    Brittany Hedrick, CNP    [facility]: Interventional Radiology   [phone]

## 2021-06-22 NOTE — PROGRESS NOTES
"Pt reports bloody output Sunday and Monday, states he was seen in the ED yesterday \"they checked me out and it was good\".  Pt states he was DC'd from the ED at noon and noticed no output into drain since 1800 yesterday. On arrival pt has approx 3cc blood drg in bulb.   "

## 2021-06-22 NOTE — PROCEDURES
Interventional Radiology Post-Procedure Note   [facility]  ?   Brief Procedure Note:   Patient name: Jamal Sanchez  Pt MRN:[id number]   Date of procedure: 11/28/2018     Procedure(s): Abscessogram  Sedation method: None  Pre Procedure Diagnosis: Diverticulitis, abscess  Post Procedure Diagnosis: same  Indications: Follow up drain   ?   Attending: Daniele Gaines M.D.  Specimen(s) removed: None   Additional studies ordered: None  Drains: 12 Fr peritoneal drain  Estimated Blood Loss: Minimal  Complications: None  Vascular closure method: N/A    Findings/Notes/Comments: Decreased size of abscess, patent drain. 3 way stop cock exchange for a large bore stopcock which likely was the cause of the difficulty draining abscess. Return to IR in 1 week with follow up CT.   ?   Please see dictation in PACS or under the Imaging tab in Livescribe for detailed procedure note.     Daniele Gaines M.D.   Vascular and Interventional Radiology   Pager: [phone]   After Hours / Scheduling: [phone]     11/28/2018  1:08 PM

## 2021-07-06 DIAGNOSIS — K21.9 GASTROESOPHAGEAL REFLUX DISEASE WITHOUT ESOPHAGITIS: ICD-10-CM

## 2021-07-07 RX ORDER — OMEPRAZOLE 40 MG/1
40 CAPSULE, DELAYED RELEASE ORAL DAILY
Qty: 90 CAPSULE | Refills: 1 | Status: SHIPPED | OUTPATIENT
Start: 2021-07-07 | End: 2021-12-13

## 2021-07-09 ENCOUNTER — OFFICE VISIT (OUTPATIENT)
Dept: FAMILY MEDICINE | Facility: CLINIC | Age: 66
End: 2021-07-09
Payer: MEDICARE

## 2021-07-09 VITALS
BODY MASS INDEX: 30.35 KG/M2 | WEIGHT: 212 LBS | TEMPERATURE: 98.6 F | OXYGEN SATURATION: 98 % | RESPIRATION RATE: 16 BRPM | SYSTOLIC BLOOD PRESSURE: 132 MMHG | HEIGHT: 70 IN | DIASTOLIC BLOOD PRESSURE: 90 MMHG | HEART RATE: 87 BPM

## 2021-07-09 DIAGNOSIS — I10 HYPERTENSION, GOAL BELOW 140/90: Primary | ICD-10-CM

## 2021-07-09 DIAGNOSIS — N52.9 ERECTILE DYSFUNCTION, UNSPECIFIED ERECTILE DYSFUNCTION TYPE: ICD-10-CM

## 2021-07-09 DIAGNOSIS — L98.9 SKIN LESION: ICD-10-CM

## 2021-07-09 DIAGNOSIS — Z12.5 SCREENING PSA (PROSTATE SPECIFIC ANTIGEN): ICD-10-CM

## 2021-07-09 LAB — PSA SERPL-ACNC: 1.6 UG/L (ref 0–4)

## 2021-07-09 PROCEDURE — G0103 PSA SCREENING: HCPCS | Performed by: FAMILY MEDICINE

## 2021-07-09 PROCEDURE — 36415 COLL VENOUS BLD VENIPUNCTURE: CPT | Performed by: FAMILY MEDICINE

## 2021-07-09 PROCEDURE — 99214 OFFICE O/P EST MOD 30 MIN: CPT | Performed by: FAMILY MEDICINE

## 2021-07-09 RX ORDER — SILDENAFIL 100 MG/1
100 TABLET, FILM COATED ORAL DAILY PRN
Qty: 6 TABLET | Refills: 0 | Status: SHIPPED | OUTPATIENT
Start: 2021-07-09 | End: 2021-11-23

## 2021-07-09 ASSESSMENT — MIFFLIN-ST. JEOR: SCORE: 1742.88

## 2021-07-09 NOTE — LETTER
July 12, 2021      Jamal NEW Daniel  [address] [address] [place] [id number]        Dear ,    We are writing to inform you of your test results.    PSA testing within normal limits.       Resulted Orders   PSA, screen   Result Value Ref Range    PSA 1.60 0 - 4 ug/L      Comment:      Assay Method:  Chemiluminescence using Siemens Vista analyzer       If you have any questions or concerns, please call the clinic at the number listed above.       Sincerely,      Raad Benitez, DO

## 2021-07-09 NOTE — PROGRESS NOTES
Assessment & Plan     Hypertension, goal below 140/90  Slightly elevated 132/90 today in clinic.  He reports home values are within goal.  Continue on triamterene-hydrochlorothiazide 75-50 mg daily.  Advised low-salt diet, exercise, and heart healthy foods.  Follow-up in 2 weeks for a nurse blood pressure check.  Check blood pressure daily at home.  Advised to bring blood pressure cuff to be calibrated.  If at follow-up visit blood pressure is elevated greater than 140/90 the patient schedule a office visit to discuss starting another blood pressure agent.    Erectile dysfunction, unspecified erectile dysfunction type  Has followed with urology in the past.  Cialis not helping.  Will trial Viagra.  Medication risk, benefits and side effects discussed.  He reports having prior injections with urology.  Refer back to Urology for further evaluation and cares.  - sildenafil (VIAGRA) 100 MG tablet  Dispense: 6 tablet; Refill: 0  - Adult Urology Referral    Skin lesion  Lesions on right forearm and upper arm. Also with quite a bit of sun exposure and no sunscreen. Should have a skin check completed by Dermatology.   - ADULT DERMATOLOGY REFERRAL    Screening PSA (prostate specific antigen)  Wishes to obtain screening. Last completed 2019 and within normal limits.   - PSA, screen    Follow up with nurse BP check in 2 weeks.     Raad Benitez, [facility]    Subjective   Gopal is a 66 year old who presents for the following health issues     HPI       Hypertension Follow-up      Do you check your blood pressure regularly outside of the clinic? Yes     Are you following a low salt diet? No    Are your blood pressures ever more than 140 on the top number (systolic) OR more   than 90 on the bottom number (diastolic), for example 140/90? Yes    Currently on triamterene-hydrochlorothiazide 75-50 mg daily.   No chest pain or shortness of breath.   Blood pressures at home have been within normal limits.  "      How many servings of fruits and vegetables do you eat daily?  2-3    On average, how many sweetened beverages do you drink each day (Examples: soda, juice, sweet tea, etc.  Do NOT count diet or artificially sweetened beverages)?   32 oz of juice     How many days per week do you exercise enough to make your heart beat faster? 3 or less    How many minutes a day do you exercise enough to make your heart beat faster? 60 or more    How many days per week do you miss taking your medication? 0        Skin lesions:   Present out in the sun quite frequently.   Concerned about lesions on right inner arm and right upper arm.   No prior skin cancers.   Wishes to see Dermatology for skin check and further evaluation of lesions on his arms.   Encouraged daily use of sunscreen, as patient does not currently use any.       Erectile Dysfunction.   Wishes to try and use Viagra as Cialis was not working for the patient. In the past followed with Urology and received injections. Discussed we can try Viagra but he would benefit from seeing Urology for further evaluation and cares.       He wishes to obtain PSA screening. This was last completed 10/2019 and was WNL. No new symptoms per the patient.       Review of Systems   Constitutional, HEENT, cardiovascular, pulmonary, gi and gu systems are negative, except as otherwise noted.      Objective    BP (!) 132/90 (BP Location: Left arm, Patient Position: Chair, Cuff Size: Adult Regular)   Pulse 87   Temp 98.6  F (37  C) (Tympanic)   Resp 16   Ht 1.778 m (5' 10\")   Wt 96.2 kg (212 lb)   SpO2 98%   BMI 30.42 kg/m    Body mass index is 30.42 kg/m .  Physical Exam   General: alert, cooperative, no acute distress   CV: RRR, no murmur  Resp: non-labored breathing, clear to auscultation, no wheezing or rales   Abdomen: Soft, non-tender, no guarding.   Extremities: No peripheral edema, calves non-tender.   Skin: small nodule on left inner forearm and right upper arm.   "

## 2021-07-09 NOTE — PATIENT INSTRUCTIONS
Blood pressure continue current medication at this time.     Check blood pressure daily at home with upper arm cuff.     Follow up in 2 weeks for free nurse blood pressure check. Bring in home BP cuff and daily readings.     Goal for blood pressure less than 140/90. If elevated will start a medication ( Lisinopril)       Follow up with Urology regarding erectile dysfunction     Skin lesions see Dermatology.     Eyes look great.

## 2021-07-15 ENCOUNTER — ALLIED HEALTH/NURSE VISIT (OUTPATIENT)
Dept: FAMILY MEDICINE | Facility: CLINIC | Age: 66
End: 2021-07-15
Payer: MEDICARE

## 2021-07-15 DIAGNOSIS — Z23 NEED FOR VACCINATION: Primary | ICD-10-CM

## 2021-07-15 PROCEDURE — 99207 PR NO CHARGE NURSE ONLY: CPT

## 2021-07-15 PROCEDURE — 90750 HZV VACC RECOMBINANT IM: CPT

## 2021-07-15 PROCEDURE — 90471 IMMUNIZATION ADMIN: CPT

## 2021-07-15 NOTE — NURSING NOTE
Prior to immunization administration, verified patients identity using patient s name and date of birth. Please see Immunization Activity for additional information.     Screening Questionnaire for Adult Immunization    Are you sick today?   No   Do you have allergies to medications, food, a vaccine component or latex?   No   Have you ever had a serious reaction after receiving a vaccination?   No   Do you have a long-term health problem with heart, lung, kidney, or metabolic disease (e.g., diabetes), asthma, a blood disorder, no spleen, complement component deficiency, a cochlear implant, or a spinal fluid leak?  Are you on long-term aspirin therapy?   No   Do you have cancer, leukemia, HIV/AIDS, or any other immune system problem?   No   Do you have a parent, brother, or sister with an immune system problem?   No   In the past 3 months, have you taken medications that affect  your immune system, such as prednisone, other steroids, or anticancer drugs; drugs for the treatment of rheumatoid arthritis, Crohn s disease, or psoriasis; or have you had radiation treatments?   No   Have you had a seizure, or a brain or other nervous system problem?   No   During the past year, have you received a transfusion of blood or blood    products, or been given immune (gamma) globulin or antiviral drug?   No   For women: Are you pregnant or is there a chance you could become       pregnant during the next month?   No   Have you received any vaccinations in the past 4 weeks?   No     Immunization questionnaire answers were all negative.        Per orders of HERBERTH Camejo CNP, injection of Shingrix given by Dhara Sandoval CMA. Patient instructed to remain in clinic for 15 minutes afterwards, and to report any adverse reaction to me immediately.       Screening performed by Dhara Sandoval CMA on 7/15/2021 at 2:24 PM.

## 2021-08-09 ENCOUNTER — OFFICE VISIT (OUTPATIENT)
Dept: DERMATOLOGY | Facility: CLINIC | Age: 66
End: 2021-08-09
Payer: MEDICARE

## 2021-08-09 VITALS
BODY MASS INDEX: 30.42 KG/M2 | HEART RATE: 70 BPM | HEIGHT: 70 IN | DIASTOLIC BLOOD PRESSURE: 89 MMHG | SYSTOLIC BLOOD PRESSURE: 143 MMHG

## 2021-08-09 DIAGNOSIS — D22.9 NEVUS: ICD-10-CM

## 2021-08-09 DIAGNOSIS — L81.4 LENTIGO: ICD-10-CM

## 2021-08-09 DIAGNOSIS — L29.9 ITCHING: ICD-10-CM

## 2021-08-09 DIAGNOSIS — L82.0 INFLAMED SEBORRHEIC KERATOSIS: ICD-10-CM

## 2021-08-09 DIAGNOSIS — L82.1 SEBORRHEIC KERATOSIS: ICD-10-CM

## 2021-08-09 DIAGNOSIS — D18.01 ANGIOMA OF SKIN: ICD-10-CM

## 2021-08-09 DIAGNOSIS — B35.3 TINEA PEDIS OF BOTH FEET: Primary | ICD-10-CM

## 2021-08-09 PROCEDURE — 99214 OFFICE O/P EST MOD 30 MIN: CPT | Mod: 25 | Performed by: PHYSICIAN ASSISTANT

## 2021-08-09 PROCEDURE — 17110 DESTRUCTION B9 LES UP TO 14: CPT | Performed by: PHYSICIAN ASSISTANT

## 2021-08-09 RX ORDER — KETOCONAZOLE 20 MG/G
CREAM TOPICAL
Qty: 60 G | Refills: 5 | Status: SHIPPED | OUTPATIENT
Start: 2021-08-09 | End: 2022-08-30

## 2021-08-09 RX ORDER — TRIAMCINOLONE ACETONIDE 1 MG/G
CREAM TOPICAL 3 TIMES DAILY
Qty: 80 G | Refills: 3 | Status: SHIPPED | OUTPATIENT
Start: 2021-08-09 | End: 2023-06-26

## 2021-08-09 NOTE — PROGRESS NOTES
"HPI:   Chief complaints: Jamal Sanchez is a pleasant 66 year old male who presents for Above Waist skin cancer screening to rule out skin cancer   Last Skin Exam: n/a      1st Baseline: yes  Personal HX of Skin Cancer: no   Personal HX of Malignant Melanoma: no   Family HX of Skin Cancer / Malignant Melanoma: no  Personal HX of Atypical Moles:   no  Risk factors: history of sun exposure; limited sunscreen use currently  New / Changing lesions:yes few itchy brown spots  Social History:   On review of systems, there are no further skin complaints, patient is feeling otherwise well.   ROS of the following were done and are negative: Constitutional, Eyes, Ears, Nose,   Mouth, Throat, Cardiovascular, Respiratory, GI, Genitourinary, Musculoskeletal,   Psychiatric, Endocrine, Allergic/Immunologic.    PHYSICAL EXAM:   BP (!) 143/89   Pulse 70   Ht 1.778 m (5' 10\")   BMI 30.42 kg/m    Skin exam performed as follows: Type 2 skin. Mood appropriate  Alert and Oriented X 3. Well developed, well nourished in no distress.  General appearance: Normal  Head including face: Normal  Eyes: conjunctiva and lids: Normal  Mouth: Lips, teeth, gums: Normal  Neck: Normal  Chest-breast/axillae: Normal  Back: Normal  Spleen and liver: Normal  Cardiovascular: Exam of peripheral vascular system by observation for swelling, varicosities, edema: Normal  Genitalia: groin, buttocks: Normal  Extremities: digits/nails (clubbing): Normal  Eccrine and Apocrine glands: Normal  Right upper extremity: Normal  Left upper extremity: Normal  Right lower extremity: Normal  Left lower extremity: Normal  Skin: Scalp and body hair: See below    Pt deferred exam of breasts, groin, buttocks: No    Other physical findings:  1. Multiple pigmented macules on extremities and trunk  2. Multiple pigmented macules on face, trunk and extremities  3. Multiple vascular papules on trunk, arms and legs  4. Multiple scattered keratotic plaques  5. Inflamed keratotic " papules on the back x 5       Except as noted above, no other signs of skin cancer or melanoma.     ASSESSMENT/PLAN:   Benign Full skin cancer screening today. . Patient with history of none  Advised on monthly self exams and 1 year  Patient Education: Appropriate brochures given.    1. Multiple benign appearing nevi on arms, legs and trunk. Discussed ABCDEs of melanoma and sunscreen.   2. Multiple lentigos on arms, legs and trunk. Advised benign, no treatment needed.  3. Multiple scattered angiomas. Advised benign, no treatment needed.   4. Seborrheic keratosis on arms, legs and trunk. Advised benign, no treatment needed.  5. Inflamed seborrheic keratosis on the back x 5. As physically tender cryosurgery performed. Advised on post op care.   6. Diffuse photodamage of skin - advised. Discussed sun protection at length.   7. Tinea pedis - start ketoconazole cream x 1-2 weeks PRN  8. Itching in the sun (? PMLE) no rash today. Continue TAC PRN.   9. Gopal to follow up with Primary Care provider regarding elevated blood pressure.            Follow-up: yearly FSE/PRN sooner    1.) Patient was asked about new and changing moles. YES  2.) Patient received a complete physical skin examination: YES  3.) Patient was counseled to perform a monthly self skin examination: YES  Scribed By: Stephanie Fair MS, PAANN-MARIE

## 2021-08-09 NOTE — LETTER
"    8/9/2021         RE: Jamal Sanchez  [address] [facility] [address]        Dear Colleague,    Thank you for referring your patient, Jamal Sanchez, to the [facility]. Please see a copy of my visit note below.    HPI:   Chief complaints: Jamal Sanchez is a pleasant 66 year old male who presents for Above Waist skin cancer screening to rule out skin cancer   Last Skin Exam: n/a      1st Baseline: yes  Personal HX of Skin Cancer: no   Personal HX of Malignant Melanoma: no   Family HX of Skin Cancer / Malignant Melanoma: no  Personal HX of Atypical Moles:   no  Risk factors: history of sun exposure; limited sunscreen use currently  New / Changing lesions:yes few itchy brown spots  Social History:   On review of systems, there are no further skin complaints, patient is feeling otherwise well.   ROS of the following were done and are negative: Constitutional, Eyes, Ears, Nose,   Mouth, Throat, Cardiovascular, Respiratory, GI, Genitourinary, Musculoskeletal,   Psychiatric, Endocrine, Allergic/Immunologic.    PHYSICAL EXAM:   BP (!) 143/89   Pulse 70   Ht 1.778 m (5' 10\")   BMI 30.42 kg/m    Skin exam performed as follows: Type 2 skin. Mood appropriate  Alert and Oriented X 3. Well developed, well nourished in no distress.  General appearance: Normal  Head including face: Normal  Eyes: conjunctiva and lids: Normal  Mouth: Lips, teeth, gums: Normal  Neck: Normal  Chest-breast/axillae: Normal  Back: Normal  Spleen and liver: Normal  Cardiovascular: Exam of peripheral vascular system by observation for swelling, varicosities, edema: Normal  Genitalia: groin, buttocks: Normal  Extremities: digits/nails (clubbing): Normal  Eccrine and Apocrine glands: Normal  Right upper extremity: Normal  Left upper extremity: Normal  Right lower extremity: Normal  Left lower extremity: Normal  Skin: Scalp and body hair: See below    Pt deferred exam of breasts, groin, buttocks: No    Other " physical findings:  1. Multiple pigmented macules on extremities and trunk  2. Multiple pigmented macules on face, trunk and extremities  3. Multiple vascular papules on trunk, arms and legs  4. Multiple scattered keratotic plaques  5. Inflamed keratotic papules on the back x 5       Except as noted above, no other signs of skin cancer or melanoma.     ASSESSMENT/PLAN:   Benign Full skin cancer screening today. . Patient with history of none  Advised on monthly self exams and 1 year  Patient Education: Appropriate brochures given.    1. Multiple benign appearing nevi on arms, legs and trunk. Discussed ABCDEs of melanoma and sunscreen.   2. Multiple lentigos on arms, legs and trunk. Advised benign, no treatment needed.  3. Multiple scattered angiomas. Advised benign, no treatment needed.   4. Seborrheic keratosis on arms, legs and trunk. Advised benign, no treatment needed.  5. Inflamed seborrheic keratosis on the back x 5. As physically tender cryosurgery performed. Advised on post op care.   6. Diffuse photodamage of skin - advised. Discussed sun protection at length.   7. Tinea pedis - start ketoconazole cream x 1-2 weeks PRN  8. Itching in the sun (? PMLE) no rash today. Continue TAC PRN.   9. Gopal to follow up with Primary Care provider regarding elevated blood pressure.            Follow-up: yearly FSE/PRN sooner    1.) Patient was asked about new and changing moles. YES  2.) Patient received a complete physical skin examination: YES  3.) Patient was counseled to perform a monthly self skin examination: YES  Scribed By: Stephanie Fair, MS, PAANN-MARIE          Again, thank you for allowing me to participate in the care of your patient.        Sincerely,        Stephanie Fair PA-C

## 2021-09-09 ENCOUNTER — TELEPHONE (OUTPATIENT)
Dept: FAMILY MEDICINE | Facility: CLINIC | Age: 66
End: 2021-09-09

## 2021-09-09 DIAGNOSIS — Z01.10 ENCOUNTER FOR HEARING EXAMINATION, UNSPECIFIED WHETHER ABNORMAL FINDINGS: Primary | ICD-10-CM

## 2021-09-09 NOTE — TELEPHONE ENCOUNTER
Reason for Call: Request for an order or referral:    Order or referral being requested: Patient is calling asking for a referral for hearing specialist to have hearing tested. Here in [place] is fine he said    Date needed: at your convenience    Has the patient been seen by the PCP for this problem? Not Applicable      Phone number Patient can be reached at:  Home number on file [phone] (home)    Best Time:  any    Can we leave a detailed message on this number?  YES    Call taken on 9/9/2021 at 12:16 PM by Annalee Galloway

## 2021-09-10 NOTE — TELEPHONE ENCOUNTER
Forwarding request for audiology referral to provider.  Order pended. PCP is retired, last saw Dr. Benitez.     Vivian Aburto RN  [facility]

## 2021-09-15 ENCOUNTER — VIRTUAL VISIT (OUTPATIENT)
Dept: UROLOGY | Facility: CLINIC | Age: 66
End: 2021-09-15
Payer: MEDICARE

## 2021-09-15 DIAGNOSIS — N32.0 BLADDER OUTLET OBSTRUCTION: Primary | ICD-10-CM

## 2021-09-15 DIAGNOSIS — N52.9 ERECTILE DYSFUNCTION, UNSPECIFIED ERECTILE DYSFUNCTION TYPE: ICD-10-CM

## 2021-09-15 PROCEDURE — 99441 PR PHYSICIAN TELEPHONE EVALUATION 5-10 MIN: CPT | Performed by: UROLOGY

## 2021-09-15 RX ORDER — TAMSULOSIN HYDROCHLORIDE 0.4 MG/1
0.4 CAPSULE ORAL DAILY
Qty: 90 CAPSULE | Refills: 3 | Status: ON HOLD | OUTPATIENT
Start: 2021-09-15 | End: 2022-09-07

## 2021-09-15 RX ORDER — OXYBUTYNIN CHLORIDE 10 MG/1
10 TABLET, EXTENDED RELEASE ORAL DAILY
Qty: 90 TABLET | Refills: 3 | Status: SHIPPED | OUTPATIENT
Start: 2021-09-15 | End: 2022-10-12

## 2021-09-15 RX ORDER — SILDENAFIL CITRATE 20 MG/1
TABLET ORAL
Qty: 30 TABLET | Refills: 3 | Status: SHIPPED | OUTPATIENT
Start: 2021-09-15 | End: 2022-04-11

## 2021-09-15 NOTE — PROGRESS NOTES
Telephone visit    66-year-old male with voiding symptoms and erectile insufficiency.    Recently stopped finasteride oxybutynin and tamsulosin.    Symptoms of bladder outlet obstruction returned.    He currently is interested in blood donation and therefore finasteride cannot be used.  To the best of my knowledge neither tamsulosin or oxybutynin are on the exclusion list for the blood bank.    He is also using alprostadil for management of his erectile insufficiency.  He did achieve some degree of erection but found the discomfort from the medication bothersome.    The erections were better than the erections he got with sildenafil and so he is inclined to give it another try.    We reviewed the instructions which is 0.1 to 0.2 mL of 50 mcg/mL alprostadil.    The sildenafil is in 20 mg (Revatio) format and he is instructed to take 2-5 of these tablets 1 hour prior to intercourse.    He still has alprostadil left over from his previous prescription and even though it has  it should be safe to use and should work.    I will renew his tamsulosin and oxybutynin prescriptions along with the Revatio.    I will call him in about 2 months in follow-up.    Total time 10 minutes

## 2021-09-18 ENCOUNTER — HEALTH MAINTENANCE LETTER (OUTPATIENT)
Age: 66
End: 2021-09-18

## 2021-10-07 ENCOUNTER — OFFICE VISIT (OUTPATIENT)
Dept: AUDIOLOGY | Facility: CLINIC | Age: 66
End: 2021-10-07
Attending: FAMILY MEDICINE
Payer: MEDICARE

## 2021-10-07 DIAGNOSIS — H90.3 SENSORINEURAL HEARING LOSS, ASYMMETRICAL: Primary | ICD-10-CM

## 2021-10-07 DIAGNOSIS — Z01.10 ENCOUNTER FOR HEARING EXAMINATION, UNSPECIFIED WHETHER ABNORMAL FINDINGS: ICD-10-CM

## 2021-10-07 PROCEDURE — 92557 COMPREHENSIVE HEARING TEST: CPT | Performed by: AUDIOLOGIST

## 2021-10-07 PROCEDURE — 92550 TYMPANOMETRY & REFLEX THRESH: CPT | Performed by: AUDIOLOGIST

## 2021-10-07 PROCEDURE — 99207 PR NO CHARGE LOS: CPT | Performed by: AUDIOLOGIST

## 2021-10-07 NOTE — PROGRESS NOTES
AUDIOLOGY REPORT    SUBJECTIVE:  Jamal Sanchez is a 66 year old male who was seen at [facility] for an audiologic evaluation, referred by Dr. Benitez.  No previous audiograms are available at today's appointment.  The patient reports difficulty with speech understanding. He reports some history of recreational noise exposure. He states loud noises bother him. The patient denies bilateral tinnitus, bilateral otalgia and family history of hearing loss.     OBJECTIVE:    Otoscopic exam indicates ears are clear of cerumen bilaterally       Pure Tone Thresholds assessed using conventional audiometry with good  reliability from 250-8000 Hz bilaterally using circumaural headphones     RIGHT:  normal, mild, moderate and severe sensorineural hearing loss    LEFT:    normal, mild, moderate and severe sensorineural hearing loss    Tympanogram:    RIGHT: normal eardrum mobility    LEFT:   normal eardrum mobility    Reflexes (reported by stimulus ear 1000 Hz):     RIGHT: Ipsilateral is present    RIGHT: Contralateral is present    LEFT: Ipsilateral is present    LEFT: Contralateral is present    Speech Reception Threshold:    RIGHT: 15 dB HL    LEFT:   10 dB HL  Word Recognition Score:     RIGHT: 92% at 55 dB HL using NU-6 recorded word list.    LEFT:   100% at 50 dB HL using NU-6 recorded word list.      ASSESSMENT:   Normal sloping to severe asymmetrical sensorineural hearing loss bilaterally.     Today s results were discussed with the patient in detail.     PLAN: It is recommended that the patient be seen by Dr. Ramos for medical evaluation of their ears and hearing evaluation. Patient was counseled regarding hearing loss and impact on communication.   Reviewed need for ear protection when exposed to occupational and recreational noise.  Please call this clinic with questions regarding these results or recommendations.        Layne Muñoz M.A. JOANNE-AAA  Clinical audiologist [place] # [id number]  10/7/2021

## 2021-10-28 ENCOUNTER — NURSE TRIAGE (OUTPATIENT)
Dept: NURSING | Facility: CLINIC | Age: 66
End: 2021-10-28

## 2021-10-28 NOTE — TELEPHONE ENCOUNTER
Patient calling with concerns about dizziness when he bends over. Lately patient reports shortness of breath with activity which is not his norm. If patient bends over he gets lightheaded/dizzy and feels like he will pass out. Patient noted symptoms started 3 weeks ago. Patient states that he is 25 lbs overweight.  Some pain in his sternum when he lifts something heavy but reports this may be from when he broke his sternum back in high school football. Patient states this sternum pain has been happening since this injury. Patient reports he has more fatigue, sleeps more than normal, and unable to tolerate his normal level of activity.   Patient reports that this shortness of breath has come and gone over the years and has had stress tests and no reason found to explain it.   BP today was 138/94 with pulse of 79. Patient has had some episodes of waking up with his heart beating fast. This happens every month or so. The fast heart rate will last for a couple of minutes.   Last episode was a few weeks ago.   Patient lives alone but has family close by.  Protocol recommends ED now or PCP triage.   Patient would prefer to wait and not go to ED tonight.   Page to on call provider Dr. Kathryn Ronquillo at 6:43 pm  Protocol does recommend ED with new onset shortness of breath. Patient can be seen in clinic with PCP ASAP but if any additional symptoms or worsening symptoms he should be seen in the ED ASAP.   Return call to patient at 6:53pm.  Relayed recommendation from on call provider. Patient to be seen in clinic asap with PCP, covering provider, or another clinic if needed. Any additional symptoms or worsening symptoms and he will have another adult drive to the ED.   Patient verbalized understanding. Connected patient with scheduling.   Mini Gurrola RN   10/28/21 6:56 PM  [facility] Nurse Advisor      Reason for Disposition    MILD difficulty breathing (e.g., minimal/no SOB at rest, SOB with walking, pulse  <100)    Additional Information    Negative: SEVERE difficulty breathing (e.g., struggling for each breath, speaks in single words)    Negative: Difficult to awaken or acting confused (e.g., disoriented, slurred speech)    Negative: Bluish (or gray) lips or face now    Negative: Shock suspected (e.g., cold/pale/clammy skin, too weak to stand, low BP, rapid pulse)    Negative: Sounds like a life-threatening emergency to the triager    Negative: [1] COVID-19 exposure AND [2] no symptoms    Negative: COVID-19 vaccine reaction suspected (e.g., fever, headache, muscle aches) occurring 1 to 3 days after getting vaccine    Negative: COVID-19 vaccine, questions about    Negative: [1] Lives with someone known to have influenza (flu test positive) AND [2] flu-like symptoms (e.g., cough, runny nose, sore throat, SOB; with or without fever)    Negative: [1] Adult with possible COVID-19 symptoms AND [2] triager concerned about severity of symptoms or other causes    Negative: COVID-19 and breastfeeding, questions about    Negative: SEVERE or constant chest pain or pressure (Exception: mild central chest pain, present only when coughing)    Negative: MODERATE difficulty breathing (e.g., speaks in phrases, SOB even at rest, pulse 100-120)    Negative: [1] Headache AND [2] stiff neck (can't touch chin to chest)    Protocols used: CORONAVIRUS (COVID-19) DIAGNOSED OR [id number] 8.25.2021

## 2021-10-29 ENCOUNTER — ANCILLARY PROCEDURE (OUTPATIENT)
Dept: GENERAL RADIOLOGY | Facility: CLINIC | Age: 66
End: 2021-10-29
Attending: NURSE PRACTITIONER
Payer: MEDICARE

## 2021-10-29 ENCOUNTER — OFFICE VISIT (OUTPATIENT)
Dept: FAMILY MEDICINE | Facility: CLINIC | Age: 66
End: 2021-10-29
Payer: MEDICARE

## 2021-10-29 ENCOUNTER — TELEPHONE (OUTPATIENT)
Dept: FAMILY MEDICINE | Facility: CLINIC | Age: 66
End: 2021-10-29

## 2021-10-29 VITALS
WEIGHT: 225 LBS | TEMPERATURE: 97.6 F | HEIGHT: 70 IN | OXYGEN SATURATION: 98 % | BODY MASS INDEX: 32.21 KG/M2 | HEART RATE: 69 BPM | RESPIRATION RATE: 17 BRPM | SYSTOLIC BLOOD PRESSURE: 136 MMHG | DIASTOLIC BLOOD PRESSURE: 86 MMHG

## 2021-10-29 DIAGNOSIS — R06.02 SOB (SHORTNESS OF BREATH): ICD-10-CM

## 2021-10-29 DIAGNOSIS — H81.12 BENIGN PAROXYSMAL POSITIONAL VERTIGO OF LEFT EAR: Primary | ICD-10-CM

## 2021-10-29 DIAGNOSIS — R06.09 DYSPNEA ON EXERTION: ICD-10-CM

## 2021-10-29 DIAGNOSIS — Z23 NEED FOR VACCINATION: ICD-10-CM

## 2021-10-29 PROCEDURE — 99214 OFFICE O/P EST MOD 30 MIN: CPT | Mod: 25 | Performed by: NURSE PRACTITIONER

## 2021-10-29 PROCEDURE — 71046 X-RAY EXAM CHEST 2 VIEWS: CPT | Performed by: RADIOLOGY

## 2021-10-29 PROCEDURE — 90732 PPSV23 VACC 2 YRS+ SUBQ/IM: CPT | Performed by: NURSE PRACTITIONER

## 2021-10-29 PROCEDURE — 90472 IMMUNIZATION ADMIN EACH ADD: CPT | Performed by: NURSE PRACTITIONER

## 2021-10-29 PROCEDURE — 93000 ELECTROCARDIOGRAM COMPLETE: CPT | Performed by: NURSE PRACTITIONER

## 2021-10-29 PROCEDURE — 90471 IMMUNIZATION ADMIN: CPT | Performed by: NURSE PRACTITIONER

## 2021-10-29 PROCEDURE — 90662 IIV NO PRSV INCREASED AG IM: CPT | Performed by: NURSE PRACTITIONER

## 2021-10-29 RX ORDER — MULTIPLE VITAMINS W/ MINERALS TAB 9MG-400MCG
1 TAB ORAL DAILY
COMMUNITY
End: 2022-05-06

## 2021-10-29 RX ORDER — ALBUTEROL SULFATE 90 UG/1
2 AEROSOL, METERED RESPIRATORY (INHALATION) EVERY 4 HOURS PRN
Qty: 18 G | Refills: 1 | Status: SHIPPED | OUTPATIENT
Start: 2021-10-29 | End: 2022-04-19

## 2021-10-29 ASSESSMENT — MIFFLIN-ST. JEOR: SCORE: 1801.84

## 2021-10-29 NOTE — TELEPHONE ENCOUNTER
Good afternoon Tami,    You had placed an order for a stress test for Gopal. It looks like in the past he has had stress tests with imaging (stress echo). I am just double checking if you would you like him to have another stress echo or are you fine having him have a stress test without imaging? The current order placed is for a stress test without imaging. If you'd like imaging, would you be able to place an order for a stress echo?    Thank you for your time and have a great weekend.    John Fallon MS, Exercise Physiologist  [facility]  [phone]

## 2021-10-29 NOTE — PATIENT INSTRUCTIONS
1.  Your dizziness is benign paroxysmal positional vertigo.  I have given you information on this.  I have ordered Physical Therapy for vestibular exercises to reduce symptoms.  Follow-up if any persistent or worsening symptoms.  2.  EKG today is normal.  Chest XR today looks normal, I will await radiologist result and call you only if they see something I do not.  Use albuterol 15-20 minutes prior to working or walking the dogs to see if this improves symptoms.  Since EKG and Chest XR are negative today,  I have ordered stress testing for further workup.  If this is negative, would recommend pulmonary function testing for evaluation also.  3.  Follow-up in ER if any chest pain or significant shortness of breath for evaluation.      Patient Education     Benign Paroxysmal Positional Vertigo     Your health care provider may move your head in certain ways to treat your BPPV.   Benign paroxysmal positional vertigo (BPPV) is a problem with the inner ear. The inner ear contains the vestibular system. This system is what helps you keep your balance. BPPV causes a feeling of spinning. It is a common problem of the vestibular system.   Understanding the vestibular system  The vestibular system of the ear is made up of very tiny parts. They include the utricle, saccule, and semicircular canals. The utricle is a tiny organ that contains calcium crystals. In some people, the crystals can move into the semicircular canals. When this happens, the system no longer works as it should. This causes BPPV. Benign means it is not life threatening. Paroxysmal means it happens suddenly. Positional means that it happens when you move your head. Vertigo is a feeling of spinning.   What causes BPPV?  Causes include injury to your head or neck. Other problems with the vestibular system may cause BPPV. In many people, the cause of BPPV is not known.   Symptoms of BPPV  You may have repeated feelings of spinning (vertigo). The vertigo usually  lasts less than 1 minute. Some movements, such as rolling over in bed, can bring on vertigo.   Diagnosing BPPV  Your primary healthcare provider may diagnose and treat your BPPV. Or you may see an ear, nose, and throat healthcare provider (otolaryngologist). In some cases, you may see a healthcare provider who focuses on the nervous system (neurologist).   The healthcare provider will ask about your symptoms and your medical history. He or she will examine you. You may have hearing and balance tests. As part of the exam, your healthcare provider may have you move your head and body in certain ways. If you have BPPV, the movements can bring on vertigo. Your provider will also look for abnormal movements of your eyes. You may have other tests to check your vestibular or nervous systems.   Treatment for BPPV  Your healthcare provider may try to move the calcium crystals. This is done by having you move your head and neck in certain ways. This treatment is safe and often works well. You may also be told to do these movements at home. You may still have vertigo for a few weeks. Your healthcare provider will recheck your symptoms, usually in about a month. Special physical therapy may also be part of treatment. In rare cases, surgery may be needed for BPPV that does not go away. Talk with your healthcare provider about whether it is safe for you to drive.   When to call the healthcare provider  Call your healthcare provider right away if you have any of these:    Symptoms that do not go away with treatment    Symptoms that get worse    New symptoms  Keila last reviewed this educational content on 2/1/2020 2000-2021 [employer]. All rights reserved. This information is not intended as a substitute for professional medical care. Always follow your healthcare professional's instructions.

## 2021-10-29 NOTE — PROGRESS NOTES
Assessment & Plan     Benign paroxysmal positional vertigo of left ear  Orthostatic BP's are normal.  Patient has nystagmus on exam with minimal dizziness symptoms typically with movement.  Referral given for Physical Therapy for vestibular exercises to reduce symptoms.  No oral mediations ordered since there is short symptoms and no nausea.  Evaluation for shortness of breath also underway which may be cause occasionally as well.  Recommended increasing fluids and follow-up if any persistent symptoms.  - Physical Therapy Referral; Future    SOB (shortness of breath)  Ordered EKG and Chest X-ray today.  On my evaluation EKG is negative and CXR looks normal.  Will await radiology interpretation on chest X-ray.  Ordered albuterol to take as needed prior to activities to see if this improves symptoms.  I ordered stress testing for further evaluation of the heart as cause.  If normal, recommend follow-up with PFT's to evaluate for COPD/restrictive airway as cause.  - EKG 12-lead complete w/read - Clinics  - XR Chest 2 Views; Future  - albuterol (PROAIR HFA/PROVENTIL HFA/VENTOLIN HFA) 108 (90 Base) MCG/ACT inhaler; Inhale 2 puffs into the lungs every 4 hours as needed for shortness of breath / dyspnea    Need for vaccination  - Pneumococcal vaccine 23 valent PPSV23  (Pneumovax) [10057]  - INFLUENZA, QUAD, HIGH DOSE, PF, 65YR + (FLUZONE HD)    See Patient Instructions    Return in about 1 week (around 11/5/2021), or if symptoms worsen or fail to improve.    Tami Abebe NP  [facility]    Taylor Herron is a 66 year old who presents for the following health issues;     HPI     Dizziness  Onset/Duration: 3 weeks   Description:   Do you feel faint: YES- usually when he is bending over doing something or to pick something up, goes away quickly there after   Does it feel like the surroundings (bed, room) are moving: no  Unsteady/off balance: YES  Have you passed out or fallen: no  Intensity:  "moderate  Progression of Symptoms: same and intermittent  Accompanying Signs & Symptoms:  Heart palpitations or chest pain: YES- occasionally with shortness of breath  Nausea, vomiting: no  Weakness or lack of coordination in arms or legs: YES- weakness in hips and calves   Vision or speech changes: no  Numbness or tingling: no  Ringing in ears (Tinnitus): no  Hearing Loss: no  History:   Head trauma/concussion history: no  Previous similar symptoms: YES- with the Shortness of Breath  Recent bleeding history: no  Any new medications (BP?): no  Precipitating factors:   Worse with activity: YES  Worse with head movement: no  Alleviating factors:   Does staying in a fixed position give relief: YES  Therapies tried and outcome: None      Patient is not a smoker.  Has had shortness of breath on and off for years but 3 weeks ago noticed that he is shortness of breath with activity.  He will get a little dizzy and then catch his breath and then it improves.  Hx of cracking sternum in high school and will get some pain there at times but the chest pain doesn't always correlate with shortness of breath.  Dizziness also comes with bending over in which he feels that he may pass out also and takes a minute to clear once standing and then feels fine.  Shortness of breath comes with work and walking the dogs.    Review of Systems   CONSTITUTIONAL: NEGATIVE for fever, chills, change in weight  RESP:POSITIVE for dyspnea on exertion  CV: NEGATIVE for chest pain, palpitations or peripheral edema  NEURO: POSITIVE for dizziness/lightheadedness, as per HPI  PSYCHIATRIC: NEGATIVE for changes in mood or affect  ROS otherwise negative      Objective    /86   Pulse 69   Temp 97.6  F (36.4  C) (Tympanic)   Resp 17   Ht 1.778 m (5' 10\")   Wt 102.1 kg (225 lb)   SpO2 98%   BMI 32.28 kg/m    Body mass index is 32.28 kg/m .  Physical Exam   GENERAL: healthy, alert and no distress  EYES: Eyes grossly normal to inspection, PERRL and " conjunctivae and sclerae normal  HENT: ear canals and TM's normal, nose and mouth without ulcers or lesions  RESP: lungs clear to auscultation - no rales, rhonchi or wheezes  CV: regular rate and rhythm, normal S1 S2, no S3 or S4, no murmur, click or rub, no peripheral edema and peripheral pulses strong  NEURO: Normal strength and tone, mentation intact, speech normal, cranial nerves 2-12 intact and Romberg normal, past point normal, Verplanck Hallpike shows left nystagmus   PSYCH: mentation appears normal, affect normal/bright

## 2021-10-29 NOTE — PROGRESS NOTES
Chief Complaint   Patient presents with     Monitor Known Hearing Loss     referred by Layne     History of Present Illness   Jamal Sanchez is a 66 year old male who presents to me today for ear evaluation.  The patient reports decreased hearing in both ears for the last several years.  It was gradual in onset.  The patient has noticed increased difficulty hearing certain sounds and difficulty in understanding others, especially in noisy or crowded situations.  There is no history of recent head trauma, or chronic ear disease or ear surgery.  The patient does report some recreational noise exposure history. No family history of hearing loss at a young age. The patient denies otalgia, otorrhea, bloody otorrhea.  He does have some intermittent tinnitus in both ears.  He is also been having some episodic dizziness with head position or position change over the past few weeks lasting seconds to minutes.  He sees physical therapy tomorrow for evaluation of BPPV.    The patient underwent an audiogram performed 10/7/2021.  My review of the audiogram showed normal hearing to 1000 Hz sloping to mild sloping to moderate sloping to severe sensorineural hearing loss in the right ear and normal hearing to 2000 Hz sloping to mild sloping to moderate sloping to moderately severe sensorineural hearing loss in the left ear.  Pure-tone average was 23 dB on the right and 15 dB on the left.  Speech reception threshold was 15 dB on the right and 10 dB on the left.  The patient had 92% word recognition on the right and 100% word recognition on the left.  The patient had a type A tympanogram on the right and a type A tympanogram on the left.      Past Medical History  Patient Active Problem List   Diagnosis     Benign prostatic hypertrophy     CARDIOVASCULAR SCREENING; LDL GOAL LESS THAN 160     GERD (gastroesophageal reflux disease)     Advanced directives, counseling/discussion     Hypertension, goal below 140/90     Appendicitis      Diverticulosis of large intestine without hemorrhage     Diverticulitis of colon     Acute diverticulitis     ETOH abuse     Generalized abdominal pain     Current Medications     Current Outpatient Medications:      albuterol (PROAIR HFA/PROVENTIL HFA/VENTOLIN HFA) 108 (90 Base) MCG/ACT inhaler, Inhale 2 puffs into the lungs every 4 hours as needed for shortness of breath / dyspnea, Disp: 18 g, Rfl: 1     Apoaequorin (PREVAGEN) 10 MG CAPS, , Disp: , Rfl:      Ca Carbonate-Mag Hydroxide (ROLAIDS PO), Take 1 tablet by mouth daily as needed, Disp: , Rfl:      calcium carbonate (TUMS) 500 MG chewable tablet, Take 1 chew tab by mouth daily as needed , Disp: , Rfl:      COMPOUNDED NON-CONTROLLED SUBSTANCE (CMPD RX) - PHARMACY TO MIX COMPOUNDED MEDICATION, Inject 0.1 mL intra cavernously for erection. Gradually increase dose if needed . Maximum 0.2 mL, Disp: 2.5 mL, Rfl: 3     ibuprofen (ADVIL/MOTRIN) 200 MG tablet, Take 800 mg by mouth every 8 hours as needed , Disp: , Rfl:      ketoconazole (NIZORAL) 2 % external cream, Apply to feet BID x 2-3 weeks PRN, Disp: 60 g, Rfl: 5     multivitamin w/minerals (MULTI-VITAMIN) tablet, Take 1 tablet by mouth daily, Disp: , Rfl:      omeprazole (PRILOSEC) 40 MG DR capsule, Take 1 capsule (40 mg) by mouth daily, Disp: 90 capsule, Rfl: 1     oxybutynin ER (DITROPAN-XL) 10 MG 24 hr tablet, Take 1 tablet (10 mg) by mouth daily, Disp: 90 tablet, Rfl: 3     sildenafil (REVATIO) 20 MG tablet, Take 2 to 5 tablets 1 hour prior to intercourse, Disp: 30 tablet, Rfl: 3     sildenafil (VIAGRA) 100 MG tablet, Take 1 tablet (100 mg) by mouth daily as needed, Disp: 6 tablet, Rfl: 0     tamsulosin (FLOMAX) 0.4 MG capsule, Take 1 capsule (0.4 mg) by mouth daily, Disp: 90 capsule, Rfl: 3     triamcinolone (KENALOG) 0.1 % external cream, Apply topically 3 times daily, Disp: 80 g, Rfl: 3     triamterene-HCTZ (MAXZIDE) 75-50 MG tablet, Take 1 tablet by mouth daily, Disp: 90 tablet, Rfl:  3    Allergies  Allergies   Allergen Reactions     Nka [No Known Allergies]        Social History   Social History     Socioeconomic History     Marital status:      Spouse name: Not on file     Number of children: Not on file     Years of education: Not on file     Highest education level: Not on file   Occupational History     Not on file   Tobacco Use     Smoking status: Never Smoker     Smokeless tobacco: Never Used   Substance and Sexual Activity     Alcohol use: Yes     Comment: 6 pack beer on weekend.     Drug use: No     Sexual activity: Yes     Partners: Female   Other Topics Concern     Parent/sibling w/ CABG, MI or angioplasty before 65F 55M? No   Social History Narrative     Not on file     Social Determinants of Health     Financial Resource Strain:      Difficulty of Paying Living Expenses:    Food Insecurity:      Worried About Running Out of Food in the Last Year:      Ran Out of Food in the Last Year:    Transportation Needs:      Lack of Transportation (Medical):      Lack of Transportation (Non-Medical):    Physical Activity:      Days of Exercise per Week:      Minutes of Exercise per Session:    Stress:      Feeling of Stress :    Social Connections:      Frequency of Communication with Friends and Family:      Frequency of Social Gatherings with Friends and Family:      Attends [employer] Services:      Active Member of Clubs or Organizations:      Attends Club or Organization Meetings:      Marital Status:    Intimate Partner Violence:      Fear of Current or Ex-Partner:      Emotionally Abused:      Physically Abused:      Sexually Abused:        Family History  Family History   Problem Relation Age of Onset     Diabetes Mother      Cerebrovascular Disease Mother      Neurologic Disorder Father         parkinson's     Cancer - colorectal Maternal Grandfather      Hypertension Brother      Cerebrovascular Disease Brother         stroke     Neurologic Disorder Brother      Alcohol/Drug  Brother      Unknown/Adopted Brother      Obesity Sister        Review of Systems  As per HPI and PMHx, otherwise 10+ comprehensive system review is negative.    Physical Exam  BP (!) 156/92 (BP Location: Left arm, Patient Position: Chair, Cuff Size: Adult Large)   Pulse 74   Temp 97.8  F (36.6  C) (Tympanic)   Wt 102.1 kg (225 lb)   BMI 32.28 kg/m    GENERAL: Patient is a pleasant, cooperative 66 year old male in no acute distress.  HEAD: Normocephalic, atraumatic.  Hair and scalp are normal.  EYES: Pupils are equal, round, reactive to light and accommodation.  Extraocular movements are intact.  The sclera nonicteric without injection.  The extraocular structures are normal.  EARS: Normal shape and symmetry.  No tenderness when palpating the mastoid or tragal areas bilaterally.  Otoscopic exam reveals a minimal amount of cerumen bilaterally.  The bilateral tympanic membranes are round, intact without evidence of effusion, good landmarks.  No retraction, granulation, or drainage.  NOSE: Nares are patent.  Nasal mucosa is pink and moist.  Negative anterior rhinoscopy.  NEUROLOGIC: Cranial nerves II through XII are grossly intact.  Voice is strong.  Patient is House-Brackmann I/VI bilaterally.  Normal gait and tandem gait.  CARDIOVASCULAR: Extremities are warm and well-perfused.  No significant peripheral edema.  RESPIRATORY: Patient has nonlabored breathing without cough, wheeze, stridor.  PSYCHIATRIC: Patient is alert and oriented.  Mood and affect appear normal.  SKIN: Warm and dry.  No scalp, face, or neck lesions noted.    Assessment and Plan     ICD-10-CM    1. Sensorineural hearing loss, bilateral  H90.3 MR Brain w/o & w Contrast   2. Asymmetrical sensorineural hearing loss  H90.3 MR Brain w/o & w Contrast   3. Dizziness  R42 MR Brain w/o & w Contrast   4. Imbalance  R26.89 MR Brain w/o & w Contrast     It was my pleasure seeing Jamal Sanchez today in clinic.  The patient presents today with bilateral  sensorineural hearing loss with some slight asymmetry in the right ear.  Does have some noise exposure history but does have several frequencies that are greater than 10 dB different including 3000 Hz.  He is also having some episodic dizziness/vertigo symptoms.  I think in the setting, we should obtain an MRI with IAC protocol to rule out any retrocochlear pathology.  We spent the remainder of today's visit on education. We discussed hearing protection in noisy environments.    The patient will follow up as necessary for worsening symptoms or changes in symptoms. I have also recommended repeat audiogram in 1-3 years, or sooner if symptoms warrant.      Gopal to follow up with Primary Care provider regarding elevated blood pressure.    Shabbir Ramos MD  Department of Otolaryngology-Head and Neck Surgery  [facility]

## 2021-11-01 ENCOUNTER — OFFICE VISIT (OUTPATIENT)
Dept: OTOLARYNGOLOGY | Facility: CLINIC | Age: 66
End: 2021-11-01
Payer: MEDICARE

## 2021-11-01 VITALS
DIASTOLIC BLOOD PRESSURE: 92 MMHG | BODY MASS INDEX: 32.28 KG/M2 | TEMPERATURE: 97.8 F | SYSTOLIC BLOOD PRESSURE: 156 MMHG | WEIGHT: 225 LBS | HEART RATE: 74 BPM

## 2021-11-01 DIAGNOSIS — R26.89 IMBALANCE: ICD-10-CM

## 2021-11-01 DIAGNOSIS — H90.3 SENSORINEURAL HEARING LOSS, BILATERAL: Primary | ICD-10-CM

## 2021-11-01 DIAGNOSIS — H90.3 ASYMMETRICAL SENSORINEURAL HEARING LOSS: ICD-10-CM

## 2021-11-01 DIAGNOSIS — R42 DIZZINESS: ICD-10-CM

## 2021-11-01 PROCEDURE — 99204 OFFICE O/P NEW MOD 45 MIN: CPT | Performed by: OTOLARYNGOLOGY

## 2021-11-01 ASSESSMENT — PAIN SCALES - GENERAL: PAINLEVEL: NO PAIN (0)

## 2021-11-01 NOTE — NURSING NOTE
"Initial BP (!) 156/92 (BP Location: Left arm, Patient Position: Chair, Cuff Size: Adult Large)   Pulse 74   Temp 97.8  F (36.6  C) (Tympanic)   Wt 102.1 kg (225 lb)   BMI 32.28 kg/m   Estimated body mass index is 32.28 kg/m  as calculated from the following:    Height as of 10/29/21: 1.778 m (5' 10\").    Weight as of this encounter: 102.1 kg (225 lb). .    Elvia Alegre LPN    "

## 2021-11-01 NOTE — LETTER
11/1/2021         RE: Jamal Sanchez  [address] [facility] [address]        Dear Colleague,    Thank you for referring your patient, Jamal Sanchez, to the [facility]. Please see a copy of my visit note below.    Chief Complaint   Patient presents with     Monitor Known Hearing Loss     referred by Layne     History of Present Illness   Jamal Sanchez is a 66 year old male who presents to me today for ear evaluation.  The patient reports decreased hearing in both ears for the last several years.  It was gradual in onset.  The patient has noticed increased difficulty hearing certain sounds and difficulty in understanding others, especially in noisy or crowded situations.  There is no history of recent head trauma, or chronic ear disease or ear surgery.  The patient does report some recreational noise exposure history. No family history of hearing loss at a young age. The patient denies otalgia, otorrhea, bloody otorrhea.  He does have some intermittent tinnitus in both ears.  He is also been having some episodic dizziness with head position or position change over the past few weeks lasting seconds to minutes.  He sees physical therapy tomorrow for evaluation of BPPV.    The patient underwent an audiogram performed 10/7/2021.  My review of the audiogram showed normal hearing to 1000 Hz sloping to mild sloping to moderate sloping to severe sensorineural hearing loss in the right ear and normal hearing to 2000 Hz sloping to mild sloping to moderate sloping to moderately severe sensorineural hearing loss in the left ear.  Pure-tone average was 23 dB on the right and 15 dB on the left.  Speech reception threshold was 15 dB on the right and 10 dB on the left.  The patient had 92% word recognition on the right and 100% word recognition on the left.  The patient had a type A tympanogram on the right and a type A tympanogram on the left.      Past Medical History  Patient Active  Problem List   Diagnosis     Benign prostatic hypertrophy     CARDIOVASCULAR SCREENING; LDL GOAL LESS THAN 160     GERD (gastroesophageal reflux disease)     Advanced directives, counseling/discussion     Hypertension, goal below 140/90     Appendicitis     Diverticulosis of large intestine without hemorrhage     Diverticulitis of colon     Acute diverticulitis     ETOH abuse     Generalized abdominal pain     Current Medications     Current Outpatient Medications:      albuterol (PROAIR HFA/PROVENTIL HFA/VENTOLIN HFA) 108 (90 Base) MCG/ACT inhaler, Inhale 2 puffs into the lungs every 4 hours as needed for shortness of breath / dyspnea, Disp: 18 g, Rfl: 1     Apoaequorin (PREVAGEN) 10 MG CAPS, , Disp: , Rfl:      Ca Carbonate-Mag Hydroxide (ROLAIDS PO), Take 1 tablet by mouth daily as needed, Disp: , Rfl:      calcium carbonate (TUMS) 500 MG chewable tablet, Take 1 chew tab by mouth daily as needed , Disp: , Rfl:      COMPOUNDED NON-CONTROLLED SUBSTANCE (CMPD RX) - PHARMACY TO MIX COMPOUNDED MEDICATION, Inject 0.1 mL intra cavernously for erection. Gradually increase dose if needed . Maximum 0.2 mL, Disp: 2.5 mL, Rfl: 3     ibuprofen (ADVIL/MOTRIN) 200 MG tablet, Take 800 mg by mouth every 8 hours as needed , Disp: , Rfl:      ketoconazole (NIZORAL) 2 % external cream, Apply to feet BID x 2-3 weeks PRN, Disp: 60 g, Rfl: 5     multivitamin w/minerals (MULTI-VITAMIN) tablet, Take 1 tablet by mouth daily, Disp: , Rfl:      omeprazole (PRILOSEC) 40 MG DR capsule, Take 1 capsule (40 mg) by mouth daily, Disp: 90 capsule, Rfl: 1     oxybutynin ER (DITROPAN-XL) 10 MG 24 hr tablet, Take 1 tablet (10 mg) by mouth daily, Disp: 90 tablet, Rfl: 3     sildenafil (REVATIO) 20 MG tablet, Take 2 to 5 tablets 1 hour prior to intercourse, Disp: 30 tablet, Rfl: 3     sildenafil (VIAGRA) 100 MG tablet, Take 1 tablet (100 mg) by mouth daily as needed, Disp: 6 tablet, Rfl: 0     tamsulosin (FLOMAX) 0.4 MG capsule, Take 1 capsule (0.4 mg)  by mouth daily, Disp: 90 capsule, Rfl: 3     triamcinolone (KENALOG) 0.1 % external cream, Apply topically 3 times daily, Disp: 80 g, Rfl: 3     triamterene-HCTZ (MAXZIDE) 75-50 MG tablet, Take 1 tablet by mouth daily, Disp: 90 tablet, Rfl: 3    Allergies  Allergies   Allergen Reactions     Nka [No Known Allergies]        Social History   Social History     Socioeconomic History     Marital status:      Spouse name: Not on file     Number of children: Not on file     Years of education: Not on file     Highest education level: Not on file   Occupational History     Not on file   Tobacco Use     Smoking status: Never Smoker     Smokeless tobacco: Never Used   Substance and Sexual Activity     Alcohol use: Yes     Comment: 6 pack beer on weekend.     Drug use: No     Sexual activity: Yes     Partners: Female   Other Topics Concern     Parent/sibling w/ CABG, MI or angioplasty before 65F 55M? No   Social History Narrative     Not on file     Social Determinants of Health     Financial Resource Strain:      Difficulty of Paying Living Expenses:    Food Insecurity:      Worried About Running Out of Food in the Last Year:      Ran Out of Food in the Last Year:    Transportation Needs:      Lack of Transportation (Medical):      Lack of Transportation (Non-Medical):    Physical Activity:      Days of Exercise per Week:      Minutes of Exercise per Session:    Stress:      Feeling of Stress :    Social Connections:      Frequency of Communication with Friends and Family:      Frequency of Social Gatherings with Friends and Family:      Attends Mu-ism Services:      Active Member of Clubs or Organizations:      Attends Club or Organization Meetings:      Marital Status:    Intimate Partner Violence:      Fear of Current or Ex-Partner:      Emotionally Abused:      Physically Abused:      Sexually Abused:        Family History  Family History   Problem Relation Age of Onset     Diabetes Mother      Cerebrovascular  Disease Mother      Neurologic Disorder Father         parkinson's     Cancer - colorectal Maternal Grandfather      Hypertension Brother      Cerebrovascular Disease Brother         stroke     Neurologic Disorder Brother      Alcohol/Drug Brother      Unknown/Adopted Brother      Obesity Sister        Review of Systems  As per HPI and PMHx, otherwise 10+ comprehensive system review is negative.    Physical Exam  BP (!) 156/92 (BP Location: Left arm, Patient Position: Chair, Cuff Size: Adult Large)   Pulse 74   Temp 97.8  F (36.6  C) (Tympanic)   Wt 102.1 kg (225 lb)   BMI 32.28 kg/m    GENERAL: Patient is a pleasant, cooperative 66 year old male in no acute distress.  HEAD: Normocephalic, atraumatic.  Hair and scalp are normal.  EYES: Pupils are equal, round, reactive to light and accommodation.  Extraocular movements are intact.  The sclera nonicteric without injection.  The extraocular structures are normal.  EARS: Normal shape and symmetry.  No tenderness when palpating the mastoid or tragal areas bilaterally.  Otoscopic exam reveals a minimal amount of cerumen bilaterally.  The bilateral tympanic membranes are round, intact without evidence of effusion, good landmarks.  No retraction, granulation, or drainage.  NOSE: Nares are patent.  Nasal mucosa is pink and moist.  Negative anterior rhinoscopy.  NEUROLOGIC: Cranial nerves II through XII are grossly intact.  Voice is strong.  Patient is House-Brackmann I/VI bilaterally.  Normal gait and tandem gait.  CARDIOVASCULAR: Extremities are warm and well-perfused.  No significant peripheral edema.  RESPIRATORY: Patient has nonlabored breathing without cough, wheeze, stridor.  PSYCHIATRIC: Patient is alert and oriented.  Mood and affect appear normal.  SKIN: Warm and dry.  No scalp, face, or neck lesions noted.    Assessment and Plan     ICD-10-CM    1. Sensorineural hearing loss, bilateral  H90.3 MR Brain w/o & w Contrast   2. Asymmetrical sensorineural hearing  loss  H90.3 MR Brain w/o & w Contrast   3. Dizziness  R42 MR Brain w/o & w Contrast   4. Imbalance  R26.89 MR Brain w/o & w Contrast     It was my pleasure seeing Jamal Sanchez today in clinic.  The patient presents today with bilateral sensorineural hearing loss with some slight asymmetry in the right ear.  Does have some noise exposure history but does have several frequencies that are greater than 10 dB different including 3000 Hz.  He is also having some episodic dizziness/vertigo symptoms.  I think in the setting, we should obtain an MRI with IAC protocol to rule out any retrocochlear pathology.  We spent the remainder of today's visit on education. We discussed hearing protection in noisy environments.    The patient will follow up as necessary for worsening symptoms or changes in symptoms. I have also recommended repeat audiogram in 1-3 years, or sooner if symptoms warrant.      Gopal to follow up with Primary Care provider regarding elevated blood pressure.    Shabbir Ramos MD  Department of Otolaryngology-Head and Neck Surgery  [facility]         Again, thank you for allowing me to participate in the care of your patient.        Sincerely,        Shabbir Ramos MD

## 2021-11-02 ENCOUNTER — HOSPITAL ENCOUNTER (OUTPATIENT)
Dept: PHYSICAL THERAPY | Facility: CLINIC | Age: 66
Setting detail: THERAPIES SERIES
End: 2021-11-02
Attending: NURSE PRACTITIONER
Payer: MEDICARE

## 2021-11-02 ENCOUNTER — HOSPITAL ENCOUNTER (OUTPATIENT)
Dept: CARDIOLOGY | Facility: CLINIC | Age: 66
Discharge: HOME | End: 2021-11-02
Attending: NURSE PRACTITIONER | Admitting: NURSE PRACTITIONER
Payer: MEDICARE

## 2021-11-02 DIAGNOSIS — R06.09 DYSPNEA ON EXERTION: ICD-10-CM

## 2021-11-02 DIAGNOSIS — R06.09 DYSPNEA ON EXERTION: Primary | ICD-10-CM

## 2021-11-02 DIAGNOSIS — H81.12 BENIGN PAROXYSMAL POSITIONAL VERTIGO OF LEFT EAR: ICD-10-CM

## 2021-11-02 PROCEDURE — 93016 CV STRESS TEST SUPVJ ONLY: CPT | Performed by: INTERNAL MEDICINE

## 2021-11-02 PROCEDURE — 93017 CV STRESS TEST TRACING ONLY: CPT

## 2021-11-02 PROCEDURE — 93018 CV STRESS TEST I&R ONLY: CPT | Performed by: INTERNAL MEDICINE

## 2021-11-02 PROCEDURE — 97162 PT EVAL MOD COMPLEX 30 MIN: CPT | Mod: GP | Performed by: PHYSICAL THERAPIST

## 2021-11-02 NOTE — PROGRESS NOTES
Jamal Sancehz   PHYSICAL THERAPY EVALUATION    11/02/21 1100   Quick Adds   Quick Adds Vestibular Eval;Certification   Type of Visit Initial OP PT Evaluation   General Information   Start of Care Date 11/02/21   Referring Physician Tami Abebe NP   Orders Evaluate and Treat as Indicated   Order Date 10/29/21   Medical Diagnosis BPPV L   Onset of illness/injury or Date of Surgery 10/08/21   Pertinent history of current problem (include personal factors and/or comorbidities that impact the POC) Gets dizzy when he bends over, then gets up and feels a little off ablance a while then sx settle, room doesn't spin. Sx 3-4 wks. Gets short of breath with this also. He thinks it is his lungs, he is out of shape. Having stress test today. Having CT of head soon also. Denies room spinning   Prior level of function comment indep living   Patient/Family Goals Statement get rid of sx   Fall Risk Screen   Fall screen completed by PT   Have you fallen 2 or more times in the past year? No   Have you fallen and had an injury in the past year? No   Is patient a fall risk? No   Abuse Screen (yes response referral indicated)   Feels Unsafe at Home or Work/School no   Feels Threatened by Someone no   Does Anyone Try to Keep You From Having Contact with Others or Doing Things Outside Your Home? no   Physical Signs of Abuse Present no   Gait   Gait Comments gait is normal in all conditions   Balance Special Tests   Balance Special Tests Modified CTSIB Conditions   Balance Special Tests Modified CTSIB Conditions   Condition 1, seconds 30 Seconds   Condition 2, seconds 30 Seconds   Modified CTSIB Comments unable to locate foam   Cervicogenic Screen   Neck ROM WNL   Vertebral Artery Test Normal   Transverse Ligament Test Normal   Oculomotor Exam   Smooth Pursuit Normal   Saccades Normal   VOR Normal   Infrared Goggle Exam or Frenzel Lense Exam   Vestibular Suppressant in Last 24 Hours? No   Exam completed with Infrared Goggles    Spontaneous Nystagmus Negative   Gaze Evoked Nystagmus Other   Gaze Evoked Nystagmus comments end beats B with far lateral gaze   Head Shake Horizontal Nystagmus Negative   [place]-Hallpike (right) Other   [place]-Hallpike (right) comments pt  looks around a lot, possible downbeats for 3-4 initailly getting to position   [place]-Hallpike (Left) Other   [place]-Hallpike (left) comments pt  looks around a lot, possible downbeats for 5-6 initailly, slightly more evident than R side, getting to position   [facility] Supine Roll Test (Right) Negative   [facility] Supine Roll Test (Left) Negative   Other Infrared Goggle Exam or Frenzel Lense Exam Comments not clearly BPPV, no report of sx in test positions   Planned Therapy Interventions   Planned Therapy Interventions neuromuscular re-education   Clinical Impression   Criteria for Skilled Therapeutic Interventions Met yes, treatment indicated   PT Diagnosis motion sensitivity, unclear origin of sx   Functional limitations due to impairments bending over/standing up   Clinical Presentation Evolving/Changing   Clinical Presentation Rationale heat, lungs, breathing, dizziness   Clinical Decision Making (Complexity) Moderate complexity   Therapy Frequency 1 time/week   Predicted Duration of Therapy Intervention (days/wks) no appt scheduled, willhold openn 30 days   Risk & Benefits of therapy have been explained Yes   Patient, Family & other staff in agreement with plan of care Yes   Education Assessment   Preferred Learning Style Listening   Barriers to Learning No barriers   GOALS   PT Eval Goals 1   Goal 1   Goal Identifier 1   Goal Description pt will be able to bend over/stand up w/o sx in 4wk   Target Date 11/30/21   Total Evaluation Time   PT Eval, Moderate Complexity Minutes (17048) 25   [facility]  Kris Hoenk  PT  [facility]  [address] [place] [address]  [email or web address]   Office: [phone]  Voicemail: [phone]

## 2021-11-04 ENCOUNTER — HOSPITAL ENCOUNTER (OUTPATIENT)
Dept: MRI IMAGING | Facility: CLINIC | Age: 66
Discharge: HOME | End: 2021-11-04
Attending: OTOLARYNGOLOGY | Admitting: OTOLARYNGOLOGY
Payer: MEDICARE

## 2021-11-04 DIAGNOSIS — R42 DIZZINESS: ICD-10-CM

## 2021-11-04 DIAGNOSIS — H90.3 ASYMMETRICAL SENSORINEURAL HEARING LOSS: ICD-10-CM

## 2021-11-04 DIAGNOSIS — H90.3 SENSORINEURAL HEARING LOSS, BILATERAL: ICD-10-CM

## 2021-11-04 DIAGNOSIS — R26.89 IMBALANCE: ICD-10-CM

## 2021-11-04 PROCEDURE — A9585 GADOBUTROL INJECTION: HCPCS | Performed by: OTOLARYNGOLOGY

## 2021-11-04 PROCEDURE — 255N000002 HC RX 255 OP 636: Performed by: OTOLARYNGOLOGY

## 2021-11-04 PROCEDURE — 70553 MRI BRAIN STEM W/O & W/DYE: CPT

## 2021-11-04 RX ORDER — GADOBUTROL 604.72 MG/ML
10 INJECTION INTRAVENOUS ONCE
Status: COMPLETED | OUTPATIENT
Start: 2021-11-04 | End: 2021-11-04

## 2021-11-04 RX ADMIN — GADOBUTROL 10 ML: 604.72 INJECTION INTRAVENOUS at 14:24

## 2021-11-05 ENCOUNTER — HOSPITAL ENCOUNTER (OUTPATIENT)
Dept: RESPIRATORY THERAPY | Facility: CLINIC | Age: 66
Discharge: HOME | End: 2021-11-05
Attending: FAMILY MEDICINE | Admitting: FAMILY MEDICINE
Payer: MEDICARE

## 2021-11-05 DIAGNOSIS — R06.09 DYSPNEA ON EXERTION: ICD-10-CM

## 2021-11-05 PROCEDURE — 999N000105 HC STATISTIC NO DOCUMENTATION TO SUPPORT CHARGE

## 2021-11-05 PROCEDURE — 94060 EVALUATION OF WHEEZING: CPT

## 2021-11-05 PROCEDURE — 94726 PLETHYSMOGRAPHY LUNG VOLUMES: CPT

## 2021-11-05 PROCEDURE — 94726 PLETHYSMOGRAPHY LUNG VOLUMES: CPT | Mod: 26 | Performed by: INTERNAL MEDICINE

## 2021-11-05 PROCEDURE — 94729 DIFFUSING CAPACITY: CPT | Mod: 26 | Performed by: INTERNAL MEDICINE

## 2021-11-05 PROCEDURE — 94640 AIRWAY INHALATION TREATMENT: CPT

## 2021-11-05 PROCEDURE — 94729 DIFFUSING CAPACITY: CPT

## 2021-11-05 PROCEDURE — 94060 EVALUATION OF WHEEZING: CPT | Mod: 26 | Performed by: INTERNAL MEDICINE

## 2021-11-05 PROCEDURE — 250N000009 HC RX 250: Performed by: NURSE PRACTITIONER

## 2021-11-05 RX ORDER — ALBUTEROL SULFATE 0.83 MG/ML
2.5 SOLUTION RESPIRATORY (INHALATION) ONCE
Status: COMPLETED | OUTPATIENT
Start: 2021-11-05 | End: 2021-11-05

## 2021-11-05 RX ADMIN — ALBUTEROL SULFATE 2.5 MG: 2.5 SOLUTION RESPIRATORY (INHALATION) at 08:25

## 2021-11-08 LAB
DLCOUNC-%PRED-PRE: 81 %
DLCOUNC-PRE: 21.53 ML/MIN/MMHG
DLCOUNC-PRED: 26.43 ML/MIN/MMHG
ERV-%PRED-PRE: 62 %
ERV-PRE: 0.53 L
ERV-PRED: 0.84 L
EXPTIME-PRE: 5.9 SEC
FEF2575-%PRED-POST: 159 %
FEF2575-%PRED-PRE: 143 %
FEF2575-POST: 4.15 L/SEC
FEF2575-PRE: 3.74 L/SEC
FEF2575-PRED: 2.6 L/SEC
FEFMAX-%PRED-PRE: 103 %
FEFMAX-PRE: 8.92 L/SEC
FEFMAX-PRED: 8.65 L/SEC
FEV1-%PRED-PRE: 99 %
FEV1-PRE: 3.29 L
FEV1FEV6-PRE: 84 %
FEV1FEV6-PRED: 78 %
FEV1FVC-PRE: 84 %
FEV1FVC-PRED: 76 %
FEV1SVC-PRE: 76 %
FEV1SVC-PRED: 69 %
FIFMAX-PRE: 4.81 L/SEC
FRCPLETH-%PRED-PRE: 74 %
FRCPLETH-PRE: 2.73 L
FRCPLETH-PRED: 3.67 L
FVC-%PRED-PRE: 89 %
FVC-PRE: 3.91 L
FVC-PRED: 4.37 L
IC-%PRED-PRE: 95 %
IC-PRE: 3.82 L
IC-PRED: 3.99 L
RVPLETH-%PRED-PRE: 85 %
RVPLETH-PRE: 2.2 L
RVPLETH-PRED: 2.57 L
TLCPLETH-%PRED-PRE: 91 %
TLCPLETH-PRE: 6.55 L
TLCPLETH-PRED: 7.13 L
VA-%PRED-PRE: 90 %
VA-PRE: 5.85 L
VC-%PRED-PRE: 89 %
VC-PRE: 4.34 L
VC-PRED: 4.83 L

## 2021-11-11 NOTE — PROGRESS NOTES
[facility]        OUTPATIENT PHYSICAL THERAPY FUNCTIONAL EVALUATION  PLAN OF TREATMENT FOR OUTPATIENT REHABILITATION  (COMPLETE FOR INITIAL CLAIMS ONLY)  Patient's Last Name, First Name, M.I.  YOB: 1955  Jamal Sanchez     Provider's Name   [facility]   Medical Record No.  [id number]     Start of Care Date:  11/02/21   Onset Date:  10/08/21   Type:     _X__PT   ____OT  ____SLP Medical Diagnosis: dizziness        PT Diagnosis:  motion sensitivity, unclear origin of sx Visits from SOC:  1                              __________________________________________________________________________________  Plan of Treatment/Functional Goals:  neuromuscular re-education           GOALS  1  pt will be able to bend over/stand up w/o sx in 4wk  11/30/21                                 Therapy Frequency:  1 time/week   Predicted Duration of Therapy Intervention:  no appt scheduled, willhold openn 30 days    Kris Hoenk, PT                                    I CERTIFY THE NEED FOR THESE SERVICES FURNISHED UNDER        THIS PLAN OF TREATMENT AND WHILE UNDER MY CARE     (Physician co-signature of this document indicates review and certification of the therapy plan).                Certification Date From:    11/2/21  Certification Date To:   11/30/21    Referring Provider:  Tami Abebe NP    Initial Assessment  See Epic Evaluation- Start of Care Date: 11/02/21

## 2021-11-17 ENCOUNTER — VIRTUAL VISIT (OUTPATIENT)
Dept: FAMILY MEDICINE | Facility: CLINIC | Age: 66
End: 2021-11-17
Payer: MEDICARE

## 2021-11-17 DIAGNOSIS — I10 HYPERTENSION, GOAL BELOW 140/90: ICD-10-CM

## 2021-11-17 DIAGNOSIS — R06.09 DYSPNEA ON EXERTION: Primary | ICD-10-CM

## 2021-11-17 PROCEDURE — 99214 OFFICE O/P EST MOD 30 MIN: CPT | Mod: TEL | Performed by: FAMILY MEDICINE

## 2021-11-17 NOTE — PROGRESS NOTES
Gopal is a 66 year old who is being evaluated via a billable video visit.      How would you like to obtain your AVS? PartyWithMehart. Text link to patient.  If the video visit is dropped, the invitation should be resent by: Text to cell phone: [phone]  Will anyone else be joining your video visit? No      Telephone visit.     Assessment & Plan     Dyspnea on exertion  Hypertension, goal below 140/90  Continues to have issues with dyspnea on exertion. Exercise EKG stress test with no ischemic changes, PFT testing WNL. CXR unremarkable.   -- No recent ECHO study. Discussed will need to see patient in clinic for further evaluation and care of the dyspnea on exertion. Will order ECHO study at this time. If ECHO is unremarkable will consider nuclear stress test for further evaluation, however will first evaluate patient in person prior to this.   -- Follow up in clinic in 5 days or sooner if needed. Reasons to present to ER discussed.   --All questions answered, patient in agreement with the plan.   - Echocardiogram Complete    Follow up next week ( 5 days)    Raad Benitez DO  [facility]   Gopal is a 66 year old who presents for the following health issues     HPI   Chief Complaint   Patient presents with     RECHECK     PFT, stress test, Brain MRI, and ENT. All tests were normal, what are the next steps.       Patient with recent testing for dyspnea on exertion. Has underwent EKG stress test, PFT testing, brain MRI, and ENT evaluation.     Brain MRI   IMPRESSION:  1. Unremarkable appearance of the visualized cranial nerves VII-VIII  complexes bilaterally. No internal auditory canal or cerebellopontine  angle cistern mass.  2. Brain atrophy and presumed chronic small vessel scanning changes,  as described.  3. No acute intracranial abnormality.    PFT 11/5/2021  IMPRESSION:   Normal Pulmonary Function     Exercise EKG stress test  Within normal limits.     Today on the phone:  1 month  ago started to develop shortness of breath.   Will be more short of breath with activity such as walking from the mailbox to back home.   No chest pain. No palpitations.  No recent fevers or chills.  Reports some irritation in his throat at times.   Occasional cough in the morning with clear-white mucous, nothing throughout the day.     Review of Systems   Constitutional, HEENT, cardiovascular, pulmonary, gi and gu systems are negative, except as otherwise noted.      Objective           Vitals:  No vitals were obtained today due to virtual visit.    Physical Exam   No physical exam, telephone visit.     Telephone visit 9 minutes total

## 2021-11-23 ENCOUNTER — OFFICE VISIT (OUTPATIENT)
Dept: FAMILY MEDICINE | Facility: CLINIC | Age: 66
End: 2021-11-23
Payer: MEDICARE

## 2021-11-23 VITALS
HEART RATE: 71 BPM | OXYGEN SATURATION: 98 % | BODY MASS INDEX: 32.21 KG/M2 | HEIGHT: 70 IN | RESPIRATION RATE: 16 BRPM | SYSTOLIC BLOOD PRESSURE: 138 MMHG | TEMPERATURE: 97.9 F | DIASTOLIC BLOOD PRESSURE: 82 MMHG | WEIGHT: 225 LBS

## 2021-11-23 DIAGNOSIS — E66.09 CLASS 1 OBESITY DUE TO EXCESS CALORIES WITHOUT SERIOUS COMORBIDITY WITH BODY MASS INDEX (BMI) OF 32.0 TO 32.9 IN ADULT: ICD-10-CM

## 2021-11-23 DIAGNOSIS — Z13.21 ENCOUNTER FOR VITAMIN DEFICIENCY SCREENING: ICD-10-CM

## 2021-11-23 DIAGNOSIS — E66.89 OTHER OBESITY: ICD-10-CM

## 2021-11-23 DIAGNOSIS — R06.09 DYSPNEA ON EXERTION: Primary | ICD-10-CM

## 2021-11-23 DIAGNOSIS — R53.83 OTHER FATIGUE: ICD-10-CM

## 2021-11-23 DIAGNOSIS — I10 HYPERTENSION, GOAL BELOW 140/90: ICD-10-CM

## 2021-11-23 DIAGNOSIS — E66.811 CLASS 1 OBESITY DUE TO EXCESS CALORIES WITHOUT SERIOUS COMORBIDITY WITH BODY MASS INDEX (BMI) OF 32.0 TO 32.9 IN ADULT: ICD-10-CM

## 2021-11-23 DIAGNOSIS — Z23 HIGH PRIORITY FOR 2019-NCOV VACCINE: ICD-10-CM

## 2021-11-23 DIAGNOSIS — E61.1 LOW IRON: ICD-10-CM

## 2021-11-23 LAB
ALBUMIN SERPL-MCNC: 3.7 G/DL (ref 3.4–5)
ALP SERPL-CCNC: 85 U/L (ref 40–150)
ALT SERPL W P-5'-P-CCNC: 83 U/L (ref 0–70)
ANION GAP SERPL CALCULATED.3IONS-SCNC: 6 MMOL/L (ref 3–14)
AST SERPL W P-5'-P-CCNC: 41 U/L (ref 0–45)
BILIRUB SERPL-MCNC: 0.3 MG/DL (ref 0.2–1.3)
BUN SERPL-MCNC: 14 MG/DL (ref 7–30)
CALCIUM SERPL-MCNC: 8.9 MG/DL (ref 8.5–10.1)
CHLORIDE BLD-SCNC: 105 MMOL/L (ref 94–109)
CO2 SERPL-SCNC: 27 MMOL/L (ref 20–32)
CREAT SERPL-MCNC: 1.02 MG/DL (ref 0.66–1.25)
ERYTHROCYTE [DISTWIDTH] IN BLOOD BY AUTOMATED COUNT: 16.4 % (ref 10–15)
FERRITIN SERPL-MCNC: 8 NG/ML (ref 26–388)
GFR SERPL CREATININE-BSD FRML MDRD: 76 ML/MIN/1.73M2
GLUCOSE BLD-MCNC: 101 MG/DL (ref 70–99)
HCT VFR BLD AUTO: 38.5 % (ref 40–53)
HGB BLD-MCNC: 11.3 G/DL (ref 13.3–17.7)
IRON SATN MFR SERPL: 3 % (ref 15–46)
IRON SERPL-MCNC: 18 UG/DL (ref 35–180)
MCH RBC QN AUTO: 22.5 PG (ref 26.5–33)
MCHC RBC AUTO-ENTMCNC: 29.4 G/DL (ref 31.5–36.5)
MCV RBC AUTO: 77 FL (ref 78–100)
PLATELET # BLD AUTO: 283 10E3/UL (ref 150–450)
POTASSIUM BLD-SCNC: 4 MMOL/L (ref 3.4–5.3)
PROT SERPL-MCNC: 7.6 G/DL (ref 6.8–8.8)
RBC # BLD AUTO: 5.03 10E6/UL (ref 4.4–5.9)
SODIUM SERPL-SCNC: 138 MMOL/L (ref 133–144)
TIBC SERPL-MCNC: 529 UG/DL (ref 240–430)
TSH SERPL DL<=0.005 MIU/L-ACNC: 1.33 MU/L (ref 0.4–4)
WBC # BLD AUTO: 5.2 10E3/UL (ref 4–11)

## 2021-11-23 PROCEDURE — 80053 COMPREHEN METABOLIC PANEL: CPT | Performed by: FAMILY MEDICINE

## 2021-11-23 PROCEDURE — 85027 COMPLETE CBC AUTOMATED: CPT | Performed by: FAMILY MEDICINE

## 2021-11-23 PROCEDURE — 82728 ASSAY OF FERRITIN: CPT | Performed by: FAMILY MEDICINE

## 2021-11-23 PROCEDURE — 83550 IRON BINDING TEST: CPT | Performed by: FAMILY MEDICINE

## 2021-11-23 PROCEDURE — 82306 VITAMIN D 25 HYDROXY: CPT | Performed by: FAMILY MEDICINE

## 2021-11-23 PROCEDURE — 99214 OFFICE O/P EST MOD 30 MIN: CPT | Mod: 25 | Performed by: FAMILY MEDICINE

## 2021-11-23 PROCEDURE — 84443 ASSAY THYROID STIM HORMONE: CPT | Performed by: FAMILY MEDICINE

## 2021-11-23 PROCEDURE — 91306 COVID-19,PF,MODERNA (18+ YRS BOOSTER .25ML): CPT | Performed by: FAMILY MEDICINE

## 2021-11-23 PROCEDURE — 0064A COVID-19,PF,MODERNA (18+ YRS BOOSTER .25ML): CPT | Performed by: FAMILY MEDICINE

## 2021-11-23 PROCEDURE — 36415 COLL VENOUS BLD VENIPUNCTURE: CPT | Performed by: FAMILY MEDICINE

## 2021-11-23 ASSESSMENT — MIFFLIN-ST. JEOR: SCORE: 1801.84

## 2021-11-23 NOTE — PATIENT INSTRUCTIONS
Blood work today.     Schedule the ECHO heart test at [facility].     Next steps pending blood work and ECHO heart study.

## 2021-11-23 NOTE — PROGRESS NOTES
Assessment & Plan     Dyspnea on exertion  Other fatigue  Obesity BMI 32.  Initial evaluation thus far with treadmill exercise stress test, pulmonary function testing, EKG, CXR has returned unremarkable for causes of dyspnea on exertion. ECHO is scheduled for 12/22. He is going to proceed with   Patient does have extensive alcohol history and this could be contributing.  We will proceed with echocardiogram, CBC, CMP, thyroid, iron studies and also vitamin D for further work-up and evaluation as patient is also complaining of fatigue.  If echocardiogram is unremarkable will consider proceeding with nuclear stress test.   - Echocardiogram Complete  - CBC with platelets  - Comprehensive metabolic panel (BMP + Alb, Alk Phos, ALT, AST, Total. Bili, TP)  - TSH with free T4 reflex  - Ferritin  - Iron and iron binding capacity    Hypertension, goal below 140/90  BP controlled 138/82 today in clinic.   Currently on triamterene-hydrochlorothiazide 75-50 daily.    High priority for 2019-nCoV vaccine  - COVID-19,PF,MODERNA (18+ Yrs BOOSTER .25mL)    The risks, benefits and treatment options of prescribed medications or other treatments have been discussed with the patient. The patient verbalized their understanding and should call or follow up if no improvement or if they develop further problems.    Raad Benitez, [facility]    Taylor Herron is a 66 year old who presents for the following health issues     History of Present Illness       He eats 2-3 servings of fruits and vegetables daily.He consumes 0 sweetened beverage(s) daily.He exercises with enough effort to increase his heart rate 30 to 60 minutes per day.  He exercises with enough effort to increase his heart rate 5 days per week.   He is taking medications regularly.     66-year-old male presents to clinic for follow-up regarding dyspnea on exertion.    Patient with recent testing for dyspnea on exertion. Has underwent EKG stress  "test, PFT testing, brain MRI, and ENT evaluation.      Brain MRI   IMPRESSION:  1. Unremarkable appearance of the visualized cranial nerves VII-VIII  complexes bilaterally. No internal auditory canal or cerebellopontine  angle cistern mass.  2. Brain atrophy and presumed chronic small vessel scanning changes,  as described.  3. No acute intracranial abnormality.     PFT 11/5/2021  IMPRESSION:   Normal Pulmonary Function      Exercise EKG stress test  Within normal limits.     Patient with a virtual visit done 11/17/2021.  At that time it was determined to proceed with echocardiogram for further evaluation of the dyspnea on exertion.  Patient presents today for follow-up.    Today in clinic:  Reports not being able to schedule the ECHO until 12/22  Shortness of breath and dizziness originally started in November.   Dizziness has improved.   Will have \"sensitivity\" in the middle of the chest. No chest pain.   Has a history of cracked sternum back in high school.   Will become short of breath with minimal activity. He states he will then drink some alcohol and his shortness of breath will improve and he can be more active then.   No cough. No loss of taste or smell. No recurrent fevers.   Is still able to go for walks with the dog but not as fast or for as long.   Also complains of feeling more fatigued.   No blood in stool.   No black tarry stool.   No unexpected weight loss or weight gain.   Drinks alcohol about 10-12 drinks on the weekend,   Drinks 1-2 drinks on weekdays.   Non-tobacco user.     Preventive  Wishes to have a booster vaccine.   Moderna in feb and march 2021     Review of Systems   Constitutional, HEENT, cardiovascular, pulmonary, gi and gu systems are negative, except as otherwise noted.      Objective    /82 (BP Location: Right arm, Patient Position: Chair, Cuff Size: Adult Regular)   Pulse 71   Temp 97.9  F (36.6  C) (Tympanic)   Resp 16   Ht 1.778 m (5' 10\")   Wt 102.1 kg (225 lb)   " SpO2 98%   BMI 32.28 kg/m    Body mass index is 32.28 kg/m .  Physical Exam   General: Alert, cooperative, no acute distress  Head: Normocephalic, atraumatic   Eyes: PERRL, no scleral icterus, no conjunctival injection   Ears: External ear without deformity, external canals patent, TM intact with no signs of infection   Nose: nares patent  Throat: Oropharynx clear, non-erythematous, tonsils non-enlarged   Neck: supple, trachea midline, no goiter   CV: RRR, no murmur   Lungs: Clear, non-labored breathing, No rales or rhonchi   Abdomen: Bowel sounds active, soft, non-tender, no guarding.   Extremities: No peripheral edema, calves non-tender   MSK: strength intact in upper and lower extremities. Gait normal.   Neuro: CN II-XII grossly intact. No focal deficits. Sensation intact.

## 2021-11-24 LAB — DEPRECATED CALCIDIOL+CALCIFEROL SERPL-MC: 41 UG/L (ref 20–75)

## 2021-11-25 DIAGNOSIS — D50.8 OTHER IRON DEFICIENCY ANEMIA: ICD-10-CM

## 2021-11-25 DIAGNOSIS — R74.01 ELEVATED ALT MEASUREMENT: Primary | ICD-10-CM

## 2021-11-25 PROBLEM — D50.9 ANEMIA, IRON DEFICIENCY: Status: ACTIVE | Noted: 2021-11-25

## 2021-11-25 RX ORDER — FERROUS SULFATE 325(65) MG
325 TABLET, DELAYED RELEASE (ENTERIC COATED) ORAL EVERY OTHER DAY
Qty: 45 TABLET | Refills: 3 | Status: SHIPPED | OUTPATIENT
Start: 2021-11-25 | End: 2022-03-10

## 2021-12-10 ENCOUNTER — HOSPITAL ENCOUNTER (OUTPATIENT)
Dept: CARDIOLOGY | Facility: CLINIC | Age: 66
Discharge: HOME | End: 2021-12-10
Attending: FAMILY MEDICINE | Admitting: FAMILY MEDICINE
Payer: MEDICARE

## 2021-12-10 DIAGNOSIS — R06.09 DYSPNEA ON EXERTION: Primary | ICD-10-CM

## 2021-12-10 LAB — LVEF ECHO: NORMAL

## 2021-12-10 PROCEDURE — 255N000002 HC RX 255 OP 636: Performed by: FAMILY MEDICINE

## 2021-12-10 PROCEDURE — 999N000208 ECHOCARDIOGRAM COMPLETE

## 2021-12-10 PROCEDURE — 93306 TTE W/DOPPLER COMPLETE: CPT | Mod: 26 | Performed by: INTERNAL MEDICINE

## 2021-12-10 RX ADMIN — HUMAN ALBUMIN MICROSPHERES AND PERFLUTREN 3 ML: 10; .22 INJECTION, SOLUTION INTRAVENOUS at 14:12

## 2021-12-13 ENCOUNTER — OFFICE VISIT (OUTPATIENT)
Dept: FAMILY MEDICINE | Facility: CLINIC | Age: 66
End: 2021-12-13
Payer: MEDICARE

## 2021-12-13 VITALS
OXYGEN SATURATION: 98 % | RESPIRATION RATE: 28 BRPM | TEMPERATURE: 97.7 F | DIASTOLIC BLOOD PRESSURE: 70 MMHG | HEIGHT: 70 IN | BODY MASS INDEX: 32.78 KG/M2 | WEIGHT: 229 LBS | SYSTOLIC BLOOD PRESSURE: 120 MMHG | HEART RATE: 68 BPM

## 2021-12-13 DIAGNOSIS — R06.02 SHORTNESS OF BREATH ON EXERTION: ICD-10-CM

## 2021-12-13 DIAGNOSIS — K21.9 GASTROESOPHAGEAL REFLUX DISEASE WITHOUT ESOPHAGITIS: ICD-10-CM

## 2021-12-13 DIAGNOSIS — D50.0 IRON DEFICIENCY ANEMIA DUE TO CHRONIC BLOOD LOSS: Primary | ICD-10-CM

## 2021-12-13 PROCEDURE — 99214 OFFICE O/P EST MOD 30 MIN: CPT | Performed by: FAMILY MEDICINE

## 2021-12-13 RX ORDER — OMEPRAZOLE 40 MG/1
40 CAPSULE, DELAYED RELEASE ORAL DAILY
Qty: 90 CAPSULE | Refills: 1 | Status: SHIPPED | OUTPATIENT
Start: 2021-12-13 | End: 2021-12-15

## 2021-12-13 ASSESSMENT — MIFFLIN-ST. JEOR: SCORE: 1819.99

## 2021-12-13 NOTE — PROGRESS NOTES
Assessment & Plan     Shortness of breath on exertion  Iron deficiency anemia due to chronic blood loss  Continues have symptoms of shortness of breath with exertion.  Recent iron studies showed depressed ferritin and also decreased hemoglobin.  He donates blood every 6 to 8 weeks and has donated almost 10 gallons in his lifetime.  Discussed the low hemoglobin and low ferritin could be a culprit for his symptoms.  Has underwent treadmill exercise stress test, pulmonary function testing, echocardiogram which has all returned  unremarkable.  We will replete the iron with every other day dosing.  Recheck CBC and iron studies in 6 weeks.  Discussed to hold off on further blood donation.  No loss of blood in the urine or stool per his knowledge.  If symptoms persist despite increase ferritin and hemoglobin levels will consider NM Lexiscan stress test for further evaluation and cares.  - Ferritin  - Iron and iron binding capacity  - CBC with platelets    Gastroesophageal reflux disease without esophagitis  Currently on Prilosec 40 mg daily. Continues to have GERD symptoms. Last EGD in 2013 showed gastritis and hiatal hernia. Will further evaluate with EGD for next steps.   - omeprazole (PRILOSEC) 40 MG DR capsule  Dispense: 90 capsule; Refill: 1  - Adult Gastro Ref - Procedure Only    Follow up in 6 weeks or sooner as needed. Lab work prior.     The risks, benefits and treatment options of prescribed medications or other treatments have been discussed with the patient. The patient verbalized their understanding and should call or follow up if no improvement or if they develop further problems.    >30 minutes spent on the date of the encounter doing chart review, history and exam, documentation and further activities as noted above    Raad Benitez DO  [facility]   Gopal is a 66 year old who presents for the following health issues     History of Present Illness       He eats 2-3  "servings of fruits and vegetables daily.He consumes 0 sweetened beverage(s) daily.He exercises with enough effort to increase his heart rate 30 to 60 minutes per day.  He exercises with enough effort to increase his heart rate 5 days per week.   He is taking medications regularly.     Follow up on ECHO testing and shortness of breath.     ECHO 11/23/2021  Interpretation Summary    1. Normal biventricular size and function. Left ventricular ejection fraction  of 60-65%. No segmental wall motion abnormalities noted.  2. Normal sized atria.  3. No hemodynamically significant valvular disease.  No prior study for comparison. Technically adequate study.  ______________________________________________________________________________      Has been donating blood every 6-8 weeks for the last 10-15 years.   Has donated almost 10 gallons of blood in his life.   Has started on iron pills every other day since 11/23  No blood in the stool. Stools now black from the iron. No sticky stools. No abdominal pain.    No shortness of breath at rest. Reports continues to have shortness of breath with exertion this is unchanged from prior.     Reports had a cracked sternum in highschool. Reports will have sternum pain with lifting heavy objects. No pain today in clinic.     No tobacco use current   No unexpected weight loss or weight gain.   Will walk the dog and also swim/ do light weights about 3 times weekly.       GERD  Has reflux on Omeprazole 40 mg daily.   Reports having worsening GERD symptoms recently. Last EGD 10/19/2013 showed gastritis, hiatal hernia.       Review of Systems   Constitutional, HEENT, cardiovascular, pulmonary, gi and gu systems are negative, except as otherwise noted.      Objective    /70 (BP Location: Right arm, Patient Position: Chair, Cuff Size: Adult Large)   Pulse 68   Temp 97.7  F (36.5  C) (Tympanic)   Resp 28   Ht 1.778 m (5' 10\")   Wt 103.9 kg (229 lb)   SpO2 98%   BMI 32.86 kg/m    Body " mass index is 32.86 kg/m .  Physical Exam   General: alert, cooperative, no acute distress   CV: RRR, no murmur  Resp: non-labored breathing, clear to auscultation, no wheezing or rales   Abdomen: Soft, non-tender, no guarding.   Extremities: No peripheral edema, calves non-tender.

## 2021-12-13 NOTE — PATIENT INSTRUCTIONS
Continue on iron supplementation every other day.     Blood work in 6 weeks, follow up visit after the blood work.     Schedule EGD ( scope) due to GERD symptoms despite being on Prilosec 40 mg daily.

## 2021-12-14 DIAGNOSIS — K21.9 GASTROESOPHAGEAL REFLUX DISEASE WITHOUT ESOPHAGITIS: ICD-10-CM

## 2021-12-15 NOTE — TELEPHONE ENCOUNTER
Pending Prescriptions:                       Disp   Refills    omeprazole (PRILOSEC) 40 MG DR capsule    90 cap*0            Sig: Take 1 capsule (40 mg) by mouth daily    Routing refill request to provider for review/approval because:  Drug interaction warning      Kam Dudley RN

## 2021-12-16 DIAGNOSIS — Z11.59 ENCOUNTER FOR SCREENING FOR OTHER VIRAL DISEASES: ICD-10-CM

## 2021-12-16 RX ORDER — OMEPRAZOLE 40 MG/1
40 CAPSULE, DELAYED RELEASE ORAL DAILY
Qty: 90 CAPSULE | Refills: 3 | Status: SHIPPED | OUTPATIENT
Start: 2021-12-16 | End: 2022-12-13

## 2021-12-31 ENCOUNTER — LAB (OUTPATIENT)
Dept: LAB | Facility: CLINIC | Age: 66
End: 2021-12-31
Payer: MEDICARE

## 2021-12-31 ENCOUNTER — ANESTHESIA EVENT (OUTPATIENT)
Dept: GASTROENTEROLOGY | Facility: CLINIC | Age: 66
End: 2021-12-31
Payer: MEDICARE

## 2021-12-31 DIAGNOSIS — Z11.59 ENCOUNTER FOR SCREENING FOR OTHER VIRAL DISEASES: ICD-10-CM

## 2021-12-31 PROCEDURE — U0003 INFECTIOUS AGENT DETECTION BY NUCLEIC ACID (DNA OR RNA); SEVERE ACUTE RESPIRATORY SYNDROME CORONAVIRUS 2 (SARS-COV-2) (CORONAVIRUS DISEASE [COVID-19]), AMPLIFIED PROBE TECHNIQUE, MAKING USE OF HIGH THROUGHPUT TECHNOLOGIES AS DESCRIBED BY CMS-2020-01-R: HCPCS

## 2021-12-31 PROCEDURE — U0005 INFEC AGEN DETEC AMPLI PROBE: HCPCS

## 2021-12-31 NOTE — ANESTHESIA PREPROCEDURE EVALUATION
Anesthesia Pre-Procedure Evaluation    Patient: Jamal Sanchez   MRN: [id number] : 1955        Preoperative Diagnosis: GERD without esophagitis [K21.9]    Procedure : Procedure(s):  ESOPHAGOGASTRODUODENOSCOPY (EGD)          Past Medical History:   Diagnosis Date     BPH (benign prostatic hyperplasia)      Diverticulitis      GERD (gastroesophageal reflux disease)      Hypertension       Past Surgical History:   Procedure Laterality Date     APPENDECTOMY       ARTHROSCOPY SHOULDER ROTATOR CUFF REPAIR Left      ARTHROSCOPY SHOULDER RT/LT Right 2018     COLONOSCOPY  3/5/2008     COLONOSCOPY  10/9/2013    Procedure: COLONOSCOPY;;  Surgeon: Nicolas Lizama MD;  Location: [facility]     ESOPHAGOSCOPY, GASTROSCOPY, DUODENOSCOPY (EGD), COMBINED  10/9/2013    Procedure: COMBINED ESOPHAGOSCOPY, GASTROSCOPY, DUODENOSCOPY (EGD), BIOPSY SINGLE OR MULTIPLE;;  Surgeon: Nicolas Lizama MD;  Location: [facility]     HERNIA REPAIR       LAPAROSCOPIC APPENDECTOMY N/A 2016    Procedure: LAPAROSCOPIC APPENDECTOMY;  Surgeon: Ab Guzman MD;  Location: [facility]     LAPAROSCOPIC HERNIORRHAPHY INGUINAL BILATERAL Bilateral 2016    Procedure: LAPAROSCOPIC HERNIORRHAPHY INGUINAL BILATERAL;  Surgeon: Ab Guzman MD;  Location: [facility]      Allergies   Allergen Reactions     Nka [No Known Allergies]       Social History     Tobacco Use     Smoking status: Never Smoker     Smokeless tobacco: Never Used   Substance Use Topics     Alcohol use: Yes     Comment: 6 pack beer on weekend.      Wt Readings from Last 1 Encounters:   21 103.9 kg (229 lb)        Anesthesia Evaluation   Pt has had prior anesthetic. Type: MAC and General.        ROS/MED HX  ENT/Pulmonary:     (+) ISABELLA risk factors, hypertension,     Neurologic:       Cardiovascular:     (+) hypertension-----Previous cardiac testing   Echo: Date:  Results:  Interpretation Summary     1. Normal biventricular size and function. Left ventricular ejection  fraction  of 60-65%. No segmental wall motion abnormalities noted.  2. Normal sized atria.  3. No hemodynamically significant valvular disease.  No prior study for comparison. Technically adequate study.  Stress Test: Date:  Results:    ECG Reviewed: Date:  Results:  Sinus Bradycardia   WITHIN NORMAL LIMITS  Cath:  Date: Results:      METS/Exercise Tolerance:     Hematologic:     (+) anemia,     Musculoskeletal:       GI/Hepatic: Comment: diverticulitis    (+) GERD,     Renal/Genitourinary:     (+) BPH,     Endo:     (+) Obesity,     Psychiatric/Substance Use:     (+) alcohol abuse     Infectious Disease:       Malignancy:       Other:            Physical Exam    Airway        Mallampati: I   TM distance: > 3 FB   Neck ROM: full   Mouth opening: > 3 cm    Respiratory Devices and Support         Dental  no notable dental history         Cardiovascular   cardiovascular exam normal          Pulmonary   pulmonary exam normal                OUTSIDE LABS:  CBC:   Lab Results   Component Value Date    WBC 5.2 11/23/2021    WBC 6.1 07/10/2019    HGB 11.3 (L) 11/23/2021    HGB 15.0 07/10/2019    HCT 38.5 (L) 11/23/2021    HCT 46.2 07/10/2019     11/23/2021     07/10/2019     BMP:   Lab Results   Component Value Date     11/23/2021     02/22/2021    POTASSIUM 4.0 11/23/2021    POTASSIUM 4.2 02/22/2021    CHLORIDE 105 11/23/2021    CHLORIDE 102 02/22/2021    CO2 27 11/23/2021    CO2 28 02/22/2021    BUN 14 11/23/2021    BUN 23 02/22/2021    CR 1.02 11/23/2021    CR 0.89 02/22/2021     (H) 11/23/2021    GLC 90 02/22/2021     COAGS:   Lab Results   Component Value Date    INR 1.10 11/22/2018     POC: No results found for: BGM, HCG, HCGS  HEPATIC:   Lab Results   Component Value Date    ALBUMIN 3.7 11/23/2021    PROTTOTAL 7.6 11/23/2021    ALT 83 (H) 11/23/2021    AST 41 11/23/2021    ALKPHOS 85 11/23/2021    BILITOTAL 0.3 11/23/2021     OTHER:   Lab Results   Component Value Date     LACT 1.3 07/25/2017    ALYCE 8.9 11/23/2021    MAG 2.0 11/22/2018    LIPASE 77 07/02/2019    TSH 1.33 11/23/2021    SED 9 08/29/2017       Anesthesia Plan    ASA Status:  3      Anesthesia Type: General.              Consents    Anesthesia Plan(s) and associated risks, benefits, and realistic alternatives discussed. Questions answered and patient/representative(s) expressed understanding.     - Discussed: Risks, Benefits and Alternatives for BOTH SEDATION and the PROCEDURE were discussed     - Discussed with:  Patient         Postoperative Care       PONV prophylaxis: Background Propofol Infusion     Comments:                HERBERTH Oconnor CRNA

## 2022-01-03 ENCOUNTER — HOSPITAL ENCOUNTER (OUTPATIENT)
Facility: CLINIC | Age: 67
Discharge: HOME | End: 2022-01-03
Attending: SURGERY | Admitting: SURGERY
Payer: MEDICARE

## 2022-01-03 ENCOUNTER — ANESTHESIA (OUTPATIENT)
Dept: GASTROENTEROLOGY | Facility: CLINIC | Age: 67
End: 2022-01-03
Payer: MEDICARE

## 2022-01-03 VITALS
TEMPERATURE: 97.8 F | DIASTOLIC BLOOD PRESSURE: 86 MMHG | OXYGEN SATURATION: 96 % | RESPIRATION RATE: 16 BRPM | SYSTOLIC BLOOD PRESSURE: 129 MMHG | WEIGHT: 229 LBS | HEIGHT: 70 IN | BODY MASS INDEX: 32.78 KG/M2 | HEART RATE: 60 BPM

## 2022-01-03 LAB — UPPER GI ENDOSCOPY: NORMAL

## 2022-01-03 PROCEDURE — 43249 ESOPH EGD DILATION <30 MM: CPT | Performed by: SURGERY

## 2022-01-03 PROCEDURE — 43239 EGD BIOPSY SINGLE/MULTIPLE: CPT | Mod: 59 | Performed by: SURGERY

## 2022-01-03 PROCEDURE — 258N000003 HC RX IP 258 OP 636: Performed by: SURGERY

## 2022-01-03 PROCEDURE — 250N000009 HC RX 250: Performed by: SURGERY

## 2022-01-03 PROCEDURE — 370N000017 HC ANESTHESIA TECHNICAL FEE, PER MIN: Performed by: SURGERY

## 2022-01-03 PROCEDURE — 250N000009 HC RX 250: Performed by: NURSE ANESTHETIST, CERTIFIED REGISTERED

## 2022-01-03 PROCEDURE — 250N000011 HC RX IP 250 OP 636: Performed by: NURSE ANESTHETIST, CERTIFIED REGISTERED

## 2022-01-03 RX ORDER — LIDOCAINE 40 MG/G
CREAM TOPICAL
Status: DISCONTINUED | OUTPATIENT
Start: 2022-01-03 | End: 2022-01-03 | Stop reason: HOSPADM

## 2022-01-03 RX ORDER — SODIUM CHLORIDE, SODIUM LACTATE, POTASSIUM CHLORIDE, CALCIUM CHLORIDE 600; 310; 30; 20 MG/100ML; MG/100ML; MG/100ML; MG/100ML
INJECTION, SOLUTION INTRAVENOUS CONTINUOUS
Status: DISCONTINUED | OUTPATIENT
Start: 2022-01-03 | End: 2022-01-03 | Stop reason: HOSPADM

## 2022-01-03 RX ORDER — ONDANSETRON 2 MG/ML
4 INJECTION INTRAMUSCULAR; INTRAVENOUS
Status: DISCONTINUED | OUTPATIENT
Start: 2022-01-03 | End: 2022-01-03 | Stop reason: HOSPADM

## 2022-01-03 RX ORDER — PROPOFOL 10 MG/ML
INJECTION, EMULSION INTRAVENOUS PRN
Status: DISCONTINUED | OUTPATIENT
Start: 2022-01-03 | End: 2022-01-03

## 2022-01-03 RX ADMIN — LIDOCAINE HYDROCHLORIDE 0.1 ML: 10 INJECTION, SOLUTION EPIDURAL; INFILTRATION; INTRACAUDAL; PERINEURAL at 10:05

## 2022-01-03 RX ADMIN — SODIUM CHLORIDE, POTASSIUM CHLORIDE, SODIUM LACTATE AND CALCIUM CHLORIDE: 600; 310; 30; 20 INJECTION, SOLUTION INTRAVENOUS at 10:04

## 2022-01-03 RX ADMIN — TOPICAL ANESTHETIC 2 SPRAY: 200 SPRAY DENTAL; PERIODONTAL at 10:22

## 2022-01-03 RX ADMIN — PROPOFOL 250 MG: 10 INJECTION, EMULSION INTRAVENOUS at 10:24

## 2022-01-03 ASSESSMENT — MIFFLIN-ST. JEOR: SCORE: 1819.99

## 2022-01-03 NOTE — ANESTHESIA POSTPROCEDURE EVALUATION
Patient: Jamal Sanchez    Procedure: Procedure(s):  ESOPHAGOGASTRODUODENOSCOPY, WITH BALLOON DILATION OF LESS THAN 30 MILLIMETERS       Diagnosis:GERD without esophagitis [K21.9]  Diagnosis Additional Information: No value filed.    Anesthesia Type:  General    Note:  Disposition: Outpatient   Postop Pain Control: Uneventful            Sign Out: Well controlled pain   PONV: No   Neuro/Psych: Uneventful            Sign Out: Acceptable/Baseline neuro status   Airway/Respiratory: Uneventful            Sign Out: Acceptable/Baseline resp. status   CV/Hemodynamics: Uneventful            Sign Out: Acceptable CV status; No obvious hypovolemia; No obvious fluid overload   Other NRE: NONE   DID A NON-ROUTINE EVENT OCCUR? No           Last vitals:  Vitals Value Taken Time   /86 01/03/22 1100   Temp     Pulse 60 01/03/22 1100   Resp 16 01/03/22 1100   SpO2 88 % 01/03/22 1052   Vitals shown include unvalidated device data.    Electronically Signed By: HERBERTH Perkins CRNA  January 3, 2022  11:16 AM

## 2022-01-03 NOTE — H&P
66 year old year old male here for upper endoscopy for GERD        Patient Active Problem List   Diagnosis     Benign prostatic hypertrophy     CARDIOVASCULAR SCREENING; LDL GOAL LESS THAN 160     GERD (gastroesophageal reflux disease)     Advanced directives, counseling/discussion     Hypertension, goal below 140/90     Appendicitis     Diverticulosis of large intestine without hemorrhage     Diverticulitis of colon     Acute diverticulitis     ETOH abuse     Generalized abdominal pain     Anemia, iron deficiency       Past Medical History:   Diagnosis Date     BPH (benign prostatic hyperplasia)      Diverticulitis      GERD (gastroesophageal reflux disease)      Hypertension        Past Surgical History:   Procedure Laterality Date     APPENDECTOMY       ARTHROSCOPY SHOULDER ROTATOR CUFF REPAIR Left      ARTHROSCOPY SHOULDER RT/LT Right 01/2018     COLONOSCOPY  3/5/2008     COLONOSCOPY  10/9/2013    Procedure: COLONOSCOPY;;  Surgeon: Nicolas Lizama MD;  Location: [facility]     ESOPHAGOSCOPY, GASTROSCOPY, DUODENOSCOPY (EGD), COMBINED  10/9/2013    Procedure: COMBINED ESOPHAGOSCOPY, GASTROSCOPY, DUODENOSCOPY (EGD), BIOPSY SINGLE OR MULTIPLE;;  Surgeon: Nicolas Lizama MD;  Location: [facility]     HERNIA REPAIR       LAPAROSCOPIC APPENDECTOMY N/A 1/7/2016    Procedure: LAPAROSCOPIC APPENDECTOMY;  Surgeon: Ab Guzman MD;  Location: [facility]     LAPAROSCOPIC HERNIORRHAPHY INGUINAL BILATERAL Bilateral 12/13/2016    Procedure: LAPAROSCOPIC HERNIORRHAPHY INGUINAL BILATERAL;  Surgeon: Ab Guzman MD;  Location: [facility]       Family History   Problem Relation Age of Onset     Diabetes Mother      Cerebrovascular Disease Mother      Neurologic Disorder Father         parkinson's     Cancer - colorectal Maternal Grandfather      Hypertension Brother      Cerebrovascular Disease Brother         stroke     Neurologic Disorder Brother      Alcohol/Drug Brother      Unknown/Adopted Brother      Obesity Sister        No  current outpatient medications on file.       Allergies   Allergen Reactions     Nka [No Known Allergies]        Pt reports that he has never smoked. He has never used smokeless tobacco. He reports current alcohol use. He reports that he does not use drugs.    Exam:    Awake, Alert OX3  Lungs - CTA bilaterally  CV - RRR, no murmurs, distal pulses intact  Abd - soft, non-distended, non-tender, +BS  Extr - No cyanosis or edema    A/P 66 year old year old male in need of upper endoscopy for GERD. Risks, benefits, alternatives, and complications were discussed including the possibility of perforation and the patient agreed to proceed.    Ab Guzman MD

## 2022-01-03 NOTE — ANESTHESIA CARE TRANSFER NOTE
Patient: Jamal Sanchez    Procedure: Procedure(s):  ESOPHAGOGASTRODUODENOSCOPY, WITH BALLOON DILATION OF LESS THAN 30 MILLIMETERS       Diagnosis: GERD without esophagitis [K21.9]  Diagnosis Additional Information: No value filed.    Anesthesia Type:   General     Note:    Oropharynx: oropharynx clear of all foreign objects and spontaneously breathing  Level of Consciousness: awake  Oxygen Supplementation: room air    Independent Airway: airway patency satisfactory and stable  Dentition: dentition unchanged  Vital Signs Stable: post-procedure vital signs reviewed and stable  Report to RN Given: handoff report given  Patient transferred to: Phase II    Handoff Report: Identifed the Patient, Identified the Reponsible Provider, Reviewed the pertinent medical history, Discussed the surgical course, Reviewed Intra-OP anesthesia mangement and issues during anesthesia, Set expectations for post-procedure period and Allowed opportunity for questions and acknowledgement of understanding      Vitals:  Vitals Value Taken Time   BP     Temp     Pulse     Resp     SpO2         Electronically Signed By: HERBERTH Perkins CRNA  January 3, 2022  10:32 AM

## 2022-01-04 PROBLEM — K44.9 HIATAL HERNIA: Status: ACTIVE | Noted: 2022-01-04

## 2022-03-03 ENCOUNTER — TELEPHONE (OUTPATIENT)
Dept: FAMILY MEDICINE | Facility: CLINIC | Age: 67
End: 2022-03-03
Payer: MEDICARE

## 2022-03-03 ENCOUNTER — NURSE TRIAGE (OUTPATIENT)
Dept: NURSING | Facility: CLINIC | Age: 67
End: 2022-03-03
Payer: MEDICARE

## 2022-03-03 NOTE — TELEPHONE ENCOUNTER
Reason for Call:  Same Day Appointment, Requested Provider:  Patient was triaged with the nurse and was recommended to be seen in the ED, patient does not want to go to the ED, patient wants to be see by Dr. Can.  When I tried to schedule first available appointment was the end of March     PCP: No primary care provider on file.    Reason for visit: pain in breast bone and shortness of breath     Duration of symptoms: patient says that he has had this pain for the past year, states that things are not getting worse,     Have you been treated for this in the past? Yes    Additional comments: patient has recently started working out,     Can we leave a detailed message on this number? YES    Phone number patient can be reached at: Home number on file [phone] (home)    Best Time: anytime     Call taken on 3/3/2022 at 4:16 PM by Janice Leone

## 2022-03-03 NOTE — TELEPHONE ENCOUNTER
"Pt called in states he has difficulty breathing.  The Pt also states he has pain in middle of chest bone  The symptom is when he walk.  The symptom started a year ago.  The symptom is chronic.  The Pt has the symptom today.  The Pt states he see Dr for this symptom several times.  No history of heart disease.  No history of lung disease.  No dizziness.  No runny nose, no cough,   Pt has pain in middle of chest bone.  No fever.  No travel on the last month.  Pt doing work out okay.  The Pt states he has Irregular heart beat.  The disposition is to be seen at the ED now.  The Pt refused the disposition and states this symptom is not new for me and I am not in danger at this time.  Pt request to make appointment.  The call is transferred to the .  Care advice given per protocol.  Patient agrees with care advice given.   Agreed to call back if he has additional symptoms or questions.    Kam Hale [place] Nurse Advisor 3/3/2022 4:17 PM         Reason for Disposition    Extra heart beats OR irregular heart beating   (i.e., \"palpitations\")    Additional Information    Negative: Breathing stopped and hasn't returned    Negative: Choking on something    Negative: SEVERE difficulty breathing (e.g., struggling for each breath, speaks in single words, pulse > 120)    Negative: Bluish (or gray) lips or face    Negative: Difficult to awaken or acting confused (e.g., disoriented, slurred speech)    Negative: Passed out (i.e., fainted, collapsed and was not responding)    Negative: Wheezing started suddenly after medicine, an allergic food, or bee sting    Negative: Stridor    Negative: Slow, shallow and weak breathing    Negative: Sounds like a life-threatening emergency to the triager    Negative: Chest pain    Negative: Wheezing (high pitched whistling sound) and previous asthma attacks or use of asthma medicines    Negative: Difficulty breathing and only present when coughing    Negative: Difficulty breathing and " "only from stuffy or runny nose    Negative: Difficulty breathing and within 14 days of COVID-19 Exposure    Negative: MODERATE difficulty breathing (e.g., speaks in phrases, SOB even at rest, pulse 100-120) of new onset or worse than normal    Negative: Wheezing can be heard across the room    Negative: Drooling or spitting out saliva (because can't swallow)    Negative: Any history of prior \"blood clot\" in leg or lungs    Negative: Illness requiring prolonged bedrest in past month (e.g., immobilization, long hospital stay)    Negative: Hip or leg fracture (broken bone) in past month (or had cast on leg or ankle in past month)    Negative: Major surgery in the past month    Negative: Long-distance travel in past month (e.g., car, bus, train, plane; with trip lasting 6 or more hours)    Protocols used: BREATHING DIFFICULTY-A-OH      "

## 2022-03-09 NOTE — TELEPHONE ENCOUNTER
Patient calling back.   Chronic sternal pain.  Declines ER  Scheduled tomorrow with provider tomorrow   Requested to cancel 4/1 appt.     Natalee Cordova RN on 3/9/2022 at 11:19 AM

## 2022-03-09 NOTE — TELEPHONE ENCOUNTER
Called pt and left message to call. Offered to get appt with another provider sooner???      Kam Dudley RN

## 2022-03-10 ENCOUNTER — OFFICE VISIT (OUTPATIENT)
Dept: FAMILY MEDICINE | Facility: CLINIC | Age: 67
End: 2022-03-10
Payer: MEDICARE

## 2022-03-10 VITALS
HEIGHT: 70 IN | HEART RATE: 66 BPM | SYSTOLIC BLOOD PRESSURE: 122 MMHG | RESPIRATION RATE: 20 BRPM | TEMPERATURE: 98.3 F | BODY MASS INDEX: 31.64 KG/M2 | OXYGEN SATURATION: 98 % | WEIGHT: 221 LBS | DIASTOLIC BLOOD PRESSURE: 80 MMHG

## 2022-03-10 DIAGNOSIS — D50.9 IRON DEFICIENCY ANEMIA, UNSPECIFIED IRON DEFICIENCY ANEMIA TYPE: Primary | ICD-10-CM

## 2022-03-10 DIAGNOSIS — K21.00 GASTROESOPHAGEAL REFLUX DISEASE WITH ESOPHAGITIS WITHOUT HEMORRHAGE: ICD-10-CM

## 2022-03-10 LAB
BASOPHILS # BLD AUTO: 0 10E3/UL (ref 0–0.2)
BASOPHILS NFR BLD AUTO: 1 %
EOSINOPHIL # BLD AUTO: 0.1 10E3/UL (ref 0–0.7)
EOSINOPHIL NFR BLD AUTO: 2 %
ERYTHROCYTE [DISTWIDTH] IN BLOOD BY AUTOMATED COUNT: 17 % (ref 10–15)
FERRITIN SERPL-MCNC: 11 NG/ML (ref 26–388)
HCT VFR BLD AUTO: 40.7 % (ref 40–53)
HGB BLD-MCNC: 12.2 G/DL (ref 13.3–17.7)
LYMPHOCYTES # BLD AUTO: 2.2 10E3/UL (ref 0.8–5.3)
LYMPHOCYTES NFR BLD AUTO: 37 %
MCH RBC QN AUTO: 22.6 PG (ref 26.5–33)
MCHC RBC AUTO-ENTMCNC: 30 G/DL (ref 31.5–36.5)
MCV RBC AUTO: 76 FL (ref 78–100)
MONOCYTES # BLD AUTO: 1 10E3/UL (ref 0–1.3)
MONOCYTES NFR BLD AUTO: 18 %
NEUTROPHILS # BLD AUTO: 2.4 10E3/UL (ref 1.6–8.3)
NEUTROPHILS NFR BLD AUTO: 42 %
PLATELET # BLD AUTO: 230 10E3/UL (ref 150–450)
RBC # BLD AUTO: 5.39 10E6/UL (ref 4.4–5.9)
WBC # BLD AUTO: 5.8 10E3/UL (ref 4–11)

## 2022-03-10 PROCEDURE — 99214 OFFICE O/P EST MOD 30 MIN: CPT | Performed by: NURSE PRACTITIONER

## 2022-03-10 PROCEDURE — 82728 ASSAY OF FERRITIN: CPT | Performed by: NURSE PRACTITIONER

## 2022-03-10 PROCEDURE — 36415 COLL VENOUS BLD VENIPUNCTURE: CPT | Performed by: NURSE PRACTITIONER

## 2022-03-10 PROCEDURE — 85025 COMPLETE CBC W/AUTO DIFF WBC: CPT | Performed by: NURSE PRACTITIONER

## 2022-03-10 RX ORDER — FERROUS SULFATE 325(65) MG
325 TABLET, DELAYED RELEASE (ENTERIC COATED) ORAL DAILY
Qty: 90 TABLET | Refills: 1 | Status: SHIPPED | OUTPATIENT
Start: 2022-03-10 | End: 2023-03-29

## 2022-03-10 RX ORDER — FAMOTIDINE 20 MG/1
20 TABLET, FILM COATED ORAL 2 TIMES DAILY
Qty: 60 TABLET | Refills: 1 | Status: SHIPPED | OUTPATIENT
Start: 2022-03-10 | End: 2022-05-16

## 2022-03-10 ASSESSMENT — PAIN SCALES - GENERAL: PAINLEVEL: MODERATE PAIN (4)

## 2022-03-10 NOTE — PROGRESS NOTES
Assessment & Plan     Iron deficiency anemia, unspecified iron deficiency anemia type  -was donating blood, states last time about a month ago, he donated 10 gallons of blood over the last 30 years. I advised patient to stop donating blood since it is the likely cause of his iron deficiency anemia and can also contribute to his shortness of breath. Patient had extensive work up regarding ongoing shortness of breath, including stress test, heart ECHO, chest Xray and PFT studies-all normal results. I advised patient that anemia can be a reason of shortness of breath in some patients.   - CBC with platelets and differential; Future  - Ferritin; Future  - CBC with platelets and differential  - Ferritin  - will increase iron supplement to daily use, ferrous sulfate (FE TABS) 325 (65 Fe) MG EC tablet; Take 1 tablet (325 mg) by mouth daily  -will plan to recheck ferritin level and CBC in 1 month     Gastroesophageal reflux disease with esophagitis without hemorrhage  -complains of chest pain, states pain got somewhat better after endoscopy and esophageal dilation than few weeks later pain got worse again  -recommended to repeat endoscopy, continue Omeprazole and start Pepcid since still having symptoms, follow GERD diet   -will also consider GI consult if symptoms persist   - famotidine (PEPCID) 20 MG tablet; Take 1 tablet (20 mg) by mouth 2 times daily  - CBC with platelets and differential; Future  - Ferritin; Future  - Adult Gastro Ref - Procedure Only; Future  - CBC with platelets and differential  - Ferritin        Return in about 4 weeks (around 4/7/2022) for Lab Work.    HERBERTH Thacker CNP  [facility]    Taylor Herron is a 67 year old who presents for the following health issues  accompanied by his self.    History of Present Illness       Reason for visit:  Pain in my sternum.  Symptoms include:  Ongoing pain in my sternum  Symptom intensity:  Mild  Symptom progression:   "Worsening  Had these symptoms before:  Yes  Has tried/received treatment for these symptoms:  Yes  Previous treatment was successful:  Yes  Prior treatment description:  Stuck a ballon down my throat and inflated.  What makes it worse:  Coffee, pretzels  What makes it better:  Antacid, stretching    He eats 2-3 servings of fruits and vegetables daily.He consumes 0 sweetened beverage(s) daily.He exercises with enough effort to increase his heart rate 30 to 60 minutes per day.  He exercises with enough effort to increase his heart rate 4 days per week.   He is taking medications regularly.       956}        Review of Systems   Constitutional, HEENT, cardiovascular, pulmonary, gi and gu systems are negative, except as otherwise noted.      Objective    /80 (BP Location: Left arm, Patient Position: Sitting, Cuff Size: Adult Large)   Pulse 66   Temp 98.3  F (36.8  C) (Tympanic)   Resp 20   Ht 1.778 m (5' 10\")   Wt 100.2 kg (221 lb)   SpO2 98%   BMI 31.71 kg/m    Body mass index is 31.71 kg/m .  Physical Exam   GENERAL: healthy, alert and no distress  EYES: Eyes grossly normal to inspection, PERRL and conjunctivae and sclerae normal  RESP: lungs clear to auscultation - no rales, rhonchi or wheezes  CV: regular rate and rhythm, normal S1 S2, no S3 or S4, no murmur, click or rub, no peripheral edema and peripheral pulses strong  MS: no gross musculoskeletal defects noted, no edema  NEURO: Normal strength and tone, mentation intact and speech normal  PSYCH: mentation appears normal, affect normal/bright    Results for orders placed or performed in visit on 03/10/22 (from the past 24 hour(s))   CBC with platelets and differential    Narrative    The following orders were created for panel order CBC with platelets and differential.  Procedure                               Abnormality         Status                     ---------                               -----------         ------                     CBC with " platelets and d...[[id number]]  Abnormal            Final result                 Please view results for these tests on the individual orders.   CBC with platelets and differential   Result Value Ref Range    WBC Count 5.8 4.0 - 11.0 10e3/uL    RBC Count 5.39 4.40 - 5.90 10e6/uL    Hemoglobin 12.2 (L) 13.3 - 17.7 g/dL    Hematocrit 40.7 40.0 - 53.0 %    MCV 76 (L) 78 - 100 fL    MCH 22.6 (L) 26.5 - 33.0 pg    MCHC 30.0 (L) 31.5 - 36.5 g/dL    RDW 17.0 (H) 10.0 - 15.0 %    Platelet Count 230 150 - 450 10e3/uL    % Neutrophils 42 %    % Lymphocytes 37 %    % Monocytes 18 %    % Eosinophils 2 %    % Basophils 1 %    Absolute Neutrophils 2.4 1.6 - 8.3 10e3/uL    Absolute Lymphocytes 2.2 0.8 - 5.3 10e3/uL    Absolute Monocytes 1.0 0.0 - 1.3 10e3/uL    Absolute Eosinophils 0.1 0.0 - 0.7 10e3/uL    Absolute Basophils 0.0 0.0 - 0.2 10e3/uL

## 2022-03-10 NOTE — PATIENT INSTRUCTIONS
Reschedule endoscopy    Labs    Start Pepcid 20 mg twice daily     Continue Prilosec 40 mg daily

## 2022-03-11 DIAGNOSIS — Z11.59 ENCOUNTER FOR SCREENING FOR OTHER VIRAL DISEASES: Primary | ICD-10-CM

## 2022-04-04 ENCOUNTER — LAB (OUTPATIENT)
Dept: LAB | Facility: CLINIC | Age: 67
End: 2022-04-04
Payer: MEDICARE

## 2022-04-04 DIAGNOSIS — I10 HYPERTENSION, GOAL BELOW 140/90: ICD-10-CM

## 2022-04-04 DIAGNOSIS — Z11.59 ENCOUNTER FOR SCREENING FOR OTHER VIRAL DISEASES: ICD-10-CM

## 2022-04-04 PROCEDURE — U0005 INFEC AGEN DETEC AMPLI PROBE: HCPCS

## 2022-04-04 PROCEDURE — U0003 INFECTIOUS AGENT DETECTION BY NUCLEIC ACID (DNA OR RNA); SEVERE ACUTE RESPIRATORY SYNDROME CORONAVIRUS 2 (SARS-COV-2) (CORONAVIRUS DISEASE [COVID-19]), AMPLIFIED PROBE TECHNIQUE, MAKING USE OF HIGH THROUGHPUT TECHNOLOGIES AS DESCRIBED BY CMS-2020-01-R: HCPCS

## 2022-04-05 ENCOUNTER — ANESTHESIA EVENT (OUTPATIENT)
Dept: GASTROENTEROLOGY | Facility: CLINIC | Age: 67
End: 2022-04-05
Payer: MEDICARE

## 2022-04-05 LAB — SARS-COV-2 RNA RESP QL NAA+PROBE: NEGATIVE

## 2022-04-05 NOTE — ANESTHESIA PREPROCEDURE EVALUATION
Anesthesia Pre-Procedure Evaluation    Patient: Jamal Sanchez   MRN: [id number] : 1955        Preoperative Diagnosis: GERD without esophagitis [K21.9]    Procedure : Procedure(s):  ESOPHAGOGASTRODUODENOSCOPY (EGD)          Past Medical History:   Diagnosis Date     BPH (benign prostatic hyperplasia)      Complication of anesthesia      Diverticulitis      GERD (gastroesophageal reflux disease)      Hypertension       Past Surgical History:   Procedure Laterality Date     APPENDECTOMY       ARTHROSCOPY SHOULDER ROTATOR CUFF REPAIR Left      ARTHROSCOPY SHOULDER RT/LT Right 2018     COLONOSCOPY  3/5/2008     COLONOSCOPY  10/9/2013    Procedure: COLONOSCOPY;;  Surgeon: Nicolas Lizama MD;  Location: [facility]     ESOPHAGOSCOPY, GASTROSCOPY, DUODENOSCOPY (EGD), COMBINED  10/9/2013    Procedure: COMBINED ESOPHAGOSCOPY, GASTROSCOPY, DUODENOSCOPY (EGD), BIOPSY SINGLE OR MULTIPLE;;  Surgeon: Nicolas Lizama MD;  Location: [facility]     HERNIA REPAIR       LAPAROSCOPIC APPENDECTOMY N/A 2016    Procedure: LAPAROSCOPIC APPENDECTOMY;  Surgeon: Ab Guzman MD;  Location: [facility]     LAPAROSCOPIC HERNIORRHAPHY INGUINAL BILATERAL Bilateral 2016    Procedure: LAPAROSCOPIC HERNIORRHAPHY INGUINAL BILATERAL;  Surgeon: Ab Guzman MD;  Location: [facility]      Allergies   Allergen Reactions     Nka [No Known Allergies]       Social History     Tobacco Use     Smoking status: Never Smoker     Smokeless tobacco: Never Used   Substance Use Topics     Alcohol use: Yes     Comment: 6 pack beer on weekend.      Wt Readings from Last 1 Encounters:   22 100.2 kg (221 lb)        Anesthesia Evaluation   Pt has had prior anesthetic. Type: MAC and General.        ROS/MED HX  ENT/Pulmonary:     (+) ISABELLA risk factors, hypertension,     Neurologic:       Cardiovascular:     (+) hypertension-----Previous cardiac testing   Echo: Date:  Results:  Interpretation Summary     1. Normal biventricular size and  function. Left ventricular ejection fraction  of 60-65%. No segmental wall motion abnormalities noted.  2. Normal sized atria.  3. No hemodynamically significant valvular disease.  No prior study for comparison. Technically adequate study.  Stress Test: Date:  Results:    ECG Reviewed: Date:  Results:  Sinus Bradycardia   WITHIN NORMAL LIMITS  Cath:  Date: Results:      METS/Exercise Tolerance:     Hematologic:     (+) anemia,     Musculoskeletal:       GI/Hepatic: Comment: diverticulitis    (+) GERD,     Renal/Genitourinary:     (+) BPH,     Endo:     (+) Obesity,     Psychiatric/Substance Use:     (+) alcohol abuse     Infectious Disease:       Malignancy:       Other:            Physical Exam    Airway        Mallampati: I   TM distance: > 3 FB   Neck ROM: full   Mouth opening: > 3 cm    Respiratory Devices and Support         Dental  no notable dental history         Cardiovascular   cardiovascular exam normal          Pulmonary   pulmonary exam normal                OUTSIDE LABS:  CBC:   Lab Results   Component Value Date    WBC 5.8 03/10/2022    WBC 5.2 11/23/2021    HGB 12.2 (L) 03/10/2022    HGB 11.3 (L) 11/23/2021    HCT 40.7 03/10/2022    HCT 38.5 (L) 11/23/2021     03/10/2022     11/23/2021     BMP:   Lab Results   Component Value Date     11/23/2021     02/22/2021    POTASSIUM 4.0 11/23/2021    POTASSIUM 4.2 02/22/2021    CHLORIDE 105 11/23/2021    CHLORIDE 102 02/22/2021    CO2 27 11/23/2021    CO2 28 02/22/2021    BUN 14 11/23/2021    BUN 23 02/22/2021    CR 1.02 11/23/2021    CR 0.89 02/22/2021     (H) 11/23/2021    GLC 90 02/22/2021     COAGS:   Lab Results   Component Value Date    INR 1.10 11/22/2018     POC: No results found for: BGM, HCG, HCGS  HEPATIC:   Lab Results   Component Value Date    ALBUMIN 3.7 11/23/2021    PROTTOTAL 7.6 11/23/2021    ALT 83 (H) 11/23/2021    AST 41 11/23/2021    ALKPHOS 85 11/23/2021    BILITOTAL 0.3 11/23/2021     OTHER:    Lab Results   Component Value Date    LACT 1.3 2017    ALYCE 8.9 2021    MAG 2.0 2018    LIPASE 77 2019    TSH 1.33 2021    SED 9 2017       Anesthesia Plan    ASA Status:  3      Anesthesia Type: General.              Consents    Anesthesia Plan(s) and associated risks, benefits, and realistic alternatives discussed. Questions answered and patient/representative(s) expressed understanding.     - Discussed: Risks, Benefits and Alternatives for BOTH SEDATION and the PROCEDURE were discussed     - Discussed with:  Patient         Postoperative Care       PONV prophylaxis: Background Propofol Infusion     Comments:                HERBERTH Cantrell CRNAAnesthesia Pre-Procedure Evaluation    Patient: Jamal Sanchez   MRN: [id number] : 1955        Procedure : Procedure(s):  ESOPHAGOGASTRODUODENOSCOPY (EGD)          Past Medical History:   Diagnosis Date     BPH (benign prostatic hyperplasia)      Complication of anesthesia      Diverticulitis      GERD (gastroesophageal reflux disease)      Hypertension       Past Surgical History:   Procedure Laterality Date     APPENDECTOMY       ARTHROSCOPY SHOULDER ROTATOR CUFF REPAIR Left      ARTHROSCOPY SHOULDER RT/LT Right 2018     COLONOSCOPY  3/5/2008     COLONOSCOPY  10/9/2013    Procedure: COLONOSCOPY;;  Surgeon: Nicolas Lizama MD;  Location: [facility]     ESOPHAGOSCOPY, GASTROSCOPY, DUODENOSCOPY (EGD), COMBINED  10/9/2013    Procedure: COMBINED ESOPHAGOSCOPY, GASTROSCOPY, DUODENOSCOPY (EGD), BIOPSY SINGLE OR MULTIPLE;;  Surgeon: Nicolas Lizama MD;  Location: [facility]     HERNIA REPAIR       LAPAROSCOPIC APPENDECTOMY N/A 2016    Procedure: LAPAROSCOPIC APPENDECTOMY;  Surgeon: Ab Guzman MD;  [facility]     LAPAROSCOPIC HERNIORRHAPHY INGUINAL BILATERAL Bilateral 2016    Procedure: LAPAROSCOPIC HERNIORRHAPHY INGUINAL BILATERAL;  Surgeon: Ab Guzman MD;  Location: [facility]      Allergies    Allergen Reactions     Nka [No Known Allergies]       Social History     Tobacco Use     Smoking status: Never Smoker     Smokeless tobacco: Never Used   Substance Use Topics     Alcohol use: Yes     Comment: 6 pack beer on weekend.      Wt Readings from Last 1 Encounters:   03/10/22 100.2 kg (221 lb)        Anesthesia Evaluation   Pt has had prior anesthetic.         ROS/MED HX  ENT/Pulmonary:       Neurologic:       Cardiovascular:     (+) hypertension-----    METS/Exercise Tolerance:     Hematologic:     (+) anemia,     Musculoskeletal:       GI/Hepatic: Comment: Hx Diverticulitis  Generalized abdominal pain    (+) GERD, hiatal hernia,     Renal/Genitourinary:     (+) BPH,     Endo:       Psychiatric/Substance Use:     (+) alcohol abuse     Infectious Disease:       Malignancy:       Other:            Physical Exam    Airway  airway exam normal      Mallampati: II   TM distance: > 3 FB   Neck ROM: full   Mouth opening: > 3 cm    Respiratory Devices and Support         Dental  no notable dental history         Cardiovascular   cardiovascular exam normal          Pulmonary   pulmonary exam normal                OUTSIDE LABS:  CBC:   Lab Results   Component Value Date    WBC 5.8 03/10/2022    WBC 5.2 11/23/2021    HGB 12.2 (L) 03/10/2022    HGB 11.3 (L) 11/23/2021    HCT 40.7 03/10/2022    HCT 38.5 (L) 11/23/2021     03/10/2022     11/23/2021     BMP:   Lab Results   Component Value Date     11/23/2021     02/22/2021    POTASSIUM 4.0 11/23/2021    POTASSIUM 4.2 02/22/2021    CHLORIDE 105 11/23/2021    CHLORIDE 102 02/22/2021    CO2 27 11/23/2021    CO2 28 02/22/2021    BUN 14 11/23/2021    BUN 23 02/22/2021    CR 1.02 11/23/2021    CR 0.89 02/22/2021     (H) 11/23/2021    GLC 90 02/22/2021     COAGS:   Lab Results   Component Value Date    INR 1.10 11/22/2018     POC: No results found for: BGM, HCG, HCGS  HEPATIC:   Lab Results   Component Value Date    ALBUMIN 3.7 11/23/2021     PROTTOTAL 7.6 11/23/2021    ALT 83 (H) 11/23/2021    AST 41 11/23/2021    ALKPHOS 85 11/23/2021    BILITOTAL 0.3 11/23/2021     OTHER:   Lab Results   Component Value Date    LACT 1.3 07/25/2017    ALYCE 8.9 11/23/2021    MAG 2.0 11/22/2018    LIPASE 77 07/02/2019    TSH 1.33 11/23/2021    SED 9 08/29/2017       Anesthesia Plan    ASA Status:  2   NPO Status:  NPO Appropriate    Anesthesia Type: General.     - Airway: Native airway   Induction: Propofol.           Consents    Anesthesia Plan(s) and associated risks, benefits, and realistic alternatives discussed. Questions answered and patient/representative(s) expressed understanding.     - Discussed: Risks, Benefits and Alternatives for BOTH SEDATION and the PROCEDURE were discussed     - Discussed with:  Patient         Postoperative Care            Comments:                HERBERTH Armstrong CRNA

## 2022-04-06 ENCOUNTER — HOSPITAL ENCOUNTER (OUTPATIENT)
Facility: CLINIC | Age: 67
Discharge: HOME | End: 2022-04-06
Attending: SURGERY | Admitting: SURGERY
Payer: MEDICARE

## 2022-04-06 ENCOUNTER — ANESTHESIA (OUTPATIENT)
Dept: GASTROENTEROLOGY | Facility: CLINIC | Age: 67
End: 2022-04-06
Payer: MEDICARE

## 2022-04-06 VITALS
BODY MASS INDEX: 31.64 KG/M2 | WEIGHT: 221 LBS | RESPIRATION RATE: 12 BRPM | HEIGHT: 70 IN | OXYGEN SATURATION: 94 % | SYSTOLIC BLOOD PRESSURE: 133 MMHG | DIASTOLIC BLOOD PRESSURE: 86 MMHG | TEMPERATURE: 97.7 F | HEART RATE: 62 BPM

## 2022-04-06 LAB — UPPER GI ENDOSCOPY: NORMAL

## 2022-04-06 PROCEDURE — 43239 EGD BIOPSY SINGLE/MULTIPLE: CPT | Performed by: SURGERY

## 2022-04-06 PROCEDURE — 250N000009 HC RX 250: Performed by: NURSE ANESTHETIST, CERTIFIED REGISTERED

## 2022-04-06 PROCEDURE — 88342 IMHCHEM/IMCYTCHM 1ST ANTB: CPT | Mod: TC | Performed by: SURGERY

## 2022-04-06 PROCEDURE — 250N000009 HC RX 250: Performed by: SURGERY

## 2022-04-06 PROCEDURE — 258N000003 HC RX IP 258 OP 636: Performed by: SURGERY

## 2022-04-06 PROCEDURE — 250N000011 HC RX IP 250 OP 636: Performed by: NURSE ANESTHETIST, CERTIFIED REGISTERED

## 2022-04-06 PROCEDURE — 370N000017 HC ANESTHESIA TECHNICAL FEE, PER MIN: Performed by: SURGERY

## 2022-04-06 RX ORDER — ONDANSETRON 2 MG/ML
4 INJECTION INTRAMUSCULAR; INTRAVENOUS
Status: DISCONTINUED | OUTPATIENT
Start: 2022-04-06 | End: 2022-04-06 | Stop reason: HOSPADM

## 2022-04-06 RX ORDER — NALOXONE HYDROCHLORIDE 0.4 MG/ML
0.4 INJECTION, SOLUTION INTRAMUSCULAR; INTRAVENOUS; SUBCUTANEOUS
Status: CANCELLED | OUTPATIENT
Start: 2022-04-06

## 2022-04-06 RX ORDER — TRIAMTERENE AND HYDROCHLOROTHIAZIDE 75; 50 MG/1; MG/1
1 TABLET ORAL DAILY
Qty: 90 TABLET | Refills: 3 | Status: SHIPPED | OUTPATIENT
Start: 2022-04-06 | End: 2023-03-29

## 2022-04-06 RX ORDER — PROPOFOL 10 MG/ML
INJECTION, EMULSION INTRAVENOUS PRN
Status: DISCONTINUED | OUTPATIENT
Start: 2022-04-06 | End: 2022-04-06

## 2022-04-06 RX ORDER — SODIUM CHLORIDE, SODIUM LACTATE, POTASSIUM CHLORIDE, CALCIUM CHLORIDE 600; 310; 30; 20 MG/100ML; MG/100ML; MG/100ML; MG/100ML
INJECTION, SOLUTION INTRAVENOUS CONTINUOUS
Status: DISCONTINUED | OUTPATIENT
Start: 2022-04-06 | End: 2022-04-06 | Stop reason: HOSPADM

## 2022-04-06 RX ORDER — LIDOCAINE HYDROCHLORIDE 10 MG/ML
INJECTION, SOLUTION EPIDURAL; INFILTRATION; INTRACAUDAL; PERINEURAL PRN
Status: DISCONTINUED | OUTPATIENT
Start: 2022-04-06 | End: 2022-04-06

## 2022-04-06 RX ORDER — FLUMAZENIL 0.1 MG/ML
0.2 INJECTION, SOLUTION INTRAVENOUS
Status: CANCELLED | OUTPATIENT
Start: 2022-04-06 | End: 2022-04-07

## 2022-04-06 RX ORDER — NALOXONE HYDROCHLORIDE 0.4 MG/ML
0.2 INJECTION, SOLUTION INTRAMUSCULAR; INTRAVENOUS; SUBCUTANEOUS
Status: CANCELLED | OUTPATIENT
Start: 2022-04-06

## 2022-04-06 RX ORDER — LIDOCAINE 40 MG/G
CREAM TOPICAL
Status: DISCONTINUED | OUTPATIENT
Start: 2022-04-06 | End: 2022-04-06 | Stop reason: HOSPADM

## 2022-04-06 RX ADMIN — SODIUM CHLORIDE, POTASSIUM CHLORIDE, SODIUM LACTATE AND CALCIUM CHLORIDE: 600; 310; 30; 20 INJECTION, SOLUTION INTRAVENOUS at 11:08

## 2022-04-06 RX ADMIN — PROPOFOL 50 MG: 10 INJECTION, EMULSION INTRAVENOUS at 12:01

## 2022-04-06 RX ADMIN — LIDOCAINE HYDROCHLORIDE 50 MG: 10 INJECTION, SOLUTION EPIDURAL; INFILTRATION; INTRACAUDAL; PERINEURAL at 12:00

## 2022-04-06 RX ADMIN — LIDOCAINE HYDROCHLORIDE 0.1 ML: 10 INJECTION, SOLUTION EPIDURAL; INFILTRATION; INTRACAUDAL; PERINEURAL at 11:08

## 2022-04-06 RX ADMIN — PROPOFOL 100 MG: 10 INJECTION, EMULSION INTRAVENOUS at 11:59

## 2022-04-06 NOTE — H&P
67 year old year old male here for upper endoscopy for reflux.  Ongoing, had EGD in 1/2022 with dilation.  This improved symptoms for a short time.  No regurgitation or food bolus impactions.  He is on PPI and H2 blocker.        Patient Active Problem List   Diagnosis     Benign prostatic hypertrophy     CARDIOVASCULAR SCREENING; LDL GOAL LESS THAN 160     GERD (gastroesophageal reflux disease)     Advanced directives, counseling/discussion     Hypertension, goal below 140/90     Appendicitis     Diverticulosis of large intestine without hemorrhage     Diverticulitis of colon     Acute diverticulitis     ETOH abuse     Generalized abdominal pain     Anemia, iron deficiency     Hiatal hernia       Past Medical History:   Diagnosis Date     BPH (benign prostatic hyperplasia)      Complication of anesthesia      Diverticulitis      GERD (gastroesophageal reflux disease)      Hypertension        Past Surgical History:   Procedure Laterality Date     APPENDECTOMY       ARTHROSCOPY SHOULDER ROTATOR CUFF REPAIR Left      ARTHROSCOPY SHOULDER RT/LT Right 01/2018     COLONOSCOPY  3/5/2008     COLONOSCOPY  10/9/2013    Procedure: COLONOSCOPY;;  Surgeon: Nicolas Lizama MD;  Location: [facility]     ESOPHAGOSCOPY, GASTROSCOPY, DUODENOSCOPY (EGD), COMBINED  10/9/2013    Procedure: COMBINED ESOPHAGOSCOPY, GASTROSCOPY, DUODENOSCOPY (EGD), BIOPSY SINGLE OR MULTIPLE;;  Surgeon: Nicolas Lizama MD;  Location: [facility]     HERNIA REPAIR       LAPAROSCOPIC APPENDECTOMY N/A 1/7/2016    Procedure: LAPAROSCOPIC APPENDECTOMY;  Surgeon: Ab Guzman MD;  Location: [facility]     LAPAROSCOPIC HERNIORRHAPHY INGUINAL BILATERAL Bilateral 12/13/2016    Procedure: LAPAROSCOPIC HERNIORRHAPHY INGUINAL BILATERAL;  Surgeon: Ab Guzman MD;  Location: [facility]       Family History   Problem Relation Age of Onset     Diabetes Mother      Cerebrovascular Disease Mother      Neurologic Disorder Father         parkinson's     Cancer - colorectal  Chronic, controlled.  Latest blood pressure and vitals reviewed-   Temp:  [97.6 °F (36.4 °C)-98.8 °F (37.1 °C)]   Pulse:  [46-78]   Resp:  [9-18]   BP: (105-135)/(55-77)   SpO2:  [95 %-100 %] .   Home meds for hypertension were reviewed and noted below. Hospital anti-hypertensive changes were made as shown below.  Hypertension Medications             atenoloL (TENORMIN) 25 MG tablet Take 1 tablet (25 mg total) by mouth once daily.    lisinopriL 10 MG tablet Take 1 tablet (10 mg total) by mouth once daily.      Hospital Medications             lisinopriL tablet 10 mg Starting on 6/16/2020. 10 mg, Oral, Daily        Will utilize p.r.n. blood pressure medication only if patient's blood pressure greater than  180/110 and he develops symptoms such as worsening chest pain or shortness of breath.       Maternal Grandfather      Hypertension Brother      Cerebrovascular Disease Brother         stroke     Neurologic Disorder Brother      Alcohol/Drug Brother      Unknown/Adopted Brother      Obesity Sister        No current outpatient medications on file.       Allergies   Allergen Reactions     Nka [No Known Allergies]        Pt reports that he has never smoked. He has never used smokeless tobacco. He reports current alcohol use. He reports that he does not use drugs.    Exam:    Awake, Alert OX3  Lungs - CTA bilaterally  CV - RRR, no murmurs, distal pulses intact  Abd - soft, non-distended, non-tender, +BS  Extr - No cyanosis or edema    A/P 67 year old year old male in need of upper endoscopy for reflux. Risks, benefits, alternatives, and complications were discussed including the possibility of perforation and the patient agreed to proceed.    Vimal Stanley, DO on 4/6/2022 at 11:09 AM

## 2022-04-06 NOTE — ANESTHESIA POSTPROCEDURE EVALUATION
Patient: Jamal Sanchez    Procedure: Procedure(s):  ESOPHAGOGASTRODUODENOSCOPY, WITH BIOPSY       Anesthesia Type:  General    Note:  Disposition: Outpatient   Postop Pain Control: Uneventful            Sign Out: Well controlled pain   PONV: No   Neuro/Psych: Uneventful            Sign Out: Acceptable/Baseline neuro status   Airway/Respiratory: Uneventful            Sign Out: Acceptable/Baseline resp. status   CV/Hemodynamics: Uneventful            Sign Out: Acceptable CV status; No obvious hypovolemia; No obvious fluid overload   Other NRE: NONE   DID A NON-ROUTINE EVENT OCCUR? No           Last vitals:  Vitals Value Taken Time   /82 04/06/22 1210   Temp     Pulse 60 04/06/22 1210   Resp 16 04/06/22 1210   SpO2 91 % 04/06/22 1225   Vitals shown include unvalidated device data.    Electronically Signed By: HERBERTH Cantrell CRNA  April 6, 2022  12:26 PM

## 2022-04-06 NOTE — ANESTHESIA CARE TRANSFER NOTE
Patient: Jamal Sanchez    Procedure: Procedure(s):  ESOPHAGOGASTRODUODENOSCOPY, WITH BIOPSY       Diagnosis: Gastroesophageal reflux disease with esophagitis without hemorrhage [K21.00]  Diagnosis Additional Information: No value filed.    Anesthesia Type:   General     Note:    Oropharynx: oropharynx clear of all foreign objects and spontaneously breathing  Level of Consciousness: drowsy  Oxygen Supplementation: room air    Independent Airway: airway patency satisfactory and stable  Dentition: dentition unchanged  Vital Signs Stable: post-procedure vital signs reviewed and stable  Report to RN Given: handoff report given  Patient transferred to: Phase II    Handoff Report: Identifed the Patient, Identified the Reponsible Provider, Reviewed the pertinent medical history, Discussed the surgical course, Reviewed Intra-OP anesthesia mangement and issues during anesthesia, Set expectations for post-procedure period and Allowed opportunity for questions and acknowledgement of understanding      Vitals:  Vitals Value Taken Time   /82 04/06/22 1210   Temp     Pulse 60 04/06/22 1210   Resp 16 04/06/22 1210   SpO2 91 % 04/06/22 1213   Vitals shown include unvalidated device data.    Electronically Signed By: HERBERTH Cantrell CRNA  April 6, 2022  12:13 PM

## 2022-04-06 NOTE — LETTER
April 13, 2022    Jamal Sanchez  [address] [address] [place] [id number]  April 13, 2022    Dear Jamal,   This letter is to inform you of the results of your pathology report on your upper endoscopy (EGD).    Your pathology report was:  Normal, please follow up by having a repeat upper endoscopy (EGD), if your symptoms worsen.  If you have questions, please contact your primary care physician.    A.  Gastric body polyp:  - Benign gastric oxyntic fundic mucosa with occasional dilated glands consistent with proton pump inhibitor therapy effect without inflammation or other abnormalities.  - Negative for Helicobacter pylori on immunohistochemical stained section.    B.  Gastric antrum:  - No diagnostic abnormalities identified, benign gastric antral mucosa without inflammation or atypia.  - No evidence of Helicobacter pylori on H& immunohistochemical stained sections.    If you have any questions or concerns please do not hesitate to call my office at [phone].  Sincerely,   Vimal Stanley, DO   [facility]

## 2022-04-08 DIAGNOSIS — N52.9 ERECTILE DYSFUNCTION, UNSPECIFIED ERECTILE DYSFUNCTION TYPE: ICD-10-CM

## 2022-04-08 NOTE — TELEPHONE ENCOUNTER
Requested Prescriptions   Pending Prescriptions Disp Refills     sildenafil (REVATIO) 20 MG tablet 30 tablet 3     Sig: Take 2 to 5 tablets 1 hour prior to intercourse       There is no refill protocol information for this order          Last office visit: Visit date not found with prescribing provider:     Future Office Visit:      Vianca Martin  [facility]

## 2022-04-11 LAB
PATH REPORT.COMMENTS IMP SPEC: NORMAL
PATH REPORT.FINAL DX SPEC: NORMAL
PATH REPORT.GROSS SPEC: NORMAL
PATH REPORT.MICROSCOPIC SPEC OTHER STN: NORMAL
PATH REPORT.RELEVANT HX SPEC: NORMAL
PHOTO IMAGE: NORMAL

## 2022-04-11 PROCEDURE — 88341 IMHCHEM/IMCYTCHM EA ADD ANTB: CPT | Mod: 26 | Performed by: PATHOLOGY

## 2022-04-11 PROCEDURE — 88305 TISSUE EXAM BY PATHOLOGIST: CPT | Mod: 26 | Performed by: PATHOLOGY

## 2022-04-11 PROCEDURE — 88342 IMHCHEM/IMCYTCHM 1ST ANTB: CPT | Mod: 26 | Performed by: PATHOLOGY

## 2022-04-11 RX ORDER — SILDENAFIL CITRATE 20 MG/1
TABLET ORAL
Qty: 30 TABLET | Refills: 3 | Status: SHIPPED | OUTPATIENT
Start: 2022-04-11 | End: 2022-07-28

## 2022-04-11 NOTE — TELEPHONE ENCOUNTER
Medication Sildenafil is not covered by pt's insurance anymore. Should med be changed or try for PA or pt pay rocha? Please advise.  Mirela COVARRUBIAS RN BSN PHN  Specialty Clinics

## 2022-04-18 DIAGNOSIS — R06.02 SOB (SHORTNESS OF BREATH): ICD-10-CM

## 2022-04-19 ENCOUNTER — IMMUNIZATION (OUTPATIENT)
Dept: FAMILY MEDICINE | Facility: CLINIC | Age: 67
End: 2022-04-19
Payer: COMMERCIAL

## 2022-04-19 ENCOUNTER — LAB (OUTPATIENT)
Dept: LAB | Facility: CLINIC | Age: 67
End: 2022-04-19

## 2022-04-19 DIAGNOSIS — D50.8 OTHER IRON DEFICIENCY ANEMIA: Primary | ICD-10-CM

## 2022-04-19 DIAGNOSIS — D50.9 IRON DEFICIENCY ANEMIA, UNSPECIFIED IRON DEFICIENCY ANEMIA TYPE: ICD-10-CM

## 2022-04-19 DIAGNOSIS — D50.0 IRON DEFICIENCY ANEMIA DUE TO CHRONIC BLOOD LOSS: ICD-10-CM

## 2022-04-19 DIAGNOSIS — R74.01 ELEVATED ALT MEASUREMENT: ICD-10-CM

## 2022-04-19 LAB
ALT SERPL W P-5'-P-CCNC: 48 U/L (ref 0–70)
ERYTHROCYTE [DISTWIDTH] IN BLOOD BY AUTOMATED COUNT: 20.7 % (ref 10–15)
FERRITIN SERPL-MCNC: 17 NG/ML (ref 26–388)
HCT VFR BLD AUTO: 46.7 % (ref 40–53)
HGB BLD-MCNC: 14.3 G/DL (ref 13.3–17.7)
IRON SATN MFR SERPL: 13 % (ref 15–46)
IRON SERPL-MCNC: 66 UG/DL (ref 35–180)
MCH RBC QN AUTO: 24.5 PG (ref 26.5–33)
MCHC RBC AUTO-ENTMCNC: 30.6 G/DL (ref 31.5–36.5)
MCV RBC AUTO: 80 FL (ref 78–100)
PLATELET # BLD AUTO: 225 10E3/UL (ref 150–450)
RBC # BLD AUTO: 5.84 10E6/UL (ref 4.4–5.9)
TIBC SERPL-MCNC: 491 UG/DL (ref 240–430)
WBC # BLD AUTO: 6.1 10E3/UL (ref 4–11)

## 2022-04-19 PROCEDURE — 91306 COVID-19,PF,MODERNA (18+ YRS BOOSTER .25ML): CPT

## 2022-04-19 PROCEDURE — 83550 IRON BINDING TEST: CPT

## 2022-04-19 PROCEDURE — 0064A COVID-19,PF,MODERNA (18+ YRS BOOSTER .25ML): CPT

## 2022-04-19 PROCEDURE — 84460 ALANINE AMINO (ALT) (SGPT): CPT

## 2022-04-19 PROCEDURE — 85027 COMPLETE CBC AUTOMATED: CPT

## 2022-04-19 PROCEDURE — 82728 ASSAY OF FERRITIN: CPT

## 2022-04-19 PROCEDURE — 36415 COLL VENOUS BLD VENIPUNCTURE: CPT

## 2022-04-19 RX ORDER — ALBUTEROL SULFATE 90 UG/1
2 AEROSOL, METERED RESPIRATORY (INHALATION) EVERY 4 HOURS PRN
Qty: 18 G | Refills: 1 | Status: SHIPPED | OUTPATIENT
Start: 2022-04-19 | End: 2023-03-29

## 2022-04-24 ENCOUNTER — HEALTH MAINTENANCE LETTER (OUTPATIENT)
Age: 67
End: 2022-04-24

## 2022-05-06 ENCOUNTER — OFFICE VISIT (OUTPATIENT)
Dept: FAMILY MEDICINE | Facility: CLINIC | Age: 67
End: 2022-05-06
Payer: MEDICARE

## 2022-05-06 VITALS
OXYGEN SATURATION: 98 % | DIASTOLIC BLOOD PRESSURE: 80 MMHG | RESPIRATION RATE: 16 BRPM | HEART RATE: 60 BPM | BODY MASS INDEX: 30.78 KG/M2 | HEIGHT: 70 IN | TEMPERATURE: 98 F | WEIGHT: 215 LBS | SYSTOLIC BLOOD PRESSURE: 130 MMHG

## 2022-05-06 DIAGNOSIS — Z13.220 SCREENING CHOLESTEROL LEVEL: ICD-10-CM

## 2022-05-06 DIAGNOSIS — E78.5 HYPERLIPIDEMIA LDL GOAL <100: ICD-10-CM

## 2022-05-06 DIAGNOSIS — R06.09 DYSPNEA ON EXERTION: ICD-10-CM

## 2022-05-06 DIAGNOSIS — E61.1 LOW IRON: ICD-10-CM

## 2022-05-06 DIAGNOSIS — K21.9 GASTROESOPHAGEAL REFLUX DISEASE, UNSPECIFIED WHETHER ESOPHAGITIS PRESENT: Primary | ICD-10-CM

## 2022-05-06 DIAGNOSIS — K44.9 HIATAL HERNIA: ICD-10-CM

## 2022-05-06 LAB
CHOLEST SERPL-MCNC: 182 MG/DL
FASTING STATUS PATIENT QL REPORTED: YES
HDLC SERPL-MCNC: 58 MG/DL
LDLC SERPL CALC-MCNC: 111 MG/DL
NONHDLC SERPL-MCNC: 124 MG/DL
TRIGL SERPL-MCNC: 64 MG/DL

## 2022-05-06 PROCEDURE — 90471 IMMUNIZATION ADMIN: CPT | Performed by: FAMILY MEDICINE

## 2022-05-06 PROCEDURE — 99214 OFFICE O/P EST MOD 30 MIN: CPT | Mod: 25 | Performed by: FAMILY MEDICINE

## 2022-05-06 PROCEDURE — 90750 HZV VACC RECOMBINANT IM: CPT | Performed by: FAMILY MEDICINE

## 2022-05-06 PROCEDURE — 80061 LIPID PANEL: CPT | Performed by: FAMILY MEDICINE

## 2022-05-06 PROCEDURE — 36415 COLL VENOUS BLD VENIPUNCTURE: CPT | Performed by: FAMILY MEDICINE

## 2022-05-06 RX ORDER — SODIUM PHOSPHATE,MONO-DIBASIC 19G-7G/118
1 ENEMA (ML) RECTAL DAILY
COMMUNITY
End: 2023-05-22

## 2022-05-06 RX ORDER — MULTIVIT WITH MINERALS/LUTEIN
1000 TABLET ORAL DAILY
COMMUNITY
End: 2024-02-05

## 2022-05-06 RX ORDER — ATORVASTATIN CALCIUM 20 MG/1
20 TABLET, FILM COATED ORAL DAILY
Qty: 90 TABLET | Refills: 3 | Status: SHIPPED | OUTPATIENT
Start: 2022-05-06 | End: 2022-09-14

## 2022-05-06 ASSESSMENT — PAIN SCALES - GENERAL: PAINLEVEL: NO PAIN (0)

## 2022-05-06 NOTE — PROGRESS NOTES
Assessment & Plan     Gastroesophageal reflux disease, unspecified whether esophagitis present  Hiatal hernia  - Adult Gastro Ref - Consult Only    Dyspnea on exertion  Initial workup has been unremarkable. Will obtain NM lexiscan test to rule out Cardiac etiology.   - NM Lexiscan stress test  - Lipid panel reflex to direct LDL Non-fasting    Screening cholesterol level  Start on Lipitor 20 mg daily.     Hyperlipidemia LDL goal <100  Lipitor 20 mg daily. Check cholesterol in 2 months.   - Lipid panel reflex to direct LDL Non-fasting  - atorvastatin (LIPITOR) 20 MG tablet  Dispense: 90 tablet; Refill: 3  - Lipid panel reflex to direct LDL Fasting    Low iron  Check labs. Discussed to not donate any future blood. He reports he wont donate any more blood.   - Ferritin  - Iron and iron binding capacity  - CBC with platelets    Follow up with lab test in 2 months.     The risks, benefits and treatment options of prescribed medications or other treatments have been discussed with the patient. The patient verbalized their understanding and should call or follow up if no improvement or if they develop further problems.      Raad Benitez, [facility]on is a 67 year old who presents for the following health issues     History of Present Illness       Reason for visit:  Acid relux. Shortness of Breath.  Symptom onset:  More than a month  Symptoms include:  Acid reflux. Shortness of breath.  Symptom intensity:  Moderate  Symptom progression:  Staying the same  Had these symptoms before:  Yes  Has tried/received treatment for these symptoms:  Yes  Previous treatment was successful:  No    He eats 2-3 servings of fruits and vegetables daily.He consumes 0 sweetened beverage(s) daily.He exercises with enough effort to increase his heart rate 30 to 60 minutes per day.  He exercises with enough effort to increase his heart rate 4 days per week.   He is taking medications regularly.    "  67 year old male who presents to clinic to discuss hiatal hernia and also shortness of breath.       Hiatal hernia  GERD  Seen on EGD on 4/6/2022  Biopsies negative   Omeprazole 40 mg daily.   Famotidine 20 mg BID   Has been trying to lose weight and exercise more.   He believes the acid reflux is causing his symptoms of chest discomfort.       Shortness of breath  Exercise stress test treadmill WNL on 11/2/2021  Pulm function test 11/5/2021 normal limits  echocardiogram 12/10/2021 normal. EF 60-65% no RWMA. No significant valvular disease   CBC shows hemoglobin has normalized.   No active shortness of breath.   Reports increased shortness of breath with ambulation.   Will have a constant chest discomfort that is always present, does not change with exercise or activity.     Shortness of breath and chest pain improved with healthier diet and more exercise.   No cough   No fevers.   Exercising. 3 times weekly.       The 10-year ASCVD risk score (Ralphpetrona MA Jr., et al., 2013) is: 15.8%    Values used to calculate the score:      Age: 67 years      Sex: Male      Is Non- : No      Diabetic: No      Tobacco smoker: No      Systolic Blood Pressure: 130 mmHg      Is BP treated: No      HDL Cholesterol: 60 mg/dL      Total Cholesterol: 225 mg/dL      Review of Systems   Constitutional, HEENT, cardiovascular, pulmonary, gi and gu systems are negative, except as otherwise noted.      Objective    /80 (BP Location: Left arm, Patient Position: Chair, Cuff Size: Adult Large)   Pulse 60   Temp 98  F (36.7  C) (Tympanic)   Resp 16   Ht 1.778 m (5' 10\")   Wt 97.5 kg (215 lb)   SpO2 98%   BMI 30.85 kg/m    Body mass index is 30.85 kg/m .  Physical Exam   General: alert, cooperative, no acute distress   CV: RRR, no murmur  Resp: non-labored breathing, clear to auscultation, no wheezing or rales   Abdomen: Soft, non-tender, no guarding.   Extremities: No peripheral edema, calves non-tender.       "

## 2022-05-06 NOTE — PATIENT INSTRUCTIONS
Follow up with GI specialist regarding the hiatal hernia.     For the shortness of breath. Schedule the heart stress test.     Start on lipitor 20 mg daily.     Labs to be checked today and in 3 months.

## 2022-05-16 DIAGNOSIS — K21.00 GASTROESOPHAGEAL REFLUX DISEASE WITH ESOPHAGITIS WITHOUT HEMORRHAGE: ICD-10-CM

## 2022-05-16 RX ORDER — FAMOTIDINE 20 MG/1
20 TABLET, FILM COATED ORAL 2 TIMES DAILY
Qty: 60 TABLET | Refills: 1 | Status: SHIPPED | OUTPATIENT
Start: 2022-05-16 | End: 2022-08-01

## 2022-06-15 ENCOUNTER — LAB (OUTPATIENT)
Dept: LAB | Facility: CLINIC | Age: 67
End: 2022-06-15
Payer: MEDICARE

## 2022-06-15 DIAGNOSIS — D50.8 OTHER IRON DEFICIENCY ANEMIA: ICD-10-CM

## 2022-06-15 LAB
FERRITIN SERPL-MCNC: 29 NG/ML (ref 26–388)
IRON SATN MFR SERPL: 17 % (ref 15–46)
IRON SERPL-MCNC: 69 UG/DL (ref 35–180)
TIBC SERPL-MCNC: 399 UG/DL (ref 240–430)

## 2022-06-15 PROCEDURE — 36415 COLL VENOUS BLD VENIPUNCTURE: CPT

## 2022-06-15 PROCEDURE — 83550 IRON BINDING TEST: CPT

## 2022-06-15 PROCEDURE — 82728 ASSAY OF FERRITIN: CPT

## 2022-06-22 NOTE — TELEPHONE ENCOUNTER
REFERRAL INFORMATION:    Referring Provider:  Eli    Referring Clinic:  [facility]     Reason for Visit/Diagnosis: GI disease, esophagitis      FUTURE VISIT INFORMATION:    Appointment Date: 7.14.22    Appointment Time: 10 AM     NOTES STATUS DETAILS   OFFICE NOTE from Referring Provider Internal 5.6.22 DYLAN Benitez    OFFICE NOTE from Other Specialist Internal 1.21.19, 11.30.18 DYLAN Jimenes   HOSPITAL DISCHARGE SUMMARY/  ED VISITS Internal 7.2.19 (diverticulitis) DYLAN Cui [place]  More in [facility] related to diverticulitis if needed      OPERATIVE REPORT N/A    MEDICATION LIST Internal         ENDOSCOPY  Internal 1.3.22  4.6.22   COLONOSCOPY Internal 10.9.13   ERCP N/A    EUS N/A    STOOL TESTING N/A    PERTINENT LABS Internal    PATHOLOGY REPORTS (RELATED) Internal 4.6.22    IMAGING (CT, MRI, EGD, MRCP, Small Bowel Follow Through/SBT, MR/CT Enterography) Internal 7.2.19 CT ab pelvis  12.5.18 CT ab pelvis

## 2022-07-12 ENCOUNTER — OFFICE VISIT (OUTPATIENT)
Dept: FAMILY MEDICINE | Facility: CLINIC | Age: 67
End: 2022-07-12
Payer: MEDICARE

## 2022-07-12 ENCOUNTER — TELEPHONE (OUTPATIENT)
Dept: GASTROENTEROLOGY | Facility: CLINIC | Age: 67
End: 2022-07-12

## 2022-07-12 VITALS
TEMPERATURE: 98.5 F | BODY MASS INDEX: 31.35 KG/M2 | SYSTOLIC BLOOD PRESSURE: 120 MMHG | DIASTOLIC BLOOD PRESSURE: 70 MMHG | OXYGEN SATURATION: 96 % | HEART RATE: 68 BPM | RESPIRATION RATE: 28 BRPM | WEIGHT: 219 LBS | HEIGHT: 70 IN

## 2022-07-12 DIAGNOSIS — K21.9 GASTROESOPHAGEAL REFLUX DISEASE WITHOUT ESOPHAGITIS: Primary | ICD-10-CM

## 2022-07-12 DIAGNOSIS — Z12.11 SCREEN FOR COLON CANCER: ICD-10-CM

## 2022-07-12 DIAGNOSIS — K44.9 HIATAL HERNIA: ICD-10-CM

## 2022-07-12 DIAGNOSIS — R22.30 SHOULDER MASS: ICD-10-CM

## 2022-07-12 DIAGNOSIS — R06.09 DYSPNEA ON EXERTION: ICD-10-CM

## 2022-07-12 PROCEDURE — 99214 OFFICE O/P EST MOD 30 MIN: CPT | Performed by: FAMILY MEDICINE

## 2022-07-12 RX ORDER — MULTIVIT WITH MINERALS/LUTEIN
1 TABLET ORAL DAILY
COMMUNITY

## 2022-07-12 ASSESSMENT — PAIN SCALES - GENERAL: PAINLEVEL: MODERATE PAIN (5)

## 2022-07-12 NOTE — PATIENT INSTRUCTIONS
Continue on pepcid and prilosec. Follow up with GI for further cares.     For shortness of breath complete cardiac stress test. Call clinic at [phone]    Monitor shoulder for now. If pain or discomfort let me know.

## 2022-07-12 NOTE — PROGRESS NOTES
Assessment & Plan     Gastroesophageal reflux disease without esophagitis  Hiatal hernia  Symptoms controlled on prilosec and pepcid. Has plans to follow up with GI regarding hiatal hernia     Dyspnea on exertion  Has not completed the Lexiscan stress test.  Normal PFTs in the past  Normal treadmill exercise stress test  Symptoms likely related to deconditioning has improved when he is exercising.  We will complete Lexiscan stress test.    Shoulder mass  Noticed will 2 weeks ago.  It is not painful.  No discomfort.  Full range of motion of the shoulder.  Has not been red or hot.  No fevers.  We will continue to monitor.    Screen for colon cancer  Due for screening.  Order placed today.  - Colonscopy Screening  Referral; Future      The risks, benefits and treatment options of prescribed medications or other treatments have been discussed with the patient. The patient verbalized their understanding and should call or follow up if no improvement or if they develop further problems.      Raad Benitez [facility]    Taylor Herron is a 67 year old, presenting for the following health issues:  Results      History of Present Illness       Hypertension: He presents for follow up of hypertension.  He does check blood pressure  regularly outside of the clinic. Outpatient blood pressures have not been over 140/90. He does not follow a low salt diet.         Pain History:  When did you first notice your pain? - Acute Pain   Have you seen anyone else for your pain? No  Where in your body do you have pain? Musculoskeletal problem/pain  Onset/Duration: 2 weeks  Description  Location: shoulder - left  Joint Swelling: YES  Redness: No  Pain: YES  Warmth: No  Intensity:  mild  Progression of Symptoms:  Has a lump on his left shoulder, was lifting heavy logs.  Accompanying signs and symptoms:   Fevers: No  Numbness/tingling/weakness: No  History  Trauma to the area: YES- see above  Recent  "illness:  No  Previous similar problem: YES- has had rotator cuff repair in the past  Previous evaluation:  No  Precipitating or alleviating factors:  Aggravating factors include: lifting  Therapies tried and outcome: nothing      Reports moving fire wood.   No trauma.   Left shoulder bump   No pain  No decreased range of motion.   Not bothersome. Wishes to continue to monitor.     GERD improved with Pepcid medication.   Currently on prilosec and pepcid.   No reflux symptoms.   Has follow up with GI.     Shortness of breath with exertion  He has not completed his lexiscan stress test.   No tobacco use history.   ECHO study normal.   PFT testing normal.    Reports when he was actively working out this had resolved.       Review of Systems   Constitutional, HEENT, cardiovascular, pulmonary, gi and gu systems are negative, except as otherwise noted.      Objective    /70 (BP Location: Right arm, Patient Position: Chair, Cuff Size: Adult Regular)   Pulse 68   Temp 98.5  F (36.9  C) (Tympanic)   Resp 28   Ht 1.778 m (5' 10\")   Wt 99.3 kg (219 lb)   SpO2 96%   BMI 31.42 kg/m    Body mass index is 31.42 kg/m .  Physical Exam   General: alert, cooperative, no acute distress   CV: RRR, no murmur  Resp: non-labored breathing  Abdomen: Soft, non-tender, no guarding.   Extremities: No peripheral edema, calves non-tender.         .  ..  "

## 2022-07-12 NOTE — TELEPHONE ENCOUNTER
[facility] Call Center    Phone Message    May a detailed message be left on voicemail: yes     Reason for Call: Other: Patient called to verify appt of 7/14/22 with Tamar Bob.  Appt was cancelled 7/1/22 (as she was leaving); however, hasn't been rescheduled.  Please review and attach scheduling instructions, due to mulitple referrals DXs.     Action Taken: Message routed to:  [facility]    Travel Screening: Not Applicable

## 2022-07-13 ENCOUNTER — TELEPHONE (OUTPATIENT)
Dept: FAMILY MEDICINE | Facility: CLINIC | Age: 67
End: 2022-07-13

## 2022-07-13 NOTE — TELEPHONE ENCOUNTER
[facility] Call Center    Phone Message    May a detailed message be left on voicemail: yes     Reason for Call: Other: please call to schedule nuclear stress test     Action Taken: Other: routed to cardiology    Travel Screening: Not Applicable

## 2022-07-14 ENCOUNTER — PRE VISIT (OUTPATIENT)
Dept: GASTROENTEROLOGY | Facility: CLINIC | Age: 67
End: 2022-07-14

## 2022-07-21 DIAGNOSIS — N52.9 ERECTILE DYSFUNCTION, UNSPECIFIED ERECTILE DYSFUNCTION TYPE: ICD-10-CM

## 2022-07-21 NOTE — TELEPHONE ENCOUNTER
Requested Prescriptions   Pending Prescriptions Disp Refills     sildenafil (REVATIO) 20 MG tablet 30 tablet 3     Sig: Take 2 to 5 tablets 1 hour prior to intercourse       There is no refill protocol information for this order        Last office visit: Visit date not found with prescribing provider:  Dr. Caba    Future Office Visit:          Aleksey Fajardo  [facility]

## 2022-07-27 NOTE — TELEPHONE ENCOUNTER
Last OV 1/13/20. No f/u appointment scheduled. Please advise on refills or if a f/u appointment is needed first.  Marti Biswas RN on 7/27/2022 at 4:16 PM

## 2022-07-28 RX ORDER — SILDENAFIL CITRATE 20 MG/1
TABLET ORAL
Qty: 30 TABLET | Refills: 3 | Status: SHIPPED | OUTPATIENT
Start: 2022-07-28 | End: 2023-02-21

## 2022-07-30 DIAGNOSIS — K21.00 GASTROESOPHAGEAL REFLUX DISEASE WITH ESOPHAGITIS WITHOUT HEMORRHAGE: ICD-10-CM

## 2022-08-01 RX ORDER — FAMOTIDINE 20 MG/1
20 TABLET, FILM COATED ORAL 2 TIMES DAILY
Qty: 180 TABLET | Refills: 0 | Status: SHIPPED | OUTPATIENT
Start: 2022-08-01 | End: 2022-10-28

## 2022-08-03 ENCOUNTER — TELEPHONE (OUTPATIENT)
Dept: FAMILY MEDICINE | Facility: CLINIC | Age: 67
End: 2022-08-03

## 2022-08-03 ENCOUNTER — HOSPITAL ENCOUNTER (OUTPATIENT)
Dept: NUCLEAR MEDICINE | Facility: CLINIC | Age: 67
Setting detail: NUCLEAR MEDICINE
Discharge: HOME | End: 2022-08-03
Attending: FAMILY MEDICINE
Payer: MEDICARE

## 2022-08-03 ENCOUNTER — HOSPITAL ENCOUNTER (OUTPATIENT)
Dept: CARDIOLOGY | Facility: CLINIC | Age: 67
Discharge: HOME | End: 2022-08-03
Attending: FAMILY MEDICINE
Payer: MEDICARE

## 2022-08-03 VITALS — BODY MASS INDEX: 31.35 KG/M2 | HEIGHT: 70 IN | WEIGHT: 219 LBS

## 2022-08-03 DIAGNOSIS — R94.39 ABNORMAL STRESS TEST: Primary | ICD-10-CM

## 2022-08-03 DIAGNOSIS — R06.09 DYSPNEA ON EXERTION: ICD-10-CM

## 2022-08-03 LAB
CV STRESS MAX HR HE: 134
NUC STRESS EJECTION FRACTION: 55 %
RATE PRESSURE PRODUCT: NORMAL
STRESS ANGINA INDEX: 0
STRESS ECHO BASELINE DIASTOLIC HE: 80
STRESS ECHO BASELINE HR: 45 BPM
STRESS ECHO BASELINE SYSTOLIC BP: 142
STRESS ECHO CALCULATED PERCENT HR: 88 %
STRESS ECHO LAST STRESS DIASTOLIC BP: 70
STRESS ECHO LAST STRESS SYSTOLIC BP: 160
STRESS ECHO POST ESTIMATED WORKLOAD: 9.3 METS
STRESS ECHO POST EXERCISE DUR MIN: 9 MIN
STRESS ECHO POST EXERCISE DUR SEC: 39 SEC
STRESS ECHO TARGET HR: 152

## 2022-08-03 PROCEDURE — 93017 CV STRESS TEST TRACING ONLY: CPT

## 2022-08-03 PROCEDURE — 93018 CV STRESS TEST I&R ONLY: CPT | Performed by: INTERNAL MEDICINE

## 2022-08-03 PROCEDURE — 343N000001 HC RX 343: Performed by: FAMILY MEDICINE

## 2022-08-03 PROCEDURE — 93016 CV STRESS TEST SUPVJ ONLY: CPT | Performed by: INTERNAL MEDICINE

## 2022-08-03 PROCEDURE — G1004 CDSM NDSC: HCPCS | Performed by: INTERNAL MEDICINE

## 2022-08-03 PROCEDURE — A9502 TC99M TETROFOSMIN: HCPCS | Performed by: FAMILY MEDICINE

## 2022-08-03 PROCEDURE — G1004 CDSM NDSC: HCPCS

## 2022-08-03 PROCEDURE — 78452 HT MUSCLE IMAGE SPECT MULT: CPT | Mod: 26 | Performed by: INTERNAL MEDICINE

## 2022-08-03 RX ADMIN — TETROFOSMIN 31.2 MCI.: 1.38 INJECTION, POWDER, LYOPHILIZED, FOR SOLUTION INTRAVENOUS at 10:49

## 2022-08-03 RX ADMIN — TETROFOSMIN 10.7 MCI.: 1.38 INJECTION, POWDER, LYOPHILIZED, FOR SOLUTION INTRAVENOUS at 08:38

## 2022-08-11 ENCOUNTER — OFFICE VISIT (OUTPATIENT)
Dept: CARDIOLOGY | Facility: CLINIC | Age: 67
End: 2022-08-11
Attending: FAMILY MEDICINE
Payer: MEDICARE

## 2022-08-11 VITALS
SYSTOLIC BLOOD PRESSURE: 136 MMHG | BODY MASS INDEX: 30.73 KG/M2 | TEMPERATURE: 99.1 F | HEART RATE: 90 BPM | DIASTOLIC BLOOD PRESSURE: 85 MMHG | OXYGEN SATURATION: 97 % | WEIGHT: 214.2 LBS

## 2022-08-11 DIAGNOSIS — I25.118 CORONARY ARTERY DISEASE OF NATIVE HEART WITH STABLE ANGINA PECTORIS, UNSPECIFIED VESSEL OR LESION TYPE (H): Primary | ICD-10-CM

## 2022-08-11 DIAGNOSIS — R94.39 ABNORMAL STRESS TEST: ICD-10-CM

## 2022-08-11 PROCEDURE — 99204 OFFICE O/P NEW MOD 45 MIN: CPT | Performed by: INTERNAL MEDICINE

## 2022-08-11 NOTE — PROGRESS NOTES
CARDIOLOGY CLINIC CONSULTATION    REASON FOR CONSULT: Exertional angina positive stress test    PRIMARY CARE PHYSICIAN:  Raad Benitez    Today's clinic visit entailed:  Review of the result(s) of each unique test - Echo labs ECG stress test ECG and stress test  40 minutes spent on the date of the encounter doing chart review, history and exam, documentation and further activities per the note  Provider  Link to [facility] Help Grid     The level of medical decision making during this visit was of high complexity.      HISTORY OF PRESENT ILLNESS:  This is a very pleasant 67-year-old gentleman who is here with his friend.  The patient denies any prior cardiovascular history.  He has a history of obesity.  He has a history of hyperlipidemia recently started on statin therapy no diabetes or smoking history.  Has family history of carotid disease but no premature CAD history.    He says that for the last 12 to 18 months he has been having progressively worsening exertional shortness of breath.  Atypical symptoms of angina.  Denies central chest discomfort arm pain jaw pain.  Denies edema orthopnea.  Echocardiography last year showed normal LV function without valve disease.    Given his symptoms, PCP ordered a stress test.  The patient was able to exercise for 9 minutes and 40 seconds.  He had the similar symptoms on the treadmill.  Nuclear stress test showed a nontransmural infarction in the inferior inferoseptal segments of the LV with mild kamila-infarct ischemia.  Given these findings he has been referred to cardiology.    Patient and his friend tell me that he has had a clear-cut clinical decline in exertional tolerance.    PAST MEDICAL HISTORY:  Past Medical History:   Diagnosis Date     BPH (benign prostatic hyperplasia)      Complication of anesthesia      Diverticulitis      GERD (gastroesophageal reflux disease)      Hypertension        MEDICATIONS:  Current Outpatient Medications   Medication     albuterol (PROAIR  HFA/PROVENTIL HFA/VENTOLIN HFA) 108 (90 Base) MCG/ACT inhaler     atorvastatin (LIPITOR) 20 MG tablet     calcium carbonate (TUMS) 500 MG chewable tablet     COMPOUNDED NON-CONTROLLED SUBSTANCE (CMPD RX) - PHARMACY TO MIX COMPOUNDED MEDICATION     Digestive Enzymes (DIGESTIVE ENZYME PO)     famotidine (PEPCID) 20 MG tablet     ferrous sulfate (FE TABS) 325 (65 Fe) MG EC tablet     glucosamine-chondroitin 500-400 MG CAPS per capsule     ibuprofen (ADVIL/MOTRIN) 200 MG tablet     ketoconazole (NIZORAL) 2 % external cream     multivitamin (CENTRUM SILVER) tablet     omeprazole (PRILOSEC) 40 MG DR capsule     oxybutynin ER (DITROPAN-XL) 10 MG 24 hr tablet     sildenafil (REVATIO) 20 MG tablet     tamsulosin (FLOMAX) 0.4 MG capsule     triamcinolone (KENALOG) 0.1 % external cream     triamterene-HCTZ (MAXZIDE) 75-50 MG tablet     vitamin C (ASCORBIC ACID) 1000 MG TABS     No current facility-administered medications for this visit.       ALLERGIES:  Allergies   Allergen Reactions     Nka [No Known Allergies]        SOCIAL HISTORY:  I have reviewed this patient's social history and updated it with pertinent information if needed. Jamal NEW Sanchez  reports that he has never smoked. He has never used smokeless tobacco. He reports current alcohol use. He reports that he does not use drugs.    FAMILY HISTORY:  I have reviewed this patient's family history and updated it with pertinent information if needed.   Family History   Problem Relation Age of Onset     Diabetes Mother      Cerebrovascular Disease Mother      Neurologic Disorder Father         parkinson's     Cancer - colorectal Maternal Grandfather      Hypertension Brother      Cerebrovascular Disease Brother         stroke     Alcohol/Drug Brother      Unknown/Adopted Brother      Diabetes Sister      Obesity Sister        REVIEW OF SYSTEMS:  Skin:        Eyes:       ENT:       Respiratory:  Positive for dyspnea on exertion  Cardiovascular:  Negative  for;palpitations;chest pain;edema;syncope or near-syncope;dizziness;lightheadedness;fatigue    Gastroenterology:      Genitourinary:       Musculoskeletal:       Neurologic:       Psychiatric:       Heme/Lymph/Imm:       Endocrine:           PHYSICAL EXAM:  Temp: 99.1  F (37.3  C) Temp src: Tympanic BP: 136/85 Pulse: 90     SpO2: 97 %      Vital Signs with Ranges  Temp:  [99.1  F (37.3  C)] 99.1  F (37.3  C)  Pulse:  [87-90] 90  BP: (135-136)/(85-90) 136/85  SpO2:  [97 %] 97 %  214 lbs 3.2 oz    Constitutional: awake, alert, no distress  Respiratory: Clear to auscultation  Cardiovascular: Normal JVP no edema normal heart sounds  GI: nondistended  Neuropsychiatric: appropriate affact    DATA:  Labs: Pertinent cardiac labs reviewed    ASSESSMENT:  Exertional dyspnea and positive stress test concerning for obstructive CAD  Borderline hyperlipidemia recently started on statin therapy    RECOMMENDATIONS:  1. Patient symptoms are atypical but nuclear stress test is positive.  Given ongoing symptoms and nuclear stress test findings, recommend coronary angiography with intent to revascularize.  2. Patient will start baby aspirin.  He denies any contraindications to dual antiplatelet therapy.  Denies any bleeding problems.  3. He is significant bradycardia does not tolerate beta-blockers.  4. Risk and benefits of the angiogram explained to the patient and his friend and they are willing to proceed.  5. Repeat limited echocardiography to look for wall motion abnormalities and EF.  6. Avoid difficult exertion until this is done.  If any resting symptoms he needs to immediately seek medical attention.    Follow-up after the findings of these test.    CLINTON Martinez, [facility]  Cardiology - [facility]  August 11, 2022

## 2022-08-11 NOTE — LETTER
8/11/2022    Raad Benitez, DO  [address] [facility] [address]    RE: Jamal NEW Sanchez       Dear Colleague,     I had the [facility] of seeing Jamal Sanchez in [facility].  CARDIOLOGY CLINIC CONSULTATION    REASON FOR CONSULT: Exertional angina positive stress test    PRIMARY CARE PHYSICIAN:  Raad Benitez    Today's clinic visit entailed:  Review of the result(s) of each unique test - Echo labs ECG stress test ECG and stress test  40 minutes spent on the date of the encounter doing chart review, history and exam, documentation and further activities per the note  Provider  Link to [facility] Help Grid     The level of medical decision making during this visit was of high complexity.      HISTORY OF PRESENT ILLNESS:  This is a very pleasant 67-year-old gentleman who is here with his friend.  The patient denies any prior cardiovascular history.  He has a history of obesity.  He has a history of hyperlipidemia recently started on statin therapy no diabetes or smoking history.  Has family history of carotid disease but no premature CAD history.    He says that for the last 12 to 18 months he has been having progressively worsening exertional shortness of breath.  Atypical symptoms of angina.  Denies central chest discomfort arm pain jaw pain.  Denies edema orthopnea.  Echocardiography last year showed normal LV function without valve disease.    Given his symptoms, PCP ordered a stress test.  The patient was able to exercise for 9 minutes and 40 seconds.  He had the similar symptoms on the treadmill.  Nuclear stress test showed a nontransmural infarction in the inferior inferoseptal segments of the LV with mild kamila-infarct ischemia.  Given these findings he has been referred to cardiology.    Patient and his friend tell me that he has had a clear-cut clinical decline in exertional tolerance.    PAST MEDICAL HISTORY:  Past Medical History:   Diagnosis Date     BPH (benign prostatic  hyperplasia)      Complication of anesthesia      Diverticulitis      GERD (gastroesophageal reflux disease)      Hypertension        MEDICATIONS:  Current Outpatient Medications   Medication     albuterol (PROAIR HFA/PROVENTIL HFA/VENTOLIN HFA) 108 (90 Base) MCG/ACT inhaler     atorvastatin (LIPITOR) 20 MG tablet     calcium carbonate (TUMS) 500 MG chewable tablet     COMPOUNDED NON-CONTROLLED SUBSTANCE (CMPD RX) - PHARMACY TO MIX COMPOUNDED MEDICATION     Digestive Enzymes (DIGESTIVE ENZYME PO)     famotidine (PEPCID) 20 MG tablet     ferrous sulfate (FE TABS) 325 (65 Fe) MG EC tablet     glucosamine-chondroitin 500-400 MG CAPS per capsule     ibuprofen (ADVIL/MOTRIN) 200 MG tablet     ketoconazole (NIZORAL) 2 % external cream     multivitamin (CENTRUM SILVER) tablet     omeprazole (PRILOSEC) 40 MG DR capsule     oxybutynin ER (DITROPAN-XL) 10 MG 24 hr tablet     sildenafil (REVATIO) 20 MG tablet     tamsulosin (FLOMAX) 0.4 MG capsule     triamcinolone (KENALOG) 0.1 % external cream     triamterene-HCTZ (MAXZIDE) 75-50 MG tablet     vitamin C (ASCORBIC ACID) 1000 MG TABS     No current facility-administered medications for this visit.       ALLERGIES:  Allergies   Allergen Reactions     Nka [No Known Allergies]        SOCIAL HISTORY:  I have reviewed this patient's social history and updated it with pertinent information if needed. Jamal Sanchez  reports that he has never smoked. He has never used smokeless tobacco. He reports current alcohol use. He reports that he does not use drugs.    FAMILY HISTORY:  I have reviewed this patient's family history and updated it with pertinent information if needed.   Family History   Problem Relation Age of Onset     Diabetes Mother      Cerebrovascular Disease Mother      Neurologic Disorder Father         parkinson's     Cancer - colorectal Maternal Grandfather      Hypertension Brother      Cerebrovascular Disease Brother         stroke     Alcohol/Drug Brother       Unknown/Adopted Brother      Diabetes Sister      Obesity Sister        REVIEW OF SYSTEMS:  Skin:        Eyes:       ENT:       Respiratory:  Positive for dyspnea on exertion  Cardiovascular:  Negative for;palpitations;chest pain;edema;syncope or near-syncope;dizziness;lightheadedness;fatigue    Gastroenterology:      Genitourinary:       Musculoskeletal:       Neurologic:       Psychiatric:       Heme/Lymph/Imm:       Endocrine:           PHYSICAL EXAM:  Temp: 99.1  F (37.3  C) Temp src: Tympanic BP: 136/85 Pulse: 90     SpO2: 97 %      Vital Signs with Ranges  Temp:  [99.1  F (37.3  C)] 99.1  F (37.3  C)  Pulse:  [87-90] 90  BP: (135-136)/(85-90) 136/85  SpO2:  [97 %] 97 %  214 lbs 3.2 oz    Constitutional: awake, alert, no distress  Respiratory: Clear to auscultation  Cardiovascular: Normal JVP no edema normal heart sounds  GI: nondistended  Neuropsychiatric: appropriate affact    DATA:  Labs: Pertinent cardiac labs reviewed    ASSESSMENT:  Exertional dyspnea and positive stress test concerning for obstructive CAD  Borderline hyperlipidemia recently started on statin therapy    RECOMMENDATIONS:  1. Patient symptoms are atypical but nuclear stress test is positive.  Given ongoing symptoms and nuclear stress test findings, recommend coronary angiography with intent to revascularize.  2. Patient will start baby aspirin.  He denies any contraindications to dual antiplatelet therapy.  Denies any bleeding problems.  3. He is significant bradycardia does not tolerate beta-blockers.  4. Risk and benefits of the angiogram explained to the patient and his friend and they are willing to proceed.  5. Repeat limited echocardiography to look for wall motion abnormalities and EF.  6. Avoid difficult exertion until this is done.  If any resting symptoms he needs to immediately seek medical attention.    Follow-up after the findings of these test.    CLINTON Martinez, [facility]  Cardiology - [facility] Heart  August 11, 2022    Thank you  for allowing me to participate in the care of your patient.      Sincerely,     Wilian Cruz MD     [facility]  cc:   Raad Benitez, DO  [address] [place] [address]

## 2022-08-16 ENCOUNTER — ANESTHESIA EVENT (OUTPATIENT)
Dept: GASTROENTEROLOGY | Facility: CLINIC | Age: 67
End: 2022-08-16
Payer: MEDICARE

## 2022-08-16 NOTE — ANESTHESIA PREPROCEDURE EVALUATION
Anesthesia Pre-Procedure Evaluation    Patient: Jamal Sanchez   MRN: [id number] : 1955        Procedure : Procedure(s):  COLONOSCOPY          Past Medical History:   Diagnosis Date     BPH (benign prostatic hyperplasia)      Complication of anesthesia      Diverticulitis      GERD (gastroesophageal reflux disease)      Hypertension       Past Surgical History:   Procedure Laterality Date     APPENDECTOMY       ARTHROSCOPY SHOULDER ROTATOR CUFF REPAIR Left      ARTHROSCOPY SHOULDER RT/LT Right 2018     COLONOSCOPY  3/5/2008     COLONOSCOPY  10/9/2013    Procedure: COLONOSCOPY;;  Surgeon: Nicolas Lizama MD;  Location: [facility]     ESOPHAGOSCOPY, GASTROSCOPY, DUODENOSCOPY (EGD), COMBINED  10/9/2013    Procedure: COMBINED ESOPHAGOSCOPY, GASTROSCOPY, DUODENOSCOPY (EGD), BIOPSY SINGLE OR MULTIPLE;;  Surgeon: Nicolas Lizama MD;  Location: [facility]     ESOPHAGOSCOPY, GASTROSCOPY, DUODENOSCOPY (EGD), COMBINED N/A 2022    Procedure: ESOPHAGOGASTRODUODENOSCOPY, WITH BIOPSY;  Surgeon: Vimal Stanley DO;  Location: [facility]     HERNIA REPAIR       LAPAROSCOPIC APPENDECTOMY N/A 2016    Procedure: LAPAROSCOPIC APPENDECTOMY;  Surgeon: Ab Guzman MD;  Location: [facility]     LAPAROSCOPIC HERNIORRHAPHY INGUINAL BILATERAL Bilateral 2016    Procedure: LAPAROSCOPIC HERNIORRHAPHY INGUINAL BILATERAL;  Surgeon: Ab Guzman MD;  Location: [facility]      Allergies   Allergen Reactions     Nka [No Known Allergies]       Social History     Tobacco Use     Smoking status: Never Smoker     Smokeless tobacco: Never Used   Substance Use Topics     Alcohol use: Yes     Comment: 6 pack beer on weekend.      Wt Readings from Last 1 Encounters:   22 97.2 kg (214 lb 3.2 oz)        Anesthesia Evaluation   Pt has had prior anesthetic. Type: General, MAC and Regional.        ROS/MED HX  ENT/Pulmonary:       Neurologic:       Cardiovascular:     (+) Dyslipidemia hypertension-----ZEPEDA. Previous cardiac  testing   Echo: Date: Results:    Stress Test: Date: Results:    ECG Reviewed: Date: 8/11/22 Results:  Sinus Bradycardia   WITHIN NORMAL LIMITS  Cath: Date: Results:      METS/Exercise Tolerance:     Hematologic:     (+) anemia,     Musculoskeletal:   (+) arthritis,     GI/Hepatic:     (+) GERD, hiatal hernia, Inflammatory bowel disease,     Renal/Genitourinary:     (+) BPH,     Endo:     (+) Obesity,     Psychiatric/Substance Use:     (+) alcohol abuse     Infectious Disease:       Malignancy:       Other:            Physical Exam    Airway        Mallampati: I   TM distance: > 3 FB   Neck ROM: full   Mouth opening: > 3 cm    Respiratory Devices and Support         Dental  no notable dental history         Cardiovascular   cardiovascular exam normal          Pulmonary   pulmonary exam normal                OUTSIDE LABS:  CBC:   Lab Results   Component Value Date    WBC 6.1 04/19/2022    WBC 5.8 03/10/2022    HGB 14.3 04/19/2022    HGB 12.2 (L) 03/10/2022    HCT 46.7 04/19/2022    HCT 40.7 03/10/2022     04/19/2022     03/10/2022     BMP:   Lab Results   Component Value Date     11/23/2021     02/22/2021    POTASSIUM 4.0 11/23/2021    POTASSIUM 4.2 02/22/2021    CHLORIDE 105 11/23/2021    CHLORIDE 102 02/22/2021    CO2 27 11/23/2021    CO2 28 02/22/2021    BUN 14 11/23/2021    BUN 23 02/22/2021    CR 1.02 11/23/2021    CR 0.89 02/22/2021     (H) 11/23/2021    GLC 90 02/22/2021     COAGS:   Lab Results   Component Value Date    INR 1.10 11/22/2018     POC: No results found for: BGM, HCG, HCGS  HEPATIC:   Lab Results   Component Value Date    ALBUMIN 3.7 11/23/2021    PROTTOTAL 7.6 11/23/2021    ALT 48 04/19/2022    AST 41 11/23/2021    ALKPHOS 85 11/23/2021    BILITOTAL 0.3 11/23/2021     OTHER:   Lab Results   Component Value Date    LACT 1.3 07/25/2017    ALYCE 8.9 11/23/2021    MAG 2.0 11/22/2018    LIPASE 77 07/02/2019    TSH 1.33 11/23/2021    SED 9 08/29/2017       Anesthesia  Plan    ASA Status:  3      Anesthesia Type: General.              Consents    Anesthesia Plan(s) and associated risks, benefits, and realistic alternatives discussed. Questions answered and patient/representative(s) expressed understanding.     - Discussed: Risks, Benefits and Alternatives for BOTH SEDATION and the PROCEDURE were discussed     - Discussed with:  Patient         Postoperative Care            Comments:                HERBERTH Camejo CRNA

## 2022-08-17 ENCOUNTER — HOSPITAL ENCOUNTER (OUTPATIENT)
Facility: CLINIC | Age: 67
Discharge: HOME | End: 2022-08-17
Attending: SURGERY | Admitting: SURGERY
Payer: MEDICARE

## 2022-08-17 ENCOUNTER — ANESTHESIA (OUTPATIENT)
Dept: GASTROENTEROLOGY | Facility: CLINIC | Age: 67
End: 2022-08-17
Payer: MEDICARE

## 2022-08-17 VITALS
HEART RATE: 66 BPM | WEIGHT: 210 LBS | BODY MASS INDEX: 30.06 KG/M2 | SYSTOLIC BLOOD PRESSURE: 152 MMHG | RESPIRATION RATE: 16 BRPM | OXYGEN SATURATION: 96 % | HEIGHT: 70 IN | TEMPERATURE: 98.5 F | DIASTOLIC BLOOD PRESSURE: 87 MMHG

## 2022-08-17 LAB — COLONOSCOPY: NORMAL

## 2022-08-17 PROCEDURE — 88305 TISSUE EXAM BY PATHOLOGIST: CPT | Mod: TC | Performed by: SURGERY

## 2022-08-17 PROCEDURE — 250N000011 HC RX IP 250 OP 636: Performed by: NURSE ANESTHETIST, CERTIFIED REGISTERED

## 2022-08-17 PROCEDURE — 258N000003 HC RX IP 258 OP 636: Performed by: SURGERY

## 2022-08-17 PROCEDURE — 370N000017 HC ANESTHESIA TECHNICAL FEE, PER MIN: Performed by: SURGERY

## 2022-08-17 PROCEDURE — 250N000009 HC RX 250: Performed by: SURGERY

## 2022-08-17 PROCEDURE — 45385 COLONOSCOPY W/LESION REMOVAL: CPT | Mod: PT | Performed by: SURGERY

## 2022-08-17 PROCEDURE — 45385 COLONOSCOPY W/LESION REMOVAL: CPT | Performed by: SURGERY

## 2022-08-17 PROCEDURE — 45381 COLONOSCOPY SUBMUCOUS NJX: CPT | Mod: PT | Performed by: SURGERY

## 2022-08-17 RX ORDER — NALOXONE HYDROCHLORIDE 0.4 MG/ML
0.4 INJECTION, SOLUTION INTRAMUSCULAR; INTRAVENOUS; SUBCUTANEOUS
Status: CANCELLED | OUTPATIENT
Start: 2022-08-17

## 2022-08-17 RX ORDER — SODIUM CHLORIDE, SODIUM LACTATE, POTASSIUM CHLORIDE, CALCIUM CHLORIDE 600; 310; 30; 20 MG/100ML; MG/100ML; MG/100ML; MG/100ML
INJECTION, SOLUTION INTRAVENOUS CONTINUOUS
Status: DISCONTINUED | OUTPATIENT
Start: 2022-08-17 | End: 2022-08-17 | Stop reason: HOSPADM

## 2022-08-17 RX ORDER — FLUMAZENIL 0.1 MG/ML
0.2 INJECTION, SOLUTION INTRAVENOUS
Status: CANCELLED | OUTPATIENT
Start: 2022-08-17 | End: 2022-08-18

## 2022-08-17 RX ORDER — LIDOCAINE 40 MG/G
CREAM TOPICAL
Status: DISCONTINUED | OUTPATIENT
Start: 2022-08-17 | End: 2022-08-17 | Stop reason: HOSPADM

## 2022-08-17 RX ORDER — NALOXONE HYDROCHLORIDE 0.4 MG/ML
0.2 INJECTION, SOLUTION INTRAMUSCULAR; INTRAVENOUS; SUBCUTANEOUS
Status: CANCELLED | OUTPATIENT
Start: 2022-08-17

## 2022-08-17 RX ORDER — PROPOFOL 10 MG/ML
INJECTION, EMULSION INTRAVENOUS PRN
Status: DISCONTINUED | OUTPATIENT
Start: 2022-08-17 | End: 2022-08-17

## 2022-08-17 RX ORDER — ONDANSETRON 2 MG/ML
4 INJECTION INTRAMUSCULAR; INTRAVENOUS
Status: DISCONTINUED | OUTPATIENT
Start: 2022-08-17 | End: 2022-08-17 | Stop reason: HOSPADM

## 2022-08-17 RX ADMIN — LIDOCAINE HYDROCHLORIDE 0.3 ML: 10 INJECTION, SOLUTION EPIDURAL; INFILTRATION; INTRACAUDAL; PERINEURAL at 12:28

## 2022-08-17 RX ADMIN — PROPOFOL 125 MG: 10 INJECTION, EMULSION INTRAVENOUS at 12:55

## 2022-08-17 RX ADMIN — SODIUM CHLORIDE, POTASSIUM CHLORIDE, SODIUM LACTATE AND CALCIUM CHLORIDE: 600; 310; 30; 20 INJECTION, SOLUTION INTRAVENOUS at 12:28

## 2022-08-17 RX ADMIN — PROPOFOL 125 MG: 10 INJECTION, EMULSION INTRAVENOUS at 12:53

## 2022-08-17 RX ADMIN — PROPOFOL 50 MG: 10 INJECTION, EMULSION INTRAVENOUS at 13:00

## 2022-08-17 ASSESSMENT — ACTIVITIES OF DAILY LIVING (ADL): ADLS_ACUITY_SCORE: 35

## 2022-08-17 NOTE — LETTER
Jamal Sanchez  [address] [address] [place] [id number]      August 25, 2022    Dear Jamal,  This letter is written to inform you of the results of your recent colonoscopy.  Your examination showed polyp(s) in your transverse colon and sigmoid colon. All polyps were removed in their entirety and sent for review by a pathologist. As you will see on the pathology report below, the tissue(s) were tubular adenomatous polyps. Your examination also showed diverticulosis.      A.  Colon, transverse, polypectomy-  Tubular adenoma, no evidence of high-grade dysplasia or invasive malignancy    B.  Colon, sigmoid, polypectomy-  Tubular adenoma, no evidence of high-grade dysplasia or invasive malignancy    Diverticulosis can be described as small outpouchings (pockets) in your colon wall. This is an entirely benign (non-cancerous) finding.  Adenomatous polyps are entirely benign (non-cancerous); however, patients who have developed these polyps are at an increased risk for developing additional polyps in the future. If these are not eventually removed, there is a risk of developing colon cancer. We will advise more frequent examinations with you because of the risk associated with this type of polyp.    Given these findings, your personal history of polyps, I recommend that you undergo a repeat colonoscopy in 7 year(s) for surveillance. We will enter you into a recall system so you receive a reminder closer to the time that you are due for repeat examination.     Please remember that this recommendation is made with the understanding that you are not experiencing persistent changes in bowel function, bleeding per rectum, and/or significant abdominal pain. If you experience these symptoms, please contact your primary care provider for a further evaluation.     If you have any questions or concerns about the results of your colonoscopy or the appropriate follow-up, please contact my assistant at  [phone]    Sincerely,      Vimal Stanley, [facility] General Surgery  ___

## 2022-08-17 NOTE — ANESTHESIA POSTPROCEDURE EVALUATION
Patient: Jamal Sanchez    Procedure: Procedure(s):  COLONOSCOPY, FLEXIBLE, WITH LESION REMOVAL USING SNARE       Anesthesia Type:  General    Note:  Disposition: Outpatient   Postop Pain Control: Uneventful            Sign Out: Well controlled pain   PONV: No   Neuro/Psych: Uneventful            Sign Out: Acceptable/Baseline neuro status   Airway/Respiratory: Uneventful            Sign Out: Acceptable/Baseline resp. status   CV/Hemodynamics: Uneventful            Sign Out: Acceptable CV status; No obvious hypovolemia; No obvious fluid overload   Other NRE: NONE   DID A NON-ROUTINE EVENT OCCUR? No           Last vitals:  Vitals:    08/17/22 1207   BP: 134/80   Pulse: 61   Resp: 16   Temp: 36.9  C (98.5  F)   SpO2: 97%       Electronically Signed By: HERBERTH Perkins CRNA  August 17, 2022  1:14 PM

## 2022-08-17 NOTE — H&P
67 year old year old male here for colonoscopy for screening.  Last colonoscopy was 2013, negative.    Patient denies blood in stool or change in stool caliber.  Family history of colon cancer in a grandfather.  History of diverticulitis in the past.    Patient Active Problem List   Diagnosis     Benign prostatic hypertrophy     CARDIOVASCULAR SCREENING; LDL GOAL LESS THAN 160     GERD (gastroesophageal reflux disease)     Advanced directives, counseling/discussion     Hypertension, goal below 140/90     Appendicitis     Diverticulosis of large intestine without hemorrhage     Diverticulitis of colon     Acute diverticulitis     ETOH abuse     Generalized abdominal pain     Anemia, iron deficiency     Hiatal hernia     Dyspnea on exertion       Past Medical History:   Diagnosis Date     BPH (benign prostatic hyperplasia)      Complication of anesthesia      Diverticulitis      GERD (gastroesophageal reflux disease)      Hypertension        Past Surgical History:   Procedure Laterality Date     APPENDECTOMY       ARTHROSCOPY SHOULDER ROTATOR CUFF REPAIR Left      ARTHROSCOPY SHOULDER RT/LT Right 01/2018     COLONOSCOPY  3/5/2008     COLONOSCOPY  10/9/2013    Procedure: COLONOSCOPY;;  Surgeon: Nicolas Lizama MD;  Location: [facility]     ESOPHAGOSCOPY, GASTROSCOPY, DUODENOSCOPY (EGD), COMBINED  10/9/2013    Procedure: COMBINED ESOPHAGOSCOPY, GASTROSCOPY, DUODENOSCOPY (EGD), BIOPSY SINGLE OR MULTIPLE;;  Surgeon: Nicolas Lizama MD;  Location: [facility]     ESOPHAGOSCOPY, GASTROSCOPY, DUODENOSCOPY (EGD), COMBINED N/A 4/6/2022    Procedure: ESOPHAGOGASTRODUODENOSCOPY, WITH BIOPSY;  Surgeon: Vimal Stanley DO;  Location: [facility]     HERNIA REPAIR       LAPAROSCOPIC APPENDECTOMY N/A 1/7/2016    Procedure: LAPAROSCOPIC APPENDECTOMY;  Surgeon: Ab Guzman MD;  Location: [facility]     LAPAROSCOPIC HERNIORRHAPHY INGUINAL BILATERAL Bilateral 12/13/2016    Procedure: LAPAROSCOPIC HERNIORRHAPHY INGUINAL BILATERAL;   "Surgeon: Ab Guzman MD;  Location: [facility]       Family History   Problem Relation Age of Onset     Diabetes Mother      Cerebrovascular Disease Mother      Neurologic Disorder Father         parkinson's     Cancer - colorectal Maternal Grandfather      Hypertension Brother      Cerebrovascular Disease Brother         stroke     Alcohol/Drug Brother      Unknown/Adopted Brother      Diabetes Sister      Obesity Sister        No current outpatient medications on file.       Allergies   Allergen Reactions     Nka [No Known Allergies]        Pt reports that he has never smoked. He has never used smokeless tobacco. He reports current alcohol use. He reports that he does not use drugs.    Exam:  /80 (BP Location: Left arm)   Pulse 61   Temp 98.5  F (36.9  C) (Oral)   Resp 16   Ht 1.778 m (5' 10\")   Wt 95.3 kg (210 lb)   SpO2 97%   BMI 30.13 kg/m      Awake, Alert OX3  Lungs - CTA bilaterally  CV - RRR, no murmurs, distal pulses intact  Abd - soft, non-distended, non-tender, +BS  Extr - No cyanosis or edema    A/P 67 year old year old male in need of colonoscopy for screening. Risks, benefits, alternatives, and complications were discussed including the possibility of perforation, bleeding, missed lesion and the patient agreed to proceed    Vimal Stanley DO on 8/17/2022 at 12:43 PM    "

## 2022-08-17 NOTE — ANESTHESIA CARE TRANSFER NOTE
Patient: Jamal Sanchez    Procedure: Procedure(s):  COLONOSCOPY, FLEXIBLE, WITH LESION REMOVAL USING SNARE       Diagnosis: Screen for colon cancer [Z12.11]  Diagnosis Additional Information: No value filed.    Anesthesia Type:   General     Note:    Oropharynx: oropharynx clear of all foreign objects and spontaneously breathing  Level of Consciousness: awake  Oxygen Supplementation: room air    Independent Airway: airway patency satisfactory and stable  Dentition: dentition unchanged  Vital Signs Stable: post-procedure vital signs reviewed and stable  Report to RN Given: handoff report given  Patient transferred to: Phase II    Handoff Report: Identifed the Patient, Identified the Reponsible Provider, Reviewed the pertinent medical history, Discussed the surgical course, Reviewed Intra-OP anesthesia mangement and issues during anesthesia, Set expectations for post-procedure period and Allowed opportunity for questions and acknowledgement of understanding      Vitals:  Vitals Value Taken Time   BP     Temp     Pulse     Resp     SpO2         Electronically Signed By: HERBERTH Perkins CRNA  August 17, 2022  1:14 PM

## 2022-08-19 LAB
PATH REPORT.COMMENTS IMP SPEC: NORMAL
PATH REPORT.COMMENTS IMP SPEC: NORMAL
PATH REPORT.FINAL DX SPEC: NORMAL
PATH REPORT.GROSS SPEC: NORMAL
PATH REPORT.MICROSCOPIC SPEC OTHER STN: NORMAL
PATH REPORT.RELEVANT HX SPEC: NORMAL
PHOTO IMAGE: NORMAL

## 2022-08-19 PROCEDURE — 88305 TISSUE EXAM BY PATHOLOGIST: CPT | Mod: 26 | Performed by: PATHOLOGY

## 2022-08-29 DIAGNOSIS — B35.3 TINEA PEDIS OF BOTH FEET: ICD-10-CM

## 2022-08-29 NOTE — TELEPHONE ENCOUNTER
Requested Prescriptions   Pending Prescriptions Disp Refills     ketoconazole (NIZORAL) 2 % external cream 60 g 5     Sig: Apply to feet BID x 2-3 weeks PRN       There is no refill protocol information for this order          Last office visit: 8/9/2021 with prescribing provider:  Stephanie Fair PA-C   Future Office Visit:    Syeda [facility]

## 2022-08-29 NOTE — LETTER
[facility] [place]  [address] [facility]  [facility] [address]  Phone: [phone]    2022    Jamal Sanchez                                                                                                        [id number] [address] [place] [id number]            Dear Mr. Sanchez,    We recently provided you with a medication refill. Prescription medications require routine follow-up appointments with your Dermatology Provider.      Per [facility] medication refill protocol, you do need to be seen at least annually to renew a prescription while on prescribed medication(s). A prescription is valid for only one year before it expires.      At this time we ask that: You schedule a routine follow up Dermatology office visit to follow your Tinea pedis of both feet and renew your now  prescription.    Your prescription: Has  as it is over 1 year old. You have been given a one time tunde refill of medication.     We are currently booking Dermatology appointments into 2023, so please schedule follow up Dermatology appointment as soon as possible to avoid going without medication.    [phone] to schedule or you may schedule via My chart. You may be seen for follow up with any of our 3 Dermatology Providers via telephone or virtual video if you prefer.     Thank you,      Stephanie LESLIE / lisbet

## 2022-08-30 DIAGNOSIS — R06.09 DYSPNEA ON EXERTION: Primary | ICD-10-CM

## 2022-08-30 RX ORDER — KETOCONAZOLE 20 MG/G
CREAM TOPICAL
Qty: 60 G | Refills: 1 | Status: SHIPPED | OUTPATIENT
Start: 2022-08-30 | End: 2022-12-15

## 2022-08-30 NOTE — TELEPHONE ENCOUNTER
> 1 year. Needs Derm appointment. Letter sent and note sent to pharmacy as well. Ebony Turcios RN

## 2022-08-31 ENCOUNTER — HOSPITAL ENCOUNTER (OUTPATIENT)
Dept: CARDIOLOGY | Facility: HOSPITAL | Age: 67
Discharge: HOME | End: 2022-08-31
Attending: INTERNAL MEDICINE | Admitting: INTERNAL MEDICINE
Payer: MEDICARE

## 2022-08-31 ENCOUNTER — CARE COORDINATION (OUTPATIENT)
Dept: CARDIOLOGY | Facility: CLINIC | Age: 67
End: 2022-08-31

## 2022-08-31 DIAGNOSIS — R94.39 ABNORMAL STRESS TEST: ICD-10-CM

## 2022-08-31 DIAGNOSIS — R06.09 DYSPNEA ON EXERTION: Primary | ICD-10-CM

## 2022-08-31 DIAGNOSIS — I25.118 CORONARY ARTERY DISEASE OF NATIVE HEART WITH STABLE ANGINA PECTORIS, UNSPECIFIED VESSEL OR LESION TYPE (H): ICD-10-CM

## 2022-08-31 LAB — LVEF ECHO: NORMAL

## 2022-08-31 PROCEDURE — 93321 DOPPLER ECHO F-UP/LMTD STD: CPT | Mod: 26 | Performed by: INTERNAL MEDICINE

## 2022-08-31 PROCEDURE — 255N000002 HC RX 255 OP 636: Performed by: INTERNAL MEDICINE

## 2022-08-31 PROCEDURE — 93308 TTE F-UP OR LMTD: CPT | Mod: 26 | Performed by: INTERNAL MEDICINE

## 2022-08-31 PROCEDURE — 93321 DOPPLER ECHO F-UP/LMTD STD: CPT

## 2022-08-31 PROCEDURE — 999N000208 ECHOCARDIOGRAM LIMITED

## 2022-08-31 PROCEDURE — 93325 DOPPLER ECHO COLOR FLOW MAPG: CPT | Mod: 26 | Performed by: INTERNAL MEDICINE

## 2022-08-31 RX ORDER — SODIUM CHLORIDE 9 MG/ML
INJECTION, SOLUTION INTRAVENOUS CONTINUOUS
Status: CANCELLED | OUTPATIENT
Start: 2022-08-31

## 2022-08-31 RX ORDER — LIDOCAINE 40 MG/G
CREAM TOPICAL
Status: CANCELLED | OUTPATIENT
Start: 2022-08-31

## 2022-08-31 RX ADMIN — PERFLUTREN 2 ML: 6.52 INJECTION, SUSPENSION INTRAVENOUS at 11:15

## 2022-08-31 NOTE — PROGRESS NOTES
"1. COVID test to be done 9/4/22 at 10:00am at [place] [facility].   2. Take your temperature the morning of the procedure. If it is >100 call the Care Suites at [phone]  3. Coronary angiogram to be done at [facility] on 9/7/22 . Please arrive at 8:30am . If you need to contact [facility] for any reason, please call [phone] option #2.  4. Follow up with Celeste Simons NP at [facility] at 3:40pm on 9/14/22.    Please call the cardiology nurse line at [phone] for any questions or     Shauna RN; Marian RN; Leanna RN    ANGIOGRAM  [facility]   [place], [place]   Contact [facility] scheduling if needed at [phone].      1. In preparation for your procedure, we require that you do the following:  a. Nothing to eat or drink after midnight if your procedure is before 12 noon.  b. Take your usual morning medications with a small sip of water immediately upon arising on the morning of your procedure unless outlined below:    Aspirin:  o If patient currently takes 81 mg aspirin daily, take 81 mg aspirin the night before and 4-81 mg tabs the morning of the procedure  o If the patient does not currently take aspirin daily, they are to take aspirin 325 mg the morning of procedure    Diuretic (Water Pill):  o If you take a diuretic (water pill), do not take the morning of the procedure..   2. You will be unable to drive after your procedure; please arrange to have someone drive you home. Make sure that there is a responsible adult with you for 24 hours after your procedure. Your procedure will be cancelled if you do not have transportation home or someone staying with you for 24 hrs.   3.  Your procedure will be done at [facility]. Please park in the  Skyway Ramp  on the west side of [facility] on [address]. Take the skyway over [facility] to the hospital. Please check in on the first floor, \"Skyway " "Welcome Desk\" which is one floor down from the skyway level.   4. If you have any questions about your upcoming procedure, please contact nursing at [facility] at [phone] or at [facility] at [phone].    Left mesg to call back to Disc above schedule and instructions with patient. Pt verbalized understanding and agreed with plan.     "

## 2022-09-04 ENCOUNTER — LAB (OUTPATIENT)
Dept: FAMILY MEDICINE | Facility: CLINIC | Age: 67
End: 2022-09-04
Payer: MEDICARE

## 2022-09-04 DIAGNOSIS — R06.09 DYSPNEA ON EXERTION: ICD-10-CM

## 2022-09-04 LAB — SARS-COV-2 RNA RESP QL NAA+PROBE: NEGATIVE

## 2022-09-04 PROCEDURE — U0003 INFECTIOUS AGENT DETECTION BY NUCLEIC ACID (DNA OR RNA); SEVERE ACUTE RESPIRATORY SYNDROME CORONAVIRUS 2 (SARS-COV-2) (CORONAVIRUS DISEASE [COVID-19]), AMPLIFIED PROBE TECHNIQUE, MAKING USE OF HIGH THROUGHPUT TECHNOLOGIES AS DESCRIBED BY CMS-2020-01-R: HCPCS

## 2022-09-04 PROCEDURE — U0005 INFEC AGEN DETEC AMPLI PROBE: HCPCS

## 2022-09-07 ENCOUNTER — HOSPITAL ENCOUNTER (OUTPATIENT)
Facility: CLINIC | Age: 67
Discharge: HOME | End: 2022-09-07
Admitting: INTERNAL MEDICINE
Payer: MEDICARE

## 2022-09-07 VITALS
HEART RATE: 47 BPM | OXYGEN SATURATION: 99 % | TEMPERATURE: 97.9 F | RESPIRATION RATE: 16 BRPM | DIASTOLIC BLOOD PRESSURE: 93 MMHG | SYSTOLIC BLOOD PRESSURE: 161 MMHG | HEIGHT: 70 IN | BODY MASS INDEX: 31.35 KG/M2 | WEIGHT: 219 LBS

## 2022-09-07 DIAGNOSIS — R94.39 ABNORMAL STRESS TEST: ICD-10-CM

## 2022-09-07 DIAGNOSIS — R06.09 DYSPNEA ON EXERTION: ICD-10-CM

## 2022-09-07 DIAGNOSIS — N32.0 BLADDER OUTLET OBSTRUCTION: ICD-10-CM

## 2022-09-07 DIAGNOSIS — I25.118 CORONARY ARTERY DISEASE OF NATIVE ARTERY OF NATIVE HEART WITH STABLE ANGINA PECTORIS (H): ICD-10-CM

## 2022-09-07 DIAGNOSIS — I25.118 CORONARY ARTERY DISEASE OF NATIVE HEART WITH STABLE ANGINA PECTORIS, UNSPECIFIED VESSEL OR LESION TYPE (H): ICD-10-CM

## 2022-09-07 DIAGNOSIS — Z98.61 POSTSURGICAL PERCUTANEOUS TRANSLUMINAL CORONARY ANGIOPLASTY STATUS: ICD-10-CM

## 2022-09-07 PROBLEM — Z98.890 STATUS POST CORONARY ANGIOGRAM: Status: ACTIVE | Noted: 2022-09-07

## 2022-09-07 LAB
ACT BLD: 254 SECONDS (ref 74–150)
ANION GAP SERPL CALCULATED.3IONS-SCNC: 4 MMOL/L (ref 3–14)
APTT PPP: 31 SECONDS (ref 22–38)
BUN SERPL-MCNC: 13 MG/DL (ref 7–30)
CALCIUM SERPL-MCNC: 9.8 MG/DL (ref 8.5–10.1)
CATH EF ESTIMATED: 60 %
CHLORIDE BLD-SCNC: 107 MMOL/L (ref 94–109)
CO2 SERPL-SCNC: 27 MMOL/L (ref 20–32)
CREAT SERPL-MCNC: 0.79 MG/DL (ref 0.66–1.25)
ERYTHROCYTE [DISTWIDTH] IN BLOOD BY AUTOMATED COUNT: 14 % (ref 10–15)
GFR SERPL CREATININE-BSD FRML MDRD: >90 ML/MIN/1.73M2
GLUCOSE BLD-MCNC: 123 MG/DL (ref 70–99)
HCT VFR BLD AUTO: 45.8 % (ref 40–53)
HGB BLD-MCNC: 15.1 G/DL (ref 13.3–17.7)
INR PPP: 1.02 (ref 0.85–1.15)
MCH RBC QN AUTO: 30.6 PG (ref 26.5–33)
MCHC RBC AUTO-ENTMCNC: 33 G/DL (ref 31.5–36.5)
MCV RBC AUTO: 93 FL (ref 78–100)
PLATELET # BLD AUTO: 202 10E3/UL (ref 150–450)
POTASSIUM BLD-SCNC: 4 MMOL/L (ref 3.4–5.3)
RBC # BLD AUTO: 4.93 10E6/UL (ref 4.4–5.9)
SODIUM SERPL-SCNC: 138 MMOL/L (ref 133–144)
WBC # BLD AUTO: 5.5 10E3/UL (ref 4–11)

## 2022-09-07 PROCEDURE — 85027 COMPLETE CBC AUTOMATED: CPT | Performed by: INTERNAL MEDICINE

## 2022-09-07 PROCEDURE — 250N000009 HC RX 250: Performed by: INTERNAL MEDICINE

## 2022-09-07 PROCEDURE — 93458 L HRT ARTERY/VENTRICLE ANGIO: CPT | Performed by: INTERNAL MEDICINE

## 2022-09-07 PROCEDURE — 999N000054 HC STATISTIC EKG NON-CHARGEABLE

## 2022-09-07 PROCEDURE — 36415 COLL VENOUS BLD VENIPUNCTURE: CPT | Performed by: INTERNAL MEDICINE

## 2022-09-07 PROCEDURE — 99153 MOD SED SAME PHYS/QHP EA: CPT | Performed by: INTERNAL MEDICINE

## 2022-09-07 PROCEDURE — 93571 IV DOP VEL&/PRESS C FLO 1ST: CPT | Mod: 26 | Performed by: INTERNAL MEDICINE

## 2022-09-07 PROCEDURE — 99152 MOD SED SAME PHYS/QHP 5/>YRS: CPT | Performed by: INTERNAL MEDICINE

## 2022-09-07 PROCEDURE — 250N000011 HC RX IP 250 OP 636: Performed by: INTERNAL MEDICINE

## 2022-09-07 PROCEDURE — 80048 BASIC METABOLIC PNL TOTAL CA: CPT | Performed by: INTERNAL MEDICINE

## 2022-09-07 PROCEDURE — 85610 PROTHROMBIN TIME: CPT | Performed by: INTERNAL MEDICINE

## 2022-09-07 PROCEDURE — 250N000013 HC RX MED GY IP 250 OP 250 PS 637: Performed by: INTERNAL MEDICINE

## 2022-09-07 PROCEDURE — 85347 COAGULATION TIME ACTIVATED: CPT

## 2022-09-07 PROCEDURE — 93571 IV DOP VEL&/PRESS C FLO 1ST: CPT | Mod: 52,LD | Performed by: INTERNAL MEDICINE

## 2022-09-07 PROCEDURE — 93572 IV DOP VEL&/PRESS C FLO EA: CPT | Mod: 26 | Performed by: INTERNAL MEDICINE

## 2022-09-07 PROCEDURE — 92928 PRQ TCAT PLMT NTRAC ST 1 LES: CPT | Mod: LD | Performed by: INTERNAL MEDICINE

## 2022-09-07 PROCEDURE — C1887 CATHETER, GUIDING: HCPCS | Performed by: INTERNAL MEDICINE

## 2022-09-07 PROCEDURE — C9600 PERC DRUG-EL COR STENT SING: HCPCS | Performed by: INTERNAL MEDICINE

## 2022-09-07 PROCEDURE — 999N000184 HC STATISTIC TELEMETRY

## 2022-09-07 PROCEDURE — C1725 CATH, TRANSLUMIN NON-LASER: HCPCS | Performed by: INTERNAL MEDICINE

## 2022-09-07 PROCEDURE — 272N000001 HC OR GENERAL SUPPLY STERILE: Performed by: INTERNAL MEDICINE

## 2022-09-07 PROCEDURE — 36591 DRAW BLOOD OFF VENOUS DEVICE: CPT

## 2022-09-07 PROCEDURE — 258N000003 HC RX IP 258 OP 636: Performed by: INTERNAL MEDICINE

## 2022-09-07 PROCEDURE — 999N000071 HC STATISTIC HEART CATH LAB OR EP LAB

## 2022-09-07 PROCEDURE — 93005 ELECTROCARDIOGRAM TRACING: CPT

## 2022-09-07 PROCEDURE — C1769 GUIDE WIRE: HCPCS | Performed by: INTERNAL MEDICINE

## 2022-09-07 PROCEDURE — C1874 STENT, COATED/COV W/DEL SYS: HCPCS | Performed by: INTERNAL MEDICINE

## 2022-09-07 PROCEDURE — C1894 INTRO/SHEATH, NON-LASER: HCPCS | Performed by: INTERNAL MEDICINE

## 2022-09-07 PROCEDURE — 93572 IV DOP VEL&/PRESS C FLO EA: CPT | Performed by: INTERNAL MEDICINE

## 2022-09-07 PROCEDURE — 93010 ELECTROCARDIOGRAM REPORT: CPT | Mod: 76 | Performed by: INTERNAL MEDICINE

## 2022-09-07 PROCEDURE — 85730 THROMBOPLASTIN TIME PARTIAL: CPT | Performed by: INTERNAL MEDICINE

## 2022-09-07 DEVICE — STENT CORONARY DES SYNERGY XD MR US 3.50X24MM H7493941824350: Type: IMPLANTABLE DEVICE | Status: FUNCTIONAL

## 2022-09-07 RX ORDER — ACETAMINOPHEN 325 MG/1
650 TABLET ORAL EVERY 4 HOURS PRN
Status: DISCONTINUED | OUTPATIENT
Start: 2022-09-07 | End: 2022-09-07 | Stop reason: HOSPADM

## 2022-09-07 RX ORDER — ATROPINE SULFATE 0.1 MG/ML
0.5 INJECTION INTRAVENOUS
Status: DISCONTINUED | OUTPATIENT
Start: 2022-09-07 | End: 2022-09-07 | Stop reason: HOSPADM

## 2022-09-07 RX ORDER — FLUMAZENIL 0.1 MG/ML
0.2 INJECTION, SOLUTION INTRAVENOUS
Status: DISCONTINUED | OUTPATIENT
Start: 2022-09-07 | End: 2022-09-07 | Stop reason: HOSPADM

## 2022-09-07 RX ORDER — ONDANSETRON 2 MG/ML
4 INJECTION INTRAMUSCULAR; INTRAVENOUS EVERY 6 HOURS PRN
Status: DISCONTINUED | OUTPATIENT
Start: 2022-09-07 | End: 2022-09-07 | Stop reason: HOSPADM

## 2022-09-07 RX ORDER — METOPROLOL SUCCINATE 25 MG/1
25 TABLET, EXTENDED RELEASE ORAL DAILY
Status: DISCONTINUED | OUTPATIENT
Start: 2022-09-07 | End: 2022-09-07

## 2022-09-07 RX ORDER — TAMSULOSIN HYDROCHLORIDE 0.4 MG/1
0.4 CAPSULE ORAL DAILY
Qty: 90 CAPSULE | Refills: 0 | Status: SHIPPED | OUTPATIENT
Start: 2022-09-07 | End: 2022-10-25

## 2022-09-07 RX ORDER — ATORVASTATIN CALCIUM 40 MG/1
40 TABLET, FILM COATED ORAL DAILY
Qty: 90 TABLET | Refills: 3 | Status: SHIPPED | OUTPATIENT
Start: 2022-09-07 | End: 2023-09-26

## 2022-09-07 RX ORDER — NALOXONE HYDROCHLORIDE 0.4 MG/ML
0.2 INJECTION, SOLUTION INTRAMUSCULAR; INTRAVENOUS; SUBCUTANEOUS
Status: DISCONTINUED | OUTPATIENT
Start: 2022-09-07 | End: 2022-09-07 | Stop reason: HOSPADM

## 2022-09-07 RX ORDER — LISINOPRIL 10 MG/1
10 TABLET ORAL DAILY
Status: DISCONTINUED | OUTPATIENT
Start: 2022-09-07 | End: 2022-09-07 | Stop reason: HOSPADM

## 2022-09-07 RX ORDER — TAMSULOSIN HYDROCHLORIDE 0.4 MG/1
0.4 CAPSULE ORAL DAILY
Status: DISCONTINUED | OUTPATIENT
Start: 2022-09-07 | End: 2022-09-07 | Stop reason: HOSPADM

## 2022-09-07 RX ORDER — METOPROLOL TARTRATE 1 MG/ML
5 INJECTION, SOLUTION INTRAVENOUS
Status: DISCONTINUED | OUTPATIENT
Start: 2022-09-07 | End: 2022-09-07 | Stop reason: HOSPADM

## 2022-09-07 RX ORDER — NALOXONE HYDROCHLORIDE 0.4 MG/ML
0.4 INJECTION, SOLUTION INTRAMUSCULAR; INTRAVENOUS; SUBCUTANEOUS
Status: DISCONTINUED | OUTPATIENT
Start: 2022-09-07 | End: 2022-09-07 | Stop reason: HOSPADM

## 2022-09-07 RX ORDER — OXYCODONE HYDROCHLORIDE 5 MG/1
10 TABLET ORAL EVERY 4 HOURS PRN
Status: DISCONTINUED | OUTPATIENT
Start: 2022-09-07 | End: 2022-09-07 | Stop reason: HOSPADM

## 2022-09-07 RX ORDER — NITROGLYCERIN 5 MG/ML
VIAL (ML) INTRAVENOUS
Status: DISCONTINUED | OUTPATIENT
Start: 2022-09-07 | End: 2022-09-07 | Stop reason: HOSPADM

## 2022-09-07 RX ORDER — LIDOCAINE 40 MG/G
CREAM TOPICAL
Status: DISCONTINUED | OUTPATIENT
Start: 2022-09-07 | End: 2022-09-07 | Stop reason: HOSPADM

## 2022-09-07 RX ORDER — HEPARIN SODIUM 1000 [USP'U]/ML
INJECTION, SOLUTION INTRAVENOUS; SUBCUTANEOUS
Status: DISCONTINUED | OUTPATIENT
Start: 2022-09-07 | End: 2022-09-07 | Stop reason: HOSPADM

## 2022-09-07 RX ORDER — METOPROLOL SUCCINATE 25 MG/1
25 TABLET, EXTENDED RELEASE ORAL DAILY
Qty: 90 TABLET | Refills: 4 | Status: SHIPPED | OUTPATIENT
Start: 2022-09-07 | End: 2022-09-14

## 2022-09-07 RX ORDER — CLOPIDOGREL BISULFATE 75 MG/1
TABLET ORAL
Status: DISCONTINUED | OUTPATIENT
Start: 2022-09-07 | End: 2022-09-07 | Stop reason: HOSPADM

## 2022-09-07 RX ORDER — NITROGLYCERIN 0.4 MG/1
0.4 TABLET SUBLINGUAL EVERY 5 MIN PRN
Status: DISCONTINUED | OUTPATIENT
Start: 2022-09-07 | End: 2022-09-07 | Stop reason: HOSPADM

## 2022-09-07 RX ORDER — SODIUM CHLORIDE 9 MG/ML
INJECTION, SOLUTION INTRAVENOUS CONTINUOUS
Status: ACTIVE | OUTPATIENT
Start: 2022-09-07 | End: 2022-09-07

## 2022-09-07 RX ORDER — HYDRALAZINE HYDROCHLORIDE 20 MG/ML
10 INJECTION INTRAMUSCULAR; INTRAVENOUS EVERY 4 HOURS PRN
Status: DISCONTINUED | OUTPATIENT
Start: 2022-09-07 | End: 2022-09-07 | Stop reason: HOSPADM

## 2022-09-07 RX ORDER — FAMOTIDINE 20 MG/1
20 TABLET, FILM COATED ORAL 2 TIMES DAILY
Status: DISCONTINUED | OUTPATIENT
Start: 2022-09-07 | End: 2022-09-07 | Stop reason: HOSPADM

## 2022-09-07 RX ORDER — OXYCODONE HYDROCHLORIDE 5 MG/1
5 TABLET ORAL EVERY 4 HOURS PRN
Status: DISCONTINUED | OUTPATIENT
Start: 2022-09-07 | End: 2022-09-07 | Stop reason: HOSPADM

## 2022-09-07 RX ORDER — CALCIUM CARBONATE 500 MG/1
500 TABLET, CHEWABLE ORAL DAILY PRN
Status: DISCONTINUED | OUTPATIENT
Start: 2022-09-07 | End: 2022-09-07 | Stop reason: HOSPADM

## 2022-09-07 RX ORDER — FENTANYL CITRATE 50 UG/ML
INJECTION, SOLUTION INTRAMUSCULAR; INTRAVENOUS
Status: DISCONTINUED | OUTPATIENT
Start: 2022-09-07 | End: 2022-09-07 | Stop reason: HOSPADM

## 2022-09-07 RX ORDER — CLOPIDOGREL BISULFATE 75 MG/1
75 TABLET ORAL DAILY
Status: DISCONTINUED | OUTPATIENT
Start: 2022-09-08 | End: 2022-09-07 | Stop reason: HOSPADM

## 2022-09-07 RX ORDER — TRIAMTERENE AND HYDROCHLOROTHIAZIDE 75; 50 MG/1; MG/1
1 TABLET ORAL DAILY
Status: DISCONTINUED | OUTPATIENT
Start: 2022-09-07 | End: 2022-09-07 | Stop reason: HOSPADM

## 2022-09-07 RX ORDER — CLOPIDOGREL BISULFATE 75 MG/1
75 TABLET ORAL DAILY
Qty: 90 TABLET | Refills: 3 | Status: SHIPPED | OUTPATIENT
Start: 2022-09-08 | End: 2023-09-21

## 2022-09-07 RX ORDER — IOPAMIDOL 755 MG/ML
INJECTION, SOLUTION INTRAVASCULAR
Status: DISCONTINUED | OUTPATIENT
Start: 2022-09-07 | End: 2022-09-07 | Stop reason: HOSPADM

## 2022-09-07 RX ORDER — LISINOPRIL 10 MG/1
10 TABLET ORAL DAILY
Qty: 90 TABLET | Refills: 3 | Status: SHIPPED | OUTPATIENT
Start: 2022-09-07 | End: 2023-09-06

## 2022-09-07 RX ORDER — SODIUM CHLORIDE 9 MG/ML
INJECTION, SOLUTION INTRAVENOUS CONTINUOUS
Status: DISCONTINUED | OUTPATIENT
Start: 2022-09-07 | End: 2022-09-07 | Stop reason: HOSPADM

## 2022-09-07 RX ORDER — ASPIRIN 81 MG/1
81 TABLET ORAL DAILY
Status: DISCONTINUED | OUTPATIENT
Start: 2022-09-08 | End: 2022-09-07 | Stop reason: HOSPADM

## 2022-09-07 RX ORDER — ATORVASTATIN CALCIUM 20 MG/1
20 TABLET, FILM COATED ORAL DAILY
Status: DISCONTINUED | OUTPATIENT
Start: 2022-09-07 | End: 2022-09-07 | Stop reason: HOSPADM

## 2022-09-07 RX ORDER — ATORVASTATIN CALCIUM 40 MG/1
40 TABLET, FILM COATED ORAL DAILY
Status: DISCONTINUED | OUTPATIENT
Start: 2022-09-07 | End: 2022-09-07 | Stop reason: HOSPADM

## 2022-09-07 RX ORDER — ONDANSETRON 4 MG/1
4 TABLET, ORALLY DISINTEGRATING ORAL EVERY 6 HOURS PRN
Status: DISCONTINUED | OUTPATIENT
Start: 2022-09-07 | End: 2022-09-07 | Stop reason: HOSPADM

## 2022-09-07 RX ORDER — FENTANYL CITRATE 50 UG/ML
25 INJECTION, SOLUTION INTRAMUSCULAR; INTRAVENOUS
Status: DISCONTINUED | OUTPATIENT
Start: 2022-09-07 | End: 2022-09-07 | Stop reason: HOSPADM

## 2022-09-07 RX ORDER — ASPIRIN 81 MG/1
81 TABLET, CHEWABLE ORAL ONCE
Status: DISCONTINUED | OUTPATIENT
Start: 2022-09-07 | End: 2022-09-07 | Stop reason: HOSPADM

## 2022-09-07 RX ADMIN — SODIUM CHLORIDE: 9 INJECTION, SOLUTION INTRAVENOUS at 09:43

## 2022-09-07 ASSESSMENT — ACTIVITIES OF DAILY LIVING (ADL)
ADLS_ACUITY_SCORE: 35

## 2022-09-07 NOTE — PRE-PROCEDURE
GENERAL PRE-PROCEDURE:   Procedure:  Coronary angiogram, left heart cath, left ventriculogram and possible intervention  Date/Time:  9/7/2022 9:10 AM    Written consent obtained?: Yes    Risks and benefits: Risks, benefits and alternatives were discussed    Consent given by:  Patient  Patient states understanding of procedure being performed: Yes    Patient's understanding of procedure matches consent: Yes    Procedure consent matches procedure scheduled: Yes    Expected level of sedation:  Moderate  Appropriately NPO:  Yes  ASA Class:  2  Mallampati  :  Grade 2- soft palate, base of uvula, tonsillar pillars, and portion of posterior pharyngeal wall visible  Lungs:  Lungs clear with good breath sounds bilaterally  Heart:  Normal heart sounds and rate  History & Physical reviewed:  History and physical reviewed and no updates needed  Statement of review:  I have reviewed the lab findings, diagnostic data, medications, and the plan for sedation

## 2022-09-07 NOTE — Clinical Note
The first balloon was inserted into the left anterior descending and middle left anterior descending.Max pressure = 14 mary. Total duration = 20 seconds.     Max pressure = 14 mary. Total duration = 20 seconds.    Balloon reinflated a second time: Max pressure = 14 mary. Total duration = 20 seconds.

## 2022-09-07 NOTE — PROGRESS NOTES
Care Suites Admission Nursing Note    Patient Information  Name: Jamal Sanchez  Age: 67 year old  Reason for admission: Left heart cath  Care Suites arrival time: 0830    Visitor Information  Name: James  Informed of visitor restrictions: N/A  1 visitor allowed per patient   Visitor must screen negative for COVID symptoms   Visitor must wear a mask  Waiting rooms closed to visitors    Patient Admission/Assessment   Pre-procedure assessment complete: Yes  If abnormal assessment/labs, provider notified: N/A  NPO: Yes  Medications held per instructions/orders: Yes  Consent: obtained  If applicable, pregnancy test status: deferred  Patient oriented to room: Yes  Education/questions answered: Yes  Plan/other: getting prepped for their procedure    Discharge Planning  Discharge name/phone number: James  [phone]  Overnight post sedation caregiver: Jacqueline Iyer [phone]  Discharge location: [place]    Arlene Gtz RN

## 2022-09-07 NOTE — TELEPHONE ENCOUNTER
Last Written Prescription Date:    Last Fill Quantity: ,  # refills:    Last office visit: Visit date not found with prescribing provider:     Future Office Visit:        Requested Prescriptions   Pending Prescriptions Disp Refills     tamsulosin (FLOMAX) 0.4 MG capsule 90 capsule 3     Sig: Take 1 capsule (0.4 mg) by mouth daily       There is no refill protocol information for this order

## 2022-09-07 NOTE — PROGRESS NOTES
Care Suites Discharge Nursing Note    Patient Information  Name: Jamal Sanchez  Age: 67 year old    Discharge Education:  Discharge instructions reviewed: Yes  Additional education/resources provided: Stent card given, patient went home with RX's in hand to fill at his pharmacy   Patient/patient representative verbalizes understanding: Yes  Patient discharging on new medications: Yes  Medication education completed: Yes    Discharge Plans:   Discharge location: home  Discharge ride contacted: Yes  Approximate discharge time: 1535    Discharge Criteria:  Discharge criteria met and vital signs stable: Yes    Patient Belongs:  Patient belongings returned to patient: Yes    Arlene Gtz RN

## 2022-09-07 NOTE — DISCHARGE INSTRUCTIONS
Cardiac Angioplasty/Stent Discharge Instructions - Radial    After you go home:    Have an adult stay with you until tomorrow.  Drink extra fluids for 2 days.  You may resume your normal diet.  No smoking       For 24 hours - due to the sedation you received:  Relax and take it easy.  Do NOT make any important or legal decisions.  Do NOT drive or operate machines at home or at work.  Do NOT drink alcohol.    Care of Wrist Puncture Site:    For the first 24 hrs - check the puncture site every 1-2 hours while awake.  It is normal to have soreness at the puncture site and mild tingling in your hand for up to 3 days.  Remove the bandaid after 24 hours. If there is minor oozing, apply another bandaid and remove it after 12 hours.  You may shower tomorrow.  Do NOT take a bath, or use a hot tub or pool for at least 3 days. Do NOT scrub the site. Do not use lotion or powder near the puncture site.           Activity:          For 2 days:   do not use your hand or arm to support your weight (such as rising from a chair)   do not bend your wrist (such as lifting a garage door).  do not lift more than 5 pounds or exercise your arm (such as tennis, golf or bowling).  Do NOT do any heavy activity such as exercise, lifting, or straining.     Bleeding:    If you start bleeding from the site in your wrist, sit down and press firmly on/above the site for 10 minutes.   Once bleeding stops, keep arm still for 2 hours.   Call [facility] as soon as you can.       Call 911 right away if you have heavy bleeding or bleeding that does not stop.      Medicines:    If you are taking an antiplatelet medication such as Plavix, Brilinta or Effient, do not stop taking it until you talk to your cardiologist.      If you are on Metformin (Glucophage), do not restart it until you have blood tests (within 2 to 3 days after discharge).  After you have your blood drawn, you may restart the Metformin.   Take your medications, including blood thinners,  unless your provider tells you not to.    If you take Coumadin (Warfarin), have your INR checked by your provider in  3-5 days. Call your clinic to schedule this.  If you have stopped any medicines, check with your provider about when to restart them.    Follow Up Appointments:    Follow up with [facility] Nurse Practitioner at [facility] of patient preference in 7-10 days.  Cardiac Rehab will contact you for follow up care.    Call the clinic if:    You have a large or growing hard lump around the site.  The site is red, swollen, hot or tender.  Blood or fluid is draining from the site.  You have chills or a fever greater than 101 F (38 C).  Your arm feels numb, cool or changes color.  You have hives, a rash or unusual itching.  Any questions or concerns.    Other Instructions:    If you received a stent - carry your stent card with you at all times.      [facility] at [place]:    [phone] [facility] (7 days a week)

## 2022-09-07 NOTE — TELEPHONE ENCOUNTER
Refilled for 90 day supply. Patient is due for a f/u visit.   Sent Twice message notifying him of this.  Marti Biswas RN on 9/7/2022 at 11:48 AM

## 2022-09-07 NOTE — Clinical Note
Hemodynamic equipment used: 5 lead ECG, OntodiaK With 3 Leads, Machine BP Cuff and pulse oximeter probe.

## 2022-09-07 NOTE — Clinical Note
Stent deployed in the middle left anterior descending. Max pressure = 14 mary. Total duration = 30 seconds.

## 2022-09-07 NOTE — PROGRESS NOTES
Care Suites Post Procedure Note    Patient Information  Name: Jamal Sanchez  Age: 67 year old    Post Procedure  Time patient returned to Care Suites: 1120  Concerns/abnormal assessment: No  If abnormal assessment, provider notified: N/A  Plan/Other: TR band with 12 ml of air, 2 hours of bedrest then can start to remove the air    Arlene Gtz RN

## 2022-09-08 ENCOUNTER — TELEPHONE (OUTPATIENT)
Dept: CARDIOLOGY | Facility: CLINIC | Age: 67
End: 2022-09-08

## 2022-09-08 DIAGNOSIS — I25.10 CAD (CORONARY ARTERY DISEASE): Primary | ICD-10-CM

## 2022-09-08 LAB
ATRIAL RATE - MUSE: 44 BPM
ATRIAL RATE - MUSE: 46 BPM
DIASTOLIC BLOOD PRESSURE - MUSE: NORMAL MMHG
DIASTOLIC BLOOD PRESSURE - MUSE: NORMAL MMHG
INTERPRETATION ECG - MUSE: NORMAL
INTERPRETATION ECG - MUSE: NORMAL
P AXIS - MUSE: 18 DEGREES
P AXIS - MUSE: 36 DEGREES
PR INTERVAL - MUSE: 174 MS
PR INTERVAL - MUSE: 180 MS
QRS DURATION - MUSE: 72 MS
QRS DURATION - MUSE: 90 MS
QT - MUSE: 472 MS
QT - MUSE: 486 MS
QTC - MUSE: 413 MS
QTC - MUSE: 415 MS
R AXIS - MUSE: 58 DEGREES
R AXIS - MUSE: 62 DEGREES
SYSTOLIC BLOOD PRESSURE - MUSE: NORMAL MMHG
SYSTOLIC BLOOD PRESSURE - MUSE: NORMAL MMHG
T AXIS - MUSE: 57 DEGREES
T AXIS - MUSE: 58 DEGREES
VENTRICULAR RATE- MUSE: 44 BPM
VENTRICULAR RATE- MUSE: 46 BPM

## 2022-09-08 RX ORDER — NITROGLYCERIN 0.4 MG/1
TABLET SUBLINGUAL
Qty: 30 TABLET | Refills: 0 | Status: SHIPPED | OUTPATIENT
Start: 2022-09-08 | End: 2022-11-10

## 2022-09-08 NOTE — TELEPHONE ENCOUNTER
"Patient was admitted to [facility] on 9/7/22 for scheduled OP diagnostic LHC for increased ZEPEDA and abnormal stress test.    PMH: Obesity, HLD    Echo showed EF of 55%    9/8/22: Coronary angiogram via LRA resulted in STORM to mLAD for 60% stenosis. Residual 50% stenosis to mid and dCFx.    Pt was started on ASA, Plavix, Zestril and Metoprolol (with hold parameters of HR <55 bpm due to hx bradycardia) and increased dosage of Lipitor at time of discharge.    RN confirmed with patient that he was d/c with an adequate supply of the antiplatelet Plavix, and reminded of importance of taking without interruption.     Pt denies any lightheadedness. Continues to have mild baseline SOB as he had PTA-not improved with PCI. Pt also states since procedure, has had recurrent hiccups. States he has a hx of hiatel hernia and bouts of midsternal \"heartburn\" PTA and continues now. Rates pain as 1-2/10, \"It kept me up some of the night.\" Improved with PRN TUMS but never completely resolved. Unchanged by position changes, movement or palpation of area. Writer encouraged pt to try one PRN SL NTG to see if resolves chest discomfort. Instructed if chest pain persistent or escalates, go to ED.    Pt DOES NOT have an Rx for PRN SL Nitroglycerin. Writer notes pt has an RX for Revatio PRN. Per chart review, no known allergy to nitrates. Pt states he has not used Revatio for over one week. Writer instructed pt to not use Revatio in the interim, until further clarification from Dr. Cruz. Writer instructed pt on PRN SL Nitroglycerin, including indications, storage and expiration period once bottle opened, administration, expected side effects and when to call the clinic vs 911. Pt verbalized understanding and RX escribed to pt's [facility] per his request. Will route this note to Dr. Cruz for further review and recommendations.    RN confirmed with patient that he has an OV scheduled on 9/14/22 at 1540 with ONESIMO Celeste " Geremias at our [facility]. Writer's and [facility] cardiology phone numbers provided.    Cardiac rehab still needs to be scheduled. Pt states they called and left a message.    Pt verbalized understanding of all instructions provided without further questions. JORGE Spears RN.

## 2022-09-09 NOTE — TELEPHONE ENCOUNTER
Thanks, yes PRN nitroglycerine should be ordered. But no Sildenafil in that case. Keep follow up with ONESIMO and discuss further.

## 2022-09-14 ENCOUNTER — LAB (OUTPATIENT)
Dept: LAB | Facility: CLINIC | Age: 67
End: 2022-09-14

## 2022-09-14 ENCOUNTER — OFFICE VISIT (OUTPATIENT)
Dept: CARDIOLOGY | Facility: CLINIC | Age: 67
End: 2022-09-14
Payer: MEDICARE

## 2022-09-14 VITALS
RESPIRATION RATE: 18 BRPM | DIASTOLIC BLOOD PRESSURE: 74 MMHG | WEIGHT: 218 LBS | BODY MASS INDEX: 31.28 KG/M2 | HEART RATE: 70 BPM | SYSTOLIC BLOOD PRESSURE: 123 MMHG | OXYGEN SATURATION: 98 %

## 2022-09-14 DIAGNOSIS — E61.1 LOW IRON: ICD-10-CM

## 2022-09-14 DIAGNOSIS — E78.5 HYPERLIPIDEMIA LDL GOAL <70: ICD-10-CM

## 2022-09-14 DIAGNOSIS — I10 BENIGN ESSENTIAL HYPERTENSION: ICD-10-CM

## 2022-09-14 DIAGNOSIS — R06.09 DOE (DYSPNEA ON EXERTION): ICD-10-CM

## 2022-09-14 DIAGNOSIS — I25.10 CORONARY ARTERY DISEASE INVOLVING NATIVE CORONARY ARTERY OF NATIVE HEART WITHOUT ANGINA PECTORIS: Primary | ICD-10-CM

## 2022-09-14 LAB
ANION GAP SERPL CALCULATED.3IONS-SCNC: 11 MMOL/L (ref 7–15)
BUN SERPL-MCNC: 16 MG/DL (ref 8–23)
CALCIUM SERPL-MCNC: 9.3 MG/DL (ref 8.8–10.2)
CHLORIDE SERPL-SCNC: 97 MMOL/L (ref 98–107)
CREAT SERPL-MCNC: 0.99 MG/DL (ref 0.67–1.17)
DEPRECATED HCO3 PLAS-SCNC: 26 MMOL/L (ref 22–29)
ERYTHROCYTE [DISTWIDTH] IN BLOOD BY AUTOMATED COUNT: 13.6 % (ref 10–15)
FERRITIN SERPL-MCNC: 101 NG/ML (ref 31–409)
GFR SERPL CREATININE-BSD FRML MDRD: 83 ML/MIN/1.73M2
GLUCOSE SERPL-MCNC: 103 MG/DL (ref 70–99)
HCT VFR BLD AUTO: 45.6 % (ref 40–53)
HGB BLD-MCNC: 14.9 G/DL (ref 13.3–17.7)
IRON BINDING CAPACITY (ROCHE): 347 UG/DL (ref 240–430)
IRON SATN MFR SERPL: 35 % (ref 15–46)
IRON SERPL-MCNC: 122 UG/DL (ref 61–157)
MCH RBC QN AUTO: 30.8 PG (ref 26.5–33)
MCHC RBC AUTO-ENTMCNC: 32.7 G/DL (ref 31.5–36.5)
MCV RBC AUTO: 94 FL (ref 78–100)
PLATELET # BLD AUTO: 186 10E3/UL (ref 150–450)
POTASSIUM SERPL-SCNC: 4.2 MMOL/L (ref 3.4–5.3)
RBC # BLD AUTO: 4.84 10E6/UL (ref 4.4–5.9)
SODIUM SERPL-SCNC: 134 MMOL/L (ref 136–145)
WBC # BLD AUTO: 6.3 10E3/UL (ref 4–11)

## 2022-09-14 PROCEDURE — 83550 IRON BINDING TEST: CPT

## 2022-09-14 PROCEDURE — 36415 COLL VENOUS BLD VENIPUNCTURE: CPT | Performed by: NURSE PRACTITIONER

## 2022-09-14 PROCEDURE — 85027 COMPLETE CBC AUTOMATED: CPT

## 2022-09-14 PROCEDURE — 80048 BASIC METABOLIC PNL TOTAL CA: CPT | Performed by: NURSE PRACTITIONER

## 2022-09-14 PROCEDURE — 99214 OFFICE O/P EST MOD 30 MIN: CPT | Performed by: NURSE PRACTITIONER

## 2022-09-14 PROCEDURE — 82728 ASSAY OF FERRITIN: CPT

## 2022-09-14 PROCEDURE — 83540 ASSAY OF IRON: CPT

## 2022-09-14 RX ORDER — FUROSEMIDE 20 MG
20 TABLET ORAL DAILY
Qty: 15 TABLET | Refills: 0 | Status: SHIPPED | OUTPATIENT
Start: 2022-09-14 | End: 2023-03-29

## 2022-09-14 NOTE — PROGRESS NOTES
Cardiology Clinic Progress Note  Jamal Sanchez MRN# [id number]   YOB: 1955 Age: 67 year old      Primary Cardiologist:   Dr. Cruz          History of Presenting Illness:      Jamal Sanchez is a pleasant 67 year old patient with a past cardiac history significant for   1. CAD    Angio 9/2022 STORM x1 to the mid LAD, D2 50%, 50% throughout LCx  2. Hyperlipidemia  3. Hypertension  Past medical history significant for obesity.    Patient was seen by Dr. Cruz in August 2022 in consultation.  He had progressively worsening dyspnea and atypical angina.  Stress test through PCP was abnormal and angiogram recommended.  Echocardiogram 2021 showed normal EF without valvular disease.    Pt presents today for angiogram follow-up. BMP September 2022 at time of angio, showing normal renal function and electrolytes.  Echocardiogram 8/31/2022 showing normal EF 55 to 60%, normal RV, no significant valvular disease. Coronary angiogram 9/7/2022 showing severely elevated LV filling pressures, STORM x1 to the mid LAD, D2 50%, 50% throughout the LCx.  He was started on DAPT and recommended considering long-term Plavix instead of aspirin, after 1 year.  Atorvastatin was increased to 40 mg daily.  He was started on ACE inhibitor but was bradycardic and could not start beta-blocker.  Results reviewed today.    Blood pressure today is well controlled.  He denies any side effects with medication changes.  He has been taking dual antiplatelet therapy without any bleeding difficulty.  He is aware that he should not stop or hold his medications without checking with cardiology first.  He has not had any improvement in his dyspnea.  He continues with chronic chest discomfort and reports history of hiatal hernia causing reflux and prior football injury to the sternum.  This causes chest discomfort the day following heavy lifting.  In regards to his dyspnea, he has not had any pulmonary work-up and has no history of smoking.  He  will call to schedule cardiac rehab and is agreeable to starting a trial of Lasix, given his elevated filling pressures.  His left radial angiogram site is without bleeding, ecchymosis, hematoma, or bruit.  Patient reports no PND, orthopnea, presyncope, syncope, edema, heart racing, or palpitations.    Labs:  LIPID RESULTS:  Lab Results   Component Value Date    CHOL 182 05/06/2022    CHOL 225 (H) 02/22/2021    HDL 58 05/06/2022    HDL 60 02/22/2021     (H) 05/06/2022     (H) 02/22/2021    TRIG 64 05/06/2022    TRIG 209 (H) 02/22/2021    CHOLHDLRATIO 3.2 08/12/2015       LIVER ENZYME RESULTS:  Lab Results   Component Value Date    AST 41 11/23/2021    AST 14 07/02/2019    ALT 48 04/19/2022    ALT 26 07/02/2019       CBC RESULTS:  Lab Results   Component Value Date    WBC 5.5 09/07/2022    WBC 6.1 07/10/2019    RBC 4.93 09/07/2022    RBC 5.22 07/10/2019    HGB 15.1 09/07/2022    HGB 15.0 07/10/2019    HCT 45.8 09/07/2022    HCT 46.2 07/10/2019    MCV 93 09/07/2022    MCV 89 07/10/2019    MCH 30.6 09/07/2022    MCH 28.7 07/10/2019    MCHC 33.0 09/07/2022    MCHC 32.5 07/10/2019    RDW 14.0 09/07/2022    RDW 13.0 07/10/2019     09/07/2022     07/10/2019       BMP RESULTS:  Lab Results   Component Value Date     09/07/2022     02/22/2021    POTASSIUM 4.0 09/07/2022    POTASSIUM 4.2 02/22/2021    CHLORIDE 107 09/07/2022    CHLORIDE 102 02/22/2021    CO2 27 09/07/2022    CO2 28 02/22/2021    ANIONGAP 4 09/07/2022    ANIONGAP 6 02/22/2021     (H) 09/07/2022    GLC 90 02/22/2021    BUN 13 09/07/2022    BUN 23 02/22/2021    CR 0.79 09/07/2022    CR 0.89 02/22/2021    GFRESTIMATED >90 09/07/2022    GFRESTIMATED 88 02/22/2021    GFRESTBLACK >90 02/22/2021    ALYCE 9.8 09/07/2022    ALYCE 9.4 02/22/2021        A1C RESULTS:  No results found for: A1C    INR RESULTS:  Lab Results   Component Value Date    INR 1.02 09/07/2022    INR 1.10 11/22/2018    INR 1.10 11/22/2018       Results  reviewed today.       Current Cardiac Medications     Aspirin 81 mg daily  Atorvastatin 40 mg daily  Plavix 75 mg daily  Lisinopril 10 mg daily  Metoprolol XL 25 mg daily  Nitroglycerin as needed  Sildenafil as needed  triamterene-HCTZ 75-50 mg daily                   Assessment and Plan:       Plan    Patient Instructions   Medication Changes:  4. START lasix 20 mg daily for 5 days   5. Make sure you are not taking metoprolol. If you are, call my nurse to let us know     Recommendations:  1. Check blood pressure at least 1 hour after medications. Call the clinic if your blood pressure is consistently greater than 130/80.    2. Check daily weights and call the clinic if your weight has increased more than 2 lbs in one day or 5 lbs in one week; if you feel more short of breath or have worsening swelling in your legs or abdomen.  3. 2000 mg sodium diet   4. 4-8 8 ounce glasses of fluids per day   5. Call my nurse after 1 week of water pill, to let us know if you shortness of breath is any better     Follow-up:  1.  Nonfasting lab today (BMP)-after previously starting lisinopril  2. Call cardiac rehab [phone] if you haven't been contacted regarding these appointments within 2 business days of discharge.  3. Cancel Dr. Cruz appt for next week.   4. Dr. Cruz or Celeste NP in 1 month to reassess dyspnea  5. Fasting lab in 2 months (lipid/ALT) after increasing atorvastatin  6. See Dr. Cruz for cardiology follow up at [facility]: March 2023.   Call 6 months prior, to schedule.     Cardiology Scheduling~[phone]  Cardiology Clinic RN~[phone] (Shauna RN, Marian RN, Leanna RN)                1. CAD    No angina     Continue statin, aspirin, ACE inhibitor, beta-blocker    DAPT, uninterrupted, for at least 1 year (9/2023)      2. Hyperlipidemia     Last  5/2022- not on statin     Continue atorvastatin 40 mg daily    Reassess lipids in 2 months      3. Hypertension    Controlled    Continue lisinopril,  metoprolol, triamterene-HCTZ      4. SOB    Elevated LV filling pressures    Hemoglobin WNL    Trial of Lasix    Patient will restart routine exercise    Consider pulmonary work-up, if needed               Thank you for allowing me to participate in this delightful patient's care.      This note was completed in part using Dragon voice recognition software. Although reviewed after completion, some word and grammatical errors may occur.    Celeste Torres, APRN CNP, APRN, CNP           Data:   All laboratory data reviewed      Total time spent today was 30 mins, reviewing labs, testing, notes, documenting notes, and seeing patient.          Constitutional:  cooperative, alert and oriented, well developed, well nourished, in no acute distress  overweight    Skin:  warm and dry to the touch         Head:  normocephalic       Eyes:  pupils equal and round       ENT:  no pallor or cyanosis       Neck:  no stiffness       Respiratory:  clear to auscultation; normal symmetry, decreased bases bilaterally        Cardiac: regular rate and rhythm; normal S1 and S2                 pulses full and equal     GI:  abdomen soft, nondistended     Extremities and Muscular Skeletal:  no edema        Neurological:  affect appropriate; no gross motor deficits       Psych:  Alert and Oriented x 3 , appropriate affact

## 2022-09-14 NOTE — PATIENT INSTRUCTIONS
Medication Changes:  START lasix 20 mg daily for 5 days   Make sure you are not taking metoprolol. If you are, call my nurse to let us know     Recommendations:  Check blood pressure at least 1 hour after medications. Call the clinic if your blood pressure is consistently greater than 130/80.    Check daily weights and call the clinic if your weight has increased more than 2 lbs in one day or 5 lbs in one week; if you feel more short of breath or have worsening swelling in your legs or abdomen.  2000 mg sodium diet   4-8 8 ounce glasses of fluids per day   Call my nurse after 1 week of water pill, to let us know if you shortness of breath is any better     Follow-up:   Nonfasting lab today ([facility])  Call cardiac rehab [phone] if you haven't been contacted regarding these appointments within 2 business days of discharge.  Cancel Dr. Cruz appt for next week.   Dr. Cruz or Celeste NP in 1 month   Fasting lab in 2 months (lipid/ALT)  See Dr. Cruz for cardiology follow up at [facility] [place]: March 2023.   Call 6 months prior, to schedule.     Cardiology Scheduling~[phone]  Cardiology Clinic RN~[phone] (Shauna RN, Marian RN, Leanna RN)

## 2022-09-14 NOTE — LETTER
9/14/2022    Raad Benitez, DO  [address] [facility] [address]    RE: Jamal Sanchez       Dear Colleague,     I had the pleasure of seeing Jamal Sanchez in the [facility].  Cardiology Clinic Progress Note  Jamal Sanchez MRN# [id number]   YOB: 1955 Age: 67 year old      Primary Cardiologist:   Dr. Cruz          History of Presenting Illness:      Jamal Sanchez is a pleasant 67 year old patient with a past cardiac history significant for   1. CAD    Angio 9/2022 STORM x1 to the mid LAD, D2 50%, 50% throughout LCx  2. Hyperlipidemia  3. Hypertension  Past medical history significant for obesity.    Patient was seen by Dr. Cruz in August 2022 in consultation.  He had progressively worsening dyspnea and atypical angina.  Stress test through PCP was abnormal and angiogram recommended.  Echocardiogram 2021 showed normal EF without valvular disease.    Pt presents today for angiogram follow-up. BMP September 2022 at time of angio, showing normal renal function and electrolytes.  Echocardiogram 8/31/2022 showing normal EF 55 to 60%, normal RV, no significant valvular disease. Coronary angiogram 9/7/2022 showing severely elevated LV filling pressures, STORM x1 to the mid LAD, D2 50%, 50% throughout the LCx.  He was started on DAPT and recommended considering long-term Plavix instead of aspirin, after 1 year.  Atorvastatin was increased to 40 mg daily.  He was started on ACE inhibitor but was bradycardic and could not start beta-blocker.  Results reviewed today.    Blood pressure today is well controlled.  He denies any side effects with medication changes.  He has been taking dual antiplatelet therapy without any bleeding difficulty.  He is aware that he should not stop or hold his medications without checking with cardiology first.  He has not had any improvement in his dyspnea.  He continues with chronic chest discomfort and reports history of hiatal hernia causing  reflux and prior football injury to the sternum.  This causes chest discomfort the day following heavy lifting.  In regards to his dyspnea, he has not had any pulmonary work-up and has no history of smoking.  He will call to schedule cardiac rehab and is agreeable to starting a trial of Lasix, given his elevated filling pressures.  His left radial angiogram site is without bleeding, ecchymosis, hematoma, or bruit.  Patient reports no PND, orthopnea, presyncope, syncope, edema, heart racing, or palpitations.    Labs:  LIPID RESULTS:  Lab Results   Component Value Date    CHOL 182 05/06/2022    CHOL 225 (H) 02/22/2021    HDL 58 05/06/2022    HDL 60 02/22/2021     (H) 05/06/2022     (H) 02/22/2021    TRIG 64 05/06/2022    TRIG 209 (H) 02/22/2021    CHOLHDLRATIO 3.2 08/12/2015       LIVER ENZYME RESULTS:  Lab Results   Component Value Date    AST 41 11/23/2021    AST 14 07/02/2019    ALT 48 04/19/2022    ALT 26 07/02/2019       CBC RESULTS:  Lab Results   Component Value Date    WBC 5.5 09/07/2022    WBC 6.1 07/10/2019    RBC 4.93 09/07/2022    RBC 5.22 07/10/2019    HGB 15.1 09/07/2022    HGB 15.0 07/10/2019    HCT 45.8 09/07/2022    HCT 46.2 07/10/2019    MCV 93 09/07/2022    MCV 89 07/10/2019    MCH 30.6 09/07/2022    MCH 28.7 07/10/2019    MCHC 33.0 09/07/2022    MCHC 32.5 07/10/2019    RDW 14.0 09/07/2022    RDW 13.0 07/10/2019     09/07/2022     07/10/2019       BMP RESULTS:  Lab Results   Component Value Date     09/07/2022     02/22/2021    POTASSIUM 4.0 09/07/2022    POTASSIUM 4.2 02/22/2021    CHLORIDE 107 09/07/2022    CHLORIDE 102 02/22/2021    CO2 27 09/07/2022    CO2 28 02/22/2021    ANIONGAP 4 09/07/2022    ANIONGAP 6 02/22/2021     (H) 09/07/2022    GLC 90 02/22/2021    BUN 13 09/07/2022    BUN 23 02/22/2021    CR 0.79 09/07/2022    CR 0.89 02/22/2021    GFRESTIMATED >90 09/07/2022    GFRESTIMATED 88 02/22/2021    GFRESTBLACK >90 02/22/2021    ALYCE 9.8  09/07/2022    ALYCE 9.4 02/22/2021        A1C RESULTS:  No results found for: A1C    INR RESULTS:  Lab Results   Component Value Date    INR 1.02 09/07/2022    INR 1.10 11/22/2018    INR 1.10 11/22/2018       Results reviewed today.       Current Cardiac Medications     Aspirin 81 mg daily  Atorvastatin 40 mg daily  Plavix 75 mg daily  Lisinopril 10 mg daily  Metoprolol XL 25 mg daily  Nitroglycerin as needed  Sildenafil as needed  triamterene-HCTZ 75-50 mg daily                   Assessment and Plan:       Plan    Patient Instructions   Medication Changes:  4. START lasix 20 mg daily for 5 days   5. Make sure you are not taking metoprolol. If you are, call my nurse to let us know     Recommendations:  1. Check blood pressure at least 1 hour after medications. Call the clinic if your blood pressure is consistently greater than 130/80.    2. Check daily weights and call the clinic if your weight has increased more than 2 lbs in one day or 5 lbs in one week; if you feel more short of breath or have worsening swelling in your legs or abdomen.  3. 2000 mg sodium diet   4. 4-8 8 ounce glasses of fluids per day   5. Call my nurse after 1 week of water pill, to let us know if you shortness of breath is any better     Follow-up:  1.  Nonfasting lab today (BMP)-after previously starting lisinopril  2. Call cardiac rehab [phone] if you haven't been contacted regarding these appointments within 2 business days of discharge.  3. Cancel Dr. Cruz appt for next week.   4. Dr. Cruz or Celeste NP in 1 month to reassess dyspnea  5. Fasting lab in 2 months (lipid/ALT) after increasing atorvastatin  6. See Dr. Cruz for cardiology follow up at [facility]: March 2023.   Call 6 months prior, to schedule.     Cardiology Scheduling~[phone]  Cardiology Clinic RN~[phone] (Shauna RN, Marian RN, Leanna RN)                1. CAD    No angina     Continue statin, aspirin, ACE inhibitor, beta-blocker    DAPT, uninterrupted, for  at least 1 year (9/2023)      2. Hyperlipidemia     Last  5/2022- not on statin     Continue atorvastatin 40 mg daily    Reassess lipids in 2 months      3. Hypertension    Controlled    Continue lisinopril, metoprolol, triamterene-HCTZ      4. SOB    Elevated LV filling pressures    Hemoglobin WNL    Trial of Lasix    Patient will restart routine exercise    Consider pulmonary work-up, if needed               Thank you for allowing me to participate in this delightful patient's care.      This note was completed in part using Dragon voice recognition software. Although reviewed after completion, some word and grammatical errors may occur.    HERBERTH Hdez CNP, HERBERTH, CNP           Data:   All laboratory data reviewed      Total time spent today was 30 mins, reviewing labs, testing, notes, documenting notes, and seeing patient.          Constitutional:  cooperative, alert and oriented, well developed, well nourished, in no acute distress  overweight    Skin:  warm and dry to the touch         Head:  normocephalic       Eyes:  pupils equal and round       ENT:  no pallor or cyanosis       Neck:  no stiffness       Respiratory:  clear to auscultation; normal symmetry, decreased bases bilaterally        Cardiac: regular rate and rhythm; normal S1 and S2                 pulses full and equal     GI:  abdomen soft, nondistended     Extremities and Muscular Skeletal:  no edema        Neurological:  affect appropriate; no gross motor deficits       Psych:  Alert and Oriented x 3 , appropriate affact      Thank you for allowing me to participate in the care of your patient.      Sincerely,     HERBERTH Hdez CNP     [facility]  cc:   No referring provider defined for this encounter.

## 2022-09-16 DIAGNOSIS — I10 HYPERTENSION, GOAL BELOW 140/90: ICD-10-CM

## 2022-09-16 DIAGNOSIS — R06.09 DYSPNEA ON EXERTION: Primary | ICD-10-CM

## 2022-09-16 NOTE — RESULT ENCOUNTER NOTE
Discussed with pt. He started lasix trial yesterday. He will keep fluid intake under 2 liters. Orders placed for BMP. Scheduling number given as he doesn't have calendar handy.

## 2022-09-19 ENCOUNTER — HOSPITAL ENCOUNTER (OUTPATIENT)
Dept: CARDIAC REHAB | Facility: CLINIC | Age: 67
Discharge: HOME | End: 2022-09-19
Attending: INTERNAL MEDICINE
Payer: MEDICARE

## 2022-09-19 DIAGNOSIS — I25.118 CORONARY ARTERY DISEASE OF NATIVE ARTERY OF NATIVE HEART WITH STABLE ANGINA PECTORIS (H): ICD-10-CM

## 2022-09-19 PROCEDURE — 93798 PHYS/QHP OP CAR RHAB W/ECG: CPT

## 2022-09-19 PROCEDURE — 93797 PHYS/QHP OP CAR RHAB WO ECG: CPT | Mod: 59

## 2022-09-21 ENCOUNTER — HOSPITAL ENCOUNTER (OUTPATIENT)
Dept: CARDIAC REHAB | Facility: CLINIC | Age: 67
Discharge: HOME | End: 2022-09-21
Attending: INTERNAL MEDICINE
Payer: MEDICARE

## 2022-09-21 PROCEDURE — 93797 PHYS/QHP OP CAR RHAB WO ECG: CPT | Mod: 59 | Performed by: CLINICAL EXERCISE PHYSIOLOGIST

## 2022-09-21 PROCEDURE — 93798 PHYS/QHP OP CAR RHAB W/ECG: CPT | Performed by: CLINICAL EXERCISE PHYSIOLOGIST

## 2022-09-26 ENCOUNTER — HOSPITAL ENCOUNTER (OUTPATIENT)
Dept: CARDIAC REHAB | Facility: CLINIC | Age: 67
Discharge: HOME | End: 2022-09-26
Attending: INTERNAL MEDICINE
Payer: MEDICARE

## 2022-09-26 PROCEDURE — 93797 PHYS/QHP OP CAR RHAB WO ECG: CPT | Mod: 59

## 2022-09-26 PROCEDURE — 93798 PHYS/QHP OP CAR RHAB W/ECG: CPT

## 2022-09-27 ENCOUNTER — OFFICE VISIT (OUTPATIENT)
Dept: SPIRITUAL SERVICES | Facility: CLINIC | Age: 67
End: 2022-09-27

## 2022-09-27 NOTE — PROGRESS NOTES
Spirituality Group Note    [facility] - [facility]    Name:    Jamal Sanchez                                                                     YOB: 1955    MRN:     [id number]                                                                       Age: 67 year old      Patient attended -led group, which included discussion of Coping with Depression and Anxiety during Serious Illness, Emotional Responses to Cardiac Illness, and The Healing Process as it relates to coping with stress.    Patient attended group for 1.0 hrs.    The patient actively participated in group discussion and added insights from his own cardiac experience narrative.      Asim Bustos M.A., [facility]  Staff    Spiritual Health Services  [facility]  [phone] (Office)  [phone] (Pager)  [email or web address]

## 2022-09-28 ENCOUNTER — HOSPITAL ENCOUNTER (OUTPATIENT)
Dept: CARDIAC REHAB | Facility: CLINIC | Age: 67
Discharge: HOME | End: 2022-09-28
Attending: INTERNAL MEDICINE
Payer: MEDICARE

## 2022-09-28 PROCEDURE — 93797 PHYS/QHP OP CAR RHAB WO ECG: CPT

## 2022-09-28 PROCEDURE — 93798 PHYS/QHP OP CAR RHAB W/ECG: CPT

## 2022-09-29 NOTE — TELEPHONE ENCOUNTER
REFERRAL INFORMATION:    Referring Provider:  Dr. Raad Benitez     Referring Clinic:  [facility]     Reason for Visit/Diagnosis: GERD      FUTURE VISIT INFORMATION:    Appointment Date: 12/13/2022    Appointment Time: 1 PM      NOTES STATUS DETAILS   OFFICE NOTE from Referring Provider Internal 7/12/2022, 5/6/2022, 12/13/2021 Office visit with Dr. Benitez      OFFICE NOTE from Other Specialist Internal 3/10/2022 Office visit with HERBERTH Rudolph CNP ([facility])     1/21/19, 11/30/18, 10/16/13, 8/21/13 Office visit with Dr. Anjel Jimenes ([facility])     1/7/19 Office visit with HERBERTH Urena CNP ([facility])     10/26/16 Office visit with Dr. Ab Guzman ([facility])     7/3/13 Office visit with Dr. Sunita Atkins ([facility])      HOSPITAL DISCHARGE SUMMARY/  ED VISITS Internal 7/2/19 ([facility])    OPERATIVE REPORT N/A    MEDICATION LIST Internal         ENDOSCOPY  Internal EGD: 4/6/2022, 1/3/2022, 10/9/13   COLONOSCOPY Internal 8/17/2022, 10/9/13, 3/5/08   ERCP N/A    EUS N/A    STOOL TESTING Internal 11/27/18   PERTINENT LABS Internal    PATHOLOGY REPORTS (RELATED) Internal 8/17/2022, 4/6/2022   IMAGING (CT, MRI, EGD, MRCP, Small Bowel Follow Through/SBT, MR/CT Enterography) Internal CT Abdomen Pelvis: 7/2/19, 12/5/18, 11/27/18

## 2022-09-30 ENCOUNTER — LAB (OUTPATIENT)
Dept: LAB | Facility: CLINIC | Age: 67
End: 2022-09-30
Payer: MEDICARE

## 2022-09-30 DIAGNOSIS — I10 HYPERTENSION, GOAL BELOW 140/90: ICD-10-CM

## 2022-09-30 DIAGNOSIS — R06.09 DYSPNEA ON EXERTION: ICD-10-CM

## 2022-09-30 LAB
ANION GAP SERPL CALCULATED.3IONS-SCNC: 12 MMOL/L (ref 7–15)
BUN SERPL-MCNC: 15.9 MG/DL (ref 8–23)
CALCIUM SERPL-MCNC: 9.8 MG/DL (ref 8.8–10.2)
CHLORIDE SERPL-SCNC: 97 MMOL/L (ref 98–107)
CREAT SERPL-MCNC: 0.93 MG/DL (ref 0.67–1.17)
DEPRECATED HCO3 PLAS-SCNC: 25 MMOL/L (ref 22–29)
GFR SERPL CREATININE-BSD FRML MDRD: 89 ML/MIN/1.73M2
GLUCOSE SERPL-MCNC: 85 MG/DL (ref 70–99)
POTASSIUM SERPL-SCNC: 4.1 MMOL/L (ref 3.4–5.3)
SODIUM SERPL-SCNC: 134 MMOL/L (ref 136–145)

## 2022-09-30 PROCEDURE — 36415 COLL VENOUS BLD VENIPUNCTURE: CPT | Performed by: NURSE PRACTITIONER

## 2022-09-30 PROCEDURE — 80048 BASIC METABOLIC PNL TOTAL CA: CPT | Performed by: NURSE PRACTITIONER

## 2022-10-03 ENCOUNTER — HOSPITAL ENCOUNTER (OUTPATIENT)
Dept: CARDIAC REHAB | Facility: CLINIC | Age: 67
Discharge: HOME | End: 2022-10-03
Attending: INTERNAL MEDICINE
Payer: MEDICARE

## 2022-10-03 PROCEDURE — 93797 PHYS/QHP OP CAR RHAB WO ECG: CPT | Mod: 59

## 2022-10-03 PROCEDURE — 93798 PHYS/QHP OP CAR RHAB W/ECG: CPT

## 2022-10-05 ENCOUNTER — HOSPITAL ENCOUNTER (OUTPATIENT)
Dept: CARDIAC REHAB | Facility: CLINIC | Age: 67
Discharge: HOME | End: 2022-10-05
Attending: INTERNAL MEDICINE
Payer: MEDICARE

## 2022-10-05 PROCEDURE — 93798 PHYS/QHP OP CAR RHAB W/ECG: CPT

## 2022-10-10 ENCOUNTER — HOSPITAL ENCOUNTER (OUTPATIENT)
Dept: CARDIAC REHAB | Facility: CLINIC | Age: 67
Discharge: HOME | End: 2022-10-10
Attending: INTERNAL MEDICINE
Payer: MEDICARE

## 2022-10-10 PROCEDURE — 93797 PHYS/QHP OP CAR RHAB WO ECG: CPT

## 2022-10-10 PROCEDURE — 93798 PHYS/QHP OP CAR RHAB W/ECG: CPT

## 2022-10-12 ENCOUNTER — HOSPITAL ENCOUNTER (OUTPATIENT)
Dept: CARDIAC REHAB | Facility: CLINIC | Age: 67
Discharge: HOME | End: 2022-10-12
Attending: INTERNAL MEDICINE
Payer: MEDICARE

## 2022-10-12 DIAGNOSIS — N32.0 BLADDER OUTLET OBSTRUCTION: ICD-10-CM

## 2022-10-12 PROCEDURE — 93798 PHYS/QHP OP CAR RHAB W/ECG: CPT

## 2022-10-12 RX ORDER — OXYBUTYNIN CHLORIDE 10 MG/1
10 TABLET, EXTENDED RELEASE ORAL DAILY
Qty: 90 TABLET | Refills: 0 | Status: SHIPPED | OUTPATIENT
Start: 2022-10-12 | End: 2022-10-26

## 2022-10-12 NOTE — TELEPHONE ENCOUNTER
Last Written Prescription Date:    Last Fill Quantity: ,  # refills:    Last office visit: Visit date not found with prescribing provider:     Future Office Visit:   Next 5 appointments (look out 90 days)    Jan 05, 2023  2:15 PM  (Arrive by 2:00 PM)  Return Visit with Stephanie Fair PA-C  [facility] ([facility] [address] [address]  [phone]             .rxp

## 2022-10-12 NOTE — LETTER
Jamal Sanchez  [address] [place] [id number]        October 12, 2022      Dear Jamal Sanchez,      We received a request to refill oxybutynin. Medication requests require follow up appointments, usually yearly. Our records indicate that you are due for a follow up with Dr. Caba for your Urology care.       We are booking months out in advance. Please contact our office at [phone], to schedule this appointment at your earliest convenience.        Sincerely,      Your [facility] Urology care team

## 2022-10-12 NOTE — TELEPHONE ENCOUNTER
Medication is being filled for 1 time refill only due to:  Patient needs to be seen because it has been more than one year since last visit.     Letter mailed reminding patient to make a f//u appointment for further refills.  Marti Biswas RN on 10/12/2022 at 8:44 AM

## 2022-10-19 ENCOUNTER — HOSPITAL ENCOUNTER (OUTPATIENT)
Dept: CARDIAC REHAB | Facility: CLINIC | Age: 67
Discharge: HOME | End: 2022-10-19
Attending: INTERNAL MEDICINE
Payer: MEDICARE

## 2022-10-19 ENCOUNTER — OFFICE VISIT (OUTPATIENT)
Dept: CARDIOLOGY | Facility: CLINIC | Age: 67
End: 2022-10-19
Attending: NURSE PRACTITIONER
Payer: MEDICARE

## 2022-10-19 VITALS
TEMPERATURE: 98.1 F | BODY MASS INDEX: 30.59 KG/M2 | OXYGEN SATURATION: 97 % | WEIGHT: 213.2 LBS | HEART RATE: 60 BPM | RESPIRATION RATE: 20 BRPM | SYSTOLIC BLOOD PRESSURE: 127 MMHG | DIASTOLIC BLOOD PRESSURE: 77 MMHG

## 2022-10-19 DIAGNOSIS — I10 BENIGN ESSENTIAL HYPERTENSION: ICD-10-CM

## 2022-10-19 DIAGNOSIS — I25.10 CORONARY ARTERY DISEASE INVOLVING NATIVE CORONARY ARTERY OF NATIVE HEART WITHOUT ANGINA PECTORIS: ICD-10-CM

## 2022-10-19 DIAGNOSIS — E78.5 HYPERLIPIDEMIA LDL GOAL <70: ICD-10-CM

## 2022-10-19 DIAGNOSIS — R06.09 DOE (DYSPNEA ON EXERTION): ICD-10-CM

## 2022-10-19 PROCEDURE — 99214 OFFICE O/P EST MOD 30 MIN: CPT | Performed by: NURSE PRACTITIONER

## 2022-10-19 PROCEDURE — 93798 PHYS/QHP OP CAR RHAB W/ECG: CPT

## 2022-10-19 RX ORDER — FUROSEMIDE 40 MG
40 TABLET ORAL DAILY
Qty: 20 TABLET | Refills: 0 | Status: SHIPPED | OUTPATIENT
Start: 2022-10-19 | End: 2023-03-30

## 2022-10-19 NOTE — PATIENT INSTRUCTIONS
Medication Changes:  Try lasix 40 mg daily for 1-2 weeks. If no improvement in shortness of breath, then continue with exercise and follow-up with primary care. Call my nurse if shortness of breath improves.     Recommendations:  Check daily weights and call the clinic if your weight has increased more than 2 lbs in one day or 5 lbs in one week; if you feel more short of breath or have worsening swelling in your legs or abdomen.  2000 mg sodium diet   4-8 8 ounce glasses of fluids per day     Follow-up:  Fasting lab in November, as scheduled  See Dr. Cruz for cardiology follow up at [facility]: March 2023 as scheduled.   Call 6 months prior, to schedule.     Cardiology Scheduling~[phone]  Cardiology Clinic RN~[phone] (Shauna RN, Marian RN, Leanna RN)

## 2022-10-19 NOTE — LETTER
10/19/2022    Raad Benitez, DO  [address] [facility] [address]    RE: Jamal Sanchez       Dear Colleague,     I had the pleasure of seeing Jamal Sanchez in the [facility].  Cardiology Clinic Progress Note  Jamal Sanchez MRN# [id number]   YOB: 1955 Age: 67 year old      Primary Cardiologist:   Dr. Cruz          History of Presenting Illness:      Jamal Sanchez is a pleasant 67 year old patient with a past cardiac history significant for   1. CAD    Angio 9/2022 STORM x1 to the mid LAD, D2 50%, 50% throughout LCx  2. Hyperlipidemia  3. Hypertension  Past medical history significant for obesity.    Patient was seen by Dr. Cruz in August 2022 in consultation.  He had progressively worsening dyspnea and atypical angina.  Stress test through PCP was abnormal and angiogram recommended.  Echocardiogram 2021 showed normal EF without valvular disease.    Patient was seen by me in September 2022. Echocardiogram 8/31/2022 showing normal EF 55 to 60%, normal RV, no significant valvular disease. Coronary angiogram 9/7/2022 showing severely elevated LV filling pressures, STORM x1 to the mid LAD, D2 50%, 50% throughout the LCx.  Atorvastatin was increased to 40 mg daily.  He was bradycardic and could not be started on beta-blocker.  He did not note any improvement in his dyspnea and continued with chronic chest discomfort.  He felt this was possibly related to his hiatal hernia or prior football injury to the sternum.  Chest discomfort occurs the day following heavy lifting.  He has not had any pulmonary work-up for his shortness of breath.  Given elevated filling pressures during angiogram he was started on a trial of Lasix.    Pt presents today for 1 month follow-up. BMP after previously starting lisinopril showed normal renal function and potassium with mild hyponatremia 134.  I recommended 2 L fluid restriction.  Recheck of BMP on 9/30/2022 again showed normal renal  function and potassium with stable hyponatremia.  Results reviewed today.    Since he was last seen, he tried a 1 week trial of Lasix 20 mg daily.  He did not note any significant improvement in his shortness of breath.  He has been adhering to 2 L fluid restriction and low-sodium diet.  He has started cardiac rehab.  He has not noted any significant weight gain and weight today in the clinic is down 5 pounds in the last month.  In reviewing his chart I see that he had PFTs November 2021 that showed normal function.  Again, he notes hiatal hernia and on prior chest x-ray it is noted as small.  He tells me that in the past his shortness of breath improved when he was swimming so he plans to restart this in the next few weeks.  Given that his angiogram showed severely elevated pressures, he is agreeable to trying a higher dose of Lasix for 1 to 2 weeks and let us know how he does with this. Patient reports no PND, orthopnea, presyncope, syncope, edema, heart racing, or palpitations.    Labs:  LIPID RESULTS:  Lab Results   Component Value Date    CHOL 182 05/06/2022    CHOL 225 (H) 02/22/2021    HDL 58 05/06/2022    HDL 60 02/22/2021     (H) 05/06/2022     (H) 02/22/2021    TRIG 64 05/06/2022    TRIG 209 (H) 02/22/2021    CHOLHDLRATIO 3.2 08/12/2015       LIVER ENZYME RESULTS:  Lab Results   Component Value Date    AST 41 11/23/2021    AST 14 07/02/2019    ALT 48 04/19/2022    ALT 26 07/02/2019       CBC RESULTS:  Lab Results   Component Value Date    WBC 6.3 09/14/2022    WBC 6.1 07/10/2019    RBC 4.84 09/14/2022    RBC 5.22 07/10/2019    HGB 14.9 09/14/2022    HGB 15.0 07/10/2019    HCT 45.6 09/14/2022    HCT 46.2 07/10/2019    MCV 94 09/14/2022    MCV 89 07/10/2019    MCH 30.8 09/14/2022    MCH 28.7 07/10/2019    MCHC 32.7 09/14/2022    MCHC 32.5 07/10/2019    RDW 13.6 09/14/2022    RDW 13.0 07/10/2019     09/14/2022     07/10/2019       BMP RESULTS:  Lab Results   Component Value Date      (L) 09/30/2022     02/22/2021    POTASSIUM 4.1 09/30/2022    POTASSIUM 4.0 09/07/2022    POTASSIUM 4.2 02/22/2021    CHLORIDE 97 (L) 09/30/2022    CHLORIDE 107 09/07/2022    CHLORIDE 102 02/22/2021    CO2 25 09/30/2022    CO2 27 09/07/2022    CO2 28 02/22/2021    ANIONGAP 12 09/30/2022    ANIONGAP 4 09/07/2022    ANIONGAP 6 02/22/2021    GLC 85 09/30/2022     (H) 09/07/2022    GLC 90 02/22/2021    BUN 15.9 09/30/2022    BUN 13 09/07/2022    BUN 23 02/22/2021    CR 0.93 09/30/2022    CR 0.89 02/22/2021    GFRESTIMATED 89 09/30/2022    GFRESTIMATED 88 02/22/2021    GFRESTBLACK >90 02/22/2021    ALYCE 9.8 09/30/2022    ALYCE 9.4 02/22/2021        A1C RESULTS:  No results found for: A1C    INR RESULTS:  Lab Results   Component Value Date    INR 1.02 09/07/2022    INR 1.10 11/22/2018    INR 1.10 11/22/2018       Results reviewed today.       Current Cardiac Medications     Aspirin 81 mg daily  Atorvastatin 40 mg daily  Plavix 75 mg daily  Lisinopril 10 mg daily  Nitroglycerin as needed  Sildenafil as needed  triamterene-HCTZ 75-50 mg daily                     Assessment and Plan:       Plan    Patient Instructions   Medication Changes:  4. Try lasix 40 mg daily for 1-2 weeks. If no improvement in shortness of breath, then continue with exercise and follow-up with primary care. Call my nurse if shortness of breath improves.     Recommendations:  1. Check daily weights and call the clinic if your weight has increased more than 2 lbs in one day or 5 lbs in one week; if you feel more short of breath or have worsening swelling in your legs or abdomen.  2. 2000 mg sodium diet   3. 4-8 8 ounce glasses of fluids per day     Follow-up:  1. Fasting lab in November, as scheduled  2. See Dr. Cruz for cardiology follow up at [facility]: March 2023 as scheduled.   Call 6 months prior, to schedule.     Cardiology Scheduling~[phone]  Cardiology Clinic RN~[phone] (Shauna RN, Marian RN, Leanna  RN)          1. CAD    No angina     Continue statin, aspirin, ACE inhibitor, beta-blocker    DAPT, uninterrupted, for at least 1 year (9/2023)    Consider long-term Plavix instead of aspirin, per interventionalist      2. Hyperlipidemia     Last  5/2022- not on statin     Continue atorvastatin 40 mg daily    Reassess lipids in 2 months      3. Hypertension    Controlled    Continue lisinopril, metoprolol, triamterene-HCTZ      4. SOB    Severely elevated LV filling pressures on anigo    Hemoglobin WNL    Trial of higher dose Lasix     Patient will restart routine exercise    PFTs WNL 11/2021               Thank you for allowing me to participate in this delightful patient's care.      This note was completed in part using Dragon voice recognition software. Although reviewed after completion, some word and grammatical errors may occur.    HERBERTH Hdez CNP, HERBERTH, CNP           Data:   All laboratory data reviewed         Constitutional:  cooperative, alert and oriented, well developed, well nourished, in no acute distress  overweight    Skin:  warm and dry to the touch         Head:  normocephalic       Eyes:  pupils equal and round       ENT:  no pallor or cyanosis       Neck:  no stiffness       Respiratory:  clear to auscultation; normal symmetry, decreased bases bilaterally        Cardiac: regular rate and rhythm; normal S1 and S2                 pulses full and equal     GI:  abdomen soft, nondistended     Extremities and Muscular Skeletal:  no edema        Neurological:  affect appropriate; no gross motor deficits       Psych:  Alert and Oriented x 3 , appropriate affact        Thank you for allowing me to participate in the care of your patient.      Sincerely,     HERBERTH Hdez CNP     [facility]  cc:   HERBERTH Barba CNP  [address]  [place] [address]

## 2022-10-19 NOTE — PROGRESS NOTES
Cardiology Clinic Progress Note  Jamal Sanchez MRN# [id number]   YOB: 1955 Age: 67 year old      Primary Cardiologist:   Dr. Cruz          History of Presenting Illness:      Jamal Sanchez is a pleasant 67 year old patient with a past cardiac history significant for   1. CAD    Angio 9/2022 STORM x1 to the mid LAD, D2 50%, 50% throughout LCx  2. Hyperlipidemia  3. Hypertension  Past medical history significant for obesity.    Patient was seen by Dr. Cruz in August 2022 in consultation.  He had progressively worsening dyspnea and atypical angina.  Stress test through PCP was abnormal and angiogram recommended.  Echocardiogram 2021 showed normal EF without valvular disease.    Patient was seen by me in September 2022. Echocardiogram 8/31/2022 showing normal EF 55 to 60%, normal RV, no significant valvular disease. Coronary angiogram 9/7/2022 showing severely elevated LV filling pressures, STORM x1 to the mid LAD, D2 50%, 50% throughout the LCx.  Atorvastatin was increased to 40 mg daily.  He was bradycardic and could not be started on beta-blocker.  He did not note any improvement in his dyspnea and continued with chronic chest discomfort.  He felt this was possibly related to his hiatal hernia or prior football injury to the sternum.  Chest discomfort occurs the day following heavy lifting.  He has not had any pulmonary work-up for his shortness of breath.  Given elevated filling pressures during angiogram he was started on a trial of Lasix.    Pt presents today for 1 month follow-up. BMP after previously starting lisinopril showed normal renal function and potassium with mild hyponatremia 134.  I recommended 2 L fluid restriction.  Recheck of BMP on 9/30/2022 again showed normal renal function and potassium with stable hyponatremia.  Results reviewed today.    Since he was last seen, he tried a 1 week trial of Lasix 20 mg daily.  He did not note any significant improvement in his shortness of  breath.  He has been adhering to 2 L fluid restriction and low-sodium diet.  He has started cardiac rehab.  He has not noted any significant weight gain and weight today in the clinic is down 5 pounds in the last month.  In reviewing his chart I see that he had PFTs November 2021 that showed normal function.  Again, he notes hiatal hernia and on prior chest x-ray it is noted as small.  He tells me that in the past his shortness of breath improved when he was swimming so he plans to restart this in the next few weeks.  Given that his angiogram showed severely elevated pressures, he is agreeable to trying a higher dose of Lasix for 1 to 2 weeks and let us know how he does with this. Patient reports no PND, orthopnea, presyncope, syncope, edema, heart racing, or palpitations.    Labs:  LIPID RESULTS:  Lab Results   Component Value Date    CHOL 182 05/06/2022    CHOL 225 (H) 02/22/2021    HDL 58 05/06/2022    HDL 60 02/22/2021     (H) 05/06/2022     (H) 02/22/2021    TRIG 64 05/06/2022    TRIG 209 (H) 02/22/2021    CHOLHDLRATIO 3.2 08/12/2015       LIVER ENZYME RESULTS:  Lab Results   Component Value Date    AST 41 11/23/2021    AST 14 07/02/2019    ALT 48 04/19/2022    ALT 26 07/02/2019       CBC RESULTS:  Lab Results   Component Value Date    WBC 6.3 09/14/2022    WBC 6.1 07/10/2019    RBC 4.84 09/14/2022    RBC 5.22 07/10/2019    HGB 14.9 09/14/2022    HGB 15.0 07/10/2019    HCT 45.6 09/14/2022    HCT 46.2 07/10/2019    MCV 94 09/14/2022    MCV 89 07/10/2019    MCH 30.8 09/14/2022    MCH 28.7 07/10/2019    MCHC 32.7 09/14/2022    MCHC 32.5 07/10/2019    RDW 13.6 09/14/2022    RDW 13.0 07/10/2019     09/14/2022     07/10/2019       BMP RESULTS:  Lab Results   Component Value Date     (L) 09/30/2022     02/22/2021    POTASSIUM 4.1 09/30/2022    POTASSIUM 4.0 09/07/2022    POTASSIUM 4.2 02/22/2021    CHLORIDE 97 (L) 09/30/2022    CHLORIDE 107 09/07/2022    CHLORIDE 102 02/22/2021     CO2 25 09/30/2022    CO2 27 09/07/2022    CO2 28 02/22/2021    ANIONGAP 12 09/30/2022    ANIONGAP 4 09/07/2022    ANIONGAP 6 02/22/2021    GLC 85 09/30/2022     (H) 09/07/2022    GLC 90 02/22/2021    BUN 15.9 09/30/2022    BUN 13 09/07/2022    BUN 23 02/22/2021    CR 0.93 09/30/2022    CR 0.89 02/22/2021    GFRESTIMATED 89 09/30/2022    GFRESTIMATED 88 02/22/2021    GFRESTBLACK >90 02/22/2021    ALYCE 9.8 09/30/2022    ALYCE 9.4 02/22/2021        A1C RESULTS:  No results found for: A1C    INR RESULTS:  Lab Results   Component Value Date    INR 1.02 09/07/2022    INR 1.10 11/22/2018    INR 1.10 11/22/2018       Results reviewed today.       Current Cardiac Medications     Aspirin 81 mg daily  Atorvastatin 40 mg daily  Plavix 75 mg daily  Lisinopril 10 mg daily  Nitroglycerin as needed  Sildenafil as needed  triamterene-HCTZ 75-50 mg daily                     Assessment and Plan:       Plan    Patient Instructions   Medication Changes:  4. Try lasix 40 mg daily for 1-2 weeks. If no improvement in shortness of breath, then continue with exercise and follow-up with primary care. Call my nurse if shortness of breath improves.     Recommendations:  1. Check daily weights and call the clinic if your weight has increased more than 2 lbs in one day or 5 lbs in one week; if you feel more short of breath or have worsening swelling in your legs or abdomen.  2. 2000 mg sodium diet   3. 4-8 8 ounce glasses of fluids per day     Follow-up:  1. Fasting lab in November, as scheduled  2. See Dr. Cruz for cardiology follow up at [facility]: March 2023 as scheduled.   Call 6 months prior, to schedule.     Cardiology Scheduling~[phone]  Cardiology Clinic RN~[phone] (Shauna RN, Marian RN, Leanna RN)          1. CAD    No angina     Continue statin, aspirin, ACE inhibitor, beta-blocker    DAPT, uninterrupted, for at least 1 year (9/2023)    Consider long-term Plavix instead of aspirin, per  interventionalist      2. Hyperlipidemia     Last  5/2022- not on statin     Continue atorvastatin 40 mg daily    Reassess lipids in 2 months      3. Hypertension    Controlled    Continue lisinopril, metoprolol, triamterene-HCTZ      4. SOB    Severely elevated LV filling pressures on anigo    Hemoglobin WNL    Trial of higher dose Lasix     Patient will restart routine exercise    PFTs WNL 11/2021               Thank you for allowing me to participate in this delightful patient's care.      This note was completed in part using Dragon voice recognition software. Although reviewed after completion, some word and grammatical errors may occur.    Celeste Torres, APRN CNP, APRN, CNP           Data:   All laboratory data reviewed         Constitutional:  cooperative, alert and oriented, well developed, well nourished, in no acute distress  overweight    Skin:  warm and dry to the touch         Head:  normocephalic       Eyes:  pupils equal and round       ENT:  no pallor or cyanosis       Neck:  no stiffness       Respiratory:  clear to auscultation; normal symmetry, decreased bases bilaterally        Cardiac: regular rate and rhythm; normal S1 and S2                 pulses full and equal     GI:  abdomen soft, nondistended     Extremities and Muscular Skeletal:  no edema        Neurological:  affect appropriate; no gross motor deficits       Psych:  Alert and Oriented x 3 , appropriate affact

## 2022-10-24 ENCOUNTER — HOSPITAL ENCOUNTER (OUTPATIENT)
Dept: CARDIAC REHAB | Facility: CLINIC | Age: 67
Discharge: HOME | End: 2022-10-24
Attending: INTERNAL MEDICINE
Payer: MEDICARE

## 2022-10-24 PROCEDURE — 93797 PHYS/QHP OP CAR RHAB WO ECG: CPT | Mod: 59

## 2022-10-24 PROCEDURE — 93798 PHYS/QHP OP CAR RHAB W/ECG: CPT

## 2022-10-25 ENCOUNTER — OFFICE VISIT (OUTPATIENT)
Dept: UROLOGY | Facility: CLINIC | Age: 67
End: 2022-10-25
Payer: MEDICARE

## 2022-10-25 VITALS
BODY MASS INDEX: 30.52 KG/M2 | WEIGHT: 213.19 LBS | DIASTOLIC BLOOD PRESSURE: 86 MMHG | TEMPERATURE: 96.6 F | HEART RATE: 62 BPM | HEIGHT: 70 IN | SYSTOLIC BLOOD PRESSURE: 126 MMHG

## 2022-10-25 DIAGNOSIS — N32.0 BLADDER OUTLET OBSTRUCTION: ICD-10-CM

## 2022-10-25 PROCEDURE — 99213 OFFICE O/P EST LOW 20 MIN: CPT | Performed by: UROLOGY

## 2022-10-25 NOTE — NURSING NOTE
"Initial /86 (BP Location: Right arm, Patient Position: Sitting, Cuff Size: Adult Large)   Pulse 62   Temp (!) 96.6  F (35.9  C) (Tympanic)   Ht 1.778 m (5' 10\")   Wt 96.7 kg (213 lb 3 oz)   BMI 30.59 kg/m   Estimated body mass index is 30.59 kg/m  as calculated from the following:    Height as of this encounter: 1.778 m (5' 10\").    Weight as of this encounter: 96.7 kg (213 lb 3 oz). .    Chel Treviño MA    "
Patient

## 2022-10-26 ENCOUNTER — HOSPITAL ENCOUNTER (OUTPATIENT)
Dept: CARDIAC REHAB | Facility: CLINIC | Age: 67
Discharge: HOME | End: 2022-10-26
Attending: INTERNAL MEDICINE
Payer: MEDICARE

## 2022-10-26 PROCEDURE — 93798 PHYS/QHP OP CAR RHAB W/ECG: CPT

## 2022-10-26 RX ORDER — OXYBUTYNIN CHLORIDE 10 MG/1
10 TABLET, EXTENDED RELEASE ORAL DAILY
Qty: 90 TABLET | Refills: 3 | Status: SHIPPED | OUTPATIENT
Start: 2022-10-26 | End: 2024-02-05

## 2022-10-26 RX ORDER — TAMSULOSIN HYDROCHLORIDE 0.4 MG/1
0.4 CAPSULE ORAL DAILY
Qty: 90 CAPSULE | Refills: 3 | Status: SHIPPED | OUTPATIENT
Start: 2022-10-26 | End: 2023-12-12

## 2022-10-26 NOTE — PROGRESS NOTES
Appointment source: Established Patient  Patient name: Jamal Sanchez  Urology Staff: Matteo Caba MD    Subjective: This is a 67 year old year old male returning for follow up of voiding symptoms and erectile dysfunction.    Has had measured results from phosphodiesterase inhibitors and did not tolerate discomfort associated with alprostadil intercavitary therapy.    Still has some alprostadil frozen. He may try it again.    Also asked if alprostadil and sildenafil can be used together. They can but a dosage adjustment (lower) should be made for both medications and the process should be approached cautiously as priapism may occur (prompting the need for an ER visit) which remains a limited resource.    Current voiding pattern is satisfactory using tamsulosin and oxybutynin 5 mg in the evening.    Assessment:  Persistent erectile dysfunction issues and relatively good voiding pattern.    Plan:  Consider using 100 mg dose of sildenafil to reassess the potential for side effects. Also consider vacuum assist devices for erection support.    Update status on [facility].    Total time 15 minutes

## 2022-10-27 ENCOUNTER — TELEPHONE (OUTPATIENT)
Dept: FAMILY MEDICINE | Facility: CLINIC | Age: 67
End: 2022-10-27

## 2022-10-27 DIAGNOSIS — Z12.5 ENCOUNTER FOR SCREENING FOR MALIGNANT NEOPLASM OF PROSTATE: Primary | ICD-10-CM

## 2022-10-27 NOTE — TELEPHONE ENCOUNTER
Patient scheduled for labs 11/14. Requesting order for PSA added by pcp please.    Thank you,    Kizzy Mccullough, ALY Martinez

## 2022-10-31 ENCOUNTER — HOSPITAL ENCOUNTER (OUTPATIENT)
Dept: CARDIAC REHAB | Facility: CLINIC | Age: 67
Discharge: HOME | End: 2022-10-31
Attending: INTERNAL MEDICINE
Payer: MEDICARE

## 2022-10-31 PROCEDURE — 93798 PHYS/QHP OP CAR RHAB W/ECG: CPT

## 2022-11-07 ENCOUNTER — HOSPITAL ENCOUNTER (OUTPATIENT)
Dept: CARDIAC REHAB | Facility: CLINIC | Age: 67
Discharge: HOME | End: 2022-11-07
Attending: INTERNAL MEDICINE
Payer: MEDICARE

## 2022-11-07 PROCEDURE — 93798 PHYS/QHP OP CAR RHAB W/ECG: CPT

## 2022-11-09 ENCOUNTER — HOSPITAL ENCOUNTER (OUTPATIENT)
Dept: CARDIAC REHAB | Facility: CLINIC | Age: 67
Discharge: HOME | End: 2022-11-09
Attending: INTERNAL MEDICINE
Payer: MEDICARE

## 2022-11-09 PROCEDURE — 93798 PHYS/QHP OP CAR RHAB W/ECG: CPT

## 2022-11-10 DIAGNOSIS — I25.10 CAD (CORONARY ARTERY DISEASE): ICD-10-CM

## 2022-11-10 NOTE — TELEPHONE ENCOUNTER
Routing to Celeste Simons NP    Per last OV in October of 2022,    Patient was seen by me in September 2022. Echocardiogram 8/31/2022 showing normal EF 55 to 60%, normal RV, no significant valvular disease. Coronary angiogram 9/7/2022 showing severely elevated LV filling pressures, STORM x1 to the mid LAD, D2 50%, 50% throughout the LCx.    Patient prescribed lasix for SOB but also has history of recurrent chest pain and now has a refill request for nitroglycerin when it was last filled about 2 months ago.     Is there any other medication that may be able to remedy chest pain reoccurrence? Called patient and left a message to further assess extent of chest pain as well.    Thanks,    Kim Bacon RN

## 2022-11-10 NOTE — TELEPHONE ENCOUNTER
Last Office Visit: 10/19/22 PATRICIA Cruz  Next Office Visit: 03/30/23 Dr. Cruz  Last Fill Date: 09/08/22  Mariya Tripathi MA Cardiology   11/10/2022 12:15 PM

## 2022-11-11 RX ORDER — NITROGLYCERIN 0.4 MG/1
TABLET SUBLINGUAL
Qty: 30 TABLET | Refills: 3 | Status: SHIPPED | OUTPATIENT
Start: 2022-11-11 | End: 2023-04-18

## 2022-11-11 NOTE — TELEPHONE ENCOUNTER
Pt called back to say he wasn't having any trouble, its just that he lost the bottle and wanted to have one on hand. Refills sent. Shauna De La Rosa RN Cardiology November 11, 2022, 11:00 AM

## 2022-11-14 ENCOUNTER — HOSPITAL ENCOUNTER (OUTPATIENT)
Dept: CARDIAC REHAB | Facility: CLINIC | Age: 67
Discharge: HOME | End: 2022-11-14
Attending: INTERNAL MEDICINE
Payer: MEDICARE

## 2022-11-14 ENCOUNTER — LAB (OUTPATIENT)
Dept: LAB | Facility: CLINIC | Age: 67
End: 2022-11-14
Payer: MEDICARE

## 2022-11-14 ENCOUNTER — ALLIED HEALTH/NURSE VISIT (OUTPATIENT)
Dept: FAMILY MEDICINE | Facility: CLINIC | Age: 67
End: 2022-11-14
Payer: MEDICARE

## 2022-11-14 DIAGNOSIS — Z23 NEED FOR VACCINATION: Primary | ICD-10-CM

## 2022-11-14 DIAGNOSIS — E78.5 HYPERLIPIDEMIA LDL GOAL <70: ICD-10-CM

## 2022-11-14 DIAGNOSIS — I10 BENIGN ESSENTIAL HYPERTENSION: ICD-10-CM

## 2022-11-14 DIAGNOSIS — Z12.5 ENCOUNTER FOR SCREENING FOR MALIGNANT NEOPLASM OF PROSTATE: ICD-10-CM

## 2022-11-14 LAB
ALT SERPL W P-5'-P-CCNC: 23 U/L (ref 10–50)
ANION GAP SERPL CALCULATED.3IONS-SCNC: 8 MMOL/L (ref 7–15)
BUN SERPL-MCNC: 18.3 MG/DL (ref 8–23)
CALCIUM SERPL-MCNC: 9.6 MG/DL (ref 8.8–10.2)
CHLORIDE SERPL-SCNC: 105 MMOL/L (ref 98–107)
CHOLEST SERPL-MCNC: 133 MG/DL
CREAT SERPL-MCNC: 0.86 MG/DL (ref 0.67–1.17)
DEPRECATED HCO3 PLAS-SCNC: 28 MMOL/L (ref 22–29)
GFR SERPL CREATININE-BSD FRML MDRD: >90 ML/MIN/1.73M2
GLUCOSE SERPL-MCNC: 122 MG/DL (ref 70–99)
HDLC SERPL-MCNC: 51 MG/DL
LDLC SERPL CALC-MCNC: 63 MG/DL
NONHDLC SERPL-MCNC: 82 MG/DL
POTASSIUM SERPL-SCNC: 4.2 MMOL/L (ref 3.4–5.3)
PSA SERPL-MCNC: 1.63 NG/ML (ref 0–4.5)
SODIUM SERPL-SCNC: 141 MMOL/L (ref 136–145)
TRIGL SERPL-MCNC: 95 MG/DL

## 2022-11-14 PROCEDURE — 93797 PHYS/QHP OP CAR RHAB WO ECG: CPT | Mod: 59

## 2022-11-14 PROCEDURE — 90662 IIV NO PRSV INCREASED AG IM: CPT

## 2022-11-14 PROCEDURE — 80061 LIPID PANEL: CPT | Performed by: NURSE PRACTITIONER

## 2022-11-14 PROCEDURE — 0134A COVID-19,PF,MODERNA BIVALENT: CPT

## 2022-11-14 PROCEDURE — 36415 COLL VENOUS BLD VENIPUNCTURE: CPT | Performed by: NURSE PRACTITIONER

## 2022-11-14 PROCEDURE — 84460 ALANINE AMINO (ALT) (SGPT): CPT | Performed by: NURSE PRACTITIONER

## 2022-11-14 PROCEDURE — 99207 PR NO CHARGE NURSE ONLY: CPT

## 2022-11-14 PROCEDURE — 80048 BASIC METABOLIC PNL TOTAL CA: CPT | Performed by: NURSE PRACTITIONER

## 2022-11-14 PROCEDURE — 93798 PHYS/QHP OP CAR RHAB W/ECG: CPT

## 2022-11-14 PROCEDURE — 91313 COVID-19,PF,MODERNA BIVALENT: CPT

## 2022-11-14 PROCEDURE — G0008 ADMIN INFLUENZA VIRUS VAC: HCPCS | Mod: 59

## 2022-11-14 PROCEDURE — G0103 PSA SCREENING: HCPCS

## 2022-11-14 NOTE — RESULT ENCOUNTER NOTE
Electrolytes, ALT and kidney function WNL; lipids much improved and now at goal. Pt notified of results via [employer].

## 2022-11-16 ENCOUNTER — HOSPITAL ENCOUNTER (OUTPATIENT)
Dept: CARDIAC REHAB | Facility: CLINIC | Age: 67
Discharge: HOME | End: 2022-11-16
Attending: INTERNAL MEDICINE
Payer: MEDICARE

## 2022-11-16 PROCEDURE — 93798 PHYS/QHP OP CAR RHAB W/ECG: CPT

## 2022-11-28 ENCOUNTER — TELEPHONE (OUTPATIENT)
Dept: CARDIOLOGY | Facility: CLINIC | Age: 67
End: 2022-11-28

## 2022-11-28 NOTE — TELEPHONE ENCOUNTER
"Received refill request for 40mg Lasix.     Per Celeste Simons CNP last clinic note from 10/19/22- \"Since he was last seen, he tried a 1 week trial of Lasix 20 mg daily.  He did not note any significant improvement in his shortness of breath.  He has been adhering to 2 L fluid restriction and low-sodium diet.  He has started cardiac rehab.  He has not noted any significant weight gain and weight today in the clinic is down 5 pounds in the last month.  In reviewing his chart I see that he had PFTs November 2021 that showed normal function.  Again, he notes hiatal hernia and on prior chest x-ray it is noted as small.  He tells me that in the past his shortness of breath improved when he was swimming so he plans to restart this in the next few weeks.  Given that his angiogram showed severely elevated pressures, he is agreeable to trying a higher dose of Lasix for 1 to 2 weeks and let us know how he does with this.   Try lasix 40 mg daily for 1-2 weeks. If no improvement in shortness of breath, then continue with exercise and follow-up with primary care. Call my nurse if shortness of breath improves.\"    LM for pt to call back to discuss need for refill as he should have been out of this rx 4 weeks ago. Is he having increase SOB, wt gain, change in symptoms. Did he notice an improvement in SOB with 40mg lasix?    Marian Rutherford, CARLOS      "

## 2022-11-29 ENCOUNTER — MYC MEDICAL ADVICE (OUTPATIENT)
Dept: CARDIOLOGY | Facility: CLINIC | Age: 67
End: 2022-11-29

## 2022-11-29 NOTE — TELEPHONE ENCOUNTER
[facility] [place] inquiring about refill and symptoms. Will await a response from pt.    Marian Rutherford RN

## 2022-11-30 ENCOUNTER — HOSPITAL ENCOUNTER (OUTPATIENT)
Dept: CARDIAC REHAB | Facility: CLINIC | Age: 67
Discharge: HOME | End: 2022-11-30
Attending: INTERNAL MEDICINE
Payer: MEDICARE

## 2022-11-30 PROCEDURE — 93798 PHYS/QHP OP CAR RHAB W/ECG: CPT

## 2022-11-30 NOTE — TELEPHONE ENCOUNTER
"Pt sent the following [employer] message:     \"I made a request error for this medication. I do not need it. Also, my symptoms have not changed and I am continuing with my exercise at the Cardiac rehab program. Thanks\".     Shauna De La Rosa RN Cardiology November 30, 2022, 7:33 AM      "

## 2022-12-05 ENCOUNTER — HOSPITAL ENCOUNTER (OUTPATIENT)
Dept: CARDIAC REHAB | Facility: CLINIC | Age: 67
Discharge: HOME | End: 2022-12-05
Attending: INTERNAL MEDICINE
Payer: MEDICARE

## 2022-12-05 PROCEDURE — 93798 PHYS/QHP OP CAR RHAB W/ECG: CPT

## 2022-12-07 ENCOUNTER — HOSPITAL ENCOUNTER (OUTPATIENT)
Dept: CARDIAC REHAB | Facility: CLINIC | Age: 67
Discharge: HOME | End: 2022-12-07
Attending: INTERNAL MEDICINE
Payer: MEDICARE

## 2022-12-07 PROCEDURE — 93798 PHYS/QHP OP CAR RHAB W/ECG: CPT

## 2022-12-07 PROCEDURE — 93797 PHYS/QHP OP CAR RHAB WO ECG: CPT | Mod: XU

## 2022-12-09 ENCOUNTER — MYC MEDICAL ADVICE (OUTPATIENT)
Dept: GASTROENTEROLOGY | Facility: CLINIC | Age: 67
End: 2022-12-09

## 2022-12-12 ENCOUNTER — HOSPITAL ENCOUNTER (OUTPATIENT)
Dept: CARDIAC REHAB | Facility: CLINIC | Age: 67
Discharge: HOME | End: 2022-12-12
Attending: INTERNAL MEDICINE
Payer: MEDICARE

## 2022-12-12 PROCEDURE — 93798 PHYS/QHP OP CAR RHAB W/ECG: CPT

## 2022-12-12 NOTE — TELEPHONE ENCOUNTER
Called to remind patient of their upcoming appointment with our GI clinic, on Tuesday 12/13/2022 at 1:00PM with Dr. De Leon. This appointment is scheduled as a video visit. You will receive a call approximately 30 minutes prior to check you in, you must be in [place] for this visit., if your appointment is virtual (video or telephone) you need to be in [place] for the visit. To reschedule or cancel patient to call [phone].    Ana Macedo MA

## 2022-12-13 ENCOUNTER — PRE VISIT (OUTPATIENT)
Dept: GASTROENTEROLOGY | Facility: CLINIC | Age: 67
End: 2022-12-13

## 2022-12-13 ENCOUNTER — VIRTUAL VISIT (OUTPATIENT)
Dept: GASTROENTEROLOGY | Facility: CLINIC | Age: 67
End: 2022-12-13
Payer: MEDICARE

## 2022-12-13 VITALS
HEIGHT: 70 IN | DIASTOLIC BLOOD PRESSURE: 80 MMHG | BODY MASS INDEX: 31.21 KG/M2 | WEIGHT: 218 LBS | SYSTOLIC BLOOD PRESSURE: 137 MMHG

## 2022-12-13 DIAGNOSIS — K21.9 GASTROESOPHAGEAL REFLUX DISEASE WITHOUT ESOPHAGITIS: ICD-10-CM

## 2022-12-13 PROCEDURE — 99204 OFFICE O/P NEW MOD 45 MIN: CPT | Mod: GC | Performed by: INTERNAL MEDICINE

## 2022-12-13 RX ORDER — OMEPRAZOLE 40 MG/1
40 CAPSULE, DELAYED RELEASE ORAL DAILY
Qty: 90 CAPSULE | Refills: 3 | Status: SHIPPED | OUTPATIENT
Start: 2022-12-13 | End: 2023-05-26 | Stop reason: ALTCHOICE

## 2022-12-13 ASSESSMENT — PAIN SCALES - GENERAL: PAINLEVEL: NO PAIN (0)

## 2022-12-13 NOTE — PROGRESS NOTES
Gopal is a 67 year old who is being evaluated via a billable video visit.      How would you like to obtain your AVS? MyChart  If the video visit is dropped, the invitation should be resent by: Text to cell phone: [phone]  Will anyone else be joining your video visit? No      Video-Visit Details    Video Start Time: 1:20 PM    Type of service:  Video Visit    Video End Time:1:30 PM    Originating Location (pt. Location): Home    Distant Location (provider location):  On-site    Platform used for Video Visit: Thelma     I performed a history and physical examination of this patient and discussed the management with Dr. De Leon on 12/13/2022. I reviewed the note and there are no changes to the past medical, family or social history.  A complete 10 point review of systems was obtained. Please see the HPI for pertinent positives and negatives. All other systems were reviewed and were found to be negative. I agree with the documented findings and plan of care as outlined.    Mainor De La Paz MD  GI Attending  Pager: [phone]

## 2022-12-13 NOTE — PATIENT INSTRUCTIONS
It was a pleasure seeing you today. As discussed with you, we will recommend the following.    Increase omeprazole 40mg two times a day (30min before breakfast and 30min before dinner).  You can use pepcid as needed for heart burn.  Lifestyle modifications including elevation of the head of bed, 4 hours between supper and lying down to sleep, small supper avoid soda drinks.   Continue to work on weight loss.     Will see you back in 3- 6 months.

## 2022-12-13 NOTE — LETTER
12/13/2022         RE: Jamal Sanchez  [address] [facility] [address]        Dear Colleague,    Thank you for referring your patient, Jamal Sanchez, to the [facility] [place]. Please see a copy of my visit note below.    Gopal is a 67 year old who is being evaluated via a billable video visit.      How would you like to obtain your AVS? MyChart  If the video visit is dropped, the invitation should be resent by: Text to cell phone: [phone]  Will anyone else be joining your video visit? No      Video-Visit Details    Video Start Time: 1:20 PM    Type of service:  Video Visit    Video End Time:1:30 PM    Originating Location (pt. Location): Home    Distant Location (provider location):  On-site    Platform used for Video Visit: Thelma     I performed a history and physical examination of this patient and discussed the management with Dr. De Leon on 12/13/2022. I reviewed the note and there are no changes to the past medical, family or social history.  A complete 10 point review of systems was obtained. Please see the HPI for pertinent positives and negatives. All other systems were reviewed and were found to be negative. I agree with the documented findings and plan of care as outlined.    Mainor De La Paz MD  GI Attending  Pager: [phone]      [facility]:     New Consult  for GERD       Date of visit: 12/13/2022 --    Referring provider: Raad Benitez DO    CC: 67 year old male referred by Raad Benitez DO for evaluation of chronic GERD.       ASSESSMENT AND PLAN:  Mr. Gopal Sanchez is a 66 yo male with CAD s/p recent PCI with stent, hyperlipidemia, GERD and obesity who presents for heart burn symptoms.      #. Heart burn symptoms  #. GERD   #. Hiatal hernia  Ongoing heart burn symptoms not improving with cardiac stent. Worse with supine position. Could be due to acid reflux given insufficiently controlled acid suppression (currently  Patient was in bed completing word search puzzles at the beginning of the shift. Stated it is something she enjoys doing, and helps keep her focus away from negative thoughts. Denied SI/HI/AVH. Throughout the evening she addressed staff about needing something to help her sleep. Upon assessment she stated she was having racing thoughts and was feeling anxious. PRN Benadryl was administered at this time. Patient was compliant with medications and mouth checks. Will continue to monitor.     Unable to offer VA due to cognitive deficit.   40mg omeprazole at night and famotidine AM). Alternatively, non-acid reflux given hiatal hernia.     -- Increase omeprazole to 40mg BID  -- Can use famotidine as needed   -- Discussed reflux precautions extensively  -- Counseled on weight loss   -- Discussed surgery as a last resort if symptoms become debilitating    #. Colon Cancer Screening   Colonoscopy 8/17/2022: Two 3-4mm TA.   -- Repeat 7-10 years.       Return to Clinic: in 3-6 months.       Patient discussed and seen with Gastroenterology staff Dr. De La Paz, who is in agreement with the above.     Marisela De Leon MD, PhD  Gastroenterology Fellow  Division of Gastroenterology, Hepatology and Nutrition  [facility]      ====================================================================================================================================  HPI:   Mr. Gopal Sanchez is a 66 yo male with CAD s/p recent PCI with stent, hyperlipidemia, GERD and obesity who presents for heart burn symptoms.     He was diagnosed with GERD many years ago and has been on omeprazole 40mg daily and famotidine 20mg AM. EGD with dilation in 1/2022. Repeat EDG 4/2022 showed hill grade IV flap, a 4cm hiatal hernia, and gastritis with normal biopsy.     He was seen by PCP Dr. Benitez on 5/6/22 for acid reflux, chest discomfort and dyspnea. During the interval, he had positive stress test and underwent coronary angiogram with a stent to mid-LAD on 9/7/2022. He was started on DAPT.    Since the cardiac stent placement, he continues to have similar heartburn symptoms which he describes as burning pain in the middle of his chest.  Worse when lying supine.  He will wake up early in the morning with heartburn which responds to Tums.  He reports eating dinner late around 8 PM and typically goes to bed at 10 PM.  He sleeps with 1 pillow.       Targeted GI review of systems complete and is otherwise negative.     Past Medical History:   Diagnosis Date     BPH (benign prostatic  hyperplasia)      Complication of anesthesia      Diverticulitis      GERD (gastroesophageal reflux disease)      Hypertension        Past Surgical History:   Procedure Laterality Date     APPENDECTOMY       ARTHROSCOPY SHOULDER ROTATOR CUFF REPAIR Left      ARTHROSCOPY SHOULDER RT/LT Right 01/2018     COLONOSCOPY  3/5/2008     COLONOSCOPY  10/9/2013    Procedure: COLONOSCOPY;;  Surgeon: Nicolas Lizama MD;  Location: [facility]     COLONOSCOPY N/A 8/17/2022    Procedure: COLONOSCOPY, FLEXIBLE, WITH LESION REMOVAL USING SNARE;  Surgeon: Vimal Stanley DO;  Location: [facility]     CV CORONARY ANGIOGRAM N/A 9/7/2022    Procedure: Coronary Angiogram;  Surgeon: Morris Sky MD;  Location:  HEART CARDIAC CATH LAB     CV INSTANTANEOUS WAVE-FREE RATIO N/A 9/7/2022    Procedure: Instantaneous Wave-Free Ratio;  Surgeon: Morris Sky MD;  Location:  HEART CARDIAC CATH LAB     CV LEFT HEART CATH N/A 9/7/2022    Procedure: Left Heart Catheterization;  Surgeon: Morris Sky MD;  Location:  HEART CARDIAC CATH LAB     CV LEFT VENTRICULOGRAM N/A 9/7/2022    Procedure: Left Ventriculogram;  Surgeon: Morris Sky MD;  Location:  HEART CARDIAC CATH LAB     CV PCI STENT DRUG ELUTING N/A 9/7/2022    Procedure: Percutaneous Coronary Intervention Stent;  Surgeon: Morris Sky MD;  Location: [facility] CARDIAC CATH LAB     ESOPHAGOSCOPY, GASTROSCOPY, DUODENOSCOPY (EGD), COMBINED  10/9/2013    Procedure: COMBINED ESOPHAGOSCOPY, GASTROSCOPY, DUODENOSCOPY (EGD), BIOPSY SINGLE OR MULTIPLE;;  Surgeon: Nicolas Lizama MD;  Location: [facility]     ESOPHAGOSCOPY, GASTROSCOPY, DUODENOSCOPY (EGD), COMBINED N/A 4/6/2022    Procedure: ESOPHAGOGASTRODUODENOSCOPY, WITH BIOPSY;  Surgeon: Vimal Stanley DO;  Location: [facility]     HERNIA REPAIR       LAPAROSCOPIC APPENDECTOMY N/A 1/7/2016    Procedure: LAPAROSCOPIC APPENDECTOMY;  Surgeon: Ab Guzman MD;  Location: [facility]     LAPAROSCOPIC  "HERNIORRHAPHY INGUINAL BILATERAL Bilateral 12/13/2016    Procedure: LAPAROSCOPIC HERNIORRHAPHY INGUINAL BILATERAL;  Surgeon: Ab Guzman MD;  Location: [facility]       Family History   Problem Relation Age of Onset     Diabetes Mother      Cerebrovascular Disease Mother      Neurologic Disorder Father         parkinson's     Cancer - colorectal Maternal Grandfather      Hypertension Brother      Cerebrovascular Disease Brother         stroke     Alcohol/Drug Brother      Unknown/Adopted Brother      Diabetes Sister      Obesity Sister        Social History     Tobacco Use     Smoking status: Never     Smokeless tobacco: Never   Substance Use Topics     Alcohol use: Yes     Comment: 6 pack beer on weekend.       Physical exam:     Vitals:/80   Ht 1.778 m (5' 10\")   Wt 98.9 kg (218 lb)   BMI 31.28 kg/m     BMI: Body mass index is 31.28 kg/m .      GEN: NAD, A&Ox3, appropriate  HEENT: EOMI, PERRLA, MMM, hearing grossly intact  Neck: full ROM  Cardiopulmonary: non labored, comfortable on RA, nondiaphoretic, no LE edema  Abdominal: soft, nontender, nondistended, no HSM, no rebound, no guarding, normoactive BS  Skin: no jaundice, no gross lesions on visible skin  Neuro: A&Ox3, grossly intact motor/sensation, gait intact  MSK: no gross deformity, moves arms/legs equally  Psych: normal affect, congruent with mood      Labs:   No relevant    Relevant imaging/Endoscopy:  4/6/2022:   Impression:      - Normal examined duodenum.                          - A few gastric polyps. Biopsied.                          - Gastritis. Biopsied.                          - Gastroesophageal flap valve classified as Hill Grade                          IV (no fold, wide open lumen, hiatal hernia present).                          - 4 cm hiatal hernia.    Pathology: No diagnostic abnormality of the gastric mucosa.  Negative for H. Pylori.       Relevant surgical reports:   None.         Again, thank you for allowing me to participate in " the care of your patient.      Sincerely,    Marisela De Leon MD

## 2022-12-14 ENCOUNTER — HOSPITAL ENCOUNTER (OUTPATIENT)
Dept: CARDIAC REHAB | Facility: CLINIC | Age: 67
Discharge: HOME | End: 2022-12-14
Attending: FAMILY MEDICINE
Payer: MEDICARE

## 2022-12-14 PROCEDURE — 93798 PHYS/QHP OP CAR RHAB W/ECG: CPT

## 2022-12-14 PROCEDURE — 93797 PHYS/QHP OP CAR RHAB WO ECG: CPT | Mod: XU

## 2022-12-14 NOTE — PROGRESS NOTES
[facility]:     New Consult  for GERD       Date of visit: 12/13/2022 --    Referring provider: Raad Benitez DO    CC: 67 year old male referred by Raad Benitez DO for evaluation of chronic GERD.       ASSESSMENT AND PLAN:  Mr. Gopal Sanchez is a 66 yo male with CAD s/p recent PCI with stent, hyperlipidemia, GERD and obesity who presents for heart burn symptoms.      #. Heart burn symptoms  #. GERD   #. Hiatal hernia  Ongoing heart burn symptoms not improving with cardiac stent. Worse with supine position. Could be due to acid reflux given insufficiently controlled acid suppression (currently 40mg omeprazole at night and famotidine AM). Alternatively, non-acid reflux given hiatal hernia.     -- Increase omeprazole to 40mg BID  -- Can use famotidine as needed   -- Discussed reflux precautions extensively  -- Counseled on weight loss   -- Discussed surgery as a last resort if symptoms become debilitating    #. Colon Cancer Screening   Colonoscopy 8/17/2022: Two 3-4mm TA.   -- Repeat 7-10 years.       Return to Clinic: in 3-6 months.       Patient discussed and seen with Gastroenterology staff Dr. De La Paz, who is in agreement with the above.     Marisela De Leon MD, PhD  Gastroenterology Fellow  Division of Gastroenterology, Hepatology and Nutrition  [facility]      ====================================================================================================================================  HPI:   Mr. Gopal Sanchez is a 66 yo male with CAD s/p recent PCI with stent, hyperlipidemia, GERD and obesity who presents for heart burn symptoms.     He was diagnosed with GERD many years ago and has been on omeprazole 40mg daily and famotidine 20mg AM. EGD with dilation in 1/2022. Repeat EDG 4/2022 showed hill grade IV flap, a 4cm hiatal hernia, and gastritis with normal biopsy.     He was seen by PCP Dr. Benitez on 5/6/22 for acid reflux, chest discomfort and dyspnea. During  the interval, he had positive stress test and underwent coronary angiogram with a stent to mid-LAD on 9/7/2022. He was started on DAPT.    Since the cardiac stent placement, he continues to have similar heartburn symptoms which he describes as burning pain in the middle of his chest.  Worse when lying supine.  He will wake up early in the morning with heartburn which responds to Tums.  He reports eating dinner late around 8 PM and typically goes to bed at 10 PM.  He sleeps with 1 pillow.       Targeted GI review of systems complete and is otherwise negative.     Past Medical History:   Diagnosis Date     BPH (benign prostatic hyperplasia)      Complication of anesthesia      Diverticulitis      GERD (gastroesophageal reflux disease)      Hypertension        Past Surgical History:   Procedure Laterality Date     APPENDECTOMY       ARTHROSCOPY SHOULDER ROTATOR CUFF REPAIR Left      ARTHROSCOPY SHOULDER RT/LT Right 01/2018     COLONOSCOPY  3/5/2008     COLONOSCOPY  10/9/2013    Procedure: COLONOSCOPY;;  Surgeon: Nicolas Lizama MD;  Location: [facility]     COLONOSCOPY N/A 8/17/2022    Procedure: COLONOSCOPY, FLEXIBLE, WITH LESION REMOVAL USING SNARE;  Surgeon: Vimal Stanley DO;  Location: [facility]     CV CORONARY ANGIOGRAM N/A 9/7/2022    Procedure: Coronary Angiogram;  Surgeon: Morris Sky MD;  Location: [facility] CARDIAC CATH LAB     CV INSTANTANEOUS WAVE-FREE RATIO N/A 9/7/2022    Procedure: Instantaneous Wave-Free Ratio;  Surgeon: Morris Sky MD;  Location: [facility] CARDIAC CATH LAB     CV LEFT HEART CATH N/A 9/7/2022    Procedure: Left Heart Catheterization;  Surgeon: Morris Sky MD;  Location:  HEART CARDIAC CATH LAB     CV LEFT VENTRICULOGRAM N/A 9/7/2022    Procedure: Left Ventriculogram;  Surgeon: Morris Sky MD;  Location:  HEART CARDIAC CATH LAB     CV PCI STENT DRUG ELUTING N/A 9/7/2022    Procedure: Percutaneous Coronary Intervention Stent;  Surgeon:  "Morris Sky MD;  Location:  HEART CARDIAC CATH LAB     ESOPHAGOSCOPY, GASTROSCOPY, DUODENOSCOPY (EGD), COMBINED  10/9/2013    Procedure: COMBINED ESOPHAGOSCOPY, GASTROSCOPY, DUODENOSCOPY (EGD), BIOPSY SINGLE OR MULTIPLE;;  Surgeon: Nicolas Lizama MD;  Location: [facility]     ESOPHAGOSCOPY, GASTROSCOPY, DUODENOSCOPY (EGD), COMBINED N/A 4/6/2022    Procedure: ESOPHAGOGASTRODUODENOSCOPY, WITH BIOPSY;  Surgeon: Vimal Stanley DO;  Location: [facility]     HERNIA REPAIR       LAPAROSCOPIC APPENDECTOMY N/A 1/7/2016    Procedure: LAPAROSCOPIC APPENDECTOMY;  Surgeon: Ab Guzman MD;  Location: [facility]     LAPAROSCOPIC HERNIORRHAPHY INGUINAL BILATERAL Bilateral 12/13/2016    Procedure: LAPAROSCOPIC HERNIORRHAPHY INGUINAL BILATERAL;  Surgeon: Ab Guzman MD;  Location: [facility]       Family History   Problem Relation Age of Onset     Diabetes Mother      Cerebrovascular Disease Mother      Neurologic Disorder Father         parkinson's     Cancer - colorectal Maternal Grandfather      Hypertension Brother      Cerebrovascular Disease Brother         stroke     Alcohol/Drug Brother      Unknown/Adopted Brother      Diabetes Sister      Obesity Sister        Social History     Tobacco Use     Smoking status: Never     Smokeless tobacco: Never   Substance Use Topics     Alcohol use: Yes     Comment: 6 pack beer on weekend.       Physical exam:     Vitals:/80   Ht 1.778 m (5' 10\")   Wt 98.9 kg (218 lb)   BMI 31.28 kg/m     BMI: Body mass index is 31.28 kg/m .      GEN: NAD, A&Ox3, appropriate  HEENT: EOMI, PERRLA, MMM, hearing grossly intact  Neck: full ROM  Cardiopulmonary: non labored, comfortable on RA, nondiaphoretic, no LE edema  Abdominal: soft, nontender, nondistended, no HSM, no rebound, no guarding, normoactive BS  Skin: no jaundice, no gross lesions on visible skin  Neuro: A&Ox3, grossly intact motor/sensation, gait intact  MSK: no gross deformity, moves arms/legs equally  Psych: normal " affect, congruent with mood      Labs:   No relevant    Relevant imaging/Endoscopy:  4/6/2022:   Impression:      - Normal examined duodenum.                          - A few gastric polyps. Biopsied.                          - Gastritis. Biopsied.                          - Gastroesophageal flap valve classified as Hill Grade                          IV (no fold, wide open lumen, hiatal hernia present).                          - 4 cm hiatal hernia.    Pathology: No diagnostic abnormality of the gastric mucosa.  Negative for H. Pylori.       Relevant surgical reports:   None.

## 2022-12-15 ENCOUNTER — OFFICE VISIT (OUTPATIENT)
Dept: DERMATOLOGY | Facility: CLINIC | Age: 67
End: 2022-12-15
Payer: MEDICARE

## 2022-12-15 DIAGNOSIS — B35.3 TINEA PEDIS OF BOTH FEET: ICD-10-CM

## 2022-12-15 DIAGNOSIS — L82.0 INFLAMED SEBORRHEIC KERATOSIS: Primary | ICD-10-CM

## 2022-12-15 DIAGNOSIS — L90.5 SCAR: ICD-10-CM

## 2022-12-15 PROCEDURE — 99213 OFFICE O/P EST LOW 20 MIN: CPT | Mod: 25 | Performed by: PHYSICIAN ASSISTANT

## 2022-12-15 PROCEDURE — 17111 DESTRUCTION B9 LESIONS 15/>: CPT | Performed by: PHYSICIAN ASSISTANT

## 2022-12-15 RX ORDER — KETOCONAZOLE 20 MG/G
CREAM TOPICAL
Qty: 60 G | Refills: 1 | Status: SHIPPED | OUTPATIENT
Start: 2022-12-15 | End: 2023-05-22

## 2022-12-15 ASSESSMENT — PAIN SCALES - GENERAL: PAINLEVEL: NO PAIN (0)

## 2022-12-15 NOTE — PROGRESS NOTES
HPI:   Chief complaints: Jamal Sanchez is a pleasant 67 year old male who presents for evaluation of spots on the back and to recheck feet. He has used ketoconazole cream on the feet as needed which is helpful. He also has spots on each cheek he would like checked.       PHYSICAL EXAM:    There were no vitals taken for this visit.  Skin exam performed as follows: Type 2 skin. Mood appropriate  Alert and Oriented X 3. Well developed, well nourished in no distress.  General appearance: Normal  Head including face: Normal  Eyes: conjunctiva and lids: Normal  Mouth: Lips, teeth, gums: Normal  Neck: Normal  Cardiovascular: Exam of peripheral vascular system by observation for swelling, varicosities, edema: Normal  Right upper extremity: Normal  Left upper extremity: Normal  Right lower extremity: Normal  Left lower extremity: Normal  Skin: Scalp and body hair: See below    Inflamed keratotic papules on the back x 17  Scarring seen on the right PA cheek, left PA cheek  Feet clear      ASSESSMENT/PLAN:     1. Inflamed seborrheic keratosis on the back x 17.  As physically tender cryosurgery performed. Advised on post op care.   2. Scarring on the face - advised benign no treatment needed  3. Recurrent tinea pedis - doing well with ketoconazole as needed. Refill provided.           Follow-up: yearly/PRN sooner  CC:   Scribed By: Stephanie Fair MS, PAANN-MARIE

## 2022-12-15 NOTE — LETTER
12/15/2022         RE: Jamal Sanchez  [address] [facility] [address]        Dear Colleague,    Thank you for referring your patient, Jamal Sanchez, to the [facility]. Please see a copy of my visit note below.    HPI:   Chief complaints: Jamal Sanchez is a pleasant 67 year old male who presents for evaluation of spots on the back and to recheck feet. He has used ketoconazole cream on the feet as needed which is helpful. He also has spots on each cheek he would like checked.       PHYSICAL EXAM:    There were no vitals taken for this visit.  Skin exam performed as follows: Type 2 skin. Mood appropriate  Alert and Oriented X 3. Well developed, well nourished in no distress.  General appearance: Normal  Head including face: Normal  Eyes: conjunctiva and lids: Normal  Mouth: Lips, teeth, gums: Normal  Neck: Normal  Cardiovascular: Exam of peripheral vascular system by observation for swelling, varicosities, edema: Normal  Right upper extremity: Normal  Left upper extremity: Normal  Right lower extremity: Normal  Left lower extremity: Normal  Skin: Scalp and body hair: See below    Inflamed keratotic papules on the back x 17  Scarring seen on the right PA cheek, left PA cheek  Feet clear      ASSESSMENT/PLAN:     1. Inflamed seborrheic keratosis on the back x 17.  As physically tender cryosurgery performed. Advised on post op care.   2. Scarring on the face - advised benign no treatment needed  3. Recurrent tinea pedis - doing well with ketoconazole as needed. Refill provided.           Follow-up: yearly/PRN sooner  CC:   Scribed By: Stephanie Fair, MS, PAANN-MARIE          Again, thank you for allowing me to participate in the care of your patient.        Sincerely,        Stephanie Fair PA-C

## 2022-12-16 ENCOUNTER — TELEPHONE (OUTPATIENT)
Dept: GASTROENTEROLOGY | Facility: CLINIC | Age: 67
End: 2022-12-16

## 2023-01-28 ENCOUNTER — E-VISIT (OUTPATIENT)
Dept: FAMILY MEDICINE | Facility: CLINIC | Age: 68
End: 2023-01-28
Payer: MEDICARE

## 2023-01-28 DIAGNOSIS — Z53.9 DIAGNOSIS NOT YET DEFINED: Primary | ICD-10-CM

## 2023-01-28 PROCEDURE — 99207 PR NON-BILLABLE SERV PER CHARTING: CPT | Performed by: FAMILY MEDICINE

## 2023-01-29 ENCOUNTER — HOSPITAL ENCOUNTER (EMERGENCY)
Facility: CLINIC | Age: 68
Discharge: HOME | End: 2023-01-29
Attending: NURSE PRACTITIONER | Admitting: NURSE PRACTITIONER
Payer: MEDICARE

## 2023-01-29 VITALS
TEMPERATURE: 97.3 F | RESPIRATION RATE: 22 BRPM | OXYGEN SATURATION: 100 % | DIASTOLIC BLOOD PRESSURE: 83 MMHG | HEART RATE: 90 BPM | SYSTOLIC BLOOD PRESSURE: 149 MMHG

## 2023-01-29 DIAGNOSIS — S39.012A BACK STRAIN: ICD-10-CM

## 2023-01-29 PROCEDURE — G0463 HOSPITAL OUTPT CLINIC VISIT: HCPCS | Performed by: NURSE PRACTITIONER

## 2023-01-29 PROCEDURE — 99213 OFFICE O/P EST LOW 20 MIN: CPT | Performed by: NURSE PRACTITIONER

## 2023-01-29 RX ORDER — PREDNISONE 20 MG/1
TABLET ORAL
Qty: 10 TABLET | Refills: 0 | Status: SHIPPED | OUTPATIENT
Start: 2023-01-29 | End: 2023-03-06

## 2023-01-29 ASSESSMENT — ENCOUNTER SYMPTOMS: BACK PAIN: 1

## 2023-01-29 NOTE — ED PROVIDER NOTES
History     Chief Complaint   Patient presents with     Back Pain     Patient reports that two weeks ago, picked up a log that was frozen to the ground and believes that injured back. Pain began later that night. Pain localized to left lower back. Patient reports pain is worsening. Patient reports that he finds some relief with a heating pad but pain returns after. Pain currently 8/10.     HPI  Jamal Sanchez is a 68 year old male who presents to the urgent care for evaluation of left sided back pain for about two weeks. He strained his back while trying to move some frozen logs and symptoms have not been improving. Denies numbness and tingling, saddle anesthesia, and loss of bowel or bladder control. Has been applying heat, stretching and walking. He reports otherwise being well and remains afebrile, no urinary complaints.     Allergies:  Allergies   Allergen Reactions     Nka [No Known Allergies]        Problem List:    Patient Active Problem List    Diagnosis Date Noted     Status post coronary angiogram 09/07/2022     Priority: Medium     Dyspnea on exertion 07/12/2022     Priority: Medium     Hiatal hernia 01/04/2022     Priority: Medium     Anemia, iron deficiency 11/25/2021     Priority: Medium     Diverticulosis of large intestine without hemorrhage 01/21/2019     Priority: Medium     Diverticulitis of colon 01/21/2019     Priority: Medium     ETOH abuse 11/25/2018     Priority: Medium     Acute diverticulitis 11/22/2018     Priority: Medium     Generalized abdominal pain 11/22/2018     Priority: Medium     Appendicitis 01/06/2016     Priority: Medium     Hypertension, goal below 140/90 07/06/2015     Priority: Medium     Advanced directives, counseling/discussion 10/16/2013     Priority: Medium     Patient does not have an Advance/Health Care Directive (HCD), requests blank HCD form.    Nina Shukla  October 16, 2013         GERD (gastroesophageal reflux disease) 08/07/2013     Priority: Medium      Benign prostatic hypertrophy 04/16/2008     Priority: Medium     CARDIOVASCULAR SCREENING; LDL GOAL LESS THAN 160 10/31/2010     Priority: Low        Past Medical History:    Past Medical History:   Diagnosis Date     BPH (benign prostatic hyperplasia)      Complication of anesthesia      Diverticulitis      GERD (gastroesophageal reflux disease)      Hypertension        Past Surgical History:    Past Surgical History:   Procedure Laterality Date     APPENDECTOMY       ARTHROSCOPY SHOULDER ROTATOR CUFF REPAIR Left      ARTHROSCOPY SHOULDER RT/LT Right 01/2018     COLONOSCOPY  3/5/2008     COLONOSCOPY  10/9/2013    Procedure: COLONOSCOPY;;  Surgeon: Nicolas Lizama MD;  Location: [facility]     COLONOSCOPY N/A 8/17/2022    Procedure: COLONOSCOPY, FLEXIBLE, WITH LESION REMOVAL USING SNARE;  Surgeon: Vimal Stanley DO;  Location: [facility]     CV CORONARY ANGIOGRAM N/A 9/7/2022    Procedure: Coronary Angiogram;  Surgeon: Morris Sky MD;  Location: [facility] CARDIAC CATH LAB     CV INSTANTANEOUS WAVE-FREE RATIO N/A 9/7/2022    Procedure: Instantaneous Wave-Free Ratio;  Surgeon: Morris Sky MD;  Location:  HEART CARDIAC CATH LAB     CV LEFT HEART CATH N/A 9/7/2022    Procedure: Left Heart Catheterization;  Surgeon: Morris Sky MD;  Location:  HEART CARDIAC CATH LAB     CV LEFT VENTRICULOGRAM N/A 9/7/2022    Procedure: Left Ventriculogram;  Surgeon: Morris Sky MD;  Location:  HEART CARDIAC CATH LAB     CV PCI STENT DRUG ELUTING N/A 9/7/2022    Procedure: Percutaneous Coronary Intervention Stent;  Surgeon: Morris Sky MD;  Location: [facility] CARDIAC CATH LAB     ESOPHAGOSCOPY, GASTROSCOPY, DUODENOSCOPY (EGD), COMBINED  10/9/2013    Procedure: COMBINED ESOPHAGOSCOPY, GASTROSCOPY, DUODENOSCOPY (EGD), BIOPSY SINGLE OR MULTIPLE;;  Surgeon: Nicolas Lizama MD;  Location: [facility]     ESOPHAGOSCOPY, GASTROSCOPY, DUODENOSCOPY (EGD), COMBINED N/A 4/6/2022    Procedure:  ESOPHAGOGASTRODUODENOSCOPY, WITH BIOPSY;  Surgeon: Vimal Stanley DO;  Location: [facility]     HERNIA REPAIR       LAPAROSCOPIC APPENDECTOMY N/A 1/7/2016    Procedure: LAPAROSCOPIC APPENDECTOMY;  Surgeon: Ab Guzman MD;  Location: [facility]     LAPAROSCOPIC HERNIORRHAPHY INGUINAL BILATERAL Bilateral 12/13/2016    Procedure: LAPAROSCOPIC HERNIORRHAPHY INGUINAL BILATERAL;  Surgeon: Ab Guzamn MD;  Location: [facility]       Family History:    Family History   Problem Relation Age of Onset     Diabetes Mother      Cerebrovascular Disease Mother      Neurologic Disorder Father         parkinson's     Cancer - colorectal Maternal Grandfather      Hypertension Brother      Cerebrovascular Disease Brother         stroke     Alcohol/Drug Brother      Unknown/Adopted Brother      Diabetes Sister      Obesity Sister        Social History:  Marital Status:   [4]  Social History     Tobacco Use     Smoking status: Never     Smokeless tobacco: Never   Vaping Use     Vaping Use: Never used   Substance Use Topics     Alcohol use: Yes     Comment: 6 pack beer on weekend.     Drug use: No        Medications:    predniSONE (DELTASONE) 20 MG tablet  albuterol (PROAIR HFA/PROVENTIL HFA/VENTOLIN HFA) 108 (90 Base) MCG/ACT inhaler  aspirin (ASA) 81 MG EC tablet  atorvastatin (LIPITOR) 40 MG tablet  calcium carbonate (TUMS) 500 MG chewable tablet  clopidogrel (PLAVIX) 75 MG tablet  COMPOUNDED NON-CONTROLLED SUBSTANCE (CMPD RX) - PHARMACY TO MIX COMPOUNDED MEDICATION  Digestive Enzymes (DIGESTIVE ENZYME PO)  famotidine (PEPCID) 20 MG tablet  ferrous sulfate (FE TABS) 325 (65 Fe) MG EC tablet  furosemide (LASIX) 20 MG tablet  furosemide (LASIX) 40 MG tablet  glucosamine-chondroitin 500-400 MG CAPS per capsule  ibuprofen (ADVIL/MOTRIN) 200 MG tablet  ketoconazole (NIZORAL) 2 % external cream  lisinopril (ZESTRIL) 10 MG tablet  multivitamin (CENTRUM SILVER) tablet  nitroGLYcerin (NITROSTAT) 0.4 MG sublingual tablet  omeprazole  (PRILOSEC) 40 MG DR capsule  oxybutynin ER (DITROPAN XL) 10 MG 24 hr tablet  psyllium (METAMUCIL/KONSYL) 58.6 % powder  sildenafil (REVATIO) 20 MG tablet  tamsulosin (FLOMAX) 0.4 MG capsule  triamcinolone (KENALOG) 0.1 % external cream  triamterene-HCTZ (MAXZIDE) 75-50 MG tablet  vitamin C (ASCORBIC ACID) 1000 MG TABS      Review of Systems   Musculoskeletal: Positive for back pain.   All other systems reviewed and are negative.    Physical Exam   BP: (!) 149/83  Pulse: 90  Temp: 97.3  F (36.3  C)  Resp: 22  SpO2: 100 %    Physical Exam  Constitutional:       General: He is not in acute distress.     Appearance: Normal appearance.   Eyes:      Conjunctiva/sclera: Conjunctivae normal.      Pupils: Pupils are equal, round, and reactive to light.   Cardiovascular:      Rate and Rhythm: Normal rate.   Pulmonary:      Effort: Pulmonary effort is normal.   Musculoskeletal:      Cervical back: Normal and normal range of motion.      Lumbar back: Normal.        Back:       Comments: Area or reported pain. Pulses and perfusion are equal bilaterally. No overlying erythema, edema, abrasions, or ecchymosis.    Skin:     General: Skin is warm.      Capillary Refill: Capillary refill takes less than 2 seconds.   Neurological:      General: No focal deficit present.      Mental Status: He is alert.         ED Course             Procedures    No results found for this or any previous visit (from the past 24 hour(s)).    Medications - No data to display    Assessments & Plan (with Medical Decision Making)   Jamal Sanchez is a 68 year old male who presents to the urgent care for evaluation of left sided back pain for about two weeks. He strained his back while trying to move some frozen logs and symptoms have not been improving. Patient with LBP. Hurts to move. Better with rest. No NV symptoms. No problems with bowel or bladder dysfunction. Strength, sensation and DTRs normal in LE bilat without deficit. No red flags. At this  time I have no reason to think this is anything other than musculoskeletal strain. Imaging is not indicated at this time and we will treat symptomatically. Discussed danger signs at length with the patient and all questions fully answered. Return if symptoms persist or worsen, or any new troubling symptoms develop.    I have reviewed the nursing notes.    I have reviewed the findings, diagnosis, plan and need for follow up with the patient.    Discharge Medication List as of 1/29/2023 10:25 AM      START taking these medications    Details   predniSONE (DELTASONE) 20 MG tablet Take two tablets (= 40mg) each day for 5 (five) days, Disp-10 tablet, R-0, E-Prescribe             Final diagnoses:   Back strain       1/29/2023   [facility] EMERGENCY DEPT     Malinda Russell, APRN CNP  01/29/23 1045

## 2023-01-30 ENCOUNTER — OFFICE VISIT (OUTPATIENT)
Dept: FAMILY MEDICINE | Facility: CLINIC | Age: 68
End: 2023-01-30
Payer: MEDICARE

## 2023-01-30 VITALS
HEART RATE: 78 BPM | WEIGHT: 217.6 LBS | DIASTOLIC BLOOD PRESSURE: 80 MMHG | RESPIRATION RATE: 24 BRPM | TEMPERATURE: 98.2 F | HEIGHT: 70 IN | OXYGEN SATURATION: 97 % | SYSTOLIC BLOOD PRESSURE: 130 MMHG | BODY MASS INDEX: 31.15 KG/M2

## 2023-01-30 DIAGNOSIS — S39.012D: Primary | ICD-10-CM

## 2023-01-30 PROCEDURE — 99213 OFFICE O/P EST LOW 20 MIN: CPT | Performed by: STUDENT IN AN ORGANIZED HEALTH CARE EDUCATION/TRAINING PROGRAM

## 2023-01-30 RX ORDER — NAPROXEN 500 MG/1
500 TABLET ORAL 2 TIMES DAILY PRN
Qty: 60 TABLET | Refills: 0 | Status: SHIPPED | OUTPATIENT
Start: 2023-01-30 | End: 2023-03-20

## 2023-01-30 RX ORDER — CYCLOBENZAPRINE HCL 5 MG
5 TABLET ORAL 3 TIMES DAILY PRN
Qty: 15 EACH | Refills: 0 | Status: SHIPPED | OUTPATIENT
Start: 2023-01-30 | End: 2023-03-21

## 2023-01-30 RX ORDER — LIDOCAINE 4 G/G
1 PATCH TOPICAL EVERY 24 HOURS
Qty: 15 PATCH | Refills: 0 | Status: CANCELLED | OUTPATIENT
Start: 2023-01-30

## 2023-01-30 ASSESSMENT — PAIN SCALES - GENERAL: PAINLEVEL: MODERATE PAIN (5)

## 2023-01-30 NOTE — PATIENT INSTRUCTIONS
Thank you for choosing us for your care. I think an in-clinic visit would be best next steps based on your symptoms. Please schedule a clinic appointment; you won t be charged for this eVisit.      You can schedule an appointment right here in [facility], or call [phone]

## 2023-01-30 NOTE — PROGRESS NOTES
"  1. Strain, back, subsequent encounter  > doubt cauda equina, stroke, or other life threatening abnormality   - Physical Therapy Referral; Future  - cyclobenzaprine (FLEXERIL) 5 MG tablet; Take 1 tablet (5 mg) by mouth 3 times daily as needed for muscle spasms  Dispense: 15 each; Refill: 0  - naproxen (NAPROSYN) 500 MG tablet; Take 1 tablet (500 mg) by mouth 2 times daily as needed for moderate pain (4-6) Take with plenty of food and water  Dispense: 60 tablet; Refill: 0  - patient reports he tried salonpas without relief at home, not interested in lidocaine patches at today's visit     Mell Mandujano MD       Subjective   Gopal is a 68 year old, presenting for the following health issues:  ER F/U      HPI     ED/UC Followup:    Facility:  [facility] ED  Date of visit: 01/29/2023  Reason for visit: back strain  Current Status: last night was pretty rough he says. He couldn't get off the couch this morning and was crawling around until he could stand. He had to walk backwards yesterday and kept having twinges. He declined pain medication at the ED and now would like to maybe get one or two to have on hand because the pain is awful. Would also like to discuss other treatment options such as physical therapy, massage or any other alternatives to relieve the pain. Currently pain is 5/10 on pain scale, gets up to 10/10 when it spasms and locks up. Denies any bowel/bladder changes.       Review of Systems   As above       Objective    /80 (BP Location: Left arm, Patient Position: Sitting, Cuff Size: Adult Large)   Pulse 78   Temp 98.2  F (36.8  C) (Tympanic)   Resp 24   Ht 1.787 m (5' 10.35\")   Wt 98.7 kg (217 lb 9.6 oz)   SpO2 97%   BMI 30.91 kg/m    Body mass index is 30.91 kg/m .  Physical Exam  Constitutional:       General: He is not in acute distress.  HENT:      Head: Normocephalic and atraumatic.      Right Ear: External ear normal.      Left Ear: External ear normal.   Eyes:      " Extraocular Movements: Extraocular movements intact.   Cardiovascular:      Rate and Rhythm: Normal rate and regular rhythm.      Heart sounds: Normal heart sounds.   Pulmonary:      Effort: Pulmonary effort is normal. No respiratory distress.      Breath sounds: Normal breath sounds. No wheezing or rhonchi.   Abdominal:      Palpations: Abdomen is soft. There is no mass.      Tenderness: There is no abdominal tenderness.   Musculoskeletal:         General: No swelling. Normal range of motion.      Cervical back: Normal range of motion and neck supple.      Comments: No spinal tenderness to palpation   Patient did have a firm muscle in the lower left aspect of his back lateral to thoracolumbar spine   Obvious intermittent discomfort appreciated on exam, but he was able to stand and walk    Skin:     General: Skin is warm.      Findings: No rash.   Neurological:      General: No focal deficit present.      Mental Status: He is alert and oriented to person, place, and time.      Coordination: Coordination normal.      Deep Tendon Reflexes: Reflexes normal.      Comments: No saddle anesthesia    Psychiatric:         Mood and Affect: Mood normal.

## 2023-02-03 ENCOUNTER — HOSPITAL ENCOUNTER (OUTPATIENT)
Dept: PHYSICAL THERAPY | Facility: CLINIC | Age: 68
Setting detail: THERAPIES SERIES
Discharge: HOME | End: 2023-02-03
Attending: STUDENT IN AN ORGANIZED HEALTH CARE EDUCATION/TRAINING PROGRAM
Payer: MEDICARE

## 2023-02-03 DIAGNOSIS — S39.012D: ICD-10-CM

## 2023-02-03 PROCEDURE — 97110 THERAPEUTIC EXERCISES: CPT | Mod: GP | Performed by: PHYSICAL THERAPIST

## 2023-02-03 PROCEDURE — 97140 MANUAL THERAPY 1/> REGIONS: CPT | Mod: GP | Performed by: PHYSICAL THERAPIST

## 2023-02-03 PROCEDURE — 97161 PT EVAL LOW COMPLEX 20 MIN: CPT | Mod: GP | Performed by: PHYSICAL THERAPIST

## 2023-02-03 NOTE — PROGRESS NOTES
02/03/23 0700   General Information   Type of Visit Initial OP Ortho PT Evaluation   Start of Care Date 02/03/23   Referring Physician Mell Mandujano MD   Patient/Family Goals Statement To relieve the pain, resume normal activities   Orders Evaluate and Treat   Date of Order 01/30/23   Certification Required? Yes   Medical Diagnosis back strain   Body Part(s)   Body Part(s) Lumbar Spine/SI   Presentation and Etiology   Pertinent history of current problem (include personal factors and/or comorbidities that impact the POC) Pt was picking up some firewood--no specific pain at the time but developed LBP that evening.   Pt has been seen in ER.  No tests.   Pt presents w/ L LBP localized @ best 5/10 @ worst stabbing 10/10.  Neg bowel / bladder/ numbness/ tingling.  Neg cough/ sneeze.  Meds:  flexeril, naproxen,   has completed prednisone.  PMHX:  Intermittent LBP,  HBP,  cardiac stent, B RCR.   Mod complexity : heats w/ wood.   Impairments A. Pain;D. Decreased ROM;E. Decreased flexibility;H. Impaired gait   Onset date of current episode/exacerbation 01/14/23   Pain quality B. Dull;A. Sharp;C. Aching;F. Stabbing   Pain exacerbation comment Sitting 30 min  then difficulty getting out of car.  Cautious w/ lifting/ carrying wood for heating.  sleeping on couch somedays has to roll onto the floor and crawl to chair to get  up.  Has not been able to clean due to pain.  Pt notes stooped over posture in the morning   Pain/symptoms eased by C. Rest;E. Changing positions;G. Heat  (RX meds)   Progression of symptoms since onset: Unchanged   Prior Level of Function   Functional Level Prior Comment walks w/ dog daily 30 min (can do currently but get sore),   Wellness room does ex and rides bike.   Currently notes pool is closed.   Cuts wood for heating.   Current Level of Function   Patient role/employment history F. Retired   Fall Risk Screen   Fall screen completed by PT   Have you fallen 2 or more times in the past year? No    Have you fallen and had an injury in the past year? No   Is patient a fall risk? No   Abuse Screen (yes response referral indicated)   Feels Unsafe at Home or Work/School no   Feels Threatened by Someone no   Does Anyone Try to Keep You From Having Contact with Others or Doing Things Outside Your Home? no   Physical Signs of Abuse Present no   Lumbar Spine/SI Objective Findings   Gait/Locomotion mild guarding, slower pace.  Toe walk notes some discomfort,  Heel walk neg   Hamstring Flexibility Mild + tightness B   Hip Flexor Flexibility WNL   Flexion ROM 70% notes stiffness   Extension ROM 50% discomfort w/ return upright   Right Side Bending ROM WNL   Left Side Bending ROM WNL   Pelvic Screen Neg FB test.   Hip Screen PROM ER 45* B ,  IR B WFL.   Hip Flexion (L2) Strength 4+/5 B   Hip Abduction Strength B 5/5   Knee Flexion Strength 5/5 B   Knee Extension (L3) Strength 5/5 B   Ankle Dorsiflexion (L4) Strength 5/5 b   Great Toe Extension (L5) Strength 5/5 B   SLR R neg,  L mild LBP   Abner Test neg B   Crossover SLR neg B   Repeated Extension Prone DIONE:  slight increase in pain.  PPU 40% no change in symptoms   Segmental Mobility Mild ERSL L4/ 5.  Hypomobility lower lumbar, noted soreness L3 w/ PA testing   Palpation Mild tenderness L lumbar paraspinals otherwise negative.   Slump Test R noted pull in calf,  L + for mild LBP.   Observation diaphysis recti   Posture WFL.  PSIS / crest level   Planned Therapy Interventions   Planned Therapy Interventions manual therapy;neuromuscular re-education;ROM;strengthening;stretching  (body mechanics)   Planned Modality Interventions   Planned Modality Interventions TENS   Clinical Impression   Criteria for Skilled Therapeutic Interventions Met yes, treatment indicated   PT Diagnosis LBP   Influenced by the following impairments pain, stiffness   Functional limitations due to impairments getting out of car, getting up in am, lifting/ carrying, prolonged walking   Clinical  Presentation Stable/Uncomplicated   Clinical Presentation Rationale no change since onset   Clinical Decision Making (Complexity) Low complexity   Therapy Frequency 1 time/week   Predicted Duration of Therapy Intervention (days/wks) 8   Risk & Benefits of therapy have been explained Yes   Patient, Family & other staff in agreement with plan of care Yes   Education Assessment   Preferred Learning Style Listening;Reading;Demonstration;Pictures/video   Barriers to Learning No barriers   ORTHO GOALS   PT Ortho Eval Goals 1;3;2;4   Ortho Goal 1   Goal Identifier 1.   Goal Description Pt will report increased ease getting up in the morning and pain no > 3/10   Target Date 04/04/23   Ortho Goal 2   Goal Identifier 2.   Goal Description Pt will report pain no > 1/10 getting out of car after 30 min drive   Target Date 04/04/23   Ortho Goal 3   Goal Identifier 3.   Goal Description Pt will be able to carry wood w/ LBP no > 3/10   Target Date 04/04/23   Ortho Goal 4   Goal Identifier 4.   Goal Description Pt will be independent and consistent w/HEP including tolerating his 30 min walks   Target Date 04/04/23   Total Evaluation Time   PT Jennyal, Low Complexity Minutes (68714) 30   Therapy Certification   Certification date from 02/03/23   Certification date to 04/04/23   Medical Diagnosis back strain     Thank you for this referral,    Geni Christiansen, PT,   [id number]  [facility]  [phone]

## 2023-02-03 NOTE — PROGRESS NOTES
[facility]    OUTPATIENT PHYSICAL THERAPY ORTHOPEDIC EVALUATION  PLAN OF TREATMENT FOR OUTPATIENT REHABILITATION  (COMPLETE FOR INITIAL CLAIMS ONLY)  Patient's Last Name, First Name, M.I.  YOB: 1955  DanielJamal  NEW    Provider s Name:  [facility]   Medical Record No.  [id number]   Start of Care Date:  02/03/23   Onset Date:  01/14/23   Type:     _X__PT   ___OT   ___SLP Medical Diagnosis:  (P) back strain     PT Diagnosis:  (P) LBP   Visits from SOC:  1      _________________________________________________________________________________  Plan of Treatment/Functional Goals:  (P) manual therapy, neuromuscular re-education, ROM, strengthening, stretching (body mechanics)     (P) TENS     Goals  Goal Identifier: (P) 1.  Goal Description: (P) Pt will report increased ease getting up in the morning and pain no > 3/10  Target Date: (P) 04/04/23    Goal Identifier: (P) 2.  Goal Description: (P) Pt will report pain no > 1/10 getting out of car after 30 min drive  Target Date: (P) 04/04/23    Goal Identifier: (P) 3.  Goal Description: (P) Pt will be able to carry wood w/ LBP no > 3/10  Target Date: (P) 04/04/23    Goal Identifier: (P) 4.  Goal Description: (P) Pt will be independent and consistent w/HEP including tolerating his 30 min walks  Target Date: (P) 04/04/23               Therapy Frequency:  (P) 1 time/week  Predicted Duration of Therapy Intervention:  (P) 8    Geni Christiansen, PT                 I CERTIFY THE NEED FOR THESE SERVICES FURNISHED UNDER        THIS PLAN OF TREATMENT AND WHILE UNDER MY CARE     (Physician co-signature of this document indicates review and certification of the therapy plan).                     Certification Date From:  (P) 02/03/23   Certification Date To:  (P) 04/04/23    Referring Provider:  Mell Mandujano MD    Initial  Assessment        See Epic Evaluation Start of Care Date: 02/03/23

## 2023-02-07 ENCOUNTER — HOSPITAL ENCOUNTER (OUTPATIENT)
Dept: PHYSICAL THERAPY | Facility: CLINIC | Age: 68
Setting detail: THERAPIES SERIES
Discharge: HOME | End: 2023-02-07
Attending: STUDENT IN AN ORGANIZED HEALTH CARE EDUCATION/TRAINING PROGRAM
Payer: MEDICARE

## 2023-02-07 PROCEDURE — 97110 THERAPEUTIC EXERCISES: CPT | Mod: GP | Performed by: PHYSICAL THERAPIST

## 2023-02-07 PROCEDURE — 97140 MANUAL THERAPY 1/> REGIONS: CPT | Mod: GP | Performed by: PHYSICAL THERAPIST

## 2023-02-20 DIAGNOSIS — N52.9 ERECTILE DYSFUNCTION, UNSPECIFIED ERECTILE DYSFUNCTION TYPE: ICD-10-CM

## 2023-02-20 NOTE — TELEPHONE ENCOUNTER
Requested Prescriptions   Pending Prescriptions Disp Refills     sildenafil (REVATIO) 20 MG tablet 30 tablet 3     Sig: Take 2 to 5 tablets 1 hour prior to intercourse       There is no refill protocol information for this order          Last office visit: 10/25/2022 with prescribing provider: Matteo Caba  Future Office Visit:      Thank you,  Ryanne Vazquez  Specialty Clinic -   [facility]

## 2023-02-20 NOTE — LETTER
Jamal Sanchez  [id number] [address] [place] [id number]        February 21, 2023      Dear Jamal Sanchez,    We received a request to refill sildenafil.  This medication requires regular follow up visits to ensure time refills.  Our records indicate that you are due for a follow up in Urology.    Dr. Caba is out on an indefinite medical leave. There is a physician assistant in Urology, Marilyn Grayson, who has been seeing many of Dr. Caba's patients at the [facility]. Otherwise, you can schedule a follow up appointment with any Urologist at any [place] location.    We are booking months out in advance. Please contact our office at [phone], to schedule this appointment at your earliest convenience.        Sincerely,      Your [facility] Urology care team

## 2023-02-21 NOTE — TELEPHONE ENCOUNTER
Last OV 10/25/22. No f/u appointment scheduled.    Appointment reminder letter mailed.    Routing refill request to provider for review/approval because:  Erectile Dysfuction Protocol Failed 02/20/2023 11:13 AM   Protocol Details  Absence of nitrates on medication list    Absence of Alpha Blockers on Med list     Please advise in Dr. Caba's absence.  Marti Biswas RN on 2/21/2023 at 11:14 AM

## 2023-02-21 NOTE — TELEPHONE ENCOUNTER
Patient active in [employer]. Sent message asking about concurrent use of nitroglycerin. Waiting for response.    Marti Biswas RN on 2/21/2023 at 12:01 PM

## 2023-02-21 NOTE — TELEPHONE ENCOUNTER
Marilyn Grayson PA-C  You 2 minutes ago (11:52 AM)     EM  Can you check with patient to see if he has required nitroglycerin in the last few years? If so, will need cardiologist's input before continuing sildenafil.

## 2023-03-01 ENCOUNTER — HOSPITAL ENCOUNTER (OUTPATIENT)
Dept: PHYSICAL THERAPY | Facility: CLINIC | Age: 68
Setting detail: THERAPIES SERIES
Discharge: HOME | End: 2023-03-01
Attending: STUDENT IN AN ORGANIZED HEALTH CARE EDUCATION/TRAINING PROGRAM
Payer: MEDICARE

## 2023-03-01 PROCEDURE — 97140 MANUAL THERAPY 1/> REGIONS: CPT | Mod: GP | Performed by: PHYSICAL THERAPIST

## 2023-03-01 PROCEDURE — 97110 THERAPEUTIC EXERCISES: CPT | Mod: GP | Performed by: PHYSICAL THERAPIST

## 2023-03-02 RX ORDER — SILDENAFIL CITRATE 20 MG/1
TABLET ORAL
Qty: 90 TABLET | Refills: 3 | Status: SHIPPED | OUTPATIENT
Start: 2023-03-02 | End: 2023-05-08 | Stop reason: ALTCHOICE

## 2023-03-02 NOTE — TELEPHONE ENCOUNTER
Patient responded he has not needed to take any Nitroglycerin per separate [employer] message. Please advise for refill request.    Marti Biswas RN on 3/2/2023 at 8:04 AM

## 2023-03-06 ENCOUNTER — HOSPITAL ENCOUNTER (EMERGENCY)
Facility: CLINIC | Age: 68
Discharge: HOME | End: 2023-03-06
Attending: NURSE PRACTITIONER | Admitting: NURSE PRACTITIONER
Payer: MEDICARE

## 2023-03-06 ENCOUNTER — MYC MEDICAL ADVICE (OUTPATIENT)
Dept: FAMILY MEDICINE | Facility: CLINIC | Age: 68
End: 2023-03-06
Payer: MEDICARE

## 2023-03-06 ENCOUNTER — HOSPITAL ENCOUNTER (OUTPATIENT)
Dept: PHYSICAL THERAPY | Facility: CLINIC | Age: 68
Setting detail: THERAPIES SERIES
Discharge: HOME | End: 2023-03-06
Attending: STUDENT IN AN ORGANIZED HEALTH CARE EDUCATION/TRAINING PROGRAM
Payer: MEDICARE

## 2023-03-06 VITALS
RESPIRATION RATE: 23 BRPM | HEART RATE: 57 BPM | TEMPERATURE: 98.1 F | DIASTOLIC BLOOD PRESSURE: 86 MMHG | SYSTOLIC BLOOD PRESSURE: 141 MMHG | OXYGEN SATURATION: 100 %

## 2023-03-06 DIAGNOSIS — M54.42 LEFT-SIDED LOW BACK PAIN WITH LEFT-SIDED SCIATICA: ICD-10-CM

## 2023-03-06 PROCEDURE — 97140 MANUAL THERAPY 1/> REGIONS: CPT | Mod: GP | Performed by: PHYSICAL THERAPIST

## 2023-03-06 PROCEDURE — 97110 THERAPEUTIC EXERCISES: CPT | Mod: GP | Performed by: PHYSICAL THERAPIST

## 2023-03-06 PROCEDURE — G0463 HOSPITAL OUTPT CLINIC VISIT: HCPCS | Performed by: NURSE PRACTITIONER

## 2023-03-06 PROCEDURE — 99213 OFFICE O/P EST LOW 20 MIN: CPT | Performed by: NURSE PRACTITIONER

## 2023-03-06 RX ORDER — PREDNISONE 20 MG/1
TABLET ORAL
Qty: 10 TABLET | Refills: 0 | Status: SHIPPED | OUTPATIENT
Start: 2023-03-06 | End: 2023-03-21

## 2023-03-06 RX ORDER — OXYCODONE HYDROCHLORIDE 5 MG/1
5 TABLET ORAL EVERY 6 HOURS PRN
Qty: 8 TABLET | Refills: 0 | Status: SHIPPED | OUTPATIENT
Start: 2023-03-06 | End: 2023-03-09

## 2023-03-06 ASSESSMENT — ENCOUNTER SYMPTOMS: BACK PAIN: 1

## 2023-03-06 NOTE — ED PROVIDER NOTES
History     Chief Complaint   Patient presents with     Back Pain     Pt is having increased back pain and can no longer tolerate the pain. Currently is taking Naproxen 500 mg twice daily and is not helping him.      HPI  Jamal Sanchez is a 68 year old male who presents to the urgent care for evaluation of left sided low back pain. Patient has been struggling on/off with low back pain for several months and has been working with PT. Over the last couple weeks left sided low back pain worsening and now into the left buttock. Denies numbness and tingling, saddle anesthesia, and loss of bowel or bladder control. Has been using Naproxen with minimal relief. No injury or trauma. Afebrile, no urinary complaints.     Allergies:  Allergies   Allergen Reactions     Nka [No Known Allergies]        Problem List:    Patient Active Problem List    Diagnosis Date Noted     Status post coronary angiogram 09/07/2022     Priority: Medium     Dyspnea on exertion 07/12/2022     Priority: Medium     Hiatal hernia 01/04/2022     Priority: Medium     Anemia, iron deficiency 11/25/2021     Priority: Medium     Diverticulosis of large intestine without hemorrhage 01/21/2019     Priority: Medium     Diverticulitis of colon 01/21/2019     Priority: Medium     ETOH abuse 11/25/2018     Priority: Medium     Acute diverticulitis 11/22/2018     Priority: Medium     Generalized abdominal pain 11/22/2018     Priority: Medium     Appendicitis 01/06/2016     Priority: Medium     Hypertension, goal below 140/90 07/06/2015     Priority: Medium     Advanced directives, counseling/discussion 10/16/2013     Priority: Medium     Patient does not have an Advance/Health Care Directive (HCD), requests blank HCD form.    Nina Shukla  October 16, 2013         GERD (gastroesophageal reflux disease) 08/07/2013     Priority: Medium     Benign prostatic hypertrophy 04/16/2008     Priority: Medium     CARDIOVASCULAR SCREENING; LDL GOAL LESS THAN 160  10/31/2010     Priority: Low        Past Medical History:    Past Medical History:   Diagnosis Date     BPH (benign prostatic hyperplasia)      Complication of anesthesia      Diverticulitis      GERD (gastroesophageal reflux disease)      Hypertension        Past Surgical History:    Past Surgical History:   Procedure Laterality Date     APPENDECTOMY       ARTHROSCOPY SHOULDER ROTATOR CUFF REPAIR Left      ARTHROSCOPY SHOULDER RT/LT Right 01/2018     COLONOSCOPY  3/5/2008     COLONOSCOPY  10/9/2013    Procedure: COLONOSCOPY;;  Surgeon: Nicolas Lizama MD;  Location: [facility]     COLONOSCOPY N/A 8/17/2022    Procedure: COLONOSCOPY, FLEXIBLE, WITH LESION REMOVAL USING SNARE;  Surgeon: Vimal Stanley DO;  Location: [facility]     CV CORONARY ANGIOGRAM N/A 9/7/2022    Procedure: Coronary Angiogram;  Surgeon: Morris Sky MD;  Location:  [facility]     CV INSTANTANEOUS WAVE-FREE RATIO N/A 9/7/2022    Procedure: Instantaneous Wave-Free Ratio;  Surgeon: Morris Sky MD;  Location:  [facility]     CV LEFT HEART CATH N/A 9/7/2022    Procedure: Left Heart Catheterization;  Surgeon: Morris Sky MD;  Location: [facility] CARDIAC CATH LAB     CV LEFT VENTRICULOGRAM N/A 9/7/2022    Procedure: Left Ventriculogram;  Surgeon: Morris Sky MD;  Location:  [facility]     CV PCI STENT DRUG ELUTING N/A 9/7/2022    Procedure: Percutaneous Coronary Intervention Stent;  Surgeon: Morris Sky MD;  Location:  [facility]     ESOPHAGOSCOPY, GASTROSCOPY, DUODENOSCOPY (EGD), COMBINED  10/9/2013    Procedure: COMBINED ESOPHAGOSCOPY, GASTROSCOPY, DUODENOSCOPY (EGD), BIOPSY SINGLE OR MULTIPLE;;  Surgeon: Nicolas Lizama MD;  Location: [facility]     ESOPHAGOSCOPY, GASTROSCOPY, DUODENOSCOPY (EGD), COMBINED N/A 4/6/2022    Procedure: ESOPHAGOGASTRODUODENOSCOPY, WITH BIOPSY;  Surgeon: Vimal Stanley DO;  Location: [facility]     HERNIA  REPAIR       LAPAROSCOPIC APPENDECTOMY N/A 1/7/2016    Procedure: LAPAROSCOPIC APPENDECTOMY;  Surgeon: Ab Guzman MD;  Location: [facility]     LAPAROSCOPIC HERNIORRHAPHY INGUINAL BILATERAL Bilateral 12/13/2016    Procedure: LAPAROSCOPIC HERNIORRHAPHY INGUINAL BILATERAL;  Surgeon: Ab Guzman MD;  Location: [facility]       Family History:    Family History   Problem Relation Age of Onset     Diabetes Mother      Cerebrovascular Disease Mother      Neurologic Disorder Father         parkinson's     Cancer - colorectal Maternal Grandfather      Hypertension Brother      Cerebrovascular Disease Brother         stroke     Alcohol/Drug Brother      Unknown/Adopted Brother      Diabetes Sister      Obesity Sister        Social History:  Marital Status:   [4]  Social History     Tobacco Use     Smoking status: Never     Smokeless tobacco: Never   Vaping Use     Vaping Use: Never used   Substance Use Topics     Alcohol use: Yes     Comment: 6 pack beer on weekend.     Drug use: No        Medications:    oxyCODONE (ROXICODONE) 5 MG tablet  predniSONE (DELTASONE) 20 MG tablet  albuterol (PROAIR HFA/PROVENTIL HFA/VENTOLIN HFA) 108 (90 Base) MCG/ACT inhaler  aspirin (ASA) 81 MG EC tablet  atorvastatin (LIPITOR) 40 MG tablet  calcium carbonate (TUMS) 500 MG chewable tablet  clopidogrel (PLAVIX) 75 MG tablet  COMPOUNDED NON-CONTROLLED SUBSTANCE (CMPD RX) - PHARMACY TO MIX COMPOUNDED MEDICATION  cyclobenzaprine (FLEXERIL) 5 MG tablet  Digestive Enzymes (DIGESTIVE ENZYME PO)  famotidine (PEPCID) 20 MG tablet  ferrous sulfate (FE TABS) 325 (65 Fe) MG EC tablet  furosemide (LASIX) 20 MG tablet  furosemide (LASIX) 40 MG tablet  glucosamine-chondroitin 500-400 MG CAPS per capsule  ibuprofen (ADVIL/MOTRIN) 200 MG tablet  ketoconazole (NIZORAL) 2 % external cream  lisinopril (ZESTRIL) 10 MG tablet  multivitamin (CENTRUM SILVER) tablet  naproxen (NAPROSYN) 500 MG tablet  nitroGLYcerin (NITROSTAT) 0.4 MG sublingual tablet  omeprazole  (PRILOSEC) 40 MG DR capsule  oxybutynin ER (DITROPAN XL) 10 MG 24 hr tablet  psyllium (METAMUCIL/KONSYL) 58.6 % powder  sildenafil (REVATIO) 20 MG tablet  tamsulosin (FLOMAX) 0.4 MG capsule  triamcinolone (KENALOG) 0.1 % external cream  triamterene-HCTZ (MAXZIDE) 75-50 MG tablet  vitamin C (ASCORBIC ACID) 1000 MG TABS          Review of Systems   Musculoskeletal: Positive for back pain.   All other systems reviewed and are negative.      Physical Exam   BP: (!) 141/86  Pulse: 57  Temp: 98.1  F (36.7  C)  Resp: 23  SpO2: 100 %      Physical Exam  Constitutional:       General: He is not in acute distress.     Appearance: Normal appearance.   Eyes:      Conjunctiva/sclera: Conjunctivae normal.      Pupils: Pupils are equal, round, and reactive to light.   Cardiovascular:      Rate and Rhythm: Normal rate.   Pulmonary:      Effort: Pulmonary effort is normal.   Musculoskeletal:      Cervical back: Normal and normal range of motion.      Thoracic back: Normal.      Lumbar back: Tenderness present. No bony tenderness. Decreased range of motion (d/t discomfort). Positive left straight leg raise test.      Comments: Pulses and perfusion are equal bilaterally. No overlying erythema, edema, abrasions, or ecchymosis.    Skin:     General: Skin is warm.      Capillary Refill: Capillary refill takes less than 2 seconds.   Neurological:      General: No focal deficit present.      Mental Status: He is alert.         ED Course                 Procedures    No results found for this or any previous visit (from the past 24 hour(s)).    Medications - No data to display    Assessments & Plan (with Medical Decision Making)   Jamal Sanchez is a 68 year old male who presents to the urgent care for evaluation of left sided low back pain. Patient has been struggling on/off with low back pain for several months and has been working with PT. Over the last couple weeks left sided low back pain worsening and now into the left buttock.  Patient with LBP with sciatic involement. Hurts to move. Better with rest. No NV symptoms. No problems with bowel or bladder dysfunction. Strength, sensation and DTRs normal in LE bilat without deficit. No red flags. At this time I have no reason to think this is anything other than musculoskeletal strain. Imaging is not indicated at this time and we will treat symptomatically. Will try prednisone and oxycodone. Discussed safe use of narcotics. Discussed danger signs at length with the patient and all questions fully answered. Return if symptoms persist or worsen, or any new troubling symptoms develop.    I have reviewed the nursing notes.    I have reviewed the findings, diagnosis, plan and need for follow up with the patient.      Discharge Medication List as of 3/6/2023 12:52 PM      START taking these medications    Details   oxyCODONE (ROXICODONE) 5 MG tablet Take 1 tablet (5 mg) by mouth every 6 hours as needed for severe pain (7-10), Disp-8 tablet, R-0, E-Prescribe             Final diagnoses:   Left-sided low back pain with left-sided sciatica       3/6/2023   [facility] EMERGENCY DEPT     Malinda Russell, HERBERTH CNP  03/06/23 0650

## 2023-03-18 DIAGNOSIS — S39.012D: ICD-10-CM

## 2023-03-20 ENCOUNTER — HOSPITAL ENCOUNTER (OUTPATIENT)
Dept: PHYSICAL THERAPY | Facility: CLINIC | Age: 68
Setting detail: THERAPIES SERIES
Discharge: HOME | End: 2023-03-20
Attending: STUDENT IN AN ORGANIZED HEALTH CARE EDUCATION/TRAINING PROGRAM
Payer: MEDICARE

## 2023-03-20 PROCEDURE — 97110 THERAPEUTIC EXERCISES: CPT | Mod: GP | Performed by: PHYSICAL THERAPIST

## 2023-03-20 PROCEDURE — 97140 MANUAL THERAPY 1/> REGIONS: CPT | Mod: GP | Performed by: PHYSICAL THERAPIST

## 2023-03-20 RX ORDER — NAPROXEN 500 MG/1
500 TABLET ORAL 2 TIMES DAILY PRN
Qty: 60 TABLET | Refills: 0 | Status: SHIPPED | OUTPATIENT
Start: 2023-03-20 | End: 2023-03-21

## 2023-03-20 NOTE — TELEPHONE ENCOUNTER
Pending Prescriptions:                       Disp   Refills    naproxen (NAPROSYN) 500 MG tablet [Pharma*60 tab*0            Sig: TAKE 1 TABLET (500 MG) BY MOUTH 2 TIMES DAILY AS           NEEDED FOR MODERATE PAIN (4-6). TAKE WITH PLENTY           OF FOOD AND WATER.    Routing refill request to provider for review/approval because:  Labs not current:    BP Readings from Last 3 Encounters:   03/06/23 (!) 141/86   01/30/23 130/80   01/29/23 (!) 149/83     AST   Date Value Ref Range Status   11/23/2021 41 0 - 45 U/L Final   07/02/2019 14 0 - 45 U/L Final     Age      Kam Dudley RN

## 2023-03-21 ENCOUNTER — ANCILLARY PROCEDURE (OUTPATIENT)
Dept: GENERAL RADIOLOGY | Facility: CLINIC | Age: 68
End: 2023-03-21
Attending: NURSE PRACTITIONER
Payer: MEDICARE

## 2023-03-21 ENCOUNTER — OFFICE VISIT (OUTPATIENT)
Dept: FAMILY MEDICINE | Facility: CLINIC | Age: 68
End: 2023-03-21
Payer: MEDICARE

## 2023-03-21 ENCOUNTER — NURSE TRIAGE (OUTPATIENT)
Dept: NURSING | Facility: CLINIC | Age: 68
End: 2023-03-21

## 2023-03-21 VITALS
DIASTOLIC BLOOD PRESSURE: 89 MMHG | HEART RATE: 109 BPM | RESPIRATION RATE: 18 BRPM | TEMPERATURE: 98 F | BODY MASS INDEX: 30.35 KG/M2 | HEIGHT: 70 IN | OXYGEN SATURATION: 99 % | SYSTOLIC BLOOD PRESSURE: 136 MMHG | WEIGHT: 212 LBS

## 2023-03-21 DIAGNOSIS — R20.2 NUMBNESS AND TINGLING IN BOTH HANDS: ICD-10-CM

## 2023-03-21 DIAGNOSIS — M54.2 CERVICALGIA: ICD-10-CM

## 2023-03-21 DIAGNOSIS — S39.012D: ICD-10-CM

## 2023-03-21 DIAGNOSIS — I25.118 CORONARY ARTERY DISEASE OF NATIVE HEART WITH STABLE ANGINA PECTORIS, UNSPECIFIED VESSEL OR LESION TYPE (H): ICD-10-CM

## 2023-03-21 DIAGNOSIS — R20.0 NUMBNESS AND TINGLING IN BOTH HANDS: ICD-10-CM

## 2023-03-21 DIAGNOSIS — R20.0 NUMBNESS OF LEFT ANTERIOR THIGH: ICD-10-CM

## 2023-03-21 DIAGNOSIS — M54.16 LUMBAR RADICULOPATHY: Primary | ICD-10-CM

## 2023-03-21 DIAGNOSIS — M54.16 LUMBAR RADICULOPATHY: ICD-10-CM

## 2023-03-21 PROCEDURE — 72100 X-RAY EXAM L-S SPINE 2/3 VWS: CPT | Mod: TC | Performed by: RADIOLOGY

## 2023-03-21 PROCEDURE — 72040 X-RAY EXAM NECK SPINE 2-3 VW: CPT | Mod: TC | Performed by: RADIOLOGY

## 2023-03-21 PROCEDURE — 99214 OFFICE O/P EST MOD 30 MIN: CPT | Performed by: NURSE PRACTITIONER

## 2023-03-21 RX ORDER — PREDNISONE 20 MG/1
TABLET ORAL
Qty: 10 TABLET | Refills: 0 | Status: SHIPPED | OUTPATIENT
Start: 2023-03-21 | End: 2023-03-29

## 2023-03-21 RX ORDER — CYCLOBENZAPRINE HCL 5 MG
5 TABLET ORAL 3 TIMES DAILY PRN
Qty: 30 TABLET | Refills: 0 | Status: SHIPPED | OUTPATIENT
Start: 2023-03-21 | End: 2023-05-30

## 2023-03-21 RX ORDER — NAPROXEN 500 MG/1
500 TABLET ORAL 2 TIMES DAILY PRN
Qty: 60 TABLET | Refills: 0 | Status: SHIPPED | OUTPATIENT
Start: 2023-03-21 | End: 2023-05-22

## 2023-03-21 ASSESSMENT — PAIN SCALES - GENERAL: PAINLEVEL: EXTREME PAIN (8)

## 2023-03-21 ASSESSMENT — ENCOUNTER SYMPTOMS: BACK PAIN: 1

## 2023-03-21 NOTE — PROGRESS NOTES
Assessment & Plan     Lumbar radiculopathy  Symptomatic care strategies reviewed  - XR Lumbar Spine 2/3 Views  - MR Lumbar Spine w/o Contrast    Imaging done today  : Schedule MRI  Continue home physical therapy program  Continue naproxen and cyclobenzaprine  Retrial prednisone burst and taper    Numbness of left anterior thigh  - XR Lumbar Spine 2/3 Views  - MR Lumbar Spine w/o Contrast    Strain, back, subsequent encounter  - predniSONE (DELTASONE) 20 MG tablet  Dispense: 10 tablet; Refill: 0  - naproxen (NAPROSYN) 500 MG tablet  Dispense: 60 tablet; Refill: 0  - cyclobenzaprine (FLEXERIL) 5 MG tablet  Dispense: 30 tablet; Refill: 0    Cervicalgia  - XR Cervical Spine 2/3 Views    Numbness and tingling in both hands  Depending on the results of the x-ray.  Consider MRI of the cervical spine  - XR Cervical Spine 2/3 Views    Coronary artery disease of native heart with stable angina pectoris, unspecified vessel or lesion type (H)  Stable.  Stent in place.  Continue current medications.  Follow-up with cardiology as planned.  No medication changes made today    Has follow-up scheduled with PCP already for next week for physical  Recheck at that time  Call or return to the clinic with any worsening of symptoms or no resolution. Patient/Parent verbalized understanding and is in agreement. Medication side effects reviewed.   Current Outpatient Medications   Medication Sig Dispense Refill     albuterol (PROAIR HFA/PROVENTIL HFA/VENTOLIN HFA) 108 (90 Base) MCG/ACT inhaler Inhale 2 puffs into the lungs every 4 hours as needed for shortness of breath / dyspnea 18 g 1     aspirin (ASA) 81 MG EC tablet Take 1 tablet (81 mg) by mouth daily Start tomorrow. 30 tablet 3     atorvastatin (LIPITOR) 40 MG tablet Take 1 tablet (40 mg) by mouth daily 90 tablet 3     calcium carbonate (TUMS) 500 MG chewable tablet Take 1 chew tab by mouth daily as needed        clopidogrel (PLAVIX) 75 MG tablet Take 1 tablet (75 mg) by mouth daily  90 tablet 3     cyclobenzaprine (FLEXERIL) 5 MG tablet Take 1 tablet (5 mg) by mouth 3 times daily as needed for muscle spasms 30 tablet 0     Digestive Enzymes (DIGESTIVE ENZYME PO)        famotidine (PEPCID) 20 MG tablet Take 1 tablet (20 mg) by mouth 2 times daily 180 tablet 2     ferrous sulfate (FE TABS) 325 (65 Fe) MG EC tablet Take 1 tablet (325 mg) by mouth daily (Patient taking differently: Take 325 mg by mouth every other day) 90 tablet 1     furosemide (LASIX) 40 MG tablet Take 1 tablet (40 mg) by mouth daily 20 tablet 0     glucosamine-chondroitin 500-400 MG CAPS per capsule Take 1 capsule by mouth daily       ibuprofen (ADVIL/MOTRIN) 200 MG tablet Take 800 mg by mouth every 8 hours as needed        lisinopril (ZESTRIL) 10 MG tablet Take 1 tablet (10 mg) by mouth daily Hold if Systolic Blood Pressure is less than 85. 90 tablet 3     multivitamin (CENTRUM SILVER) tablet Take 1 tablet by mouth daily       naproxen (NAPROSYN) 500 MG tablet Take 1 tablet (500 mg) by mouth 2 times daily as needed for moderate pain (4-6) Take with plenty of food and water 60 tablet 0     nitroGLYcerin (NITROSTAT) 0.4 MG sublingual tablet For chest pain place 1 tablet under the tongue every 5 minutes for 3 doses. If symptoms persist 5 minutes after 2nd dose call 911. 30 tablet 3     omeprazole (PRILOSEC) 40 MG DR capsule Take 1 capsule (40 mg) by mouth daily 90 capsule 3     oxybutynin ER (DITROPAN XL) 10 MG 24 hr tablet Take 1 tablet (10 mg) by mouth daily 90 tablet 3     predniSONE (DELTASONE) 20 MG tablet Take two tablets (= 40mg) each day for 5 (five) days 10 tablet 0     psyllium (METAMUCIL/KONSYL) 58.6 % powder Take by mouth daily       tamsulosin (FLOMAX) 0.4 MG capsule Take 1 capsule (0.4 mg) by mouth daily 90 capsule 3     triamterene-HCTZ (MAXZIDE) 75-50 MG tablet Take 1 tablet by mouth daily 90 tablet 3     vitamin C (ASCORBIC ACID) 1000 MG TABS Take 1,000 mg by mouth daily       COMPOUNDED NON-CONTROLLED SUBSTANCE  (CMPD RX) - PHARMACY TO MIX COMPOUNDED MEDICATION Inject 0.1 mL intra cavernously for erection. Gradually increase dose if needed . Maximum 0.2 mL 2.5 mL 3     furosemide (LASIX) 20 MG tablet Take 1 tablet (20 mg) by mouth daily for 5 days 15 tablet 0     ketoconazole (NIZORAL) 2 % external cream Apply to feet BID x 2-3 weeks PRN 60 g 1     sildenafil (REVATIO) 20 MG tablet Take 2 to 5 tablets 1 hour prior to intercourse 90 tablet 3     triamcinolone (KENALOG) 0.1 % external cream Apply topically 3 times daily (Patient not taking: Reported on 1/30/2023) 80 g 3     Chart documentation with Dragon Voice recognition Software. Although reviewed after completion, some words and grammatical errors may remain.      Return in about 1 month (around 4/21/2023).    HERBERTH Jimenez CNP  [facility]    Subjective   Gopal is a 68 year old, presenting for the following health issues:  Back Pain      Back Pain     History of Present Illness       Back Pain:  He presents for follow up of back pain. Patient's back pain is a chronic problem.  Location of back pain:  Left lower back, right shoulder and left buttock  Description of back pain: cramping  Back pain spreads: left buttocks, left thigh and left side of neck    Since patient first noticed back pain, pain is: gradually worsening  Does back pain interfere with his job:  Yes      He eats 2-3 servings of fruits and vegetables daily.He consumes 0 sweetened beverage(s) daily.He exercises with enough effort to increase his heart rate 30 to 60 minutes per day.  He exercises with enough effort to increase his heart rate 6 days per week.   He is taking medications regularly.     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     Heat sitting on the heat pad  Ice not effective.  Naproxen, flexeril, prednisone gave some relief.   Oxycodone use last night gave some mild relief.    CAD stent in place  11//2022 no MI     Right scapula pain   Worse after recent 14 hour car ride          Review of Systems   Musculoskeletal: Positive for back pain.      Constitutional, HEENT, cardiovascular, pulmonary, GI, , musculoskeletal, neuro, skin, endocrine and psych systems are negative, except as otherwise noted.      Objective    /89   Pulse 109   Temp 98  F (36.7  C) (Tympanic)   Resp 18   Ht 1.778 m (5' 10\")   Wt 96.2 kg (212 lb)   SpO2 99%   BMI 30.42 kg/m    Body mass index is 30.42 kg/m .  Physical Exam   GENERAL: healthy, alert and no distress  EYES: Eyes grossly normal to inspection, PERRL and conjunctivae and sclerae normal  HENT: ear canals and TM's normal, nose and mouth without ulcers or lesions  NECK: no adenopathy, no asymmetry, masses, or scars and thyroid normal to palpation  RESP: lungs clear to auscultation - no rales, rhonchi or wheezes  CV: regular rate and rhythm, normal S1 S2, no S3 or S4, no murmur, click or rub, no peripheral edema and peripheral pulses strong  ABDOMEN: soft, nontender, no hepatosplenomegaly, no masses and bowel sounds normal  MS: decreased range of motion lumbar spine.  Cervical spine.  Right shoulder  Trigger point tenderness right trapezius and right lateral C-spine   Pain with palpation over the left lumbar lower spine pain with palpation of the left SI joint numbness noted along the anterior thigh left  SKIN: no suspicious lesions or rashes  NEURO: Normal strength and tone, mentation intact and speech normal  PSYCH: mentation appears normal, affect normal/bright    Lab on 11/14/2022   Component Date Value Ref Range Status     Prostate Specific Antigen Screen 11/14/2022 1.63  0.00 - 4.50 ng/mL Final     Sodium 11/14/2022 141  136 - " 145 mmol/L Final     Potassium 11/14/2022 4.2  3.4 - 5.3 mmol/L Final     Chloride 11/14/2022 105  98 - 107 mmol/L Final     Carbon Dioxide (CO2) 11/14/2022 28  22 - 29 mmol/L Final     Anion Gap 11/14/2022 8  7 - 15 mmol/L Final     Urea Nitrogen 11/14/2022 18.3  8.0 - 23.0 mg/dL Final     Creatinine 11/14/2022 0.86  0.67 - 1.17 mg/dL Final     Calcium 11/14/2022 9.6  8.8 - 10.2 mg/dL Final     Glucose 11/14/2022 122 (H)  70 - 99 mg/dL Final     GFR Estimate 11/14/2022 >90  >60 mL/min/1.73m2 Final    Effective December 21, 2021 eGFRcr in adults is calculated using the 2021 CKD-EPI creatinine equation which includes age and gender (Carlene et al., [facility], DOI: 10.1056/IOHOke3898034)     ALT 11/14/2022 23  10 - 50 U/L Final     Cholesterol 11/14/2022 133  <200 mg/dL Final     Triglycerides 11/14/2022 95  <150 mg/dL Final     Direct Measure HDL 11/14/2022 51  >=40 mg/dL Final     LDL Cholesterol Calculated 11/14/2022 63  <=100 mg/dL Final     Non HDL Cholesterol 11/14/2022 82  <130 mg/dL Final     Results for orders placed or performed during the hospital encounter of 09/07/22   Cardiac Catheterization     Value    Cath EF Estimated 60    Addendum: 9/7/2022        Left ventricular filling pressures are severely elevated.    The ejection fraction is greater than 55% by visual estimate.    Prox Cx to Mid Cx lesion is 50% stenosed.    Dist Cx lesion is 50% stenosed.    Mid LAD lesion is 60% stenosed.     1.  Successful stenting of a 60% mid LAD stenosis with an IFR of 0.86 leaving a 0% residual stenosis with ELLEN grade III flow.  No significant compromise of the large second diagonal with ostial stenosis of 50%.  2.  50 to 60% mid to distal circumflex stenosis with an IFR of 1.0.    3.  LVEDP 25 mmHg.  Left ventricular ejection fraction of greater than 55% without focal wall motion abnormalities.  No mitral regurgitation no gradient across aortic valve upon pullback        Narrative      Left ventricular filling pressures  are severely elevated.    The ejection fraction is greater than 55% by visual estimate.    Prox Cx to Mid Cx lesion is 50% stenosed.    Dist Cx lesion is 50% stenosed.    Mid LAD lesion is 60% stenosed.     1.  Successful stenting of a 60% mid LAD stenosis with an IFR of 0.86   leaving a 0% residual stenosis with ELLEN grade III flow.  No significant   compromise of the large second diagonal with ostial stenosis of 50%.  2.  50 to 60% mid to distal circumflex stenosis with an IFR of 1.0.    3.  LVEDP 25 mmHg.  Left ventricular ejection fraction of greater than 55%   without focal wall motion abnormalities.  No mitral regurgitation no   gradient across aortic valve upon pullback         Imaging of the lumbar and cervical spine pending

## 2023-03-21 NOTE — TELEPHONE ENCOUNTER
8 constant  Radiates to buttocks, around to LL spine.  Nurse Triage SBAR    Is this a 2nd Level Triage? YES    Situation: Patient calling with ongoing back pain.    Background: Patient reports having been seen in ED x 2 following acute injury 1/29/23. Patient received tx options both times + referral to PT, but symptoms persist and interfering with ADLs. Patient rates pain as 8 of 10 (constant), radiating to buttocks and around LL flank. Denies urination issues. Denies numbness pain in legs. Patient stated he has received narcotic tx, but does not like the way it makes him feel (dizziness). Denies burning or blood in urine.    Assessment: Patient with severe back pain, not relieved by current tx plan/multiple trips to ED/therapy.    Protocol Recommended Disposition:   See in Office Today    Recommendation: Per protocol, recommended to be seen in office today. Transferred to scheduling for appointment to be seen. Advised patient for home care related strategies in addition to provider/therapy recommendations. Patient verbalized understanding of plan.     To scheduling for appointment.    Does the patient meet one of the following criteria for ADS visit consideration? 16+ years old, with an [facility] PCP     TIP  Providers, please consider if this condition is appropriate for management at one of our Acute and Diagnostic Services sites.     If patient is a good candidate, please use dotphrase <dot>triageresponse and select Refer to ADS to document.     Reason for Disposition    SEVERE back pain (e.g., excruciating, unable to do any normal activities) and not improved after pain medicine and CARE ADVICE    Additional Information    Negative: Passed out (i.e., fainted, collapsed and was not responding)    Negative: Shock suspected (e.g., cold/pale/clammy skin, too weak to stand, low BP, rapid pulse)    Negative: Sounds like a life-threatening emergency to the triager    Negative: Major injury to the back (e.g., MVA, fall >  10 feet or 3 meters, penetrating injury, etc.)    Negative: Pain in the upper back over the ribs (rib cage) that radiates (travels) into the chest    Negative: Pain in the upper back over the ribs (rib cage) and worsened by coughing (or clearly increases with breathing)    Negative: Back pain during pregnancy    Negative: SEVERE back pain of sudden onset and age > 60 years    Negative: SEVERE abdominal pain (e.g., excruciating)    Negative: Abdominal pain and age > 60 years    Negative: Unable to urinate (or only a few drops) and bladder feels very full    Negative: Loss of bladder or bowel control (urine or bowel incontinence; wetting self, leaking stool) of new-onset    Negative: Numbness (loss of sensation) in groin or rectal area    Negative: Pain radiates into groin, scrotum    Negative: Blood in urine (red, pink, or tea-colored)    Negative: Vomiting and pain over lower ribs of back (i.e., flank - kidney area)    Negative: Weakness of a leg or foot (e.g., unable to bear weight, dragging foot)    Negative: Patient sounds very sick or weak to the triager    Negative: Fever > 100.4 F (38.0 C) and flank pain    Negative: Pain or burning with passing urine (urination)    Protocols used: BACK PAIN-A-OH    Asim White, RN, BSN, MSN  [facility] Triage 11:31 AM

## 2023-03-22 ENCOUNTER — HOSPITAL ENCOUNTER (OUTPATIENT)
Dept: MRI IMAGING | Facility: CLINIC | Age: 68
Discharge: HOME | End: 2023-03-22
Attending: NURSE PRACTITIONER | Admitting: NURSE PRACTITIONER
Payer: MEDICARE

## 2023-03-22 DIAGNOSIS — R20.0 NUMBNESS OF LEFT ANTERIOR THIGH: ICD-10-CM

## 2023-03-22 DIAGNOSIS — M54.16 LUMBAR RADICULOPATHY: ICD-10-CM

## 2023-03-22 PROCEDURE — 72148 MRI LUMBAR SPINE W/O DYE: CPT | Mod: ME

## 2023-03-27 ENCOUNTER — HOSPITAL ENCOUNTER (OUTPATIENT)
Dept: PHYSICAL THERAPY | Facility: CLINIC | Age: 68
Setting detail: THERAPIES SERIES
Discharge: HOME | End: 2023-03-27
Attending: STUDENT IN AN ORGANIZED HEALTH CARE EDUCATION/TRAINING PROGRAM
Payer: MEDICARE

## 2023-03-27 PROCEDURE — 97140 MANUAL THERAPY 1/> REGIONS: CPT | Mod: GP | Performed by: PHYSICAL THERAPIST

## 2023-03-27 PROCEDURE — 97110 THERAPEUTIC EXERCISES: CPT | Mod: GP | Performed by: PHYSICAL THERAPIST

## 2023-03-28 ENCOUNTER — MYC MEDICAL ADVICE (OUTPATIENT)
Dept: FAMILY MEDICINE | Facility: CLINIC | Age: 68
End: 2023-03-28
Payer: MEDICARE

## 2023-03-28 ASSESSMENT — ENCOUNTER SYMPTOMS
MYALGIAS: 1
SHORTNESS OF BREATH: 1

## 2023-03-28 ASSESSMENT — ACTIVITIES OF DAILY LIVING (ADL): CURRENT_FUNCTION: NO ASSISTANCE NEEDED

## 2023-03-29 ENCOUNTER — OFFICE VISIT (OUTPATIENT)
Dept: FAMILY MEDICINE | Facility: CLINIC | Age: 68
End: 2023-03-29
Payer: MEDICARE

## 2023-03-29 VITALS
HEIGHT: 69 IN | HEART RATE: 108 BPM | BODY MASS INDEX: 30.96 KG/M2 | WEIGHT: 209 LBS | TEMPERATURE: 98 F | SYSTOLIC BLOOD PRESSURE: 120 MMHG | RESPIRATION RATE: 24 BRPM | DIASTOLIC BLOOD PRESSURE: 80 MMHG | OXYGEN SATURATION: 98 %

## 2023-03-29 DIAGNOSIS — M48.061 LUMBAR FORAMINAL STENOSIS: ICD-10-CM

## 2023-03-29 DIAGNOSIS — D50.8 OTHER IRON DEFICIENCY ANEMIA: ICD-10-CM

## 2023-03-29 DIAGNOSIS — I10 BENIGN ESSENTIAL HYPERTENSION: ICD-10-CM

## 2023-03-29 DIAGNOSIS — K21.9 GASTROESOPHAGEAL REFLUX DISEASE, UNSPECIFIED WHETHER ESOPHAGITIS PRESENT: ICD-10-CM

## 2023-03-29 DIAGNOSIS — T16.2XXA FOREIGN BODY OF LEFT EAR, INITIAL ENCOUNTER: ICD-10-CM

## 2023-03-29 DIAGNOSIS — Z00.00 ENCOUNTER FOR MEDICARE ANNUAL WELLNESS EXAM: Primary | ICD-10-CM

## 2023-03-29 DIAGNOSIS — M51.26 PROTRUSION OF LUMBAR INTERVERTEBRAL DISC: ICD-10-CM

## 2023-03-29 DIAGNOSIS — M54.42 LEFT-SIDED LOW BACK PAIN WITH LEFT-SIDED SCIATICA, UNSPECIFIED CHRONICITY: ICD-10-CM

## 2023-03-29 DIAGNOSIS — R73.09 ELEVATED GLUCOSE: ICD-10-CM

## 2023-03-29 DIAGNOSIS — I10 HYPERTENSION, GOAL BELOW 140/90: ICD-10-CM

## 2023-03-29 DIAGNOSIS — R06.02 SOB (SHORTNESS OF BREATH): ICD-10-CM

## 2023-03-29 PROBLEM — R73.03 PREDIABETES: Status: ACTIVE | Noted: 2023-03-29

## 2023-03-29 LAB
FERRITIN SERPL-MCNC: 206 NG/ML (ref 31–409)
HBA1C MFR BLD: 6.1 % (ref 0–5.6)
IRON BINDING CAPACITY (ROCHE): 350 UG/DL (ref 240–430)
IRON SATN MFR SERPL: 39 % (ref 15–46)
IRON SERPL-MCNC: 135 UG/DL (ref 61–157)

## 2023-03-29 PROCEDURE — 83036 HEMOGLOBIN GLYCOSYLATED A1C: CPT | Performed by: FAMILY MEDICINE

## 2023-03-29 PROCEDURE — 83550 IRON BINDING TEST: CPT | Performed by: FAMILY MEDICINE

## 2023-03-29 PROCEDURE — 99397 PER PM REEVAL EST PAT 65+ YR: CPT | Performed by: FAMILY MEDICINE

## 2023-03-29 PROCEDURE — 83540 ASSAY OF IRON: CPT | Performed by: FAMILY MEDICINE

## 2023-03-29 PROCEDURE — 80048 BASIC METABOLIC PNL TOTAL CA: CPT | Performed by: FAMILY MEDICINE

## 2023-03-29 PROCEDURE — 99214 OFFICE O/P EST MOD 30 MIN: CPT | Mod: 25 | Performed by: FAMILY MEDICINE

## 2023-03-29 PROCEDURE — 82728 ASSAY OF FERRITIN: CPT | Performed by: FAMILY MEDICINE

## 2023-03-29 PROCEDURE — 36415 COLL VENOUS BLD VENIPUNCTURE: CPT | Performed by: FAMILY MEDICINE

## 2023-03-29 RX ORDER — TRIAMTERENE AND HYDROCHLOROTHIAZIDE 75; 50 MG/1; MG/1
1 TABLET ORAL DAILY
Qty: 90 TABLET | Refills: 3 | Status: SHIPPED | OUTPATIENT
Start: 2023-03-29 | End: 2024-04-23

## 2023-03-29 RX ORDER — MULTIVITAMIN WITH IRON
1 TABLET ORAL DAILY
COMMUNITY
End: 2023-05-08

## 2023-03-29 RX ORDER — ALBUTEROL SULFATE 90 UG/1
2 AEROSOL, METERED RESPIRATORY (INHALATION) EVERY 4 HOURS PRN
Qty: 18 G | Refills: 1 | Status: SHIPPED | OUTPATIENT
Start: 2023-03-29 | End: 2023-05-22

## 2023-03-29 RX ORDER — FAMOTIDINE 20 MG/1
20 TABLET, FILM COATED ORAL 2 TIMES DAILY
Qty: 180 TABLET | Refills: 2 | Status: SHIPPED | OUTPATIENT
Start: 2023-03-29 | End: 2023-12-11

## 2023-03-29 ASSESSMENT — PAIN SCALES - GENERAL: PAINLEVEL: SEVERE PAIN (7)

## 2023-03-29 ASSESSMENT — ENCOUNTER SYMPTOMS
SHORTNESS OF BREATH: 1
MYALGIAS: 1

## 2023-03-29 ASSESSMENT — ACTIVITIES OF DAILY LIVING (ADL): CURRENT_FUNCTION: NO ASSISTANCE NEEDED

## 2023-03-29 NOTE — PATIENT INSTRUCTIONS
Stop iron supplementation.     Famotidine for acid/ gerd.   IF symptoms flare up would go back on omeprazole    Lab work today.     Follow up with Ortho Spine.     Ear washout to be completed today.         Patient Education   Personalized Prevention Plan  You are due for the preventive services outlined below.  Your care team is available to assist you in scheduling these services.  If you have already completed any of these items, please share that information with your care team to update in your medical record.  Health Maintenance Due   Topic Date Due    Annual Wellness Visit  02/22/2022     Your Health Risk Assessment indicates you feel you are not in good health    A healthy lifestyle helps keep the body fit and the mind alert. It helps protect you from disease, helps you fight disease, and helps prevent chronic disease (disease that doesn't go away) from getting worse. This is important as you get older and begin to notice twinges in muscles and joints and a decline in the strength and stamina you once took for granted. A healthy lifestyle includes good healthcare, good nutrition, weight control, recreation, and regular exercise. Avoid harmful substances and do what you can to keep safe. Another part of a healthy lifestyle is stay mentally active and socially involved.    Good healthcare   Have a wellness visit every year.   If you have new symptoms, let us know right away. Don't wait until the next checkup.   Take medicines exactly as prescribed and keep your medicines in a safe place. Tell us if your medicine causes problems.   Healthy diet and weight control   Eat 3 or 4 small, nutritious, low-fat, high-fiber meals a day. Include a variety of fruits, vegetables, and whole-grain foods.   Make sure you get enough calcium in your diet. Calcium, vitamin D, and exercise help prevent osteoporosis (bone thinning).   If you live alone, try eating with others when you can. That way you get a good meal and have  company while you eat it.   Try to keep a healthy weight. If you eat more calories than your body uses for energy, it will be stored as fat and you will gain weight.     Recreation   Recreation is not limited to sports and team events. It includes any activity that provides relaxation, interest, enjoyment, and exercise. Recreation provides an outlet for physical, mental, and social energy. It can give a sense of worth and achievement. It can help you stay healthy.    Mental Exercise and Social Involvement  Mental and emotional health is as important as physical health. Keep in touch with friends and family. Stay as active as possible. Continue to learn and challenge yourself.   Things you can do to stay mentally active are:  Learn something new, like a foreign language or musical instrument.   Play SCRABBLE or do crossword puzzles. If you cannot find people to play these games with you at home, you can play them with others on your computer through the Internet.   Join a games club--anything from card games to chess or checkers or lawn bowling.   Start a new hobby.   Go back to school.   Volunteer.   Read.   Keep up with world events.    Understanding USDA MyPlate  The USDA has guidelines to help you make healthy food choices. These are called MyPlate. MyPlate shows the food groups that make up healthy meals using the image of a place setting. Before you eat, think about the healthiest choices for what to put on your plate or in your cup or bowl. To learn more about building a healthy plate, visit www.choosemyplate.gov.     The food groups  Fruits. Any fruit or 100% fruit juice counts as part of the Fruit Group. Fruits may be fresh, canned, frozen, or dried, and may be whole, cut-up, or pureed. Make 1/2 of your plate fruits and vegetables.  Vegetables. Any vegetable or 100% vegetable juice counts as a member of the Vegetable Group. Vegetables may be fresh, frozen, canned, or dried. They can be served raw or cooked  and may be whole, cut-up, or mashed. Make 1/2 of your plate fruits and vegetables.  Grains. All foods made from grains are part of the Grains Group. These include wheat, rice, oats, cornmeal, and barley. Grains are often used to make foods such as bread, pasta, oatmeal, cereal, tortillas, and grits. Grains should be no more than 1/4 of your plate. At least half of your grains should be whole grains.  Protein. This group includes meat, poultry, seafood, beans and peas, eggs, processed soy products (such as tofu), nuts (including nut butters), and seeds. Make protein choices no more than 1/4 of your plate. Meat and poultry choices should be lean or low fat.  Dairy. The Dairy Group includes all fluid milk products and foods made from milk that contain calcium, such as yogurt and cheese. (Foods that have little calcium, such as cream, butter, and cream cheese, are not part of this group.) Most dairy choices should be low-fat or fat-free.  Oils. Oils aren't a food group, but they do contain essential nutrients. However it's important to watch your intake of oils. These are fats that are liquid at room temperature. They include canola, corn, olive, soybean, vegetable, and sunflower oil. Foods that are mainly oil include mayonnaise, certain salad dressings, and soft margarines. You likely already get your daily oil allowance from the foods you eat.  Things to limit  Eating healthy also means limiting these things in your diet:  Salt (sodium). Many processed foods have a lot of sodium. To keep sodium intake down, eat fresh vegetables, meats, poultry, and seafood when possible. Purchase low-sodium, reduced-sodium, or no-salt-added food products at the store. And don't add salt to your meals at home. Instead, season them with herbs and spices such as dill, oregano, cumin, and paprika. Or try adding flavor with lemon or lime zest and juice.  Saturated fat. Saturated fats are most often found in animal products such as beef,  pork, and chicken. They are often solid at room temperature, such as butter. To reduce your saturated fat intake, choose leaner cuts of meat and poultry. And try healthier cooking methods such as grilling, broiling, roasting, or baking. For a simple lower-fat swap, use plain nonfat yogurt instead of mayonnaise when making potato salad or macaroni salad.  Added sugars. These are sugars added to foods. They are in foods such as ice cream, candy, soda, fruit drinks, sports drinks, energy drinks, cookies, pastries, jams, and syrups. Cut down on added sugars by sharing sweet treats with a family member or friend. You can also choose fruit for dessert, and drink water or other unsweetened beverages.  [employer] last reviewed this educational content on 6/1/2020 2000-2022 [employer]. All rights reserved. This information is not intended as a substitute for professional medical care. Always follow your healthcare professional's instructions.          Signs of Hearing Loss  Hearing loss is a problem shared by many people. In fact, it's one of the most common health problems, particularly as people age. Most people aged 65 and older have some hearing loss. By age 80, almost everyone does. Hearing loss often occurs slowly over the years. So, you may not realize your hearing has gotten worse.   When sudden hearing loss occurs, it's important to contact your healthcare provider right away. Your provider will do a medical exam and a hearing exam as soon as possible. This is to help find the cause and type of your sudden hearing loss. Based on your diagnosis, your healthcare provider will discuss possible treatments.      Hearing much better with one ear can be a sign of hearing loss.     Have your hearing checked  Call your healthcare provider if you:   Have to strain to hear normal conversation  Have to watch other people s faces very carefully to follow what they re saying  Need to ask people to repeat what  they ve said  Often misunderstand what people are saying  Turn the volume of the television or radio up so high that others complain  Feel that people are mumbling when they re talking to you  Find that the effort to hear leaves you feeling tired and irritated  Notice, when using the phone, that you hear better with one ear than the other  Keila last reviewed this educational content on 6/1/2022 2000-2022 [employer]. All rights reserved. This information is not intended as a substitute for professional medical care. Always follow your healthcare professional's instructions.

## 2023-03-29 NOTE — PROGRESS NOTES
"SUBJECTIVE:   Gopal is a 68 year old who presents for Preventive Visit.  No flowsheet data found.  Patient has been advised of split billing requirements and indicates understanding: Yes  Are you in the first 12 months of your Medicare coverage?  No    Healthy Habits:     In general, how would you rate your overall health?  Fair    Frequency of exercise:  2-3 days/week    Duration of exercise:  45-60 minutes    Do you usually eat at least 4 servings of fruit and vegetables a day, include whole grains    & fiber and avoid regularly eating high fat or \"junk\" foods?  No    Taking medications regularly:  Yes    Medication side effects:  None    Ability to successfully perform activities of daily living:  No assistance needed    Home Safety:  No safety concerns identified    Hearing Impairment:  Difficulty following a conversation in a noisy restaurant or crowded room and find that men's voices are easier to understand than woman's    In the past 6 months, have you been bothered by leaking of urine?  No    In general, how would you rate your overall mental or emotional health?  Good      PHQ-2 Total Score: 0    Additional concerns today:  Yes      Immunizations: up to date  Cholesterol: atorvastatin 40 mg daily.   Aspirin: 81 mg daily  Diabetes: screen today with A1c    Colon Cancer: Colonoscopy 8/2022 repeat 7 years   Prostate: check PSA today   Tobacco: non-user      HTN  Lisinopril 10 mg daily  Triamterene-hydrochlorothiazide 75-50 mg daily  Doing well. BP within goal range today.     Hyperlipidemia  Atorvastatin 40 mg daily    Iron deficiency due to blood donation.   Last time donated blood 1 year ago.   Taking iron supplementation every other day.   No fatigue.   Wishes to stop iron supplementation.       GERD   On omeprazole and pepcid.   Wishes to try and come off the omeprazole.     Have you ever done Advance Care Planning? (For example, a Health Directive, POLST, or a discussion with a medical provider or your " loved ones about your wishes): No, advance care planning information given to patient to review.  Advanced care planning was discussed at today's visit.       Fall risk  Fallen 2 or more times in the past year?: No  Any fall with injury in the past year?: No    Cognitive Screening   1) Repeat 3 items (Leader, Season, Table)    2) Clock draw: NORMAL  3) 3 item recall: Recalls 3 objects  Results: NORMAL clock, 1-2 items recalled: COGNITIVE IMPAIRMENT LESS LIKELY    Mini-CogTM Copyright NATALIE Gooden. Licensed by the author for use in [facility]; reprinted with permission (clarence@[facility]). All rights reserved.        Reviewed and updated as needed this visit by clinical staff   Tobacco  Allergies  Meds  Problems  Med Hx  Surg Hx  Fam Hx          Reviewed and updated as needed this visit by Provider                 Social History     Tobacco Use     Smoking status: Never     Smokeless tobacco: Never   Substance Use Topics     Alcohol use: Yes     Comment: 6 pack beer on weekend.         Alcohol Use 3/28/2023   Prescreen: >3 drinks/day or >7 drinks/week? No     Do you have a current opioid prescription? No  How severe is your pain on a scale from 1-10? 7/10     Back Pain       Duration: Med started        Specific cause: lifting, turning/bending    Description:   Location of pain: low back left  Character of pain: dull ache, stabbing and waxing and waning  Pain radiation:radiates into the left buttocks and radiates into the left leg  New numbness or weakness in legs, not attributed to pain:  YES    Intensity: Currently 7/10, At its worst 9/10    History:   Pain interferes with job: Not applicable  History of back problems: recurrent self limited episodes of low back pain in the past  Any previous MRI or X-rays: Yes- at [place].  Date 3/22/2023  Sees a specialist for back pain:  No  Therapies tried without relief: activity, chiropractor, cold, heat, massage, muscle relaxants, NSAIDs, opioids and Physical  Therapy    Alleviating factors:   Improved by: cold, heat, opioids and stretch      Precipitating factors:  Worsened by: Lifting, Bending, Standing, Sitting, Lying Flat and Walking    Has seen physical therapy 4 times.   Having issues with hiking.   Has tried hydrotherapy   No loss of bowel or bladder.   No perineum anesthesia.       Recent MRI lumbar spine 3/22/2023  IMPRESSION:  1.  Mild acquired and congenital spinal canal stenosis at L2-L3, L3-L4 and L4-L5.  2.  Severe lateral recess stenosis with nerve root impingement on the right at L4-L5.  3.  Severe foraminal stenosis on the left at L2-L3 with a left foraminal disc protrusion impinging the left L2 nerve root.  4.  Varying degrees of moderate foraminal stenosis with nerve root abutment bilaterally at L3-L4 and on the right at L4-L5.      Current providers sharing in care for this patient include:   Patient Care Team:  Raad Benitez DO as PCP - General (Family Medicine)  Raad Benitez DO as Referring Physician (Family Medicine)  Stephanie Fair PA-C as Physician Assistant (Dermatology)  Raad Benitez DO as Assigned PCP  Tamar Bob PA-C (Inactive) as Physician Assistant (Gastroenterology)  Tamar Bob PA-C (Inactive) as Physician Assistant (Gastroenterology)  MARAL Caba MD as MD (Urology)  Celeste Simons APRN CNP as Assigned Heart and Vascular Provider  Stephanie Fair PA-C as Assigned Surgical Provider    The following health maintenance items are reviewed in Epic and correct as of today:  Health Maintenance   Topic Date Due     MEDICARE ANNUAL WELLNESS VISIT  02/22/2022     ANNUAL REVIEW OF HM ORDERS  05/06/2023     FALL RISK ASSESSMENT  03/29/2024     LIPID  11/14/2027     ADVANCE CARE PLANNING  03/29/2028     DTAP/TDAP/TD IMMUNIZATION (3 - Td or Tdap) 07/10/2029     COLORECTAL CANCER SCREENING  08/17/2029     HEPATITIS C SCREENING  Completed     PHQ-2 (once per calendar year)  Completed      "INFLUENZA VACCINE  Completed     Pneumococcal Vaccine: 65+ Years  Completed     ZOSTER IMMUNIZATION  Completed     AORTIC ANEURYSM SCREENING (SYSTEM ASSIGNED)  Completed     COVID-19 Vaccine  Completed     IPV IMMUNIZATION  Aged Out     MENINGITIS IMMUNIZATION  Aged Out       Review of Systems   Respiratory: Positive for shortness of breath.    Genitourinary: Positive for impotence.   Musculoskeletal: Positive for myalgias.       OBJECTIVE:   /80 (BP Location: Left arm, Patient Position: Chair, Cuff Size: Adult Regular)   Pulse 108   Temp 98  F (36.7  C) (Tympanic)   Resp 24   Ht 1.765 m (5' 9.49\")   Wt 94.8 kg (209 lb)   SpO2 98%   BMI 30.43 kg/m   Estimated body mass index is 30.43 kg/m  as calculated from the following:    Height as of this encounter: 1.765 m (5' 9.49\").    Weight as of this encounter: 94.8 kg (209 lb).  Physical Exam  GENERAL: healthy, alert and no distress  EYES: Eyes grossly normal to inspection, PERRL and conjunctivae and sclerae normal  HENT: ear canals and TM's normal, nose and mouth without ulcers or lesions   Left ear: paper towel appearing material stuck in the left ear. Unable to remove with ear lavage.   NECK: no adenopathy, no asymmetry, masses, or scars and thyroid normal to palpation  RESP: lungs clear to auscultation - no rales, rhonchi or wheezes  CV: regular rate and rhythm  ABDOMEN: soft, nontender, no hepatosplenomegaly, no masses and bowel sounds normal  MSK back: non-tender over the lumbar spinous process. Tender over upper lumbar transverse processes. Range of motion limited in flexion. Pain with rotation to the left. SLR testing negative bilaterally. LE strength and sensation intact.   SKIN: no suspicious lesions or rashes  NEURO: Normal strength and tone, mentation intact and speech normal  PSYCH: mentation appears normal, affect normal/bright      ASSESSMENT / PLAN:   Jamal was seen today for wellness visit and back pain.    Diagnoses and all orders for this " visit:    Encounter for Medicare annual wellness exam  Immunizations: up to date  Cholesterol: atorvastatin 40 mg daily.   Aspirin: 81 mg daily  Diabetes: screen today with A1c    Colon Cancer: Colonoscopy 8/2022 repeat 7 years   Prostate: check PSA today   Tobacco: non-user    Elevated glucose  -     Hemoglobin A1c    Hypertension, goal below 140/90  Lisinopril 10 mg daily  Triamterene-hydrochlorothiazide 75-50 mg daily  -- doing well BP within goal range.   -     triamterene-HCTZ (MAXZIDE) 75-50 MG tablet; Take 1 tablet by mouth daily    SOB (shortness of breath)  -- occasional wheezing, has not used in last few months, likes to have available.   -     albuterol (PROAIR HFA/PROVENTIL HFA/VENTOLIN HFA) 108 (90 Base) MCG/ACT inhaler; Inhale 2 puffs into the lungs every 4 hours as needed for shortness of breath    Gastroesophageal reflux disease   -- wishes to try and come off omeprazole. Continue on Pepcid. If symptoms flare up would go back on omeprazole.   -     famotidine (PEPCID) 20 MG tablet; Take 1 tablet (20 mg) by mouth 2 times daily    Other iron deficiency anemia  -- related to blood donation. Has not donated blood in over a year. Currently on iron supplementation every other day. Check iron studies today. Okay to stop iron supplementation  -     Ferritin  -     Iron and iron binding capacity    Protrusion of lumbar intervertebral disc  Lumbar foraminal stenosis  Left-sided low back pain with left-sided sciatica, unspecified chronicity  -- MRI showing severe foraminal stenosis, and L2 disc with nerve impingement.   -- has completed physical therapy without relief. Has tried NSAID's, muscle relaxant, steroids.   -- Referral to Ortho Spine placed.   -     Spine  Referral; Future      Foreign body of left ear, initial encounter  -- Left ear, report putting paper towels in his ear as things are loud for him. Advised on not putting anything in the ears. Ear lavage attempted today but unsuccessful for  "removal. Refer to ENT.   -     Adult ENT  Referral; Future    Other orders  -     PRIMARY CARE FOLLOW-UP SCHEDULING; Future      Patient has been advised of split billing requirements and indicates understanding: Yes      COUNSELING:  Reviewed preventive health counseling, as reflected in patient instructions       Regular exercise       Healthy diet/nutrition       Colon cancer screening       Prostate cancer screening      BMI:   Estimated body mass index is 30.43 kg/m  as calculated from the following:    Height as of this encounter: 1.765 m (5' 9.49\").    Weight as of this encounter: 94.8 kg (209 lb).   Weight management plan: Discussed healthy diet and exercise guidelines      He reports that he has never smoked. He has never used smokeless tobacco.      Appropriate preventive services were discussed with this patient, including applicable screening as appropriate for cardiovascular disease, diabetes, osteopenia/osteoporosis, and glaucoma.  As appropriate for age/gender, discussed screening for colorectal cancer, prostate cancer, breast cancer, and cervical cancer. Checklist reviewing preventive services available has been given to the patient.    Reviewed patients plan of care and provided an AVS. The Basic Care Plan (routine screening as documented in Health Maintenance) for Jamal meets the Care Plan requirement. This Care Plan has been established and reviewed with the Patient.          Raad Benitez DO  [facility]    Identified Health Risks:    I have reviewed Opioid Use Disorder and Substance Use Disorder risk factors and made any needed referrals.     "

## 2023-03-30 ENCOUNTER — PRE VISIT (OUTPATIENT)
Dept: CARDIOLOGY | Facility: CLINIC | Age: 68
End: 2023-03-30

## 2023-03-30 ENCOUNTER — OFFICE VISIT (OUTPATIENT)
Dept: CARDIOLOGY | Facility: CLINIC | Age: 68
End: 2023-03-30
Attending: NURSE PRACTITIONER
Payer: MEDICARE

## 2023-03-30 ENCOUNTER — LAB (OUTPATIENT)
Dept: LAB | Facility: CLINIC | Age: 68
End: 2023-03-30
Payer: MEDICARE

## 2023-03-30 ENCOUNTER — TELEPHONE (OUTPATIENT)
Dept: CARDIOLOGY | Facility: CLINIC | Age: 68
End: 2023-03-30

## 2023-03-30 VITALS
DIASTOLIC BLOOD PRESSURE: 79 MMHG | BODY MASS INDEX: 30.58 KG/M2 | OXYGEN SATURATION: 98 % | HEART RATE: 108 BPM | WEIGHT: 210 LBS | SYSTOLIC BLOOD PRESSURE: 121 MMHG

## 2023-03-30 DIAGNOSIS — R06.09 DOE (DYSPNEA ON EXERTION): ICD-10-CM

## 2023-03-30 DIAGNOSIS — R00.0 TACHYCARDIA: ICD-10-CM

## 2023-03-30 DIAGNOSIS — I25.10 CORONARY ARTERY DISEASE INVOLVING NATIVE CORONARY ARTERY OF NATIVE HEART WITHOUT ANGINA PECTORIS: Primary | ICD-10-CM

## 2023-03-30 DIAGNOSIS — E78.5 HYPERLIPIDEMIA LDL GOAL <70: ICD-10-CM

## 2023-03-30 DIAGNOSIS — I10 BENIGN ESSENTIAL HYPERTENSION: ICD-10-CM

## 2023-03-30 LAB
ANION GAP SERPL CALCULATED.3IONS-SCNC: 15 MMOL/L (ref 7–15)
BUN SERPL-MCNC: 15.4 MG/DL (ref 8–23)
CALCIUM SERPL-MCNC: 10.1 MG/DL (ref 8.8–10.2)
CHLORIDE SERPL-SCNC: 98 MMOL/L (ref 98–107)
CREAT SERPL-MCNC: 0.96 MG/DL (ref 0.67–1.17)
D DIMER PPP FEU-MCNC: 0.33 UG/ML FEU (ref 0–0.5)
DEPRECATED HCO3 PLAS-SCNC: 25 MMOL/L (ref 22–29)
GFR SERPL CREATININE-BSD FRML MDRD: 86 ML/MIN/1.73M2
GLUCOSE SERPL-MCNC: 88 MG/DL (ref 70–99)
NT-PROBNP SERPL-MCNC: <36 PG/ML (ref 0–900)
POTASSIUM SERPL-SCNC: 4.4 MMOL/L (ref 3.4–5.3)
SODIUM SERPL-SCNC: 138 MMOL/L (ref 136–145)
TSH SERPL DL<=0.005 MIU/L-ACNC: 1.33 UIU/ML (ref 0.3–4.2)

## 2023-03-30 PROCEDURE — 83880 ASSAY OF NATRIURETIC PEPTIDE: CPT | Performed by: NURSE PRACTITIONER

## 2023-03-30 PROCEDURE — 93000 ELECTROCARDIOGRAM COMPLETE: CPT | Performed by: NURSE PRACTITIONER

## 2023-03-30 PROCEDURE — 85379 FIBRIN DEGRADATION QUANT: CPT | Performed by: NURSE PRACTITIONER

## 2023-03-30 PROCEDURE — 84443 ASSAY THYROID STIM HORMONE: CPT | Performed by: NURSE PRACTITIONER

## 2023-03-30 PROCEDURE — 99214 OFFICE O/P EST MOD 30 MIN: CPT | Performed by: NURSE PRACTITIONER

## 2023-03-30 PROCEDURE — 36415 COLL VENOUS BLD VENIPUNCTURE: CPT | Performed by: NURSE PRACTITIONER

## 2023-03-30 RX ORDER — FUROSEMIDE 20 MG
20 TABLET ORAL DAILY
Qty: 10 TABLET | Refills: 0 | Status: SHIPPED | OUTPATIENT
Start: 2023-03-30 | End: 2023-04-19

## 2023-03-30 NOTE — PATIENT INSTRUCTIONS
Medication Changes:  Call my nurse to let us know if you are taking furosemide (lasix)     Recommendations:  Check blood pressure at least 1 hour after medications. Call the clinic if your blood pressure is consistently greater than 130/80.     Follow-up:  Lab today (D-dimer)  Echo   I added on BMP, BNP (fluid levels), and TSH (thyroid) to your previous lab work from yesterday   You can retry your inhaler to see if it helps    Follow-up with KATELYN Alonso or Dr. Cruz in 1 month  Call 6 months prior, to schedule.     Cardiology Scheduling~[phone]  Cardiology Clinic RN~[phone] (Shauna RN, Marian RN, Leanna RN)

## 2023-03-30 NOTE — PROGRESS NOTES
Cardiology Clinic Progress Note  Jamal Sanchez MRN# [id number]   YOB: 1955 Age: 68 year old      Primary Cardiologist:   Dr. Cruz          History of Presenting Illness:      Jamal Sanchez is a pleasant 68 year old patient with a past cardiac history significant for   1. CAD    Angio 9/2022 STORM x1 to the mid LAD, D2 50%, 50% throughout LCx  2. Hyperlipidemia  3. Hypertension  Past medical history significant for obesity.    Echocardiogram 8/31/2022 showing normal EF 55 to 60%, normal RV, no significant valvular disease.    Fall of 2022 he was seen for worsening dyspnea and atypical angina.  He had severely elevated LV filling pressures on angiogram and had stent placed September 2022. He had been off of beta-blocker due to bradycardia.  He was started on 1 week trial of Lasix.  At follow-up, unfortunately he had no improvement in symptoms and weight was down 5 pounds with adhering to fluid restriction and low-sodium diet.  Prior normal PFTs in 2021.  Patient planned to restart swimming to see if this helped his dyspnea.  He was also agreeable to trying higher dose Lasix for a week.    Pt presents today for 6-month follow-up.  He completed cardiac rehab without difficulty.  After trying increased dose of Lasix, he cannot recall if he had any significant improvement in shortness of breath.  However, he continues with intermittent dyspnea on exertion and states that this is worse when he is not active.  He had recent back injury and has been less active overall but has continued swimming.  He has had increased dyspnea on exertion recently and needs to stop and rest from walking to the mailbox which is only 200 feet.  He is mildly tachycardic today.  EKG, personally reviewed by me, shows sinus rhythm with ventricular rate 99 bpm. Patient reports no chest pain, PND, orthopnea, presyncope, syncope, edema, heart racing, or palpitations.    I reviewed his case with Dr. Cruz today who recommended  also adding on TSH, limited echo, BNP and considering right heart cath.      Addendum: D-dimer, TSH, BNP were all WNL today.  He had not been taking Lasix but restarted 1 week trial today, to see if any improvement in his shortness of breath.        Labs:  LIPID RESULTS:  Lab Results   Component Value Date    CHOL 133 11/14/2022    CHOL 225 (H) 02/22/2021    HDL 51 11/14/2022    HDL 60 02/22/2021    LDL 63 11/14/2022     (H) 02/22/2021    TRIG 95 11/14/2022    TRIG 209 (H) 02/22/2021    CHOLHDLRATIO 3.2 08/12/2015       LIVER ENZYME RESULTS:  Lab Results   Component Value Date    AST 41 11/23/2021    AST 14 07/02/2019    ALT 23 11/14/2022    ALT 26 07/02/2019       CBC RESULTS:  Lab Results   Component Value Date    WBC 6.3 09/14/2022    WBC 6.1 07/10/2019    RBC 4.84 09/14/2022    RBC 5.22 07/10/2019    HGB 14.9 09/14/2022    HGB 15.0 07/10/2019    HCT 45.6 09/14/2022    HCT 46.2 07/10/2019    MCV 94 09/14/2022    MCV 89 07/10/2019    MCH 30.8 09/14/2022    MCH 28.7 07/10/2019    MCHC 32.7 09/14/2022    MCHC 32.5 07/10/2019    RDW 13.6 09/14/2022    RDW 13.0 07/10/2019     09/14/2022     07/10/2019       BMP RESULTS:  Lab Results   Component Value Date     11/14/2022     02/22/2021    POTASSIUM 4.2 11/14/2022    POTASSIUM 4.0 09/07/2022    POTASSIUM 4.2 02/22/2021    CHLORIDE 105 11/14/2022    CHLORIDE 107 09/07/2022    CHLORIDE 102 02/22/2021    CO2 28 11/14/2022    CO2 27 09/07/2022    CO2 28 02/22/2021    ANIONGAP 8 11/14/2022    ANIONGAP 4 09/07/2022    ANIONGAP 6 02/22/2021     (H) 11/14/2022     (H) 09/07/2022    GLC 90 02/22/2021    BUN 18.3 11/14/2022    BUN 13 09/07/2022    BUN 23 02/22/2021    CR 0.86 11/14/2022    CR 0.89 02/22/2021    GFRESTIMATED >90 11/14/2022    GFRESTIMATED 88 02/22/2021    GFRESTBLACK >90 02/22/2021    ALYCE 9.6 11/14/2022    ALYCE 9.4 02/22/2021        A1C RESULTS:  Lab Results   Component Value Date    A1C 6.1 (H) 03/29/2023       INR  RESULTS:  Lab Results   Component Value Date    INR 1.02 09/07/2022    INR 1.10 11/22/2018    INR 1.10 11/22/2018       Results reviewed today.       Current Cardiac Medications     Current Outpatient Medications   Medication Sig Dispense Refill     albuterol (PROAIR HFA/PROVENTIL HFA/VENTOLIN HFA) 108 (90 Base) MCG/ACT inhaler Inhale 2 puffs into the lungs every 4 hours as needed for shortness of breath 18 g 1     aspirin (ASA) 81 MG EC tablet Take 1 tablet (81 mg) by mouth daily Start tomorrow. 30 tablet 3     atorvastatin (LIPITOR) 40 MG tablet Take 1 tablet (40 mg) by mouth daily 90 tablet 3     calcium carbonate (TUMS) 500 MG chewable tablet Take 1 chew tab by mouth daily as needed        clopidogrel (PLAVIX) 75 MG tablet Take 1 tablet (75 mg) by mouth daily 90 tablet 3     COMPOUNDED NON-CONTROLLED SUBSTANCE (CMPD RX) - PHARMACY TO MIX COMPOUNDED MEDICATION Inject 0.1 mL intra cavernously for erection. Gradually increase dose if needed . Maximum 0.2 mL 2.5 mL 3     cyclobenzaprine (FLEXERIL) 5 MG tablet Take 1 tablet (5 mg) by mouth 3 times daily as needed for muscle spasms 30 tablet 0     Digestive Enzymes (DIGESTIVE ENZYME PO)        famotidine (PEPCID) 20 MG tablet Take 1 tablet (20 mg) by mouth 2 times daily 180 tablet 2     furosemide (LASIX) 40 MG tablet Take 1 tablet (40 mg) by mouth daily 20 tablet 0     glucosamine-chondroitin 500-400 MG CAPS per capsule Take 1 capsule by mouth daily       ibuprofen (ADVIL/MOTRIN) 200 MG tablet Take 800 mg by mouth every 8 hours as needed        ketoconazole (NIZORAL) 2 % external cream Apply to feet BID x 2-3 weeks PRN 60 g 1     lisinopril (ZESTRIL) 10 MG tablet Take 1 tablet (10 mg) by mouth daily Hold if Systolic Blood Pressure is less than 85. 90 tablet 3     magnesium 250 MG tablet Take 1 tablet by mouth daily       multivitamin (CENTRUM SILVER) tablet Take 1 tablet by mouth daily       naproxen (NAPROSYN) 500 MG tablet Take 1 tablet (500 mg) by mouth 2 times  daily as needed for moderate pain (4-6) Take with plenty of food and water 60 tablet 0     nitroGLYcerin (NITROSTAT) 0.4 MG sublingual tablet For chest pain place 1 tablet under the tongue every 5 minutes for 3 doses. If symptoms persist 5 minutes after 2nd dose call 911. 30 tablet 3     omeprazole (PRILOSEC) 40 MG DR capsule Take 1 capsule (40 mg) by mouth daily 90 capsule 3     oxybutynin ER (DITROPAN XL) 10 MG 24 hr tablet Take 1 tablet (10 mg) by mouth daily 90 tablet 3     sildenafil (REVATIO) 20 MG tablet Take 2 to 5 tablets 1 hour prior to intercourse 90 tablet 3     tamsulosin (FLOMAX) 0.4 MG capsule Take 1 capsule (0.4 mg) by mouth daily 90 capsule 3     triamcinolone (KENALOG) 0.1 % external cream Apply topically 3 times daily 80 g 3     triamterene-HCTZ (MAXZIDE) 75-50 MG tablet Take 1 tablet by mouth daily 90 tablet 3     vitamin C (ASCORBIC ACID) 1000 MG TABS Take 1,000 mg by mouth daily                        Assessment and Plan:       Plan    Patient Instructions   Medication Changes:  1. Call my nurse to let us know if you are taking furosemide (lasix)     Recommendations:  1. Check blood pressure at least 1 hour after medications. Call the clinic if your blood pressure is consistently greater than 130/80.     Follow-up:  1. Lab today (D-dimer)  2. Limited Echo for ZEPEDA  3. I added on BMP, BNP (fluid levels), and TSH (thyroid) to your previous lab work from yesterday   4. You can retry your inhaler to see if it helps    5. Follow-up with KATELYN Alonso or Dr. Cruz in 1 month- consider RHC if needed ? angio  Call 6 months prior, to schedule.     Cardiology Scheduling~[phone]  Cardiology Clinic RN~[phone] (Shauna RN, Marian RN, Leanna RN)              1. CAD    ZEPEDA intermittently- see below     Continue statin, aspirin, ACE inhibitor, beta-blocker    DAPT, uninterrupted, for at least 1 year (9/2023)    Consider long-term Plavix instead of aspirin, per interventionalist        2. Hyperlipidemia      Last LDL 63 on 11/2022    Continue atorvastatin 40 mg daily        3. Hypertension    Controlled    Continue lisinopril, metoprolol, triamterene-HCTZ        4. ZEPEDA    Severely elevated LV filling pressures on anigo but no imporvement after stent or lasix    Hemoglobin was WNL    Improves with routine exercise    PFTs WNL 11/2021    TSH, ddimer, BNP WNL 3/2023    Consider left and right heart cath               Thank you for allowing me to participate in this delightful patient's care.      This note was completed in part using Dragon voice recognition software. Although reviewed after completion, some word and grammatical errors may occur.    Celeste Torres, APRN CNP, APRN, CNP           Data:   All laboratory data reviewed      Total time spent today was 36 mins, reviewing labs, testing, notes, documenting notes, and seeing patient.          Constitutional:  cooperative, alert and oriented, well developed, well nourished, in no acute distress  overweight      Skin:  warm and dry to the touch         Head:  normocephalic       Eyes:  pupils equal and round       ENT:  no pallor or cyanosis       Neck:  no stiffness       Respiratory:  clear to auscultation; normal symmetry        Cardiac: regular rate and rhythm; normal S1 and S2                 pulses full and equal     GI:  abdomen soft, nondistended     Extremities and Muscular Skeletal:  no edema        Neurological:  affect appropriate; no gross motor deficits       Psych:  Alert and Oriented x 3 , appropriate affact

## 2023-03-30 NOTE — LETTER
3/30/2023    Raad GRACE Eli, DO  [address] [facility] [address]    RE: Jamal Sanchez       Dear Colleague,     I had the pleasure of seeing Jamal Sanchez in the [facility].  Cardiology Clinic Progress Note  Jamal Sanchez MRN# [id number]   YOB: 1955 Age: 68 year old      Primary Cardiologist:   Dr. Cruz          History of Presenting Illness:      Jamal Sanchez is a pleasant 68 year old patient with a past cardiac history significant for   1. CAD    Angio 9/2022 STORM x1 to the mid LAD, D2 50%, 50% throughout LCx  2. Hyperlipidemia  3. Hypertension  Past medical history significant for obesity.    Echocardiogram 8/31/2022 showing normal EF 55 to 60%, normal RV, no significant valvular disease.    Fall of 2022 he was seen for worsening dyspnea and atypical angina.  He had severely elevated LV filling pressures on angiogram and had stent placed September 2022. He had been off of beta-blocker due to bradycardia.  He was started on 1 week trial of Lasix.  At follow-up, unfortunately he had no improvement in symptoms and weight was down 5 pounds with adhering to fluid restriction and low-sodium diet.  Prior normal PFTs in 2021.  Patient planned to restart swimming to see if this helped his dyspnea.  He was also agreeable to trying higher dose Lasix for a week.    Pt presents today for 6-month follow-up.  He completed cardiac rehab without difficulty.  After trying increased dose of Lasix, he cannot recall if he had any significant improvement in shortness of breath.  However, he continues with intermittent dyspnea on exertion and states that this is worse when he is not active.  He had recent back injury and has been less active overall but has continued swimming.  He has had increased dyspnea on exertion recently and needs to stop and rest from walking to the mailbox which is only 200 feet.  He is mildly tachycardic today.  EKG, personally reviewed by me, shows  sinus rhythm with ventricular rate 99 bpm. Patient reports no chest pain, PND, orthopnea, presyncope, syncope, edema, heart racing, or palpitations.    I reviewed his case with Dr. Cruz today who recommended also adding on TSH, limited echo, BNP and considering right heart cath.      Addendum: D-dimer, TSH, BNP were all WNL today.  He had not been taking Lasix but restarted 1 week trial today, to see if any improvement in his shortness of breath.        Labs:  LIPID RESULTS:  Lab Results   Component Value Date    CHOL 133 11/14/2022    CHOL 225 (H) 02/22/2021    HDL 51 11/14/2022    HDL 60 02/22/2021    LDL 63 11/14/2022     (H) 02/22/2021    TRIG 95 11/14/2022    TRIG 209 (H) 02/22/2021    CHOLHDLRATIO 3.2 08/12/2015       LIVER ENZYME RESULTS:  Lab Results   Component Value Date    AST 41 11/23/2021    AST 14 07/02/2019    ALT 23 11/14/2022    ALT 26 07/02/2019       CBC RESULTS:  Lab Results   Component Value Date    WBC 6.3 09/14/2022    WBC 6.1 07/10/2019    RBC 4.84 09/14/2022    RBC 5.22 07/10/2019    HGB 14.9 09/14/2022    HGB 15.0 07/10/2019    HCT 45.6 09/14/2022    HCT 46.2 07/10/2019    MCV 94 09/14/2022    MCV 89 07/10/2019    MCH 30.8 09/14/2022    MCH 28.7 07/10/2019    MCHC 32.7 09/14/2022    MCHC 32.5 07/10/2019    RDW 13.6 09/14/2022    RDW 13.0 07/10/2019     09/14/2022     07/10/2019       BMP RESULTS:  Lab Results   Component Value Date     11/14/2022     02/22/2021    POTASSIUM 4.2 11/14/2022    POTASSIUM 4.0 09/07/2022    POTASSIUM 4.2 02/22/2021    CHLORIDE 105 11/14/2022    CHLORIDE 107 09/07/2022    CHLORIDE 102 02/22/2021    CO2 28 11/14/2022    CO2 27 09/07/2022    CO2 28 02/22/2021    ANIONGAP 8 11/14/2022    ANIONGAP 4 09/07/2022    ANIONGAP 6 02/22/2021     (H) 11/14/2022     (H) 09/07/2022    GLC 90 02/22/2021    BUN 18.3 11/14/2022    BUN 13 09/07/2022    BUN 23 02/22/2021    CR 0.86 11/14/2022    CR 0.89 02/22/2021    GFRESTIMATED >90  11/14/2022    GFRESTIMATED 88 02/22/2021    GFRESTBLACK >90 02/22/2021    ALYCE 9.6 11/14/2022    ALYCE 9.4 02/22/2021        A1C RESULTS:  Lab Results   Component Value Date    A1C 6.1 (H) 03/29/2023       INR RESULTS:  Lab Results   Component Value Date    INR 1.02 09/07/2022    INR 1.10 11/22/2018    INR 1.10 11/22/2018       Results reviewed today.       Current Cardiac Medications     Current Outpatient Medications   Medication Sig Dispense Refill     albuterol (PROAIR HFA/PROVENTIL HFA/VENTOLIN HFA) 108 (90 Base) MCG/ACT inhaler Inhale 2 puffs into the lungs every 4 hours as needed for shortness of breath 18 g 1     aspirin (ASA) 81 MG EC tablet Take 1 tablet (81 mg) by mouth daily Start tomorrow. 30 tablet 3     atorvastatin (LIPITOR) 40 MG tablet Take 1 tablet (40 mg) by mouth daily 90 tablet 3     calcium carbonate (TUMS) 500 MG chewable tablet Take 1 chew tab by mouth daily as needed        clopidogrel (PLAVIX) 75 MG tablet Take 1 tablet (75 mg) by mouth daily 90 tablet 3     COMPOUNDED NON-CONTROLLED SUBSTANCE (CMPD RX) - PHARMACY TO MIX COMPOUNDED MEDICATION Inject 0.1 mL intra cavernously for erection. Gradually increase dose if needed . Maximum 0.2 mL 2.5 mL 3     cyclobenzaprine (FLEXERIL) 5 MG tablet Take 1 tablet (5 mg) by mouth 3 times daily as needed for muscle spasms 30 tablet 0     Digestive Enzymes (DIGESTIVE ENZYME PO)        famotidine (PEPCID) 20 MG tablet Take 1 tablet (20 mg) by mouth 2 times daily 180 tablet 2     furosemide (LASIX) 40 MG tablet Take 1 tablet (40 mg) by mouth daily 20 tablet 0     glucosamine-chondroitin 500-400 MG CAPS per capsule Take 1 capsule by mouth daily       ibuprofen (ADVIL/MOTRIN) 200 MG tablet Take 800 mg by mouth every 8 hours as needed        ketoconazole (NIZORAL) 2 % external cream Apply to feet BID x 2-3 weeks PRN 60 g 1     lisinopril (ZESTRIL) 10 MG tablet Take 1 tablet (10 mg) by mouth daily Hold if Systolic Blood Pressure is less than 85. 90 tablet 3      magnesium 250 MG tablet Take 1 tablet by mouth daily       multivitamin (CENTRUM SILVER) tablet Take 1 tablet by mouth daily       naproxen (NAPROSYN) 500 MG tablet Take 1 tablet (500 mg) by mouth 2 times daily as needed for moderate pain (4-6) Take with plenty of food and water 60 tablet 0     nitroGLYcerin (NITROSTAT) 0.4 MG sublingual tablet For chest pain place 1 tablet under the tongue every 5 minutes for 3 doses. If symptoms persist 5 minutes after 2nd dose call 911. 30 tablet 3     omeprazole (PRILOSEC) 40 MG DR capsule Take 1 capsule (40 mg) by mouth daily 90 capsule 3     oxybutynin ER (DITROPAN XL) 10 MG 24 hr tablet Take 1 tablet (10 mg) by mouth daily 90 tablet 3     sildenafil (REVATIO) 20 MG tablet Take 2 to 5 tablets 1 hour prior to intercourse 90 tablet 3     tamsulosin (FLOMAX) 0.4 MG capsule Take 1 capsule (0.4 mg) by mouth daily 90 capsule 3     triamcinolone (KENALOG) 0.1 % external cream Apply topically 3 times daily 80 g 3     triamterene-HCTZ (MAXZIDE) 75-50 MG tablet Take 1 tablet by mouth daily 90 tablet 3     vitamin C (ASCORBIC ACID) 1000 MG TABS Take 1,000 mg by mouth daily                        Assessment and Plan:       Plan    Patient Instructions   Medication Changes:  1. Call my nurse to let us know if you are taking furosemide (lasix)     Recommendations:  1. Check blood pressure at least 1 hour after medications. Call the clinic if your blood pressure is consistently greater than 130/80.     Follow-up:  1. Lab today (D-dimer)  2. Limited Echo for ZEPEDA  3. I added on BMP, BNP (fluid levels), and TSH (thyroid) to your previous lab work from yesterday   4. You can retry your inhaler to see if it helps    5. Follow-up with KATELYN Alonso or Dr. Cruz in 1 month- consider RHC if needed ? angio  Call 6 months prior, to schedule.     Cardiology Scheduling~[phone]  Cardiology Clinic RN~[phone] (Shauna RN, Marian RN, Leanna RN)              1. CAD    ZEPEDA intermittently- see below      Continue statin, aspirin, ACE inhibitor, beta-blocker    DAPT, uninterrupted, for at least 1 year (9/2023)    Consider long-term Plavix instead of aspirin, per interventionalist        2. Hyperlipidemia     Last LDL 63 on 11/2022    Continue atorvastatin 40 mg daily        3. Hypertension    Controlled    Continue lisinopril, metoprolol, triamterene-HCTZ        4. ZEPEDA    Severely elevated LV filling pressures on anigo but no imporvement after stent or lasix    Hemoglobin was WNL    Improves with routine exercise    PFTs WNL 11/2021    TSH, ddimer, BNP WNL 3/2023    Consider left and right heart cath               Thank you for allowing me to participate in this delightful patient's care.      This note was completed in part using Dragon voice recognition software. Although reviewed after completion, some word and grammatical errors may occur.    HERBERTH Hdez CNP, APRN, CNP           Data:   All laboratory data reviewed      Total time spent today was 36 mins, reviewing labs, testing, notes, documenting notes, and seeing patient.          Constitutional:  cooperative, alert and oriented, well developed, well nourished, in no acute distress  overweight      Skin:  warm and dry to the touch         Head:  normocephalic       Eyes:  pupils equal and round       ENT:  no pallor or cyanosis       Neck:  no stiffness       Respiratory:  clear to auscultation; normal symmetry        Cardiac: regular rate and rhythm; normal S1 and S2                 pulses full and equal     GI:  abdomen soft, nondistended     Extremities and Muscular Skeletal:  no edema        Neurological:  affect appropriate; no gross motor deficits       Psych:  Alert and Oriented x 3 , appropriate affact    Thank you for allowing me to participate in the care of your patient.      Sincerely,     HERBERTH Hdez CNP     [facility]  cc:   Celeste  Jayashree Simons, APRN CNP  [address]  [place] [address]

## 2023-04-03 ENCOUNTER — HOSPITAL ENCOUNTER (OUTPATIENT)
Dept: PHYSICAL THERAPY | Facility: CLINIC | Age: 68
Setting detail: THERAPIES SERIES
Discharge: HOME | End: 2023-04-03
Attending: STUDENT IN AN ORGANIZED HEALTH CARE EDUCATION/TRAINING PROGRAM
Payer: MEDICARE

## 2023-04-03 PROCEDURE — 97140 MANUAL THERAPY 1/> REGIONS: CPT | Mod: GP | Performed by: PHYSICAL THERAPIST

## 2023-04-03 PROCEDURE — 97110 THERAPEUTIC EXERCISES: CPT | Mod: GP | Performed by: PHYSICAL THERAPIST

## 2023-04-04 ENCOUNTER — TELEPHONE (OUTPATIENT)
Dept: CARDIOLOGY | Facility: CLINIC | Age: 68
End: 2023-04-04
Payer: MEDICARE

## 2023-04-04 DIAGNOSIS — R00.0 TACHYCARDIA: Primary | ICD-10-CM

## 2023-04-04 NOTE — TELEPHONE ENCOUNTER
Patient called to report that his heart rates have been higher lately. He is typically in the 110s and at night he never gets below 93. He has been monitoring it every couple of hours or so with his BP machine. His BP is in the 120s/80s. He feels maybe a tiny bit jittery. Stopped all caffeine 5 days ago and has noted maybe a 5-10 point decrease but not much else. He is not on any medications that would affect heart rate. Pt asked what he should do if his heart rate goes much higher--enc pt to try to relax as continual monitoring could be increasing his heart rate by causing increased anxiety. Will discuss with Celeste Simons NP for further recommendations. Shauna De La Rosa RN Cardiology April 4, 2023, 3:31 PM

## 2023-04-06 ENCOUNTER — TELEPHONE (OUTPATIENT)
Dept: OTOLARYNGOLOGY | Facility: CLINIC | Age: 68
End: 2023-04-06
Payer: MEDICARE

## 2023-04-06 NOTE — TELEPHONE ENCOUNTER
Left detailed message on identified VM notifying patient that Dr. Ramos is out on vacation this week and is heavily booked the following week. Suggested to patient (advised by nurse Rio) to reach out to family practice for sooner appointment or to be seen at urgent care if not able to get sooner appt. with family practice. Specialty Clinic phone number provided for call back with any questions.     Aleksey Fajardo  [facility]ty [facility]

## 2023-04-06 NOTE — TELEPHONE ENCOUNTER
Has he been using albuterol? I recommended trying this to see if it helped his ZEPEDA, but this could be worsening his tachycardia.     Has he had increased ETOH or had withdrawal, high levels of caffeine, smoking, or illegal drugs?     If none of the above, we can draw a CBC to check for anemia or infection and magnesium level.     HERBERTH Hdez CNP

## 2023-04-06 NOTE — TELEPHONE ENCOUNTER
Reason for Call:  Other appointment    Detailed comments: Patient was wondering if he should be seen sooner than first availability on 8/23/23 appointment, due to having foreign body of left ear, paper towel in left ear, unable to remove with ear lavage.    Phone Number Patient can be reached at: Home number on file [phone] (home)    Best Time: anytime    Can we leave a detailed message on this number? YES   Thank you,  Ryanne Vazquez  Specialty Clinic -   [facility]      Call taken on 4/6/2023 at 12:03 PM by Ryanne Vazquez

## 2023-04-07 NOTE — TELEPHONE ENCOUNTER
That would be fine to add a BMP also. I placed order and signed it for today.     Did he mention if he is using the albuterol, as that may contribute to some tachycardia.     HERBERTH Hdez CNP

## 2023-04-07 NOTE — TELEPHONE ENCOUNTER
Pt had only used albuterol once, the day of the original call to see if helped SOB which it did not. Shauna De La Rosa RN Cardiology April 7, 2023, 12:56 PM

## 2023-04-10 NOTE — TELEPHONE ENCOUNTER
Pt no showed for labs on 4/7/23. Has lab appt on 4/13/23. Will get them done then. Shauna De La Rosa RN Cardiology April 10, 2023, 9:38 AM

## 2023-04-13 ENCOUNTER — LAB (OUTPATIENT)
Dept: LAB | Facility: CLINIC | Age: 68
End: 2023-04-13
Payer: MEDICARE

## 2023-04-13 DIAGNOSIS — R00.0 TACHYCARDIA: ICD-10-CM

## 2023-04-13 DIAGNOSIS — E78.5 HYPERLIPIDEMIA LDL GOAL <70: ICD-10-CM

## 2023-04-13 LAB
ALT SERPL W P-5'-P-CCNC: 21 U/L (ref 10–50)
ANION GAP SERPL CALCULATED.3IONS-SCNC: 10 MMOL/L (ref 7–15)
BUN SERPL-MCNC: 16.9 MG/DL (ref 8–23)
CALCIUM SERPL-MCNC: 9.4 MG/DL (ref 8.8–10.2)
CHLORIDE SERPL-SCNC: 99 MMOL/L (ref 98–107)
CHOLEST SERPL-MCNC: 109 MG/DL
CREAT SERPL-MCNC: 1.01 MG/DL (ref 0.67–1.17)
DEPRECATED HCO3 PLAS-SCNC: 28 MMOL/L (ref 22–29)
ERYTHROCYTE [DISTWIDTH] IN BLOOD BY AUTOMATED COUNT: 13.2 % (ref 10–15)
GFR SERPL CREATININE-BSD FRML MDRD: 81 ML/MIN/1.73M2
GLUCOSE SERPL-MCNC: 100 MG/DL (ref 70–99)
HCT VFR BLD AUTO: 44.1 % (ref 40–53)
HDLC SERPL-MCNC: 44 MG/DL
HGB BLD-MCNC: 14.7 G/DL (ref 13.3–17.7)
LDLC SERPL CALC-MCNC: 53 MG/DL
MAGNESIUM SERPL-MCNC: 2 MG/DL (ref 1.7–2.3)
MCH RBC QN AUTO: 31.4 PG (ref 26.5–33)
MCHC RBC AUTO-ENTMCNC: 33.3 G/DL (ref 31.5–36.5)
MCV RBC AUTO: 94 FL (ref 78–100)
NONHDLC SERPL-MCNC: 65 MG/DL
PLATELET # BLD AUTO: 185 10E3/UL (ref 150–450)
POTASSIUM SERPL-SCNC: 4 MMOL/L (ref 3.4–5.3)
RBC # BLD AUTO: 4.68 10E6/UL (ref 4.4–5.9)
SODIUM SERPL-SCNC: 137 MMOL/L (ref 136–145)
TRIGL SERPL-MCNC: 58 MG/DL
WBC # BLD AUTO: 4.6 10E3/UL (ref 4–11)

## 2023-04-13 PROCEDURE — 80048 BASIC METABOLIC PNL TOTAL CA: CPT | Performed by: NURSE PRACTITIONER

## 2023-04-13 PROCEDURE — 36415 COLL VENOUS BLD VENIPUNCTURE: CPT | Performed by: NURSE PRACTITIONER

## 2023-04-13 PROCEDURE — 85027 COMPLETE CBC AUTOMATED: CPT | Performed by: NURSE PRACTITIONER

## 2023-04-13 PROCEDURE — 84460 ALANINE AMINO (ALT) (SGPT): CPT | Performed by: NURSE PRACTITIONER

## 2023-04-13 PROCEDURE — 83735 ASSAY OF MAGNESIUM: CPT | Performed by: NURSE PRACTITIONER

## 2023-04-13 PROCEDURE — 80061 LIPID PANEL: CPT | Performed by: NURSE PRACTITIONER

## 2023-04-13 NOTE — TELEPHONE ENCOUNTER
If it was the TENS unit, it would not be causing tachycardia when he isn't using it...   Lets see if we can get him in for a follow-up, with me, sooner.  Looks like his echo is not until 4/27/2023.  We can consider left and right heart catheterization, if we need to.    HERBERTH Hdez CNP

## 2023-04-13 NOTE — TELEPHONE ENCOUNTER
Lab results back. BMP, CBC, Mag, ALT and Lipids unremarkable.     Pt states BP have been great 115/80. HR great when first wakes up in the morning or in middle of night in 80's, but once up and about with very minimal activity HR averages 115-130.   He denies any palpitation, racing heart or fluttering. But states everyday his SOB gets worse and worse.    The only thing he can think of that has changed is he hurt his back and has been using a TENS unit on upper back and shoulders. He spoke with a friend that mentioned he had the same issue and had to stop using it because it was messing up is heart rate and making it beat fast.     Unsure if it can interfere with electrical part of the heart?    Pt also took 10 days worth of lasix which did not improve his SOB or change in wt in anyway.    Pt scheduled to see you again 5/5/23    Marian Rutherford RN

## 2023-04-13 NOTE — CONFIDENTIAL NOTE
Routing to Celeste Simons,    Repeat BMP, CBC, ALT and Magnesium are WDL. Attempted to call patient but voicemail was not available. PCP appt on 4/19. Echo appt on 4/26. Updated OV with you on 5/5.    Any further recommendations for Tachycardia reasoning listed down below?    Kim Bacon RN

## 2023-04-14 NOTE — TELEPHONE ENCOUNTER
Possible opening for Celeste Simons on 4/20. Will monitor and reschedule OV if available. Otherwise, no earlier openings for provider at this time. Kim Bacon RN

## 2023-04-17 NOTE — TELEPHONE ENCOUNTER
Pt now has appt on 4/21/23 at 2:45pm to see Celeste Simons NP. Shauna De La Rosa RN Cardiology April 17, 2023, 9:08 AM

## 2023-04-18 DIAGNOSIS — I25.10 CAD (CORONARY ARTERY DISEASE): ICD-10-CM

## 2023-04-18 RX ORDER — NITROGLYCERIN 0.4 MG/1
TABLET SUBLINGUAL
Qty: 25 TABLET | Refills: 3 | Status: SHIPPED | OUTPATIENT
Start: 2023-04-18

## 2023-04-18 NOTE — PROGRESS NOTES
[facility] Refill Guideline reviewed.  Medication meets criteria for refill.     Marian Rutherford RN

## 2023-04-19 ENCOUNTER — TELEPHONE (OUTPATIENT)
Dept: CARDIOLOGY | Facility: CLINIC | Age: 68
End: 2023-04-19

## 2023-04-19 ENCOUNTER — OFFICE VISIT (OUTPATIENT)
Dept: FAMILY MEDICINE | Facility: CLINIC | Age: 68
End: 2023-04-19
Payer: MEDICARE

## 2023-04-19 ENCOUNTER — ANCILLARY PROCEDURE (OUTPATIENT)
Dept: GENERAL RADIOLOGY | Facility: CLINIC | Age: 68
End: 2023-04-19
Attending: NURSE PRACTITIONER
Payer: MEDICARE

## 2023-04-19 VITALS
BODY MASS INDEX: 30.43 KG/M2 | TEMPERATURE: 97.3 F | DIASTOLIC BLOOD PRESSURE: 80 MMHG | WEIGHT: 209 LBS | SYSTOLIC BLOOD PRESSURE: 110 MMHG | OXYGEN SATURATION: 98 % | RESPIRATION RATE: 14 BRPM | HEART RATE: 102 BPM

## 2023-04-19 DIAGNOSIS — R06.02 SOBOE (SHORTNESS OF BREATH ON EXERTION): ICD-10-CM

## 2023-04-19 DIAGNOSIS — M48.062 SPINAL STENOSIS OF LUMBAR REGION WITH NEUROGENIC CLAUDICATION: ICD-10-CM

## 2023-04-19 DIAGNOSIS — R06.02 SOBOE (SHORTNESS OF BREATH ON EXERTION): Primary | ICD-10-CM

## 2023-04-19 DIAGNOSIS — J20.9 ACUTE BRONCHITIS WITH SYMPTOMS > 10 DAYS: ICD-10-CM

## 2023-04-19 DIAGNOSIS — E78.5 HYPERLIPIDEMIA LDL GOAL <100: ICD-10-CM

## 2023-04-19 DIAGNOSIS — D50.8 OTHER IRON DEFICIENCY ANEMIA: ICD-10-CM

## 2023-04-19 DIAGNOSIS — N40.0 BENIGN PROSTATIC HYPERPLASIA WITHOUT LOWER URINARY TRACT SYMPTOMS: ICD-10-CM

## 2023-04-19 DIAGNOSIS — R73.03 PREDIABETES: ICD-10-CM

## 2023-04-19 DIAGNOSIS — K21.00 GASTROESOPHAGEAL REFLUX DISEASE WITH ESOPHAGITIS WITHOUT HEMORRHAGE: ICD-10-CM

## 2023-04-19 DIAGNOSIS — I10 HYPERTENSION, GOAL BELOW 140/90: ICD-10-CM

## 2023-04-19 DIAGNOSIS — R00.0 TACHYCARDIA: Primary | ICD-10-CM

## 2023-04-19 DIAGNOSIS — I25.118 CORONARY ARTERY DISEASE OF NATIVE HEART WITH STABLE ANGINA PECTORIS, UNSPECIFIED VESSEL OR LESION TYPE (H): ICD-10-CM

## 2023-04-19 DIAGNOSIS — M50.30 DDD (DEGENERATIVE DISC DISEASE), CERVICAL: ICD-10-CM

## 2023-04-19 PROCEDURE — 71046 X-RAY EXAM CHEST 2 VIEWS: CPT | Mod: TC | Performed by: RADIOLOGY

## 2023-04-19 PROCEDURE — 99214 OFFICE O/P EST MOD 30 MIN: CPT | Performed by: NURSE PRACTITIONER

## 2023-04-19 RX ORDER — DOXYCYCLINE 100 MG/1
100 CAPSULE ORAL 2 TIMES DAILY
Qty: 20 CAPSULE | Refills: 0 | Status: SHIPPED | OUTPATIENT
Start: 2023-04-19 | End: 2023-04-29

## 2023-04-19 RX ORDER — PREDNISONE 20 MG/1
40 TABLET ORAL DAILY
Qty: 10 TABLET | Refills: 0 | Status: SHIPPED | OUTPATIENT
Start: 2023-04-19 | End: 2023-04-24

## 2023-04-19 NOTE — PROGRESS NOTES
Assessment & Plan     SOBOE (shortness of breath on exertion)    - XR Chest 2 Views; Future  - General PFT Lab (Please always keep checked); Future  - Pulmonary Function Test; Future  - CT Chest w/o Contrast; Future    Acute bronchitis with symptoms > 10 days    - doxycycline hyclate (VIBRAMYCIN) 100 MG capsule; Take 1 capsule (100 mg) by mouth 2 times daily for 10 days  - predniSONE (DELTASONE) 20 MG tablet; Take 2 tablets (40 mg) by mouth daily for 5 days    Hypertension, goal below 140/90    - Med Therapy Management Referral    Coronary artery disease of native heart with stable angina pectoris, unspecified vessel or lesion type (H)    - Med Therapy Management Referral    Gastroesophageal reflux disease with esophagitis without hemorrhage    - Med Therapy Management Referral    Hyperlipidemia LDL goal <100    - Med Therapy Management Referral    Prediabetes    - Med Therapy Management Referral    Other iron deficiency anemia    - Med Therapy Management Referral    Benign prostatic hyperplasia without lower urinary tract symptoms    - Med Therapy Management Referral    Spinal stenosis of lumbar region with neurogenic claudication    - Med Therapy Management Referral    DDD (degenerative disc disease), cervical    - Med Therapy Management Referral      Call or return to the clinic with any worsening of symptoms or no resolution. Patient/Parent verbalized understanding and is in agreement. Medication side effects reviewed.   Current Outpatient Medications   Medication Sig Dispense Refill     albuterol (PROAIR HFA/PROVENTIL HFA/VENTOLIN HFA) 108 (90 Base) MCG/ACT inhaler Inhale 2 puffs into the lungs every 4 hours as needed for shortness of breath 18 g 1     aspirin (ASA) 81 MG EC tablet Take 1 tablet (81 mg) by mouth daily Start tomorrow. 30 tablet 3     atorvastatin (LIPITOR) 40 MG tablet Take 1 tablet (40 mg) by mouth daily 90 tablet 3     calcium carbonate (TUMS) 500 MG chewable tablet Take 1 chew tab by mouth  daily as needed        clopidogrel (PLAVIX) 75 MG tablet Take 1 tablet (75 mg) by mouth daily 90 tablet 3     cyclobenzaprine (FLEXERIL) 5 MG tablet Take 1 tablet (5 mg) by mouth 3 times daily as needed for muscle spasms 30 tablet 0     Digestive Enzymes (DIGESTIVE ENZYME PO)        famotidine (PEPCID) 20 MG tablet Take 1 tablet (20 mg) by mouth 2 times daily 180 tablet 2     glucosamine-chondroitin 500-400 MG CAPS per capsule Take 1 capsule by mouth daily       ibuprofen (ADVIL/MOTRIN) 200 MG tablet Take 800 mg by mouth every 8 hours as needed        ketoconazole (NIZORAL) 2 % external cream Apply to feet BID x 2-3 weeks PRN 60 g 1     lisinopril (ZESTRIL) 10 MG tablet Take 1 tablet (10 mg) by mouth daily Hold if Systolic Blood Pressure is less than 85. 90 tablet 3     multivitamin (CENTRUM SILVER) tablet Take 1 tablet by mouth daily       naproxen (NAPROSYN) 500 MG tablet Take 1 tablet (500 mg) by mouth 2 times daily as needed for moderate pain (4-6) Take with plenty of food and water (Patient not taking: Reported on 5/12/2023) 60 tablet 0     nitroGLYcerin (NITROSTAT) 0.4 MG sublingual tablet For chest pain place 1 tablet under the tongue every 5 minutes for 3 doses. If symptoms persist 5 minutes after 2nd dose call 911. 25 tablet 3     omeprazole (PRILOSEC) 40 MG DR capsule Take 1 capsule (40 mg) by mouth daily 90 capsule 3     oxybutynin ER (DITROPAN XL) 10 MG 24 hr tablet Take 1 tablet (10 mg) by mouth daily 90 tablet 3     tamsulosin (FLOMAX) 0.4 MG capsule Take 1 capsule (0.4 mg) by mouth daily 90 capsule 3     triamcinolone (KENALOG) 0.1 % external cream Apply topically 3 times daily 80 g 3     triamterene-HCTZ (MAXZIDE) 75-50 MG tablet Take 1 tablet by mouth daily 90 tablet 3     vitamin C (ASCORBIC ACID) 1000 MG TABS Take 1,000 mg by mouth daily       gabapentin (NEURONTIN) 100 MG capsule Take 1 capsule (100 mg) by mouth 3 times daily 90 capsule 1     metoprolol succinate ER (TOPROL XL) 25 MG 24 hr tablet  Take 1 tablet (25 mg) by mouth daily 30 tablet 11     tadalafil (CIALIS) 10 MG tablet Take 1-2 tablets daily as needed, 30-45 minutes prior to intended sexual activity 30 tablet 4   Chart documentation with Dragon Voice recognition Software. Although reviewed after completion, some words and grammatical errors may remain.      HERBERTH Jimenez CNP  [facility]    Taylor Herron is a 68 year old, presenting for the following health issues:  Back Pain and Tachycardia (SOB and fatigue )        4/19/2023    10:38 AM   Additional Questions   Roomed by Sunita FITCH     History of Present Illness       Reason for visit:  Tachycardia and shortness of breath.  Symptom onset:  1-2 weeks ago  Symptoms include:  Beats per min. Average about 117.  Also shortness if breath.  Symptom intensity:  Moderate  Symptom progression:  Worsening  Had these symptoms before:  No  What makes it worse:  Rapid movements  What makes it better:  Rest    He eats 2-3 servings of fruits and vegetables daily.He consumes 0 sweetened beverage(s) daily.He exercises with enough effort to increase his heart rate 30 to 60 minutes per day.  He exercises with enough effort to increase his heart rate 4 days per week.   He is taking medications regularly.     Back Pain Follow up   Back pain is better        Duration: Med started        Specific cause: lifting, turning/bending    Description:   Location of pain: low back left  Character of pain: dull ache, stabbing and waxing and waning  Pain radiation:radiates into the left buttocks and radiates into the left leg        History:   Pain interferes with job: Not applicable  History of back problems: recurrent self limited episodes of low back pain in the past  Any previous MRI or X-rays: Yes- at [place].  Date 3/22/2023  Sees a specialist for back pain:  No  Therapies tried without relief: activity, chiropractor, cold, heat, massage, muscle relaxants, NSAIDs, opioids and  Physical Therapy    Alleviating factors:   Improved by: cold, heat, opioids and stretching     Physical Therapy 3-4 sessions not very effective  Jet and pool therapy seem to be most helpful    Pain and numbness  into the left thigh is gone.   TENS unit on the shoulder area.   Has follow up with spine for low back and neck pain scheduled for May     Noticed HR up and saw cardiology March 30h  No relief with albuterol  Non smoker. Burns wood in the house.     Cold like symptoms x 1 week  Wheezing all the time at night when laying down  Thick creamy discharge.   The SOBOE has been bad for a year tho and what started the cardiology work up  Fearful he has cancer as his stamina has declined significantly      Furosemide taken for a week and stopped the medication  No change with medication. No swelling in the hands or feet    No change in SHORTNESS OF BREATH on exertion with the stent placed last fall  No change when taking nitroglyerin              Review of Systems   Constitutional, HEENT, cardiovascular, pulmonary, GI, , musculoskeletal, neuro, skin, endocrine and psych systems are negative, except as otherwise noted.      Objective    /80   Pulse 102   Temp 97.3  F (36.3  C) (Tympanic)   Resp 14   Wt 94.8 kg (209 lb)   SpO2 98%   BMI 30.43 kg/m    Body mass index is 30.43 kg/m .     Physical Exam   GENERAL: healthy, alert and no distress  EYES: Eyes grossly normal to inspection, PERRL and conjunctivae and sclerae normal  HENT: ear canals and TM's normal, nose and mouth without ulcers or lesions  NECK: no adenopathy, no asymmetry, masses, or scars and thyroid normal to palpation  RESP: rhonchi R upper anterior and L upper anterior  CV: regular rate and rhythm, normal S1 S2, no S3 or S4, no murmur, click or rub, no peripheral edema and peripheral pulses strong  ABDOMEN: soft, nontender, no hepatosplenomegaly, no masses and bowel sounds normal  MS: no gross musculoskeletal defects noted, no edema  SKIN: no  suspicious lesions or rashes  NEURO: Normal strength and tone, mentation intact and speech normal  PSYCH: mentation appears normal, affect normal/bright    Lab on 04/13/2023   Component Date Value Ref Range Status     Cholesterol 04/13/2023 109  <200 mg/dL Final     Triglycerides 04/13/2023 58  <150 mg/dL Final     Direct Measure HDL 04/13/2023 44  >=40 mg/dL Final     LDL Cholesterol Calculated 04/13/2023 53  <=100 mg/dL Final     Non HDL Cholesterol 04/13/2023 65  <130 mg/dL Final     ALT 04/13/2023 21  10 - 50 U/L Final     WBC Count 04/13/2023 4.6  4.0 - 11.0 10e3/uL Final     RBC Count 04/13/2023 4.68  4.40 - 5.90 10e6/uL Final     Hemoglobin 04/13/2023 14.7  13.3 - 17.7 g/dL Final     Hematocrit 04/13/2023 44.1  40.0 - 53.0 % Final     MCV 04/13/2023 94  78 - 100 fL Final     MCH 04/13/2023 31.4  26.5 - 33.0 pg Final     MCHC 04/13/2023 33.3  31.5 - 36.5 g/dL Final     RDW 04/13/2023 13.2  10.0 - 15.0 % Final     Platelet Count 04/13/2023 185  150 - 450 10e3/uL Final     Magnesium 04/13/2023 2.0  1.7 - 2.3 mg/dL Final     Sodium 04/13/2023 137  136 - 145 mmol/L Final     Potassium 04/13/2023 4.0  3.4 - 5.3 mmol/L Final     Chloride 04/13/2023 99  98 - 107 mmol/L Final     Carbon Dioxide (CO2) 04/13/2023 28  22 - 29 mmol/L Final     Anion Gap 04/13/2023 10  7 - 15 mmol/L Final     Urea Nitrogen 04/13/2023 16.9  8.0 - 23.0 mg/dL Final     Creatinine 04/13/2023 1.01  0.67 - 1.17 mg/dL Final     Calcium 04/13/2023 9.4  8.8 - 10.2 mg/dL Final     Glucose 04/13/2023 100 (H)  70 - 99 mg/dL Final     GFR Estimate 04/13/2023 81  >60 mL/min/1.73m2 Final    eGFR calculated using 2021 CKD-EPI equation.

## 2023-04-19 NOTE — TELEPHONE ENCOUNTER
----- Message from HERBERTH Barba CNP sent at 4/19/2023 11:34 AM CDT -----  Regarding: RE: Pt seeing you 4/21/23--do you want an EKG prior?  Yes, ekg would be good.     Thanks   ----- Message -----  From: Xuan De La Rosa RN  Sent: 4/17/2023   9:09 AM CDT  To: HERBERTH Barba CNP  Subject: Pt seeing you 4/21/23--do you want an EKG pr#    This is the anil with the higher heart rates lately.   Shauna

## 2023-04-21 ENCOUNTER — OFFICE VISIT (OUTPATIENT)
Dept: CARDIOLOGY | Facility: CLINIC | Age: 68
End: 2023-04-21
Payer: MEDICARE

## 2023-04-21 VITALS
HEART RATE: 109 BPM | SYSTOLIC BLOOD PRESSURE: 153 MMHG | OXYGEN SATURATION: 96 % | TEMPERATURE: 98 F | WEIGHT: 206.8 LBS | DIASTOLIC BLOOD PRESSURE: 96 MMHG | BODY MASS INDEX: 30.11 KG/M2

## 2023-04-21 DIAGNOSIS — E78.5 HYPERLIPIDEMIA LDL GOAL <70: ICD-10-CM

## 2023-04-21 DIAGNOSIS — R00.0 TACHYCARDIA: Primary | ICD-10-CM

## 2023-04-21 DIAGNOSIS — I10 BENIGN ESSENTIAL HYPERTENSION: ICD-10-CM

## 2023-04-21 DIAGNOSIS — I25.10 CORONARY ARTERY DISEASE INVOLVING NATIVE CORONARY ARTERY OF NATIVE HEART WITHOUT ANGINA PECTORIS: ICD-10-CM

## 2023-04-21 PROCEDURE — 99214 OFFICE O/P EST MOD 30 MIN: CPT | Performed by: NURSE PRACTITIONER

## 2023-04-21 PROCEDURE — 93000 ELECTROCARDIOGRAM COMPLETE: CPT | Performed by: NURSE PRACTITIONER

## 2023-04-21 RX ORDER — METOPROLOL SUCCINATE 25 MG/1
25 TABLET, EXTENDED RELEASE ORAL DAILY
Qty: 30 TABLET | Refills: 11 | Status: SHIPPED | OUTPATIENT
Start: 2023-04-21 | End: 2024-03-04

## 2023-04-21 NOTE — PATIENT INSTRUCTIONS
Medication Changes:  START metoprolol XL 25 mg daily     Recommendations:  Check blood pressure at least 1 hour after medications. Call the clinic if your blood pressure is consistently greater than 130/80.   Call if blood pressure is less than 90 on top or less than 100 with lightheadedness.    Check daily weights and call the clinic if your weight has increased more than 2 lbs in one day or 5 lbs in one week; if you feel more short of breath or have worsening swelling in your legs or abdomen.     Follow-up:  Echo as scheduled  See KATELYN Alonso for cardiology follow up at [facility]: May, schedule for after seeing primary care   Call 6 months prior, to schedule.     Cardiology Scheduling~[phone]  Cardiology Clinic RN~[phone] (Shauna RN, Marian RN, Leanna RN)

## 2023-04-21 NOTE — PROGRESS NOTES
Cardiology Clinic Progress Note  Jamal Sanchez MRN# [id number]   YOB: 1955 Age: 68 year old      Primary Cardiologist:   Dr. Cruz          History of Presenting Illness:      Jamal Sanchez is a pleasant 68 year old patient with a past cardiac history significant for   1. CAD    Angio 9/2022 STORM x1 to the mid LAD, D2 50%, 50% throughout LCx  2. Hyperlipidemia  3. Hypertension  Past medical history significant for obesity.      Fall of 2022 he was seen for worsening dyspnea and atypical angina.  He had severely elevated LV filling pressures on angiogram and had stent placed September 2022. He had been off of beta-blocker due to bradycardia.  He was started on 1 week trial of Lasix.   At follow-up, although weight was down 5 pounds he had no improvement in symptoms.  He was adhering to low-sodium diet and fluid restriction.   At that time, I recommended a trial of higher dose of Lasix.  History of normal PFTs in 2021.    Patient was seen by me in March 2023 and reported increased dyspnea on exertion with walking 200 feet to the mailbox.  He would need to stop and rest.  He remained mildly tachycardic in sinus rhythm.  His D-dimer, TSH, BNP were all WNL.  He was restarted on 1 week trial of Lasix.  Dr. Cruz recommended considering right heart catheterization, if needed.    Pt presents today for tachycardia follow-up.  He is scheduled for upcoming limited echo at the end of April.  EKG today, personally reviewed by me, shows continued mild sinus tachycardia with ventricular rate 109 bpm.       After, trial of increased Lasix he again had no improvement in symptoms and no significant change in weight.  Weight today in the clinic is down 3 pounds but he notes that he is not wearing his heavy boots.  He has noted worsening dyspnea on exertion which now happens if he walks from room to room in his house.  He denies any shortness of breath at rest but during our visit seems slightly labored in his  breathing.  Had a follow-up with PCP yesterday and chest x-ray showed elevated hemidiaphragm.  Given that he had cold symptoms for a week they recommended starting prednisone and recommended lung CT.  We discussed that if his symptoms are significantly worsening prior to his follow-up with them he should present to the ED.  If symptoms are not related to his hemidiaphragm or pulmonary in nature, we still can consider left/right heart catheterization. Patient reports no chest pain, PND, orthopnea, presyncope, syncope, edema, heart racing, or palpitations.    Labs:  LIPID RESULTS:  Lab Results   Component Value Date    CHOL 109 04/13/2023    CHOL 225 (H) 02/22/2021    HDL 44 04/13/2023    HDL 60 02/22/2021    LDL 53 04/13/2023     (H) 02/22/2021    TRIG 58 04/13/2023    TRIG 209 (H) 02/22/2021    CHOLHDLRATIO 3.2 08/12/2015       LIVER ENZYME RESULTS:  Lab Results   Component Value Date    AST 41 11/23/2021    AST 14 07/02/2019    ALT 21 04/13/2023    ALT 26 07/02/2019       CBC RESULTS:  Lab Results   Component Value Date    WBC 4.6 04/13/2023    WBC 6.1 07/10/2019    RBC 4.68 04/13/2023    RBC 5.22 07/10/2019    HGB 14.7 04/13/2023    HGB 15.0 07/10/2019    HCT 44.1 04/13/2023    HCT 46.2 07/10/2019    MCV 94 04/13/2023    MCV 89 07/10/2019    MCH 31.4 04/13/2023    MCH 28.7 07/10/2019    MCHC 33.3 04/13/2023    MCHC 32.5 07/10/2019    RDW 13.2 04/13/2023    RDW 13.0 07/10/2019     04/13/2023     07/10/2019       BMP RESULTS:  Lab Results   Component Value Date     04/13/2023     02/22/2021    POTASSIUM 4.0 04/13/2023    POTASSIUM 4.0 09/07/2022    POTASSIUM 4.2 02/22/2021    CHLORIDE 99 04/13/2023    CHLORIDE 107 09/07/2022    CHLORIDE 102 02/22/2021    CO2 28 04/13/2023    CO2 27 09/07/2022    CO2 28 02/22/2021    ANIONGAP 10 04/13/2023    ANIONGAP 4 09/07/2022    ANIONGAP 6 02/22/2021     (H) 04/13/2023     (H) 09/07/2022    GLC 90 02/22/2021    BUN 16.9 04/13/2023     BUN 13 09/07/2022    BUN 23 02/22/2021    CR 1.01 04/13/2023    CR 0.89 02/22/2021    GFRESTIMATED 81 04/13/2023    GFRESTIMATED 88 02/22/2021    GFRESTBLACK >90 02/22/2021    ALYCE 9.4 04/13/2023    ALYCE 9.4 02/22/2021        A1C RESULTS:  Lab Results   Component Value Date    A1C 6.1 (H) 03/29/2023       INR RESULTS:  Lab Results   Component Value Date    INR 1.02 09/07/2022    INR 1.10 11/22/2018    INR 1.10 11/22/2018       Results reviewed today.       Current Cardiac Medications     Current Outpatient Medications   Medication Sig Dispense Refill     aspirin (ASA) 81 MG EC tablet Take 1 tablet (81 mg) by mouth daily Start tomorrow. 30 tablet 3     atorvastatin (LIPITOR) 40 MG tablet Take 1 tablet (40 mg) by mouth daily 90 tablet 3     calcium carbonate (TUMS) 500 MG chewable tablet Take 1 chew tab by mouth daily as needed        clopidogrel (PLAVIX) 75 MG tablet Take 1 tablet (75 mg) by mouth daily 90 tablet 3     COMPOUNDED NON-CONTROLLED SUBSTANCE (CMPD RX) - PHARMACY TO MIX COMPOUNDED MEDICATION Inject 0.1 mL intra cavernously for erection. Gradually increase dose if needed . Maximum 0.2 mL 2.5 mL 3     cyclobenzaprine (FLEXERIL) 5 MG tablet Take 1 tablet (5 mg) by mouth 3 times daily as needed for muscle spasms 30 tablet 0     Digestive Enzymes (DIGESTIVE ENZYME PO)        doxycycline hyclate (VIBRAMYCIN) 100 MG capsule Take 1 capsule (100 mg) by mouth 2 times daily for 10 days 20 capsule 0     famotidine (PEPCID) 20 MG tablet Take 1 tablet (20 mg) by mouth 2 times daily 180 tablet 2     glucosamine-chondroitin 500-400 MG CAPS per capsule Take 1 capsule by mouth daily       ibuprofen (ADVIL/MOTRIN) 200 MG tablet Take 800 mg by mouth every 8 hours as needed        metoprolol succinate ER (TOPROL XL) 25 MG 24 hr tablet Take 1 tablet (25 mg) by mouth daily 30 tablet 11     multivitamin (CENTRUM SILVER) tablet Take 1 tablet by mouth daily       omeprazole (PRILOSEC) 40 MG DR capsule Take 1 capsule (40 mg) by  mouth daily 90 capsule 3     oxybutynin ER (DITROPAN XL) 10 MG 24 hr tablet Take 1 tablet (10 mg) by mouth daily 90 tablet 3     predniSONE (DELTASONE) 20 MG tablet Take 2 tablets (40 mg) by mouth daily for 5 days 10 tablet 0     sildenafil (REVATIO) 20 MG tablet Take 2 to 5 tablets 1 hour prior to intercourse 90 tablet 3     tamsulosin (FLOMAX) 0.4 MG capsule Take 1 capsule (0.4 mg) by mouth daily 90 capsule 3     triamterene-HCTZ (MAXZIDE) 75-50 MG tablet Take 1 tablet by mouth daily 90 tablet 3     vitamin C (ASCORBIC ACID) 1000 MG TABS Take 1,000 mg by mouth daily       albuterol (PROAIR HFA/PROVENTIL HFA/VENTOLIN HFA) 108 (90 Base) MCG/ACT inhaler Inhale 2 puffs into the lungs every 4 hours as needed for shortness of breath (Patient not taking: Reported on 4/21/2023) 18 g 1     ketoconazole (NIZORAL) 2 % external cream Apply to feet BID x 2-3 weeks PRN (Patient not taking: Reported on 4/21/2023) 60 g 1     lisinopril (ZESTRIL) 10 MG tablet Take 1 tablet (10 mg) by mouth daily Hold if Systolic Blood Pressure is less than 85. (Patient not taking: Reported on 4/21/2023) 90 tablet 3     magnesium 250 MG tablet Take 1 tablet by mouth daily       naproxen (NAPROSYN) 500 MG tablet Take 1 tablet (500 mg) by mouth 2 times daily as needed for moderate pain (4-6) Take with plenty of food and water (Patient not taking: Reported on 4/21/2023) 60 tablet 0     nitroGLYcerin (NITROSTAT) 0.4 MG sublingual tablet For chest pain place 1 tablet under the tongue every 5 minutes for 3 doses. If symptoms persist 5 minutes after 2nd dose call 911. (Patient not taking: Reported on 4/21/2023) 25 tablet 3     triamcinolone (KENALOG) 0.1 % external cream Apply topically 3 times daily (Patient not taking: Reported on 4/21/2023) 80 g 3                      Assessment and Plan:       Plan    Patient Instructions   Medication Changes:  1. START metoprolol XL 25 mg daily for tachycardia    Recommendations:  1. Check blood pressure at least 1  hour after medications. Call the clinic if your blood pressure is consistently greater than 130/80.   2. Call if blood pressure is less than 90 on top or less than 100 with lightheadedness.    3. Check daily weights and call the clinic if your weight has increased more than 2 lbs in one day or 5 lbs in one week; if you feel more short of breath or have worsening swelling in your legs or abdomen.     Follow-up:  1. Echo as scheduled  2. See KATELYN Alonso for cardiology follow up at [facility]: May, schedule for after seeing primary care   Call 6 months prior, to schedule.     Cardiology Scheduling~[phone]  Cardiology Clinic RN~[phone] (Shauna RN, Marian RN, Leanna RN)                1. CAD    ZEPEDA - see below     Continue statin, aspirin, ACE inhibitor, beta-blocker    DAPT, uninterrupted, for at least 1 year (9/2023)    Consider long-term Plavix instead of aspirin, per interventionalist        2. Hyperlipidemia     Last LDL 53 4/2023    Continue atorvastatin 40 mg daily        3. Hypertension    Controlled at home- started prednisone yesterday     Continue lisinopril, metoprolol, triamterene-HCTZ        4. ZEPEDA    Severely elevated LV filling pressures on anigo but no imporvement after stent or lasix 2022    Hemoglobin was WNL    Improves with routine exercise    PFTs WNL 11/2021    TSH, ddimer, BNP WNL 3/2023    Elevated hemidiaphragm 4/2023-planning for chest CT and started on prednisone    Consider left/ right heart cath                  Thank you for allowing me to participate in this delightful patient's care.      This note was completed in part using Dragon voice recognition software. Although reviewed after completion, some word and grammatical errors may occur.    Celeste Torres, APRN CNP, APRN, CNP           Data:   All laboratory data reviewed      Total time spent today was 35 mins, reviewing labs, testing, notes, documenting notes, and seeing patient.          Constitutional:   cooperative, alert and oriented, well developed, well nourished, in no acute distress  overweight      Skin:  warm and dry to the touch         Head:  normocephalic       Eyes:  pupils equal and round       ENT:  no pallor or cyanosis       Neck:  no stiffness       Respiratory:  course lung sounds; normal symmetry       Cardiac: regular rhythm, tachycardic; normal S1 and S2                 pulses full and equal      GI:  abdomen soft, nondistended     Extremities and Muscular Skeletal:  no edema        Neurological:  affect appropriate; no gross motor deficits       Psych:  Alert and Oriented x 3 , appropriate affact

## 2023-04-21 NOTE — LETTER
4/21/2023    Raad Benitez, DO  [address] [facility] [address]    RE: Jamal Sanchez       Dear Colleague,     I had the pleasure of seeing Jamal Sanchez in the [facility].  Cardiology Clinic Progress Note  Jamal Sanchez MRN# [id number]   YOB: 1955 Age: 68 year old      Primary Cardiologist:   Dr. Cruz          History of Presenting Illness:      Jamal Sanchez is a pleasant 68 year old patient with a past cardiac history significant for   CAD  Angio 9/2022 STORM x1 to the mid LAD, D2 50%, 50% throughout LCx  Hyperlipidemia  Hypertension  Past medical history significant for obesity.      Fall of 2022 he was seen for worsening dyspnea and atypical angina.  He had severely elevated LV filling pressures on angiogram and had stent placed September 2022. He had been off of beta-blocker due to bradycardia.  He was started on 1 week trial of Lasix.   At follow-up, although weight was down 5 pounds he had no improvement in symptoms.  He was adhering to low-sodium diet and fluid restriction.   At that time, I recommended a trial of higher dose of Lasix.  History of normal PFTs in 2021.    Patient was seen by me in March 2023 and reported increased dyspnea on exertion with walking 200 feet to the mailbox.  He would need to stop and rest.  He remained mildly tachycardic in sinus rhythm.  His D-dimer, TSH, BNP were all WNL.  He was restarted on 1 week trial of Lasix.  Dr. Cruz recommended considering right heart catheterization, if needed.    Pt presents today for tachycardia follow-up.  He is scheduled for upcoming limited echo at the end of April.  EKG today, personally reviewed by me, shows continued mild sinus tachycardia with ventricular rate 109 bpm.       After, trial of increased Lasix he again had no improvement in symptoms and no significant change in weight.  Weight today in the clinic is down 3 pounds but he notes that he is not wearing his heavy boots.  He  has noted worsening dyspnea on exertion which now happens if he walks from room to room in his house.  He denies any shortness of breath at rest but during our visit seems slightly labored in his breathing.  Had a follow-up with PCP yesterday and chest x-ray showed elevated hemidiaphragm.  Given that he had cold symptoms for a week they recommended starting prednisone and recommended lung CT.  We discussed that if his symptoms are significantly worsening prior to his follow-up with them he should present to the ED.  If symptoms are not related to his hemidiaphragm or pulmonary in nature, we still can consider left/right heart catheterization. Patient reports no chest pain, PND, orthopnea, presyncope, syncope, edema, heart racing, or palpitations.    Labs:  LIPID RESULTS:  Lab Results   Component Value Date    CHOL 109 04/13/2023    CHOL 225 (H) 02/22/2021    HDL 44 04/13/2023    HDL 60 02/22/2021    LDL 53 04/13/2023     (H) 02/22/2021    TRIG 58 04/13/2023    TRIG 209 (H) 02/22/2021    CHOLHDLRATIO 3.2 08/12/2015       LIVER ENZYME RESULTS:  Lab Results   Component Value Date    AST 41 11/23/2021    AST 14 07/02/2019    ALT 21 04/13/2023    ALT 26 07/02/2019       CBC RESULTS:  Lab Results   Component Value Date    WBC 4.6 04/13/2023    WBC 6.1 07/10/2019    RBC 4.68 04/13/2023    RBC 5.22 07/10/2019    HGB 14.7 04/13/2023    HGB 15.0 07/10/2019    HCT 44.1 04/13/2023    HCT 46.2 07/10/2019    MCV 94 04/13/2023    MCV 89 07/10/2019    MCH 31.4 04/13/2023    MCH 28.7 07/10/2019    MCHC 33.3 04/13/2023    MCHC 32.5 07/10/2019    RDW 13.2 04/13/2023    RDW 13.0 07/10/2019     04/13/2023     07/10/2019       BMP RESULTS:  Lab Results   Component Value Date     04/13/2023     02/22/2021    POTASSIUM 4.0 04/13/2023    POTASSIUM 4.0 09/07/2022    POTASSIUM 4.2 02/22/2021    CHLORIDE 99 04/13/2023    CHLORIDE 107 09/07/2022    CHLORIDE 102 02/22/2021    CO2 28 04/13/2023    CO2 27  09/07/2022    CO2 28 02/22/2021    ANIONGAP 10 04/13/2023    ANIONGAP 4 09/07/2022    ANIONGAP 6 02/22/2021     (H) 04/13/2023     (H) 09/07/2022    GLC 90 02/22/2021    BUN 16.9 04/13/2023    BUN 13 09/07/2022    BUN 23 02/22/2021    CR 1.01 04/13/2023    CR 0.89 02/22/2021    GFRESTIMATED 81 04/13/2023    GFRESTIMATED 88 02/22/2021    GFRESTBLACK >90 02/22/2021    ALYCE 9.4 04/13/2023    ALYCE 9.4 02/22/2021        A1C RESULTS:  Lab Results   Component Value Date    A1C 6.1 (H) 03/29/2023       INR RESULTS:  Lab Results   Component Value Date    INR 1.02 09/07/2022    INR 1.10 11/22/2018    INR 1.10 11/22/2018       Results reviewed today.       Current Cardiac Medications     Current Outpatient Medications   Medication Sig Dispense Refill    aspirin (ASA) 81 MG EC tablet Take 1 tablet (81 mg) by mouth daily Start tomorrow. 30 tablet 3    atorvastatin (LIPITOR) 40 MG tablet Take 1 tablet (40 mg) by mouth daily 90 tablet 3    calcium carbonate (TUMS) 500 MG chewable tablet Take 1 chew tab by mouth daily as needed       clopidogrel (PLAVIX) 75 MG tablet Take 1 tablet (75 mg) by mouth daily 90 tablet 3    COMPOUNDED NON-CONTROLLED SUBSTANCE (CMPD RX) - PHARMACY TO MIX COMPOUNDED MEDICATION Inject 0.1 mL intra cavernously for erection. Gradually increase dose if needed . Maximum 0.2 mL 2.5 mL 3    cyclobenzaprine (FLEXERIL) 5 MG tablet Take 1 tablet (5 mg) by mouth 3 times daily as needed for muscle spasms 30 tablet 0    Digestive Enzymes (DIGESTIVE ENZYME PO)       doxycycline hyclate (VIBRAMYCIN) 100 MG capsule Take 1 capsule (100 mg) by mouth 2 times daily for 10 days 20 capsule 0    famotidine (PEPCID) 20 MG tablet Take 1 tablet (20 mg) by mouth 2 times daily 180 tablet 2    glucosamine-chondroitin 500-400 MG CAPS per capsule Take 1 capsule by mouth daily      ibuprofen (ADVIL/MOTRIN) 200 MG tablet Take 800 mg by mouth every 8 hours as needed       metoprolol succinate ER (TOPROL XL) 25 MG 24 hr tablet  Take 1 tablet (25 mg) by mouth daily 30 tablet 11    multivitamin (CENTRUM SILVER) tablet Take 1 tablet by mouth daily      omeprazole (PRILOSEC) 40 MG DR capsule Take 1 capsule (40 mg) by mouth daily 90 capsule 3    oxybutynin ER (DITROPAN XL) 10 MG 24 hr tablet Take 1 tablet (10 mg) by mouth daily 90 tablet 3    predniSONE (DELTASONE) 20 MG tablet Take 2 tablets (40 mg) by mouth daily for 5 days 10 tablet 0    sildenafil (REVATIO) 20 MG tablet Take 2 to 5 tablets 1 hour prior to intercourse 90 tablet 3    tamsulosin (FLOMAX) 0.4 MG capsule Take 1 capsule (0.4 mg) by mouth daily 90 capsule 3    triamterene-HCTZ (MAXZIDE) 75-50 MG tablet Take 1 tablet by mouth daily 90 tablet 3    vitamin C (ASCORBIC ACID) 1000 MG TABS Take 1,000 mg by mouth daily      albuterol (PROAIR HFA/PROVENTIL HFA/VENTOLIN HFA) 108 (90 Base) MCG/ACT inhaler Inhale 2 puffs into the lungs every 4 hours as needed for shortness of breath (Patient not taking: Reported on 4/21/2023) 18 g 1    ketoconazole (NIZORAL) 2 % external cream Apply to feet BID x 2-3 weeks PRN (Patient not taking: Reported on 4/21/2023) 60 g 1    lisinopril (ZESTRIL) 10 MG tablet Take 1 tablet (10 mg) by mouth daily Hold if Systolic Blood Pressure is less than 85. (Patient not taking: Reported on 4/21/2023) 90 tablet 3    magnesium 250 MG tablet Take 1 tablet by mouth daily      naproxen (NAPROSYN) 500 MG tablet Take 1 tablet (500 mg) by mouth 2 times daily as needed for moderate pain (4-6) Take with plenty of food and water (Patient not taking: Reported on 4/21/2023) 60 tablet 0    nitroGLYcerin (NITROSTAT) 0.4 MG sublingual tablet For chest pain place 1 tablet under the tongue every 5 minutes for 3 doses. If symptoms persist 5 minutes after 2nd dose call 911. (Patient not taking: Reported on 4/21/2023) 25 tablet 3    triamcinolone (KENALOG) 0.1 % external cream Apply topically 3 times daily (Patient not taking: Reported on 4/21/2023) 80 g 3                      Assessment  and Plan:       Plan    Patient Instructions   Medication Changes:  START metoprolol XL 25 mg daily for tachycardia    Recommendations:  Check blood pressure at least 1 hour after medications. Call the clinic if your blood pressure is consistently greater than 130/80.   Call if blood pressure is less than 90 on top or less than 100 with lightheadedness.    Check daily weights and call the clinic if your weight has increased more than 2 lbs in one day or 5 lbs in one week; if you feel more short of breath or have worsening swelling in your legs or abdomen.     Follow-up:  Echo as scheduled  See KATELYN Alonso for cardiology follow up at [facility]: May, schedule for after seeing primary care   Call 6 months prior, to schedule.     Cardiology Scheduling~[phone]  Cardiology Clinic RN~[phone] (Shauna RN, Marian RN, Leanna RN)                CAD  ZEPEDA - see below   Continue statin, aspirin, ACE inhibitor, beta-blocker  DAPT, uninterrupted, for at least 1 year (9/2023)  Consider long-term Plavix instead of aspirin, per interventionalist        Hyperlipidemia   Last LDL 53 4/2023  Continue atorvastatin 40 mg daily        Hypertension  Controlled at home- started prednisone yesterday   Continue lisinopril, metoprolol, triamterene-HCTZ        ZEPEDA  Severely elevated LV filling pressures on anigo but no imporvement after stent or lasix 2022  Hemoglobin was WNL  Improves with routine exercise  PFTs WNL 11/2021  TSH, ddimer, BNP WNL 3/2023  Elevated hemidiaphragm 4/2023-planning for chest CT and started on prednisone  Consider left/ right heart cath                  Thank you for allowing me to participate in this delightful patient's care.      This note was completed in part using Dragon voice recognition software. Although reviewed after completion, some word and grammatical errors may occur.    Celeste Torres, APRN CNP, APRN, CNP           Data:   All laboratory data reviewed      Total time spent  today was 35 mins, reviewing labs, testing, notes, documenting notes, and seeing patient.          Constitutional:  cooperative, alert and oriented, well developed, well nourished, in no acute distress  overweight      Skin:  warm and dry to the touch         Head:  normocephalic       Eyes:  pupils equal and round       ENT:  no pallor or cyanosis       Neck:  no stiffness       Respiratory:  course lung sounds; normal symmetry       Cardiac: regular rhythm, tachycardic; normal S1 and S2                 pulses full and equal      GI:  abdomen soft, nondistended     Extremities and Muscular Skeletal:  no edema        Neurological:  affect appropriate; no gross motor deficits       Psych:  Alert and Oriented x 3 , appropriate affact      Thank you for allowing me to participate in the care of your patient.      Sincerely,   HERBERTH Hdez [facility]  cc: No referring provider defined for this encounter.

## 2023-04-24 ENCOUNTER — HOSPITAL ENCOUNTER (OUTPATIENT)
Dept: CT IMAGING | Facility: CLINIC | Age: 68
Discharge: HOME | End: 2023-04-24
Attending: NURSE PRACTITIONER | Admitting: NURSE PRACTITIONER
Payer: MEDICARE

## 2023-04-24 DIAGNOSIS — R06.02 SOBOE (SHORTNESS OF BREATH ON EXERTION): ICD-10-CM

## 2023-04-24 PROCEDURE — G1010 CDSM STANSON: HCPCS

## 2023-04-25 ASSESSMENT — ENCOUNTER SYMPTOMS
POLYPHAGIA: 0
FLANK PAIN: 1
SYNCOPE: 0
DIFFICULTY URINATING: 1
HYPERTENSION: 1
SPUTUM PRODUCTION: 0
JOINT SWELLING: 0
CHILLS: 0
PALPITATIONS: 1
DYSURIA: 0
POLYDIPSIA: 0
ALTERED TEMPERATURE REGULATION: 0
EXERCISE INTOLERANCE: 1
BACK PAIN: 1
LIGHT-HEADEDNESS: 1
POSTURAL DYSPNEA: 0
LEG PAIN: 1
HYPOTENSION: 1
MUSCLE CRAMPS: 1
WEIGHT LOSS: 0
FEVER: 0
FATIGUE: 0
INCREASED ENERGY: 0
DYSPNEA ON EXERTION: 1
SNORES LOUDLY: 0
HEMATURIA: 0
HALLUCINATIONS: 0
MYALGIAS: 1
WEIGHT GAIN: 0
COUGH DISTURBING SLEEP: 1
NIGHT SWEATS: 0
DECREASED APPETITE: 0
WHEEZING: 1
SHORTNESS OF BREATH: 1
HEMOPTYSIS: 0
SLEEP DISTURBANCES DUE TO BREATHING: 0
ORTHOPNEA: 0
COUGH: 1

## 2023-04-26 ENCOUNTER — OFFICE VISIT (OUTPATIENT)
Dept: PHYSICAL MEDICINE AND REHAB | Facility: CLINIC | Age: 68
End: 2023-04-26
Attending: FAMILY MEDICINE
Payer: MEDICARE

## 2023-04-26 VITALS
SYSTOLIC BLOOD PRESSURE: 151 MMHG | BODY MASS INDEX: 29.63 KG/M2 | WEIGHT: 207 LBS | HEIGHT: 70 IN | HEART RATE: 55 BPM | DIASTOLIC BLOOD PRESSURE: 84 MMHG

## 2023-04-26 DIAGNOSIS — M51.26 LUMBAR DISC HERNIATION: ICD-10-CM

## 2023-04-26 DIAGNOSIS — M79.18 MYOFASCIAL PAIN: ICD-10-CM

## 2023-04-26 DIAGNOSIS — M54.16 LUMBAR RADICULAR PAIN: Primary | ICD-10-CM

## 2023-04-26 DIAGNOSIS — M48.061 FORAMINAL STENOSIS OF LUMBAR REGION: ICD-10-CM

## 2023-04-26 PROCEDURE — 99204 OFFICE O/P NEW MOD 45 MIN: CPT | Performed by: PHYSICAL MEDICINE & REHABILITATION

## 2023-04-26 RX ORDER — GABAPENTIN 100 MG/1
100 CAPSULE ORAL 3 TIMES DAILY
Qty: 90 CAPSULE | Refills: 1 | Status: SHIPPED | OUTPATIENT
Start: 2023-04-26 | End: 2023-07-24

## 2023-04-26 ASSESSMENT — PAIN SCALES - GENERAL: PAINLEVEL: NO PAIN (1)

## 2023-04-26 NOTE — LETTER
4/26/2023         RE: Jamal Sanchez  [address] [facility] [id number]        Dear Colleague,    Thank you for referring your patient, Jamal Sanchez, to the [facility]. Please see a copy of my visit note below.    Assessment/Plan:      Jamal was seen today for back pain.    Diagnoses and all orders for this visit:    Lumbar radicular pain  -     PAIN Transforaminal SHIRA Inj Lumbosacral One Level Left; Future  -     gabapentin (NEURONTIN) 100 MG capsule; Take 1 capsule (100 mg) by mouth 3 times daily    Lumbar disc herniation  -     PAIN Transforaminal SHIRA Inj Lumbosacral One Level Left; Future  -     gabapentin (NEURONTIN) 100 MG capsule; Take 1 capsule (100 mg) by mouth 3 times daily    Myofascial pain    Foraminal stenosis of lumbar region  -     PAIN Transforaminal SHIRA Inj Lumbosacral One Level Left; Future    Other orders  -     Spine  Referral         Assessment: Pleasant 68 year old male with a history of hypertension, benign prostatic hypertrophy, gastroesophageal reflux, hyperlipidemia, atrial fibrillation, Status post stent placement with:    1.  Approximate 3-month history of left lower extremity radicular pain in L2 distribution.  Far lateral disc herniation at L2-3 on the left with severe foraminal stenosis and nerve compression.    2.  Myofascial pain lumbar spine and left gluteal region.     3.  Multilevel lumbar degenerative disc disease and facet arthropathy.      Discussion:    1.  We discussed the diagnosis and treatment options.  He has been through numerous treatments including chiropractic, physical therapy, Medrol Dosepak, muscle relaxers, NSAIDs despite having history of coronary disease, opioids for pain relief and been to the emergency department/urgent care on a couple of occasions.  Despite this continues to have pain although it is improving over time.  We discussed further options such as lumbar epidural and medication  changes.    2.  Recommend a left L2-3 TFESI this will be with Dr. Penaloza at the spine center.    3.  Trial gabapentin 100 mg at bedtime slowly increasing to 3 times daily.  He may also try taking this 1 tablet in the morning to see how this works for him during the day.    4.  Continue with cyclobenzaprine as needed for myofascial pain.    5.  Stop NSAIDs and try Tylenol as needed for pain.    6.  Follow-up  at earliest convenience for injection.      It was our pleasure caring for your patient today, if there any questions or concerns please do not hesitate to contact us.      Subjective:   Patient ID: Jamal Sanchez is a 68 year old male.    History of Present Illness: The patient presents at the request of Dr.Ruu Benitez for evaluation of left-sided low back pain as well as buttock pain and left anterior thigh pain.  This started in January.  He was bending over to  some firewood had an extra pull on one of the logs due to it being frozen to the ground.  The next day significant increased pain in the left-sided low back along the iliac crest anteriorly in the anterior thigh to the medial groin.  Pain was quite severe and has been to urgent care/ED couple of occasions given prednisone burst and oxycodone.  Prednisone is not significantly helpful.  Takes cyclobenzaprine which has been helpful and naproxen as needed.  Also has been seen by chiropractor without any benefit.  Physical therapy approximately 3 visits without any benefit.  Has been swimming and getting a massage with the jet at the pool which has been helpful.    His pain is now at the left lumbar spine L2-3 L3-4 on the iliac crest as well as in the midline.  Worse with standing and walking.  Can now walk quarter mile but previously was walking 2 miles before this injury.  Bending is also painful.  He has no paresthesias down the leg or knee pain.  Questions weakness.  This pain is a constant dull ache but really worsens the day after he  does any physical activity.  Pain is a 10/10 at worst 1/10 today and at best.      Imaging: MRI report and images were personally reviewed and discussed with the patient.  A plastic model was utilized during the discussion.  MRI of the lumbar spine personally reviewed.  Multilevel lumbar degenerative disc disease with mild broad-based disc bulges at L4-5 L3-4 L2-3.  Additional far lateral left disc herniation with severe left foraminal stenosis and compression of the exiting left L2 nerve.  L3-4 moderate facet arthropathy with disc bulge with mild lateral recess stenosis bilaterally and moderate left foraminal stenosis.  L4-5 mild spinal stenosis with severe right lateral recess narrowing moderate right foraminal stenosis.       Review of Systems:  Answers for HPI/ROS submitted by the patient on 4/25/2023  General Symptoms: Yes  Skin Symptoms: No  HENT Symptoms: No  EYE SYMPTOMS: No  HEART SYMPTOMS: Yes  LUNG SYMPTOMS: Yes  INTESTINAL SYMPTOMS: No  URINARY SYMPTOMS: Yes  REPRODUCTIVE SYMPTOMS: Yes  SKELETAL SYMPTOMS: Yes  BLOOD SYMPTOMS: No  NERVOUS SYSTEM SYMPTOMS: No  MENTAL HEALTH SYMPTOMS: No  Fever: No  Loss of appetite: No  Weight loss: No  Weight gain: No  Fatigue: No  Night sweats: No  Chills: No  Increased stress: No  Excessive hunger: No  Excessive thirst: No  Feeling hot or cold when others believe the temperature is normal: No  Loss of height: No  Post-operative complications: No  Surgical site pain: No  Hallucinations: No  Change in or Loss of Energy: No  Hyperactivity: No  Confusion: No  Chest pain or pressure: Yes  Fast or irregular heartbeat: Yes  Pain in legs with walking: Yes  Trouble breathing while lying down: No  Fingers or toes appear blue: No  High blood pressure: Yes  Low blood pressure: Yes  Fainting: No  Pacemaker: No  Varicose veins: No  Edema or swelling: No  Wake up at night with shortness of breath: No  Light-headedness: Yes  Exercise intolerance: Yes  Cough: Yes  Sputum or phlegm:  No  Coughing up blood: No  Difficulty breating or shortness of breath: Yes  Snoring: No  Wheezing: Yes  Difficulty breathing on exertion: Yes  Nighttime Cough: Yes  Difficulty breathing when lying flat: No  Trouble holding urine or incontinence: No  Pain or burning: No  Trouble starting or stopping: No  Increased frequency of urination: No  Blood in urine: No  Decreased frequency of urination: No  Frequent nighttime urination: Yes  Flank pain: Yes  Difficulty emptying bladder: Yes  Scrotal pain or swelling: Yes  Erectile dysfunction: Yes  Penile discharge: No  Genital ulcers: No  Reduced libido: Yes  Back pain: Yes  Muscle aches: Yes  Swollen joints: No  Muscle cramps: Yes  Bone fracture: No    Remainder of 12 point review systems negative unless listed above.    Past Medical History:   Diagnosis Date     BPH (benign prostatic hyperplasia)      Complication of anesthesia      Diverticulitis      GERD (gastroesophageal reflux disease)      HLD (hyperlipidemia)      Hypertension        Family History   Problem Relation Age of Onset     Diabetes Mother      Cerebrovascular Disease Mother      Neurologic Disorder Father         parkinson's     Cancer - colorectal Maternal Grandfather      Prostate Cancer Paternal Grandmother      Hypertension Brother      Cerebrovascular Disease Brother         stroke     Alcohol/Drug Brother      Unknown/Adopted Brother      Diabetes Sister      Obesity Sister          Social History     Socioeconomic History     Marital status:      Spouse name: None     Number of children: None     Years of education: None     Highest education level: None   Tobacco Use     Smoking status: Never     Smokeless tobacco: Never   Vaping Use     Vaping status: Never Used   Substance and Sexual Activity     Alcohol use: Yes     Comment: 6 pack beer on weekend.     Drug use: No     Sexual activity: Yes     Partners: Female     Birth control/protection: Post-menopausal   Other Topics Concern      "Parent/sibling w/ CABG, MI or angioplasty before 65F 55M? No     Social history: Retired.  Single.  Exercises 3 days/week with walking and at the pool.    The following portions of the patient's history were reviewed and updated as appropriate: allergies, current medications, past family history, past medical history, past social history, past surgical history and problem list.    Oswestry (KULDIP) Questionnaire        4/26/2023     8:18 AM   OSWESTRY DISABILITY INDEX   Count 10   Sum 11   Oswestry Score (%) 22 %       Neck Disability Index:       View : No data to display.                   PHQ-2 Score:         3/28/2023     1:13 PM 1/30/2023    10:43 AM   PHQ-2 ( 1999 [employer])   Q1: Little interest or pleasure in doing things 0 0   Q2: Feeling down, depressed or hopeless 0 0   PHQ-2 Score 0 0   Q1: Little interest or pleasure in doing things Not at all Not at all   Q2: Feeling down, depressed or hopeless Not at all Not at all   PHQ-2 Score 0 0                  Objective:   Physical Exam:    BP (!) 151/84   Pulse 55   Ht 5' 9.5\" (1.765 m)   Wt 207 lb (93.9 kg)   BMI 30.13 kg/m    Body mass index is 30.13 kg/m .      General:  Well-appearing male in no acute distress.  Pleasant, cooperative, and interactive throughout the examination and interview.  CV: No lower extremity edema on inspection or paltation.  Lymphatics: No cervical lymphadenopathy palpated. Eyes: sclera clear. Skin: No rashes or lesions seen over the head/neck, hairline, arms, legs.  Respirations unlabored.  MSK: Gait is nonantalgic.  Able to heel-toe walk without difficulty.  Negative Romberg.  Spine: normal AP curves of the C, T, and L spine.  Full range of motion in the Lumbar spine in all planes.  Palpation: Mild tenderness left lumbar paraspinals L2-3 through L5-S1.  Extremities: Full range of motion of the elbows, and wrists with no effusions or tenderness to palpation.   Full range of motion of the hips, knees, and ankles with no effusions or " tenderness to palpation.  Negative scour maneuver and Lucian's test bilaterally. No hypermobility of the upper or lower extremities.  Neurologic exam: Mental status: Patient is alert and oriented with normal affect.  Attention, knowledge, memory, and language are intact.  Normal coordination throughout the examination.  Reflexes are 2+ and symmetric biceps, triceps, brachioradialis, patellar, and Achilles with down-going toes and Negative Jaquan's.  Sensation is intact to light touch throughout the upper and lower extremities bilaterally.  Manual muscle testing reveals 5 out of 5 in the hip flexors, knee flexors/extensors, ankle plantar flexors, ankle  dorsiflexors, and EHL.  Upper extremities: Grossly normal strength . Normal muscle bulk and tone in the arms and legs.    Negative seated and supine straight leg raise bilaterally.          Again, thank you for allowing me to participate in the care of your patient.        Sincerely,        Bernard Camarena, DO

## 2023-04-26 NOTE — PATIENT INSTRUCTIONS
A Left Lumbar epidural steroid injection has been ordered today. Please schedule this injection at least 2 weeks from now to allow time for insurance prior authorization. On the day of your injection, you cannot be sick or taking antibiotics. If you become sick and are prescribed, please call the clinic so your injection can be rescheduled for once you have completed your antibiotics. You will need to bring a  with you for your injection. If you have any questions or concerns prior to your injection, please do not hesitate to call the nurse navigation line at [phone].   2.  Stop naproxen and take tylenol as needed    3. Prescribed Gabapentin today, 100 mg tablets, to be titrated up to 1 tablet 3 times a day as tolerated for your nerve pain. Please follow Gabapentin dosing chart below.    Gabapentin 100mg Dosing Chart    DATE  MORNING AFTERNOON BEDTIME    Day 1 0 0 1    Day 2 0 0 1    Day 3 0 0 1    Day 4 1 0 1    Day 5 1 0 1    Day 6 1 0 1    Day 7 1 1 1    Day 8 1 1 1    Day 9 1 1 1    Day 10       Day 11       Day 12       Day 13       Day 14       Day 15       Day 16       Day 17       Day 18       Day 19       Day 20       Day 21       Day 22       Day 23       Day 24       Day 25       Day 26       Day 27        Continue medication, taking 1 capsule  three times daily    Please call if you have any questions regarding how to take your medication  Clinic Phone # [phone]

## 2023-04-26 NOTE — PROGRESS NOTES
Assessment/Plan:      Jamal was seen today for back pain.    Diagnoses and all orders for this visit:    Lumbar radicular pain  -     PAIN Transforaminal SHIRA Inj Lumbosacral One Level Left; Future  -     gabapentin (NEURONTIN) 100 MG capsule; Take 1 capsule (100 mg) by mouth 3 times daily    Lumbar disc herniation  -     PAIN Transforaminal SHIRA Inj Lumbosacral One Level Left; Future  -     gabapentin (NEURONTIN) 100 MG capsule; Take 1 capsule (100 mg) by mouth 3 times daily    Myofascial pain    Foraminal stenosis of lumbar region  -     PAIN Transforaminal SHIRA Inj Lumbosacral One Level Left; Future    Other orders  -     Spine  Referral         Assessment: Pleasant 68 year old male with a history of hypertension, benign prostatic hypertrophy, gastroesophageal reflux, hyperlipidemia, atrial fibrillation, Status post stent placement with:    1.  Approximate 3-month history of left lower extremity radicular pain in L2 distribution.  Far lateral disc herniation at L2-3 on the left with severe foraminal stenosis and nerve compression.    2.  Myofascial pain lumbar spine and left gluteal region.     3.  Multilevel lumbar degenerative disc disease and facet arthropathy.      Discussion:    1.  We discussed the diagnosis and treatment options.  He has been through numerous treatments including chiropractic, physical therapy, Medrol Dosepak, muscle relaxers, NSAIDs despite having history of coronary disease, opioids for pain relief and been to the emergency department/urgent care on a couple of occasions.  Despite this continues to have pain although it is improving over time.  We discussed further options such as lumbar epidural and medication changes.    2.  Recommend a left L2-3 TFESI this will be with Dr. Penaloza at the spine center.    3.  Trial gabapentin 100 mg at bedtime slowly increasing to 3 times daily.  He may also try taking this 1 tablet in the morning to see how this works for him during the  day.    4.  Continue with cyclobenzaprine as needed for myofascial pain.    5.  Stop NSAIDs and try Tylenol as needed for pain.    6.  Follow-up  at earliest convenience for injection.      It was our pleasure caring for your patient today, if there any questions or concerns please do not hesitate to contact us.      Subjective:   Patient ID: Jamal Sanchez is a 68 year old male.    History of Present Illness: The patient presents at the request of Dr.Ruu Benitez for evaluation of left-sided low back pain as well as buttock pain and left anterior thigh pain.  This started in January.  He was bending over to  some firewood had an extra pull on one of the logs due to it being frozen to the ground.  The next day significant increased pain in the left-sided low back along the iliac crest anteriorly in the anterior thigh to the medial groin.  Pain was quite severe and has been to urgent care/ED couple of occasions given prednisone burst and oxycodone.  Prednisone is not significantly helpful.  Takes cyclobenzaprine which has been helpful and naproxen as needed.  Also has been seen by chiropractor without any benefit.  Physical therapy approximately 3 visits without any benefit.  Has been swimming and getting a massage with the jet at the pool which has been helpful.    His pain is now at the left lumbar spine L2-3 L3-4 on the iliac crest as well as in the midline.  Worse with standing and walking.  Can now walk quarter mile but previously was walking 2 miles before this injury.  Bending is also painful.  He has no paresthesias down the leg or knee pain.  Questions weakness.  This pain is a constant dull ache but really worsens the day after he does any physical activity.  Pain is a 10/10 at worst 1/10 today and at best.      Imaging: MRI report and images were personally reviewed and discussed with the patient.  A plastic model was utilized during the discussion.  MRI of the lumbar spine personally reviewed.   Multilevel lumbar degenerative disc disease with mild broad-based disc bulges at L4-5 L3-4 L2-3.  Additional far lateral left disc herniation with severe left foraminal stenosis and compression of the exiting left L2 nerve.  L3-4 moderate facet arthropathy with disc bulge with mild lateral recess stenosis bilaterally and moderate left foraminal stenosis.  L4-5 mild spinal stenosis with severe right lateral recess narrowing moderate right foraminal stenosis.       Review of Systems:  Answers for HPI/ROS submitted by the patient on 4/25/2023  General Symptoms: Yes  Skin Symptoms: No  HENT Symptoms: No  EYE SYMPTOMS: No  HEART SYMPTOMS: Yes  LUNG SYMPTOMS: Yes  INTESTINAL SYMPTOMS: No  URINARY SYMPTOMS: Yes  REPRODUCTIVE SYMPTOMS: Yes  SKELETAL SYMPTOMS: Yes  BLOOD SYMPTOMS: No  NERVOUS SYSTEM SYMPTOMS: No  MENTAL HEALTH SYMPTOMS: No  Fever: No  Loss of appetite: No  Weight loss: No  Weight gain: No  Fatigue: No  Night sweats: No  Chills: No  Increased stress: No  Excessive hunger: No  Excessive thirst: No  Feeling hot or cold when others believe the temperature is normal: No  Loss of height: No  Post-operative complications: No  Surgical site pain: No  Hallucinations: No  Change in or Loss of Energy: No  Hyperactivity: No  Confusion: No  Chest pain or pressure: Yes  Fast or irregular heartbeat: Yes  Pain in legs with walking: Yes  Trouble breathing while lying down: No  Fingers or toes appear blue: No  High blood pressure: Yes  Low blood pressure: Yes  Fainting: No  Pacemaker: No  Varicose veins: No  Edema or swelling: No  Wake up at night with shortness of breath: No  Light-headedness: Yes  Exercise intolerance: Yes  Cough: Yes  Sputum or phlegm: No  Coughing up blood: No  Difficulty breating or shortness of breath: Yes  Snoring: No  Wheezing: Yes  Difficulty breathing on exertion: Yes  Nighttime Cough: Yes  Difficulty breathing when lying flat: No  Trouble holding urine or incontinence: No  Pain or burning:  No  Trouble starting or stopping: No  Increased frequency of urination: No  Blood in urine: No  Decreased frequency of urination: No  Frequent nighttime urination: Yes  Flank pain: Yes  Difficulty emptying bladder: Yes  Scrotal pain or swelling: Yes  Erectile dysfunction: Yes  Penile discharge: No  Genital ulcers: No  Reduced libido: Yes  Back pain: Yes  Muscle aches: Yes  Swollen joints: No  Muscle cramps: Yes  Bone fracture: No    Remainder of 12 point review systems negative unless listed above.    Past Medical History:   Diagnosis Date     BPH (benign prostatic hyperplasia)      Complication of anesthesia      Diverticulitis      GERD (gastroesophageal reflux disease)      HLD (hyperlipidemia)      Hypertension        Family History   Problem Relation Age of Onset     Diabetes Mother      Cerebrovascular Disease Mother      Neurologic Disorder Father         parkinson's     Cancer - colorectal Maternal Grandfather      Prostate Cancer Paternal Grandmother      Hypertension Brother      Cerebrovascular Disease Brother         stroke     Alcohol/Drug Brother      Unknown/Adopted Brother      Diabetes Sister      Obesity Sister          Social History     Socioeconomic History     Marital status:      Spouse name: None     Number of children: None     Years of education: None     Highest education level: None   Tobacco Use     Smoking status: Never     Smokeless tobacco: Never   Vaping Use     Vaping status: Never Used   Substance and Sexual Activity     Alcohol use: Yes     Comment: 6 pack beer on weekend.     Drug use: No     Sexual activity: Yes     Partners: Female     Birth control/protection: Post-menopausal   Other Topics Concern     Parent/sibling w/ CABG, MI or angioplasty before 65F 55M? No     Social history: Retired.  Single.  Exercises 3 days/week with walking and at the pool.    The following portions of the patient's history were reviewed and updated as appropriate: allergies, current  "medications, past family history, past medical history, past social history, past surgical history and problem list.    Oswestry (KULDIP) Questionnaire        4/26/2023     8:18 AM   OSWESTRY DISABILITY INDEX   Count 10   Sum 11   Oswestry Score (%) 22 %       Neck Disability Index:       View : No data to display.                   PHQ-2 Score:         3/28/2023     1:13 PM 1/30/2023    10:43 AM   PHQ-2 ( 1999 [employer])   Q1: Little interest or pleasure in doing things 0 0   Q2: Feeling down, depressed or hopeless 0 0   PHQ-2 Score 0 0   Q1: Little interest or pleasure in doing things Not at all Not at all   Q2: Feeling down, depressed or hopeless Not at all Not at all   PHQ-2 Score 0 0                  Objective:   Physical Exam:    BP (!) 151/84   Pulse 55   Ht 5' 9.5\" (1.765 m)   Wt 207 lb (93.9 kg)   BMI 30.13 kg/m    Body mass index is 30.13 kg/m .      General:  Well-appearing male in no acute distress.  Pleasant, cooperative, and interactive throughout the examination and interview.  CV: No lower extremity edema on inspection or paltation.  Lymphatics: No cervical lymphadenopathy palpated. Eyes: sclera clear. Skin: No rashes or lesions seen over the head/neck, hairline, arms, legs.  Respirations unlabored.  MSK: Gait is nonantalgic.  Able to heel-toe walk without difficulty.  Negative Romberg.  Spine: normal AP curves of the C, T, and L spine.  Full range of motion in the Lumbar spine in all planes.  Palpation: Mild tenderness left lumbar paraspinals L2-3 through L5-S1.  Extremities: Full range of motion of the elbows, and wrists with no effusions or tenderness to palpation.   Full range of motion of the hips, knees, and ankles with no effusions or tenderness to palpation.  Negative scour maneuver and Lucian's test bilaterally. No hypermobility of the upper or lower extremities.  Neurologic exam: Mental status: Patient is alert and oriented with normal affect.  Attention, knowledge, memory, and language are " intact.  Normal coordination throughout the examination.  Reflexes are 2+ and symmetric biceps, triceps, brachioradialis, patellar, and Achilles with down-going toes and Negative Jaquan's.  Sensation is intact to light touch throughout the upper and lower extremities bilaterally.  Manual muscle testing reveals 5 out of 5 in the hip flexors, knee flexors/extensors, ankle plantar flexors, ankle  dorsiflexors, and EHL.  Upper extremities: Grossly normal strength . Normal muscle bulk and tone in the arms and legs.    Negative seated and supine straight leg raise bilaterally.

## 2023-04-27 ENCOUNTER — HOSPITAL ENCOUNTER (OUTPATIENT)
Dept: CARDIOLOGY | Facility: HOSPITAL | Age: 68
Discharge: HOME | End: 2023-04-27
Attending: NURSE PRACTITIONER | Admitting: NURSE PRACTITIONER
Payer: MEDICARE

## 2023-04-27 DIAGNOSIS — I10 BENIGN ESSENTIAL HYPERTENSION: ICD-10-CM

## 2023-04-27 DIAGNOSIS — R06.09 DOE (DYSPNEA ON EXERTION): ICD-10-CM

## 2023-04-27 DIAGNOSIS — I25.10 CORONARY ARTERY DISEASE INVOLVING NATIVE CORONARY ARTERY OF NATIVE HEART WITHOUT ANGINA PECTORIS: ICD-10-CM

## 2023-04-27 LAB — LVEF ECHO: NORMAL

## 2023-04-27 PROCEDURE — 93308 TTE F-UP OR LMTD: CPT | Mod: 26 | Performed by: INTERNAL MEDICINE

## 2023-04-27 PROCEDURE — 255N000002 HC RX 255 OP 636: Performed by: NURSE PRACTITIONER

## 2023-04-27 PROCEDURE — C8924 2D TTE W OR W/O FOL W/CON,FU: HCPCS

## 2023-04-27 PROCEDURE — 93325 DOPPLER ECHO COLOR FLOW MAPG: CPT | Mod: 26 | Performed by: INTERNAL MEDICINE

## 2023-04-27 PROCEDURE — 93325 DOPPLER ECHO COLOR FLOW MAPG: CPT

## 2023-04-27 PROCEDURE — 93321 DOPPLER ECHO F-UP/LMTD STD: CPT | Mod: 26 | Performed by: INTERNAL MEDICINE

## 2023-04-27 RX ADMIN — PERFLUTREN 3 ML: 6.52 INJECTION, SUSPENSION INTRAVENOUS at 13:30

## 2023-05-01 ENCOUNTER — TELEPHONE (OUTPATIENT)
Dept: PHYSICAL MEDICINE AND REHAB | Facility: CLINIC | Age: 68
End: 2023-05-01
Payer: MEDICARE

## 2023-05-01 NOTE — TELEPHONE ENCOUNTER
Screening Questions for Radiology Injections:    Injection to be done at which interventional clinic site? [facility]    Procedure ordered by Migue    Procedure ordered? Left L2-3 CAIO Penaloza    Transforaminal Cervical SHIRA - Send to [facility] ([facility]) - No  Community Site providers perform this procedure    What insurance would patient like us to bill for this procedure? Medicare/HP    IF SCHEDULING IN [place] [facility] OR SPINE PLEASE SCHEDULE AT LEAST 7-10 BUSINESS DAYS OUT SO A PA CAN BE OBTAINED    Worker's comp or MVA (motor vehicle accident) -Any injection DO NOT SCHEDULE and route to Dami Ayala.      [facility] insurance - For SI joint injections, DO NOT SCHEDULE and route to Ann Healy.       ALL BCBS, [employer] and HP [employer] - DO NOT SCHEDULE and route to Ann Niraj    MEDICA- facet joint injections, route to Ann Niraj    Is patient scheduled at [facility]? yes   If YES, route every encounter to [facility] CARE NAVIGATION POOL [[phone]]    Is an  needed? No     Patient has a  home? (Review Grid) YES: ok    Any chance of pregnancy? NO   If YES, do NOT schedule and route to RN pool  - Dr. Stover route to Renetta Liz and PM&R Nurse  [[id number]]      Is patient actively being treated for cancer or immunocompromised? No  If YES, do NOT schedule and route to RN pool/ Dr. Stover's Team    Does the patient have a bleeding or clotting disorder? No     If YES, okay to schedule AND route to RN nurse jason/ Dr. Stover's Team     (For any patients with platelet count <100, RN must forward to provider)    Is patient taking any Blood Thinners OR Antiplatelet medication?  no   If hold needed, do NOT schedule, route to RN pool/ Dr. Stover's Team    Examples:   o Blood Thinners: (Coumadin, Warfarin, Jantoven, Pradaxa, Xarelto, Eliquis, Edoxaban, Enoxaparin, Lovenox, Heparin, Arixtra, Fondaparinux or Fragmin)  o Antiplatelet Medications: (Plavix, Brilinta or Effient)     Is  patient taking any aspirin products (includes Excedrin and Fiorinal)? No     If more than 325mg/day, OK to schedule; Instruct Pt to decrease to less than 325 mg for 7 days AND route to RN pool/ Dr. Stover's Team     For CERVICAL procedures, hold all aspirin products for 6 days.     Tell Pt that if aspirin product is not held for 6 days, the procedure WILL BE cancelled.     Any allergies to contrast dye, iodine, shellfish, or numbing and steroid medications? Yes 81mg    If YES, schedule and add allergy information to appointment notes AND route to the RN pool/ Dr. Stover's Team    If SHIRA and Contrast Dye / Iodine Allergy? DO NOT SCHEDULE, route to RN pool/ Dr. Stover's Team    Allergies: Nka [no known allergies]     Does patient have an active infection or treated for one within the past week? No    Is patient currently taking any antibiotics or steroid medications?  No     For patients on chronic, preventative, or prophylactic antibiotics, procedures may be scheduled.     For patients on antibiotics for active or recent infection, schedule 4 days after completed.    For patients on steroid medications, schedule 4 days after completed.     Has the patient had a flu shot or any other vaccinations within the past 7 days? No  If yes, explain that for the vaccine to work best they need to:       wait 1 week before and 1 week after getting any Vaccine    wait 1 week before and 2 weeks after getting Covid Vaccine #2 or BOOSTER    If patient has concerns about the timing, send to RN pool/ Dr. Stover's Team    Does patient have an MRI/CT?  YES: MRI Include Date and Check Procedure Scheduling Grid to see if required.    Was the MRI/CT done within the last 3 years?  Yes     If no route to RN Pool/ Dr. Stover's Team    If yes, where was the MRI/CT done? [facility]     Refer to PACS Transmissions list for approved external locations and route to RN Pool High Priority/ Dr. Stover's Team    If MRI was not done at approved  external location do NOT schedule and route to RN pool/ Dr. Stover's Team      If patient has an imaging disc, the injection MAY be scheduled but patient must bring disc to appt or appt will be cancelled.    Is patient able to transfer to a procedure table with minimal or no assistance? Yes     If no, do NOT schedule and route to RN Pool/ Dr. Stover's Team    Procedure Specific Instructions:    If celiac plexus block, informed patient NPO for 6 hours and that it is okay to take medications with sips of water, especially blood pressure medications Not Applicable         If this is for a cervical procedure, informed patient that aspirin needs to be held for 6 days.   Not Applicable      Sedation, If Sedation is ordered for any procedure, patient must be NPO for 6 hours prior to procedure Not Applicable      If IV needed:    Do not schedule procedures requiring IV placement in the first appointment of the day or first appointment after lunch. Do NOT schedule at 0745, 0815 or 1245. ok    Instructed patient to arrive 30 minutes early for IV start if required. (Check Procedure Scheduling Grid)  Not Applicable    Reminders:    If you are started on any steroids or antibiotics between now and your appointment, you must contact us because the procedure may need to be cancelled.  Yes      As a reminder, receiving steroids can decrease your body's ability to fight infection.   Would you still like to move forward with scheduling the injection?  Yes      IV Sedation is not provided for procedures. If oral anti-anxiety medication is needed, the patient should request this from their referring provider.      Instruct patient to arrive as directed prior to the scheduled appointment time:  If IV needed 30 minutes before appointment time       For patients 85 or older we recommend having an adult stay w/ them for the remainder of the day.       If the patient is Diabetic, remind them to bring their glucometer.      Does the patient  have any questions?  NO  Jessie Ayala  [facility] [place]

## 2023-05-01 NOTE — TELEPHONE ENCOUNTER
Phone call to patient to assure him he is fine to continue to take the Plavix for the current ordered injection. He should take all medications as directed and he may eat prior to the injection. Stated understanding and appreciation for call back.

## 2023-05-08 ENCOUNTER — OFFICE VISIT (OUTPATIENT)
Dept: UROLOGY | Facility: CLINIC | Age: 68
End: 2023-05-08
Payer: MEDICARE

## 2023-05-08 VITALS — SYSTOLIC BLOOD PRESSURE: 124 MMHG | DIASTOLIC BLOOD PRESSURE: 75 MMHG | OXYGEN SATURATION: 97 % | HEART RATE: 63 BPM

## 2023-05-08 DIAGNOSIS — N52.9 ERECTILE DYSFUNCTION, UNSPECIFIED ERECTILE DYSFUNCTION TYPE: Primary | ICD-10-CM

## 2023-05-08 PROCEDURE — 99214 OFFICE O/P EST MOD 30 MIN: CPT | Performed by: STUDENT IN AN ORGANIZED HEALTH CARE EDUCATION/TRAINING PROGRAM

## 2023-05-08 RX ORDER — TADALAFIL 10 MG/1
TABLET ORAL
Qty: 30 TABLET | Refills: 4 | Status: SHIPPED | OUTPATIENT
Start: 2023-05-08 | End: 2024-01-10

## 2023-05-08 NOTE — NURSING NOTE
"Initial /75 (BP Location: Right arm, Patient Position: Chair, Cuff Size: Adult Large)   Pulse 63   SpO2 97%  Estimated body mass index is 30.13 kg/m  as calculated from the following:    Height as of 4/26/23: 1.765 m (5' 9.5\").    Weight as of 4/26/23: 93.9 kg (207 lb). .    Trinidad Reina MA on 5/8/2023 at 1:25 PM  "

## 2023-05-08 NOTE — PROGRESS NOTES
UROLOGY FOLLOW-UP NOTE          Chief Complaint:   Today I had the pleasure of seeing Mr. Jamal Sanchez in follow-up for a chief complaint of LUTS.          Interval Update   Jamal Sanchez is a very pleasant 68 year old male CAD, prediabetes, diverticulitis, and HTN.     Brief History: Mr. Jamal Sanchez has followed with Dr. Caba for LUTS and ED. He takes tamsulosin 0.4 mg and oxybutynin XR 10 mg daily. He has tried both sildenafil and alprostadil for management of ED.     Today's notes: He reports nocturia x 2-3, though his symptoms are stable with tamsulosin and oxybutynin. He has tried sildenafil 40-60 mg. When he tries to take higher dose for management of erectile dysfunction, he gets a significant headache. Alprostadil injections caused some stinging pain.          Physical Exam:   Patient is a 68 year old  male   Vitals: Blood pressure 124/75, pulse 63, SpO2 97 %.  General: Alert and oriented x 3, no acute distress.  Respiratory: Non-labored breathing.  Cardiac: Regular rate.        Labs and Pathology:    I personally reviewed all applicable laboratory data and went over findings with patient  Significant for:    CBC RESULTS:  Recent Labs   Lab Test 04/13/23  0950 09/14/22  1636 09/07/22  0917 04/19/22  1108   WBC 4.6 6.3 5.5 6.1   HGB 14.7 14.9 15.1 14.3    186 202 225        BMP RESULTS:  Recent Labs   Lab Test 04/13/23  0950 03/29/23  1103 11/14/22  0805 09/30/22  1017 11/23/21  1243 02/22/21  1428 10/01/19  1536 07/10/19  0820 07/02/19  0927    138 141 134*   < > 136 137 133 137   POTASSIUM 4.0 4.4 4.2 4.1   < > 4.2 5.0 3.9 3.9   CHLORIDE 99 98 105 97*   < > 102 104 100 105   CO2 28 25 28 25   < > 28 29 30 26   ANIONGAP 10 15 8 12   < > 6 4 3 6   * 88 122* 85   < > 90 91 125* 126*   BUN 16.9 15.4 18.3 15.9   < > 23 19 18 17   CR 1.01 0.96 0.86 0.93   < > 0.89 0.92 0.98 0.91   GFRESTIMATED 81 86 >90 89   < > 88 86 80 88   GFRESTBLACK  --   --   --   --   --  >90 >90  >90 >90   ALYCE 9.4 10.1 9.6 9.8   < > 9.4 9.5 9.5 9.1    < > = values in this interval not displayed.       UA RESULTS:   Recent Labs   Lab Test 07/02/19  1255 10/08/18  1652 06/19/18  0757 12/19/17  1530 11/07/17  0835   SG 1.005 <=1.005 1.010 1.010 1.010   URINEPH 7.0 6.0 7.0 7.0 7.0   NITRITE Negative Negative Negative Negative Negative   RBCU  --   --  O - 2 O - 2 O - 2   WBCU  --   --  0 - 5 O - 2 O - 2       PSA RESULTS  PSA   Date Value Ref Range Status   07/09/2021 1.60 0 - 4 ug/L Final     Comment:     Assay Method:  Chemiluminescence using Siemens Vista analyzer   10/01/2019 1.63 0 - 4 ug/L Final     Comment:     Assay Method:  Chemiluminescence using Siemens Vista analyzer   05/12/2014 1.86 0 - 4 ug/L Final   11/11/2009 1.05 0 - 4 ug/L Final   07/18/2007 0.88 0 - 4 ug/L Final   12/03/2003 1.15 0 - 4 ug/L Final     Prostate Specific Antigen Screen   Date Value Ref Range Status   11/14/2022 1.63 0.00 - 4.50 ng/mL Final                Assessment/Plan   68 year old male seen in follow up for LUTS and ED. His urinary symptoms are adequately managed with tamsulosin 0.4 mg and oxybutynin XR 10 mg daily. He has tried sildenafil 40-60 mg. When he tries to take higher dose for management of erectile dysfunction, he gets a significant headache. Alprostadil injections caused some stinging pain.     We reviewed treatment options for ED including oral medication, a vacuum erection device, intracavernosal injections, and an inflatable penile prosthesis. More specifically, we discussed a trial of tadalafil or an injection with a lower alprostadil component. The patient would like to try tadalafil.       Plan:  1. Continue tamsulosin 0.4 mg and oxybutynin XR 10 mg daily.   2. Start tadalafil 10 mg prn, take 1-2 tablets 30-45 minutes prior to sexual activity. Advised not to combine with nitroglycerin. If not effective, could try injection with lower alprostadil component.            Past Medical History:     Past Medical  History:   Diagnosis Date     A-fib (H)      BPH (benign prostatic hyperplasia)      Complication of anesthesia      Diverticulitis      GERD (gastroesophageal reflux disease)      Heart disease      HLD (hyperlipidemia)      Hypertension             Past Surgical History:     Past Surgical History:   Procedure Laterality Date     APPENDECTOMY       ARTHROSCOPY SHOULDER ROTATOR CUFF REPAIR Left      ARTHROSCOPY SHOULDER RT/LT Right 01/2018     CARDIAC SURGERY  November, 2022    One stent placed in my heart.     COLONOSCOPY  03/05/2008     COLONOSCOPY  10/09/2013    Procedure: COLONOSCOPY;;  Surgeon: Nicolas Lizama MD;  Location: [facility]     COLONOSCOPY N/A 08/17/2022    Procedure: COLONOSCOPY, FLEXIBLE, WITH LESION REMOVAL USING SNARE;  Surgeon: Vimal Stanley DO;  Location: [facility]     CV CORONARY ANGIOGRAM N/A 09/07/2022    Procedure: Coronary Angiogram;  Surgeon: Morris Sky MD;  Location: [facility] CARDIAC CATH LAB     CV INSTANTANEOUS WAVE-FREE RATIO N/A 09/07/2022    Procedure: Instantaneous Wave-Free Ratio;  Surgeon: Morris Sky MD;  Location: [facility] CARDIAC CATH LAB     CV LEFT HEART CATH N/A 09/07/2022    Procedure: Left Heart Catheterization;  Surgeon: Morris Sky MD;  Location: [facility] CARDIAC CATH LAB     CV LEFT VENTRICULOGRAM N/A 09/07/2022    Procedure: Left Ventriculogram;  Surgeon: Morris Sky MD;  Location: [facility] CARDIAC CATH LAB     CV PCI STENT DRUG ELUTING N/A 09/07/2022    Procedure: Percutaneous Coronary Intervention Stent;  Surgeon: Morris Sky MD;  Location: [facility] CARDIAC CATH LAB     ESOPHAGOSCOPY, GASTROSCOPY, DUODENOSCOPY (EGD), COMBINED  10/09/2013    Procedure: COMBINED ESOPHAGOSCOPY, GASTROSCOPY, DUODENOSCOPY (EGD), BIOPSY SINGLE OR MULTIPLE;;  Surgeon: Nicolas Lizama MD;  Location: [facility]     ESOPHAGOSCOPY, GASTROSCOPY, DUODENOSCOPY (EGD), COMBINED N/A 04/06/2022    Procedure: ESOPHAGOGASTRODUODENOSCOPY, WITH  BIOPSY;  Surgeon: Vimal Stanley DO;  Location: [facility]     HERNIA REPAIR       LAPAROSCOPIC APPENDECTOMY N/A 01/07/2016    Procedure: LAPAROSCOPIC APPENDECTOMY;  Surgeon: Ab Guzman MD;  Location: [facility]     LAPAROSCOPIC HERNIORRHAPHY INGUINAL BILATERAL Bilateral 12/13/2016    Procedure: LAPAROSCOPIC HERNIORRHAPHY INGUINAL BILATERAL;  Surgeon: Ab Guzman MD;  Location: [facility]            Medications     Current Outpatient Medications   Medication     albuterol (PROAIR HFA/PROVENTIL HFA/VENTOLIN HFA) 108 (90 Base) MCG/ACT inhaler     aspirin (ASA) 81 MG EC tablet     atorvastatin (LIPITOR) 40 MG tablet     calcium carbonate (TUMS) 500 MG chewable tablet     clopidogrel (PLAVIX) 75 MG tablet     COMPOUNDED NON-CONTROLLED SUBSTANCE (CMPD RX) - PHARMACY TO MIX COMPOUNDED MEDICATION     cyclobenzaprine (FLEXERIL) 5 MG tablet     Digestive Enzymes (DIGESTIVE ENZYME PO)     famotidine (PEPCID) 20 MG tablet     gabapentin (NEURONTIN) 100 MG capsule     glucosamine-chondroitin 500-400 MG CAPS per capsule     ibuprofen (ADVIL/MOTRIN) 200 MG tablet     ketoconazole (NIZORAL) 2 % external cream     lisinopril (ZESTRIL) 10 MG tablet     magnesium 250 MG tablet     metoprolol succinate ER (TOPROL XL) 25 MG 24 hr tablet     multivitamin (CENTRUM SILVER) tablet     naproxen (NAPROSYN) 500 MG tablet     nitroGLYcerin (NITROSTAT) 0.4 MG sublingual tablet     omeprazole (PRILOSEC) 40 MG DR capsule     oxybutynin ER (DITROPAN XL) 10 MG 24 hr tablet     sildenafil (REVATIO) 20 MG tablet     tamsulosin (FLOMAX) 0.4 MG capsule     triamcinolone (KENALOG) 0.1 % external cream     triamterene-HCTZ (MAXZIDE) 75-50 MG tablet     vitamin C (ASCORBIC ACID) 1000 MG TABS     No current facility-administered medications for this visit.            Family History:     Family History   Problem Relation Age of Onset     Diabetes Mother      Cerebrovascular Disease Mother      Neurologic Disorder Father         parkinson's     Cancer -  colorectal Maternal Grandfather      Prostate Cancer Paternal Grandmother      Hypertension Brother      Cerebrovascular Disease Brother         stroke     Alcohol/Drug Brother      Unknown/Adopted Brother      Diabetes Sister      Obesity Sister             Social History:     Social History     Socioeconomic History     Marital status:      Spouse name: Not on file     Number of children: Not on file     Years of education: Not on file     Highest education level: Not on file   Occupational History     Not on file   Tobacco Use     Smoking status: Never     Smokeless tobacco: Never   Vaping Use     Vaping status: Never Used   Substance and Sexual Activity     Alcohol use: Yes     Comment: 6 pack beer on weekend.     Drug use: No     Sexual activity: Yes     Partners: Female     Birth control/protection: Post-menopausal   Other Topics Concern     Parent/sibling w/ CABG, MI or angioplasty before 65F 55M? No   Social History Narrative     Not on file     Social Determinants of Health     Financial Resource Strain: Not on file   Food Insecurity: Not on file   Transportation Needs: Not on file   Physical Activity: Not on file   Stress: Not on file   Social Connections: Not on file   Intimate Partner Violence: Not on file   Housing Stability: Not on file            Allergies:   Nka [no known allergies]         Review of Systems:  From intake questionnaire   Negative 14 system review except as noted on HPI, nurse's note.        KULDEEP AMBROSE PA-C  Department of Urology

## 2023-05-08 NOTE — PATIENT INSTRUCTIONS
Take 1-2 tablets of tadalafil (Cialis) 30-45 minutes prior to intended sexual activity. Both Cialis and Viagra are best taken on an empty stomach, though Cialis may be less affected by food.     Do not combine Cialis or Viagra with nitroglycerin.     If Cialis is not helpful or causes side effects, we could try another injection that may cause less burning pain. A vacuum erection device or inflatable penile prosthesis are other options.

## 2023-05-11 ENCOUNTER — NURSE TRIAGE (OUTPATIENT)
Dept: NURSING | Facility: CLINIC | Age: 68
End: 2023-05-11
Payer: MEDICARE

## 2023-05-11 NOTE — TELEPHONE ENCOUNTER
Patient calling. He had eaten some fish and rice, and his throat seized, pain in his throat and sub-sternally in his chest. States that it was not a bone. He initially had a hard time breathing. He eventually coughed up some clear liquid. He sat up after that, and was able to breathe, states that he feels he is recovering at this time. He has a history of shortness of breath.Patient laid on the floor to be sure he didn't fall if he passed out, but did not experience and loss of consciousness.     Care advice given for patient to be seen within 4 hours. Encouraged patient to go to [facility] [place].     Patient states that he has an appointment with Dr. MARCI Benitez as YOGESH.    Kiara Jj RN  [facility]  May 11, 2023, 6:25 PM                        Reason for Disposition    [1] MILD difficulty breathing (e.g., minimal/no SOB at rest, SOB with walking, pulse <100) AND [2] NEW-onset or WORSE than normal    Additional Information    Negative: SEVERE difficulty breathing (e.g., struggling for each breath, speaks in single words)    Negative: [1] Breathing stopped AND [2] hasn't returned    Negative: Choking on something    Negative: Bluish (or gray) lips or face now    Negative: Difficult to awaken or acting confused (e.g., disoriented, slurred speech)    Negative: Passed out (i.e., lost consciousness, collapsed and was not responding)    Negative: Wheezing started suddenly after medicine, an allergic food or bee sting    Negative: Stridor    Negative: Slow, shallow and weak breathing    Negative: Sounds like a life-threatening emergency to the triager    Negative: Chest pain    Negative: [1] Wheezing (high pitched whistling sound) AND [2] previous asthma attacks or use of asthma medicines    Negative: [1] Difficulty breathing AND [2] only present when coughing    Negative: [1] Difficulty breathing AND [2] only from stuffy or runny nose    Negative: [1] Difficulty breathing AND [2] within 14 days of COVID-19  "Exposure    Negative: [1] MODERATE difficulty breathing (e.g., speaks in phrases, SOB even at rest, pulse 100-120) AND [2] NEW-onset or WORSE than normal    Negative: Oxygen level (e.g., pulse oximetry) 90 percent or lower    Negative: Wheezing can be heard across the room    Negative: Drooling or spitting out saliva (because can't swallow)    Negative: History of prior \"blood clot\" in leg or lungs (i.e., deep vein thrombosis, pulmonary embolism)    Negative: History of inherited increased risk of blood clots (e.g., Factor 5 Leiden, Anti-thrombin 3, Protein C or Protein S deficiency, Prothrombin mutation)    Negative: Major surgery in the past month    Negative: Hip or leg fracture (broken bone) in past month (or had cast on leg or ankle in past month)    Negative: Illness requiring prolonged bedrest in past month (e.g., immobilization, long hospital stay)    Negative: Long-distance travel in past month (e.g., car, bus, train, plane; with trip lasting 6 or more hours)    Negative: Cancer treatment in past six months (or has cancer now)    Negative: Extra heart beats OR irregular heart beating  (i.e., \"palpitations\")    Negative: Fever > 103 F (39.4 C)    Negative: [1] Fever > 101 F (38.3 C) AND [2] age > 60 years    Negative: [1] Fever > 100.0 F (37.8 C) AND [2] bedridden (e.g., nursing home patient, CVA, chronic illness, recovering from surgery)    Negative: [1] Fever > 100.0 F (37.8 C) AND [2] diabetes mellitus or weak immune system (e.g., HIV positive, cancer chemo, splenectomy, organ transplant, chronic steroids)    Negative: [1] Periods where breathing stops and then resumes normally AND [2] bedridden (e.g., nursing home patient, CVA)    Negative: Pregnant or postpartum (from 0 to 6 weeks after delivery)    Negative: Patient sounds very sick or weak to the triager    Protocols used: BREATHING DIFFICULTY-A-AH      "

## 2023-05-12 ENCOUNTER — OFFICE VISIT (OUTPATIENT)
Dept: FAMILY MEDICINE | Facility: CLINIC | Age: 68
End: 2023-05-12
Payer: MEDICARE

## 2023-05-12 VITALS
HEART RATE: 60 BPM | DIASTOLIC BLOOD PRESSURE: 70 MMHG | RESPIRATION RATE: 20 BRPM | OXYGEN SATURATION: 97 % | SYSTOLIC BLOOD PRESSURE: 102 MMHG

## 2023-05-12 DIAGNOSIS — K44.9 HIATAL HERNIA: ICD-10-CM

## 2023-05-12 DIAGNOSIS — R13.10 DYSPHAGIA, UNSPECIFIED TYPE: Primary | ICD-10-CM

## 2023-05-12 PROCEDURE — 99213 OFFICE O/P EST LOW 20 MIN: CPT | Performed by: FAMILY MEDICINE

## 2023-05-12 NOTE — PATIENT INSTRUCTIONS
Continue on omeprazole medication.   Continue on pepcid 20 mg twice daily.     Follow up with GI for further evaluation of the swallowing.

## 2023-05-12 NOTE — PROGRESS NOTES
Assessment & Plan     Dysphagia, unspecified type  Hiatal hernia  Continue omeprazole 40 mg daily.   Restart on famotidine 20 mg twice daily.   -- Follow up with GI for further evaluation of dysphagia.   - Adult GI  Referral - Consult Only      The risks, benefits and treatment options of prescribed medications or other treatments have been discussed with the patient. The patient verbalized their understanding and should call or follow up if no improvement or if they develop further problems.        Raad Benitez [facility]    Taylor Herron is a 68 year old, presenting for the following health issues:  Problems Swallowing        4/19/2023    10:44 AM   Additional Questions   Roomed by Sunita        Swallowing issues.   Eating some food yesterday ( fish and rice) felt like his throat froze on him.   Two days prior was eating a beet and had symptoms where felt like it was stuck in his esophagus.   Had episode of throat freeze   On Omeprazole 40 mg daily.   On pepcid 20 mg BID. He has been decreasing his pepcid and not taking the afternoon pepcid recently.     EGD 4/2022:  Impression:            - Normal examined duodenum.                          - A few gastric polyps. Biopsied.                          - Gastritis. Biopsied.                          - Gastroesophageal flap valve classified as Hill Grade                          IV (no fold, wide open lumen, hiatal hernia present).                          - 4 cm hiatal hernia.   Recommendation:        - Discharge patient to home (ambulatory).                          - Resume previous diet.                          - Continue present medications.                          - Await pathology results.       No fevers  No breathing issues.   No cough  No abdominal pain.     Review of Systems   Constitutional, HEENT, cardiovascular, pulmonary, gi and gu systems are negative, except as otherwise noted.      Objective    /70    Pulse 60   Resp 20   SpO2 97%   There is no height or weight on file to calculate BMI.  Physical Exam   General: alert, cooperative, no acute distress   Neck: non-tender, thyroid non-enlarged.   HEENT: NC/AT, PERRL, TM's without signs of infection, nares patent, oropharynx clear and non-erythematous.   CV: RRR, no murmur  Resp: non-labored breathing, clear to auscultation, no wheezing or rales   Abdomen: Soft, non-tender, no guarding.   Extremities: No peripheral edema, calves non-tender.

## 2023-05-15 ENCOUNTER — HOSPITAL ENCOUNTER (OUTPATIENT)
Dept: RESPIRATORY THERAPY | Facility: CLINIC | Age: 68
Discharge: HOME | End: 2023-05-15
Attending: FAMILY MEDICINE | Admitting: FAMILY MEDICINE
Payer: MEDICARE

## 2023-05-15 DIAGNOSIS — R06.02 SOBOE (SHORTNESS OF BREATH ON EXERTION): ICD-10-CM

## 2023-05-15 PROCEDURE — 999N000105 HC STATISTIC NO DOCUMENTATION TO SUPPORT CHARGE

## 2023-05-15 PROCEDURE — 94640 AIRWAY INHALATION TREATMENT: CPT

## 2023-05-15 PROCEDURE — 94729 DIFFUSING CAPACITY: CPT

## 2023-05-15 PROCEDURE — 94726 PLETHYSMOGRAPHY LUNG VOLUMES: CPT

## 2023-05-15 PROCEDURE — 250N000009 HC RX 250: Performed by: NURSE PRACTITIONER

## 2023-05-15 PROCEDURE — 94060 EVALUATION OF WHEEZING: CPT

## 2023-05-15 RX ORDER — ALBUTEROL SULFATE 0.83 MG/ML
2.5 SOLUTION RESPIRATORY (INHALATION) ONCE
Status: COMPLETED | OUTPATIENT
Start: 2023-05-15 | End: 2023-05-15

## 2023-05-15 RX ADMIN — ALBUTEROL SULFATE 2.5 MG: 2.5 SOLUTION RESPIRATORY (INHALATION) at 13:25

## 2023-05-17 LAB
DLCOCOR-%PRED-PRE: 65 %
DLCOCOR-PRE: 16.97 ML/MIN/MMHG
DLCOUNC-%PRED-PRE: 65 %
DLCOUNC-PRE: 17.02 ML/MIN/MMHG
DLCOUNC-PRED: 26.06 ML/MIN/MMHG
ERV-%PRED-PRE: 37 %
ERV-PRE: 0.35 L
ERV-PRED: 0.95 L
EXPTIME-PRE: 7.23 SEC
FEF2575-%PRED-POST: 108 %
FEF2575-%PRED-PRE: 100 %
FEF2575-POST: 2.71 L/SEC
FEF2575-PRE: 2.51 L/SEC
FEF2575-PRED: 2.5 L/SEC
FEFMAX-%PRED-PRE: 92 %
FEFMAX-PRE: 7.79 L/SEC
FEFMAX-PRED: 8.46 L/SEC
FEV1-%PRED-PRE: 67 %
FEV1-PRE: 2.21 L
FEV1FEV6-PRE: 83 %
FEV1FEV6-PRED: 78 %
FEV1FVC-PRE: 87 %
FEV1FVC-PRED: 76 %
FEV1SVC-PRE: 85 %
FEV1SVC-PRED: 68 %
FIFMAX-PRE: 3.5 L/SEC
FRCPLETH-%PRED-PRE: 69 %
FRCPLETH-PRE: 2.57 L
FRCPLETH-PRED: 3.69 L
FVC-%PRED-PRE: 59 %
FVC-PRE: 2.54 L
FVC-PRED: 4.29 L
IC-%PRED-PRE: 58 %
IC-PRE: 2.23 L
IC-PRED: 3.84 L
RVPLETH-%PRED-PRE: 84 %
RVPLETH-PRE: 2.22 L
RVPLETH-PRED: 2.62 L
TLCPLETH-%PRED-PRE: 67 %
TLCPLETH-PRE: 4.8 L
TLCPLETH-PRED: 7.13 L
VA-%PRED-PRE: 57 %
VA-PRE: 3.69 L
VC-%PRED-PRE: 54 %
VC-PRE: 2.59 L
VC-PRED: 4.78 L

## 2023-05-18 ENCOUNTER — VIRTUAL VISIT (OUTPATIENT)
Dept: GASTROENTEROLOGY | Facility: CLINIC | Age: 68
End: 2023-05-18
Attending: FAMILY MEDICINE
Payer: MEDICARE

## 2023-05-18 ENCOUNTER — TELEPHONE (OUTPATIENT)
Dept: PHYSICAL MEDICINE AND REHAB | Facility: CLINIC | Age: 68
End: 2023-05-18

## 2023-05-18 DIAGNOSIS — R13.10 DYSPHAGIA, UNSPECIFIED TYPE: Primary | ICD-10-CM

## 2023-05-18 DIAGNOSIS — K21.9 GASTROESOPHAGEAL REFLUX DISEASE, UNSPECIFIED WHETHER ESOPHAGITIS PRESENT: ICD-10-CM

## 2023-05-18 PROCEDURE — 99215 OFFICE O/P EST HI 40 MIN: CPT | Mod: 95 | Performed by: PHYSICIAN ASSISTANT

## 2023-05-18 ASSESSMENT — PAIN SCALES - GENERAL: PAINLEVEL: MILD PAIN (3)

## 2023-05-18 NOTE — NURSING NOTE
Is the patient currently in the state of [place]? YES    Visit mode:VIDEO    If the visit is dropped, the patient can be reconnected by: VIDEO VISIT: Text to cell phone: [phone]    Will anyone else be joining the visit? NO      How would you like to obtain your AVS? MyChart    Are changes needed to the allergy or medication list? NO    Reason for visit: No chief complaint on file.

## 2023-05-18 NOTE — TELEPHONE ENCOUNTER
Call placed to pt. Informed him he can continue with all of his medications regularly prior to procedure tomorrow. He stated understanding. Confirmed appt date/time.

## 2023-05-18 NOTE — PATIENT INSTRUCTIONS
It was a pleasure taking care of you today.  I've included a brief summary of our discussion and care plan from today's visit below.  Please review this information with your primary care provider.  _______________________________________________________________________     My recommendations are summarized as follows:     - schedule an upper endoscopy   - continue omeprazole 40 mg daily and pepcid (famotidine) 20 mg twice daily   -Eat smaller more frequent meals.  Avoid overeating.  Avoid laying down after eating  -Avoid eating close to bedtime.  He should have 2 to 3 hours between dinner and lying down for bed.  - follow up in 3 months   ______________________________________________________________________     How do I schedule labs, imaging studies, or procedures that were ordered in clinic today?      Labs: To schedule lab appointment you can contact your local [facility] or call [phone] to schedule at any convenient [facility] location.     Procedures: If a colonoscopy, upper endoscopy, breath test, esophageal manometry, or pH impedence was ordered today, our endoscopy team will call you to schedule this. If you have not heard from our endoscopy team within a week, please call [phone] to schedule.      Imaging Studies: If you were scheduled for a CT scan, X-ray, MRI, ultrasound, HIDA scan or other imaging study, please call [phone] to have this scheduled.      Referral: If a referral to another specialty was ordered, expect a phone call or follow instructions above. If you have not heard from anyone regarding your referral in a week, please call our clinic to check the status.      Who do I call with any questions after my visit?  Please be in touch if there are any further questions that arise following today's visit.  There are multiple ways to contact your gastroenterology care team.       During business hours, you may reach a Gastroenterology nurse at [phone]     To  schedule or reschedule an appointment, please call [phone].      You can always send a secure message through [employer].  [employer] messages are answered by your nurse or doctor typically within 24 hours.  Please allow extra time on weekends and holidays.       For urgent/emergent questions after business hours, you may reach the on-call GI Fellow by contacting the [facility]  at [phone].     How will I get the results of any tests ordered?    You will receive all of your results.  If you have signed up for [employer], any tests ordered at your visit will be available to you after your physician reviews them.  Typically this takes 1-2 weeks.  If there are urgent results that require a change in your care plan, your physician or nurse will call you to discuss the next steps.       What is [employer]?  [employer] is a secure way for you to access all of your healthcare records from the [facility].  It is a web based computer program, so you can sign on to it from any location.  It also allows you to send secure messages to your care team.  I recommend signing up for [employer] access if you have not already done so and are comfortable with using a computer.       How to I schedule a follow-up visit?  If you did not schedule a follow-up visit today, please call [phone] to schedule a follow-up office visit.      Narendra Kramer PA-C  Division of Gastroenterology, Hepatology and Nutrition   [facility]

## 2023-05-18 NOTE — TELEPHONE ENCOUNTER
[facility] Call Center    Phone Message    May a detailed message be left on voicemail: yes     Reason for Call: Other: Pt wants to know if he needs to stop taking any of his medications before his appt tomorrow 05/19     Action Taken: Other: [facility]    Travel Screening: Not Applicable

## 2023-05-18 NOTE — PROGRESS NOTES
GI FOLLOW UP     CC/REFERRING MD:    Raad Benitez, DO     REASON FOR CONSULTATION:   Referred by Raad Benitez for Video Visit      HPI: Jamal Sanchez is 68 year old male with coronary artery disease status post recent PCI with stent on Plavix, hyperlipidemia, GERD and hiatal hernia who presents for follow-up.    He was actually seen in December 2022 in our GI clinic with Dr. Marisela De Leon, see prior note for more detail.  Briefly he has a long history of reflux and is on omeprazole 40 mg daily and famotidine 20 mg twice daily.  Despite this however he continues to have chest discomfort and persistent reflux symptoms.  He also reports difficulty swallowing.  Often feeling that foods are getting stuck in his chest and has difficulty getting things dislodged.  He is trying to be mindful of how he eats.  Trying to take smaller bites and chew his foods very well.    He did have an upper endoscopy in January 2022 with esophageal stenosis, treated with, gastritis and medium sized hiatal hernia.  A repeat upper endoscopy in April 2022 was without stricture in the esophagus.    He also has shortness of breath with exertion.  This symptom has been present for 3 years.  He has had extensive work-up in this regard.  He was hoping he would see improvement after his stent placement however notes that the symptoms have not changed.  He more recently had PFT studies and a CT of the chest and was referred to [facility].        Gopal  has a past medical history of A-fib (H), BPH (benign prostatic hyperplasia), Complication of anesthesia, Diverticulitis, GERD (gastroesophageal reflux disease), Heart disease, HLD (hyperlipidemia), and Hypertension.    He  has a past surgical history that includes colonoscopy (03/05/2008); Esophagoscopy, gastroscopy, duodenoscopy (EGD), combined (10/09/2013); Colonoscopy (10/09/2013); Laparoscopic appendectomy (N/A, 01/07/2016); Laparoscopic herniorrhaphy inguinal bilateral  (Bilateral, 12/13/2016); arthroscopy shoulder rt/lt (Right, 01/2018); appendectomy; Arthroscopy shoulder rotator cuff repair (Left); hernia repair; Esophagoscopy, gastroscopy, duodenoscopy (EGD), combined (N/A, 04/06/2022); Colonoscopy (N/A, 08/17/2022); Coronary Angiogram (N/A, 09/07/2022); Left Heart Catheterization (N/A, 09/07/2022); Instantaneous Wave-Free Ratio (N/A, 09/07/2022); Left Ventriculogram (N/A, 09/07/2022); Percutaneous Coronary Intervention Stent (N/A, 09/07/2022); and Cardiac surgery (November, 2022).    He  reports that he has never smoked. He has never used smokeless tobacco. He reports current alcohol use. He reports that he does not use drugs.    His family history includes Alcohol/Drug in his brother; Cancer - colorectal in his maternal grandfather; Cerebrovascular Disease in his brother and mother; Diabetes in his mother and sister; Hypertension in his brother; Neurologic Disorder in his father; Obesity in his sister; Prostate Cancer in his paternal grandmother; Unknown/Adopted in his brother.    ALLERGIES:  Nka [no known allergies]    PERTINENT MEDICATIONS:    Current Outpatient Medications:      albuterol (PROAIR HFA/PROVENTIL HFA/VENTOLIN HFA) 108 (90 Base) MCG/ACT inhaler, Inhale 2 puffs into the lungs every 4 hours as needed for shortness of breath, Disp: 18 g, Rfl: 1     aspirin (ASA) 81 MG EC tablet, Take 1 tablet (81 mg) by mouth daily Start tomorrow., Disp: 30 tablet, Rfl: 3     atorvastatin (LIPITOR) 40 MG tablet, Take 1 tablet (40 mg) by mouth daily, Disp: 90 tablet, Rfl: 3     calcium carbonate (TUMS) 500 MG chewable tablet, Take 1 chew tab by mouth daily as needed , Disp: , Rfl:      clopidogrel (PLAVIX) 75 MG tablet, Take 1 tablet (75 mg) by mouth daily, Disp: 90 tablet, Rfl: 3     cyclobenzaprine (FLEXERIL) 5 MG tablet, Take 1 tablet (5 mg) by mouth 3 times daily as needed for muscle spasms, Disp: 30 tablet, Rfl: 0     Digestive Enzymes (DIGESTIVE ENZYME PO), , Disp: , Rfl:       famotidine (PEPCID) 20 MG tablet, Take 1 tablet (20 mg) by mouth 2 times daily, Disp: 180 tablet, Rfl: 2     gabapentin (NEURONTIN) 100 MG capsule, Take 1 capsule (100 mg) by mouth 3 times daily, Disp: 90 capsule, Rfl: 1     glucosamine-chondroitin 500-400 MG CAPS per capsule, Take 1 capsule by mouth daily, Disp: , Rfl:      ibuprofen (ADVIL/MOTRIN) 200 MG tablet, Take 800 mg by mouth every 8 hours as needed , Disp: , Rfl:      ketoconazole (NIZORAL) 2 % external cream, Apply to feet BID x 2-3 weeks PRN, Disp: 60 g, Rfl: 1     lisinopril (ZESTRIL) 10 MG tablet, Take 1 tablet (10 mg) by mouth daily Hold if Systolic Blood Pressure is less than 85., Disp: 90 tablet, Rfl: 3     metoprolol succinate ER (TOPROL XL) 25 MG 24 hr tablet, Take 1 tablet (25 mg) by mouth daily, Disp: 30 tablet, Rfl: 11     multivitamin (CENTRUM SILVER) tablet, Take 1 tablet by mouth daily, Disp: , Rfl:      nitroGLYcerin (NITROSTAT) 0.4 MG sublingual tablet, For chest pain place 1 tablet under the tongue every 5 minutes for 3 doses. If symptoms persist 5 minutes after 2nd dose call 911., Disp: 25 tablet, Rfl: 3     omeprazole (PRILOSEC) 40 MG DR capsule, Take 1 capsule (40 mg) by mouth daily, Disp: 90 capsule, Rfl: 3     oxybutynin ER (DITROPAN XL) 10 MG 24 hr tablet, Take 1 tablet (10 mg) by mouth daily, Disp: 90 tablet, Rfl: 3     tadalafil (CIALIS) 10 MG tablet, Take 1-2 tablets daily as needed, 30-45 minutes prior to intended sexual activity, Disp: 30 tablet, Rfl: 4     tamsulosin (FLOMAX) 0.4 MG capsule, Take 1 capsule (0.4 mg) by mouth daily, Disp: 90 capsule, Rfl: 3     triamcinolone (KENALOG) 0.1 % external cream, Apply topically 3 times daily, Disp: 80 g, Rfl: 3     triamterene-HCTZ (MAXZIDE) 75-50 MG tablet, Take 1 tablet by mouth daily, Disp: 90 tablet, Rfl: 3     vitamin C (ASCORBIC ACID) 1000 MG TABS, Take 1,000 mg by mouth daily, Disp: , Rfl:      naproxen (NAPROSYN) 500 MG tablet, Take 1 tablet (500 mg) by mouth 2 times daily as  needed for moderate pain (4-6) Take with plenty of food and water (Patient not taking: Reported on 5/12/2023), Disp: 60 tablet, Rfl: 0        PHYSICAL EXAMINATION:  Constitutional: aaox3, cooperative, pleasant, not dyspneic/diaphoretic, no acute distress      GENERAL: Healthy, alert and no distress  EYES: Eyes grossly normal to inspection.  No discharge or erythema, or obvious scleral/conjunctival abnormalities.  RESP: No audible wheeze, cough, or visible cyanosis.  No visible retractions or increased work of breathing.    SKIN: Visible skin clear. No significant rash, abnormal pigmentation or lesions.  NEURO: Cranial nerves grossly intact.  Mentation and speech appropriate for age.  PSYCH: Mentation appears normal, affect normal/bright, judgement and insight intact, normal speech and appearance well-groomed.          PERTINENT STUDIES: (I personally reviewed these laboratory studies today)  Most recent CBC:   Recent Labs   Lab Test 04/13/23  0950 09/14/22  1636   WBC 4.6 6.3   HGB 14.7 14.9   HCT 44.1 45.6    186     Most recent hepatic panel:  Recent Labs   Lab Test 04/13/23  0950 11/14/22  0805 04/19/22  1108 11/23/21  1243 07/02/19  0927   ALT 21 23   < > 83* 26   AST  --   --   --  41 14    < > = values in this interval not displayed.     Most recent creatinine:  Recent Labs   Lab Test 04/13/23  0950 03/29/23  1103   CR 1.01 0.96       TSH   Date Value Ref Range Status   03/30/2023 1.33 0.30 - 4.20 uIU/mL Final   11/23/2021 1.33 0.40 - 4.00 mU/L Final   08/12/2015 1.44 0.40 - 4.00 mU/L Final         ASSESSMENT/PLAN:    1. Dysphagia, unspecified type  - Adult GI  Referral - Procedure Only; Future  - Adult GI Clinic Follow-Up Order (Blank); Future    2. Gastroesophageal reflux disease, unspecified whether esophagitis present  - Adult GI  Referral - Procedure Only; Future  - Adult GI Clinic Follow-Up Order (Blank); Future        Jamal Sanchez is a 68 year old male with coronary artery  disease status post recent PCI with stent on Plavix, hyperlipidemia, GERD and hiatal hernia presents for follow-up.      Persistent symptoms of reflux despite omeprazole 40 mg daily and Pepcid 20 mg twice daily.  Also now noting dysphagia.  Prior EGD in 2022 did note esophageal stenosis which was treated with dilation.  Recommended repeating upper endoscopy at this time with dilation if appropriate.  Also considered increasing his omeprazole to 40 mg twice daily however this does interact with his Plavix therefore we will leave him on a once daily dosing for now.  He does have an appointment coming up with an [facility] pharmacist and this can be reviewed at that time.  He is also concerned regarding his hiatal hernia and if this could be contributing to his shortness of breath on exertion.  Hiatal hernia was documented as 4 cm on prior endoscopy.  On more recent CT of the chest it is labeled as small hiatal hernia.  I feel it is unlikely to be contributing to his shortness of breath.  His primary care provider has referred him to a pulmonologist for further evaluation. I also encouraged him to continue with the referral.     Recommendations:   -Schedule an upper endoscopy  -Continue with omeprazole 40 mg daily and Pepcid 20 mg twice daily  -Encouraged lifestyle modifications  -Follow-up in 3 months        Thank you for this consultation.  It was a pleasure to participate in the care of this patient; please contact us with any further questions.        This note was created with voice recognition software, and while reviewed for accuracy, typos may remain.       60 minutes spent by me on the date of the encounter doing chart review, history and exam, documentation and further activities per the note      Narendra Kramer PA-C  Division of Gastroenterology, Hepatology and Nutrition   [facility]            Video-Visit Details    Video Start Time: 8:25 AM    Type of service:  Video  Visit    Video End Time:8:51 AM    Originating Location (pt. Location): Home    Distant Location (provider location):  Off-site    Platform used for Video Visit: Thelma

## 2023-05-19 ENCOUNTER — TELEPHONE (OUTPATIENT)
Dept: GASTROENTEROLOGY | Facility: CLINIC | Age: 68
End: 2023-05-19

## 2023-05-19 ENCOUNTER — RADIOLOGY INJECTION OFFICE VISIT (OUTPATIENT)
Dept: PHYSICAL MEDICINE AND REHAB | Facility: CLINIC | Age: 68
End: 2023-05-19
Attending: PHYSICAL MEDICINE & REHABILITATION
Payer: MEDICARE

## 2023-05-19 VITALS
RESPIRATION RATE: 16 BRPM | OXYGEN SATURATION: 95 % | SYSTOLIC BLOOD PRESSURE: 124 MMHG | TEMPERATURE: 98.5 F | DIASTOLIC BLOOD PRESSURE: 76 MMHG | HEART RATE: 58 BPM

## 2023-05-19 DIAGNOSIS — M51.26 LUMBAR DISC HERNIATION: ICD-10-CM

## 2023-05-19 DIAGNOSIS — M48.061 FORAMINAL STENOSIS OF LUMBAR REGION: ICD-10-CM

## 2023-05-19 DIAGNOSIS — M54.16 LUMBAR RADICULAR PAIN: ICD-10-CM

## 2023-05-19 PROCEDURE — 64483 NJX AA&/STRD TFRM EPI L/S 1: CPT | Mod: LT | Performed by: PAIN MEDICINE

## 2023-05-19 RX ORDER — LIDOCAINE HYDROCHLORIDE 10 MG/ML
INJECTION, SOLUTION EPIDURAL; INFILTRATION; INTRACAUDAL; PERINEURAL
Status: COMPLETED | OUTPATIENT
Start: 2023-05-19 | End: 2023-05-19

## 2023-05-19 RX ORDER — DEXAMETHASONE SODIUM PHOSPHATE 10 MG/ML
INJECTION, SOLUTION INTRAMUSCULAR; INTRAVENOUS
Status: COMPLETED | OUTPATIENT
Start: 2023-05-19 | End: 2023-05-19

## 2023-05-19 RX ADMIN — DEXAMETHASONE SODIUM PHOSPHATE 10 MG: 10 INJECTION, SOLUTION INTRAMUSCULAR; INTRAVENOUS at 11:35

## 2023-05-19 RX ADMIN — LIDOCAINE HYDROCHLORIDE 2 ML: 10 INJECTION, SOLUTION EPIDURAL; INFILTRATION; INTRACAUDAL; PERINEURAL at 11:33

## 2023-05-19 ASSESSMENT — PAIN SCALES - GENERAL
PAINLEVEL: MILD PAIN (3)
PAINLEVEL: MILD PAIN (3)

## 2023-05-19 NOTE — PATIENT INSTRUCTIONS
Follow-up visit with Dr. Camarena in 2-4 weeks to discuss injection outcome and determine care plan going forward.       DISCHARGE INSTRUCTIONS    During office hours (8:00 a.m.- 4:00 p.m.) questions or concerns may be answered  by calling [facility] Navigation Nurses at  [phone].  Messages received after hours will be returned the following business day.      In the case of an emergency, please dial 911 or seek assistance at the nearest Emergency Room/Urgent Care facility.     All Patients:    You may experience an increase in your symptoms for the first 2 days (It may take anywhere between 2 days- 2 weeks for the steroid to have maximum effect).    You may use ice on the injection site, as frequently as 20 minutes each hour if needed.    You may take your pain medicine.    You may continue taking your regular medication after your injection. If you have had a Medial Branch Block you may resume pain medication once your pain diary is completed.    You may shower. No swimming, tub bath or hot tub for 48 hours.  You may remove your bandaid/bandage as soon as you are home.    You may resume light activities, as tolerated.    Resume your usual diet as tolerated.    It is strongly advised that you do not drive for 1-3 hours post injection.    If you have had oral sedation:  Do not drive for 8 hours post injection.      If you have had IV sedation:  Do not drive for 24 hours post injection.  Do not operate hazardous machinery or make important personal/business decisions for 24 hours.      POSSIBLE STEROID SIDE EFFECTS (If steroid/cortisone was used for your procedure)    -If you experience these symptoms, it should only last for a short period    Swelling of the legs              Skin redness (flushing)     Mouth (oral) irritation   Blood sugar (glucose) levels            Sweats                    Mood changes  Headache  Sleeplessness  Weakened immune system for up to 14 days, which could increase the risk of  charlene the COVID-19 virus and/or experiencing more severe symptoms of the disease, if exposed.  Decreased effectiveness of the flu vaccine if given within 2 weeks of the steroid.         POSSIBLE PROCEDURE SIDE EFFECTS  -Call the Spine Center if you are concerned  Increased Pain           Increased numbness/tingling      Nausea/Vomiting          Bruising/bleeding at site      Redness or swelling                                              Difficulty walking      Weakness           Fever greater than 100.5    *In the event of a severe headache after an epidural steroid injection that is relieved by lying down, please call the [facility] to speak with a clinical staff member*

## 2023-05-19 NOTE — PROGRESS NOTES
OUTPATIENT PHYSICAL THERAPY DISCHARGE SUMMARY    04/03/23 1100   Appointment Info   Signing clinician's name / credentials Geni Christiansen, PT   Visits Used 7   Progress Note/Certification   Progress Note Due Date 04/04/23   Subjective Report   Subjective Report Pt saw MD and will be seeing spine specialist 5/9/23.  pt cont on his meds.   Pt notes pain is a constant 7/10 to 8/10.  Pt notes he has to rest after walking 200 feet due to shortness of breath and back pain.  Pt is seeing cardiac MD as well for high HR.   Treatment Interventions (PT)   Manual Therapy   Patient Response/Progress No significant difference afterwards--slight temporary relief w/ pull.   Treatment Detail Manual lumbar traction w/ swiss ball --pt instructed to try pool traction w/ noodles   Plan   Home program ex as per PTRX, walking, heat/ ice   Plan  Discharge from  PT due to ongoing elevated pain/ lack of progress.  Follow up w/ MD   Comments   Comments Pt cont to work towards goals   Medicare Claim Information   Medical Diagnosis back strain   PT Diagnosis LBP   Start of Care Date 02/03/23   Onset date of current episode/exacerbation 01/14/23   Certification date from 02/03/23   Ortho Goal 1   Goal Identifier 1.   Goal Description Pt will report increased ease getting up in the morning and pain no > 3/10   Target Date 04/04/23   Ortho Goal 2   Goal Identifier 2.   Goal Description Pt will report pain no > 1/10 getting out of car after 30 min drive   Target Date 04/04/23   Ortho Goal 3   Goal Identifier 3.   Goal Description Pt will be able to carry wood w/ LBP no > 3/10   Target Date 04/04/23   Ortho Goal 4   Goal Identifier 4.   Goal Description Pt will be independent and consistent w/HEP including tolerating his 30 min walks   Target Date 04/04/23         DISCHARGE  Reason for Discharge: Pt was not making significant progress and was referred back to MD    Equipment Issued: none    Discharge Plan: Patient to continue home  program.    Referring Provider:  Mell Mandujano

## 2023-05-22 ENCOUNTER — OFFICE VISIT (OUTPATIENT)
Dept: PHARMACY | Facility: CLINIC | Age: 68
End: 2023-05-22
Attending: NURSE PRACTITIONER
Payer: COMMERCIAL

## 2023-05-22 VITALS
SYSTOLIC BLOOD PRESSURE: 120 MMHG | HEART RATE: 59 BPM | DIASTOLIC BLOOD PRESSURE: 75 MMHG | WEIGHT: 209 LBS | BODY MASS INDEX: 30.42 KG/M2

## 2023-05-22 DIAGNOSIS — N52.9 ERECTILE DYSFUNCTION, UNSPECIFIED ERECTILE DYSFUNCTION TYPE: ICD-10-CM

## 2023-05-22 DIAGNOSIS — I25.118 CORONARY ARTERY DISEASE OF NATIVE HEART WITH STABLE ANGINA PECTORIS, UNSPECIFIED VESSEL OR LESION TYPE (H): ICD-10-CM

## 2023-05-22 DIAGNOSIS — I10 HYPERTENSION, GOAL BELOW 140/90: ICD-10-CM

## 2023-05-22 DIAGNOSIS — K21.9 GASTROESOPHAGEAL REFLUX DISEASE, UNSPECIFIED WHETHER ESOPHAGITIS PRESENT: Primary | ICD-10-CM

## 2023-05-22 DIAGNOSIS — M54.16 LUMBAR RADICULAR PAIN: ICD-10-CM

## 2023-05-22 DIAGNOSIS — E78.5 DYSLIPIDEMIA: ICD-10-CM

## 2023-05-22 DIAGNOSIS — R21 RASH: ICD-10-CM

## 2023-05-22 DIAGNOSIS — Z78.9 TAKES DIETARY SUPPLEMENTS: ICD-10-CM

## 2023-05-22 DIAGNOSIS — Z91.148 NONCOMPLIANCE WITH MEDICATION REGIMEN: ICD-10-CM

## 2023-05-22 DIAGNOSIS — N40.1 BENIGN PROSTATIC HYPERPLASIA WITH NOCTURIA: ICD-10-CM

## 2023-05-22 DIAGNOSIS — R35.1 BENIGN PROSTATIC HYPERPLASIA WITH NOCTURIA: ICD-10-CM

## 2023-05-22 PROCEDURE — 99607 MTMS BY PHARM ADDL 15 MIN: CPT | Performed by: PHARMACIST

## 2023-05-22 PROCEDURE — 99605 MTMS BY PHARM NP 15 MIN: CPT | Performed by: PHARMACIST

## 2023-05-22 NOTE — PROGRESS NOTES
Medication Therapy Management (MTM) Encounter    ASSESSMENT:                            Medication Adherence/Access: Today can change pill box regimen from 4 times daily to twice daily for greater convenience. Also provided education that all his medications would be ok to take without food and if drinking alcohol. Advised some may cause increased side effects such as GI upset or sedation if taken in this manner, however he can monitor and hopefully this helps with adherence.     Hypertension/CAD: Patient is meeting blood pressure goal of < 130/80mmHg. Patient needs further evaluation of SOB. Follow-up plan in place with cardiology and pulmonology.     Hyperlipidemia: Stable. Patient is on high intensity statin which is indicated based on 2019 ACC/AHA guidelines for lipid management.      GERD: Noted drug-drug interaction between Plavix and omeprazole, which studies are conflicting regarding the clinical significance of this interaction. Last GI note considered increasing omeprazole to 40mg twice daily, however concerned about Plavix interaction so kept once daily dosing for now. I suggest switching PPI from omeprazole to pantoprazole which would avoid this interaction altogether.    Back Pain: Patient may benefit from moving gabapentin from morning to bedtime due to daytime drowsiness. Patient may benefit from trialing off glucosamine/chondroitin to determine effectiveness.     BPH/ED: Discussed oxybutynin is highly anticholinergic and most likely the cause for his dry mouth. Patient may benefit from following-up with urology.    Rash: Stable.    Supplements: Advised to discontinue digestive enzymes because unlikely to be effective for weight loss and is costly.    PLAN:                            1. Ok to stop Vale Bio-Gest digestive enzymes.   2. Move gabapentin 100mg to bedtime.   3. Reach out to urology Marilyn Grayson PA-C via [employer] to ask about oxybutynin causing dry mouth.   4. Ok to trial off Move Free  joint health to see if makes a difference.  5. Change pill box regimen to twice daily:  Morning- multivitamin, clopidogrel, lisinopril, famotidine, triamterene/HCTZ, oxybutynin, metoprolol.   Bedtime- tamsulosin, aspirin, famotidine, omeprazole, atorvastatin, gabapentin.    **Update 5/26/23: Change omeprazole to pantoprazole 40mg daily due to Plavix interaction. Sent Rx to pharmacy and notified patient via [employer].    Follow-up: Return in about 3 months (around 8/22/2023) for Medication Therapy Management.    SUBJECTIVE/OBJECTIVE:                          Gopal Sanchez is a 68 year old male coming in for an initial visit. He was referred to me from HERBERTH Crane CNP.      Reason for visit: General medication review, brings in medication bottles today.    Allergies/ADRs: Reviewed in chart  Past Medical History: Reviewed in chart  Tobacco: He reports that he has never smoked. He has never used smokeless tobacco.  Alcohol: likes breweries maybe 6 pints on weekends.    Medication Adherence/Access:   Patient uses pill box(es), sets up himself.  Patient takes medications 4 time(s) per day.   Per patient, misses medication 3-4 times on the weekends because not sure if it's ok to take medications along with beer or without food. He to eat at least a piece of toast for breakfast.  The patient fills medications at [place]: NO, fills medications at [facility].    Current pill box set-up as follows:  Morning- digestive enzymes, multivitamin, clopidogrel, lisinopril, gabapentin, famotidine.  Noon- atorvastatin, triamterene/hctz, metoprolol, oxybutynin.  Evening- joint health, vitamin C.  Bedtime- tamsulosin, aspirin, famotidine, omeprazole.    Hypertension/CAD: Currently taking clopidogrel 75mg daily, lisinopril 10mg daily, triamterene/hctz 75-50mg daily, metoprolol ER 25mg daily, and has Nitrostat on hand (no recent use). Patient self-monitors blood pressure. Home BP monitoring in range of 120-130's systolic over 70-80's  diastolic. Patient endorses having shortness of breath every day. Recently got pulmonary testing done and albuterol doesn't make a difference. Patient reports no current medication side effects. Denies any bruising/bleeding concerns. History of stenting last November. Follows with cardiology - upcoming appointment in 2 days. Also scheduled for pulmonology in August.    BP Readings from Last 3 Encounters:   05/24/23 121/79   05/22/23 120/75   05/19/23 124/76     Pulse Readings from Last 3 Encounters:   05/24/23 69   05/22/23 59   05/19/23 58     Hyperlipidemia: Currently taking atorvastatin 40mg daily. Patient reports no significant myalgias or other side effects.    Recent Labs   Lab Test 04/13/23  0950 11/14/22  0805 09/28/16  1050 08/12/15  0730   CHOL 109 133   < > 178   HDL 44 51   < > 55   LDL 53 63   < > 103   TRIG 58 95   < > 102   CHOLHDLRATIO  --   --   --  3.2    < > = values in this interval not displayed.     GERD: Current medications include: Prilosec (omeprazole) 40mg once daily, Pepcid (famotidine) 20mg twice daily, and Tums 1-2 chew tabs as needed (uses at bedtime on weekends). Patient reports heartburn after drinking beer. Patient feels that current regimen is somewhat effective. Recently seen by GI on 5/18/23 - evaluated for persistent symptoms of reflux as well as dysphagia, plan for repeat upper endoscopy.    Back Pain: Currently taking gabapentin 100mg every morning (prescribed as 3 times daily) and Move Free [employer] 1 tablet daily for last year (contains glucosamine/chondroitin), and cyclobenzaprine 10mg as needed (last used 2 months ago). Patient reports side effects of drowsiness occurring around mid-morning. About a month ago switched from naproxen to gabapentin due to safety concerns. Recently got cortisone injection. Patient states back pain is mild lately.    BPH/ED: Currently taking oxybutynin ER 10mg daily, tamsulosin 0.4mg daily, and tadalafil 10-20mg as needed for ED. Previously  sildenafil 3 tabs was somewhat effective, however 5 tabs caused headache. Recently saw urology on 5/8/23 - prescribed tadalafil and tried 2 tabs was somewhat effective. Patient endorses nighttime urination twice nightly. Patient endorses having bothersome dry mouth/lips, which affects his speech.    Rash: Currently taking triamcinolone 0.1% cream, uses on arms in the summertime. Patient finds cream helpful and no concerns today.    Supplements: Currently taking Centrum silver multivitamin daily for general health and vitamin C 1000mg daily for immune health. Also taking Vale Bio-Gest digestive enzymes (see label below) as recommended by [facility] [place] for weight loss, which is expensive and hasn't found to be effective.      Today's Vitals: /75   Pulse 59   Wt 209 lb (94.8 kg)   BMI 30.42 kg/m       Last Comprehensive Metabolic Panel:  Lab Results   Component Value Date     04/13/2023    POTASSIUM 4.0 04/13/2023    CHLORIDE 99 04/13/2023    CO2 28 04/13/2023    ANIONGAP 10 04/13/2023     (H) 04/13/2023    BUN 16.9 04/13/2023    CR 1.01 04/13/2023    GFRESTIMATED 81 04/13/2023    ALYCE 9.4 04/13/2023     ----------------    I spent 60 minutes with this patient today. All changes were made via collaborative practice agreement with HERBERTH Crane CNP. A copy of the visit note was provided to the patient's provider(s).    A summary of these recommendations was given to the patient.    Francesca Canales, PharmD, BCACP  Medication Therapy Management Pharmacist  Pager: [phone]     Medication Therapy Recommendations  Benign prostatic hyperplasia    Current Medication: oxybutynin ER (DITROPAN XL) 10 MG 24 hr tablet   Rationale: Undesirable effect - Adverse medication event - Safety   Recommendation: Discontinue Medication - Trial off per urology.   Status: Accepted per Provider         GERD (gastroesophageal reflux disease)    Current Medication: omeprazole (PRILOSEC) 40 MG DR capsule  (Discontinued)   Rationale: Medication interaction - Dosage too low - Effectiveness   Recommendation: Change Medication - pantoprazole 40 MG EC tablet - Take 1 tablet by mouth daily.   Status: Accepted per CPA         Lumbar radicular pain    Current Medication: gabapentin (NEURONTIN) 100 MG capsule   Rationale: Undesirable effect - Adverse medication event - Safety   Recommendation: Change Medication - gabapentin 100 MG capsule - Move from AM to HS.   Status: Accepted per CPA         Noncompliance with medication regimen    Current Medication: metoprolol succinate ER (TOPROL XL) 25 MG 24 hr tablet   Rationale: Does not understand instructions - Adherence - Adherence   Recommendation: Provide Adherence Intervention - Change pill box set-up and provide education.   Status: Patient Agreed - Adherence/Education         Takes dietary supplements    Current Medication: Glucosamine-Chondroitin (GLUCOSAMINE CHONDR COMPLEX PO)   Rationale: No medical indication at this time - Unnecessary medication therapy - Indication   Recommendation: Discontinue Medication - Stop joint health and digestive enzymes.   Status: Accepted - no CPA Needed

## 2023-05-22 NOTE — PATIENT INSTRUCTIONS
"Recommendations from today's MTM visit:                                                    MTM (medication therapy management) is a service provided by a clinical pharmacist designed to help you get the most of out of your medicines.   Today we reviewed what your medicines are for, how to know if they are working, that your medicines are safe and how to make your medicine regimen as easy as possible.      1. Ok to stop Vale Bio-Gest digestive enzymes.   2. Move gabapentin 100mg to bedtime.   3. Reach out to urology Marilyn Grayson PA-C via [employer] to ask about oxybutynin causing dry mouth.   4. Ok to trial off Move Free [place] to see if makes a difference.  5. Changes pill box regimen to twice daily.  Morning- multivitamin, clopidogrel, lisinopril, famotidine, triamterene/HCTZ, oxybutynin, metoprolol.   Bedtime- tamsulosin, aspirin, famotidine, omeprazole, atorvastatin, gabapentin.    Follow-up: Return in about 3 months (around 8/22/2023) for Medication Therapy Management.    It was great speaking with you today.  I value your experience and would be very thankful for your time in providing feedback in our clinic survey. In the next few days, you may receive an email or text message from [employer] with a link to a survey related to your  clinical pharmacist.\"     To schedule another MTM appointment, please call the clinic directly or you may call the MTM scheduling line at [phone] or toll-free at [phone].     My Clinical Pharmacist's contact information:                                                      Please feel free to contact me with any questions or concerns you have.      Francesca Canales, PharmD, BCACP  Medication Therapy Management Pharmacist  Pager: [phone]   "

## 2023-05-24 ENCOUNTER — OFFICE VISIT (OUTPATIENT)
Dept: CARDIOLOGY | Facility: CLINIC | Age: 68
End: 2023-05-24
Attending: NURSE PRACTITIONER
Payer: MEDICARE

## 2023-05-24 VITALS
BODY MASS INDEX: 29.84 KG/M2 | OXYGEN SATURATION: 97 % | SYSTOLIC BLOOD PRESSURE: 121 MMHG | HEART RATE: 69 BPM | DIASTOLIC BLOOD PRESSURE: 79 MMHG | WEIGHT: 205 LBS

## 2023-05-24 DIAGNOSIS — I25.10 CORONARY ARTERY DISEASE INVOLVING NATIVE CORONARY ARTERY OF NATIVE HEART WITHOUT ANGINA PECTORIS: Primary | ICD-10-CM

## 2023-05-24 DIAGNOSIS — R06.09 DYSPNEA ON EXERTION: ICD-10-CM

## 2023-05-24 DIAGNOSIS — R06.09 DOE (DYSPNEA ON EXERTION): ICD-10-CM

## 2023-05-24 DIAGNOSIS — E78.5 HYPERLIPIDEMIA LDL GOAL <70: ICD-10-CM

## 2023-05-24 DIAGNOSIS — R00.0 TACHYCARDIA: ICD-10-CM

## 2023-05-24 DIAGNOSIS — I10 BENIGN ESSENTIAL HYPERTENSION: ICD-10-CM

## 2023-05-24 PROCEDURE — 99214 OFFICE O/P EST MOD 30 MIN: CPT | Performed by: NURSE PRACTITIONER

## 2023-05-24 NOTE — LETTER
5/24/2023    Raad NEWRosalino Benitez, DO  [address] [facility] [address]    RE: Jamal Sanchez       Dear Colleague,     I had the pleasure of seeing Jamal Sanchez in the [facility].  Cardiology Clinic Progress Note  Jamal Sanchez MRN# [id number]   YOB: 1955 Age: 68 year old      Primary Cardiologist:   Dr. Cruz          History of Presenting Illness:      Jamal Sanchez is a pleasant 68 year old patient with a past cardiac history significant for   CAD  Angio 9/2022 STORM x1 to the mid LAD, D2 50%, 50% throughout LCx  Hyperlipidemia  Hypertension  Has not tolerated beta-blocker due to bradycardia  4.  Chronic dyspnea on exertion  Elevated filling pressures on angiogram 9/2022  No improvement in symptoms after trial of Lasix  Normal PFTs 2021  TSH, D-dimer, BNP WNL 3/2023  Elevated hemidiaphragm on CXR 4/2023-started prednisone referred to pulmonology  Past medical history significant for obesity.    In March 2023 he had increased dyspnea on exertion walking 200 feet to the mailbox needing to stop and rest.  He remained mildly tachycardic in sinus rhythm and was started on metoprolol.  D-dimer, TSH, BNP were WNL.  I restarted a week of Lasix and Dr. Cruz recommended right heart catheterization, if needed.  He again had no improvement in symptoms after a trial of Lasix and no significant change in weight.  At follow-up, he had worsening dyspnea on exertion now walking from room to room in his house and appeared to have labored breathing at office visit.  Chest x-ray with PCP showed elevated hemidiaphragm and was started on prednisone and recommended lung CT.    Patient presents today for 1 month follow-up. Most recent lipids, ALT, BMP reviewed today.  Echo April 2023 with normal biventricular size and function, no significant valvular disease.  Chest CT April 2023 showed right elevated hemidiaphragm and PCP recommended pulmonology consultation.  Results reviewed  today.    He denies any side effects after increasing metoprolol.  Heart rates have been better controlled and are sometimes in the 50s.  He denies any significant lightheadedness with that.  He gets occasional positional lightheadedness that resolves within a few seconds.  Blood pressure is well controlled.  He continues with dyspnea on exertion which did not improve after prednisone.  He is scheduled for upcoming pulmonology evaluation and visit.  He has an upcoming EGD in July and is wondering if it is okay to hold DAPT.  Patient reports no chest pain, PND, orthopnea, presyncope, syncope, edema, heart racing, or palpitations.        Current Cardiac Medications   Aspirin 81 mg daily  Atorvastatin 40 mg daily  Plavix 75 mg daily  Lisinopril 10 mg daily  Metoprolol XL 25 mg daily  Nitroglycerin as needed  Cialis as needed  Triamterene-HCTZ 75-50 mg daily                     Assessment and Plan:       Plan  Patient Instructions   Medication Changes:  Ok to hold aspirin and plavix for procedure and restart as soon as you are able.     Recommendations:  Check blood pressure at least 1 hour after medications. Call the clinic if your blood pressure is consistently greater than 130/80.   Call if heart rate is less than 50 or less than 55 and lightheaded.       Follow-up:  Cardiology follow up at [facility]: Dr. Cruz in September as scheduled.   Call 6 months prior, to schedule.     Cardiology Scheduling~[phone]  Cardiology Clinic RN~[phone] (Shauna RN, Marian RN, Leanna RN)              CAD  ZEPEDA - see below   Continue statin, aspirin, ACE inhibitor, beta-blocker  DAPT, uninterrupted, for at least 1 year (9/2023)  Consider long-term Plavix instead of aspirin, per interventionalist        Hyperlipidemia   Last LDL 53 4/2023  Continue atorvastatin 40 mg daily        Hypertension  Controlled   Continue lisinopril, metoprolol, triamterene-HCTZ        ZEPEDA  Elevated hemidiaphragm  Follow with pulmonology, as  scheduled      5.  Sinus tachycardia-resolved  Possibly related to shortness of breath from elevated hemidiaphragm  Improved with beta-blocker         Respiratory:  clear to auscultation; normal symmetry        Cardiac: regular rate and rhythm     GI:  nondistended     Extremities and Muscular Skeletal:  no edema            Thank you for allowing me to participate in this delightful patient's care.      This note was completed in part using Dragon voice recognition software. Although reviewed after completion, some word and grammatical errors may occur.    HERBERTH Hdez CNP                    Thank you for allowing me to participate in the care of your patient.      Sincerely,     HERBERTH Hdez CNP     [facility]  cc:   HERBERTH Barba CNP  [address] [place] [address]

## 2023-05-24 NOTE — PROGRESS NOTES
Cardiology Clinic Progress Note  Jamal Sanchez MRN# [id number]   YOB: 1955 Age: 68 year old      Primary Cardiologist:   Dr. Cruz          History of Presenting Illness:      Jamal Sanchez is a pleasant 68 year old patient with a past cardiac history significant for   1. CAD    Angio 9/2022 STORM x1 to the mid LAD, D2 50%, 50% throughout LCx  2. Hyperlipidemia  3. Hypertension    Has not tolerated beta-blocker due to bradycardia  4.  Chronic dyspnea on exertion    Elevated filling pressures on angiogram 9/2022    No improvement in symptoms after trial of Lasix    Normal PFTs 2021    TSH, D-dimer, BNP WNL 3/2023    Elevated hemidiaphragm on CXR 4/2023-started prednisone referred to pulmonology  Past medical history significant for obesity.    In March 2023 he had increased dyspnea on exertion walking 200 feet to the mailbox needing to stop and rest.  He remained mildly tachycardic in sinus rhythm and was started on metoprolol.  D-dimer, TSH, BNP were WNL.  I restarted a week of Lasix and Dr. Cruz recommended right heart catheterization, if needed.  He again had no improvement in symptoms after a trial of Lasix and no significant change in weight.  At follow-up, he had worsening dyspnea on exertion now walking from room to room in his house and appeared to have labored breathing at office visit.  Chest x-ray with PCP showed elevated hemidiaphragm and was started on prednisone and recommended lung CT.    Patient presents today for 1 month follow-up. Most recent lipids, ALT, BMP reviewed today.  Echo April 2023 with normal biventricular size and function, no significant valvular disease.  Chest CT April 2023 showed right elevated hemidiaphragm and PCP recommended pulmonology consultation.  Results reviewed today.    He denies any side effects after increasing metoprolol.  Heart rates have been better controlled and are sometimes in the 50s.  He denies any significant lightheadedness with that.  He  gets occasional positional lightheadedness that resolves within a few seconds.  Blood pressure is well controlled.  He continues with dyspnea on exertion which did not improve after prednisone.  He is scheduled for upcoming pulmonology evaluation and visit.  He has an upcoming EGD in July and is wondering if it is okay to hold DAPT.  Patient reports no chest pain, PND, orthopnea, presyncope, syncope, edema, heart racing, or palpitations.        Current Cardiac Medications   Aspirin 81 mg daily  Atorvastatin 40 mg daily  Plavix 75 mg daily  Lisinopril 10 mg daily  Metoprolol XL 25 mg daily  Nitroglycerin as needed  Cialis as needed  Triamterene-HCTZ 75-50 mg daily                     Assessment and Plan:       Plan  Patient Instructions   Medication Changes:  1. Ok to hold aspirin and plavix for procedure and restart as soon as you are able.     Recommendations:  1. Check blood pressure at least 1 hour after medications. Call the clinic if your blood pressure is consistently greater than 130/80.   2. Call if heart rate is less than 50 or less than 55 and lightheaded.       Follow-up:  1. Cardiology follow up at [facility]: Dr. Cruz in September as scheduled.   Call 6 months prior, to schedule.     Cardiology Scheduling~[phone]  Cardiology Clinic RN~[phone] (Shauna RN, Marian RN, Leanna RN)              1. CAD    ZEPEDA - see below     Continue statin, aspirin, ACE inhibitor, beta-blocker    DAPT, uninterrupted, for at least 1 year (9/2023)    Consider long-term Plavix instead of aspirin, per interventionalist        2. Hyperlipidemia     Last LDL 53 4/2023    Continue atorvastatin 40 mg daily        3. Hypertension    Controlled     Continue lisinopril, metoprolol, triamterene-HCTZ        4. ZEPEDA    Elevated hemidiaphragm    Follow with pulmonology, as scheduled      5.  Sinus tachycardia-resolved    Possibly related to shortness of breath from elevated hemidiaphragm    Improved with beta-blocker          Respiratory:  clear to auscultation; normal symmetry        Cardiac: regular rate and rhythm     GI:  nondistended     Extremities and Muscular Skeletal:  no edema            Thank you for allowing me to participate in this delightful patient's care.      This note was completed in part using Dragon voice recognition software. Although reviewed after completion, some word and grammatical errors may occur.    HERBERTH Hdez CNP

## 2023-05-24 NOTE — PATIENT INSTRUCTIONS
Medication Changes:  Ok to hold aspirin and plavix for procedure and restart as soon as you are able.     Recommendations:  Check blood pressure at least 1 hour after medications. Call the clinic if your blood pressure is consistently greater than 130/80.   Call if heart rate is less than 50 or less than 55 and lightheaded.       Follow-up:  Cardiology follow up at [facility]: Dr. Cruz in September as scheduled.   Call 6 months prior, to schedule.     Cardiology Scheduling~[phone]  Cardiology Clinic RN~[phone] (Shauna RN, Marian RN, Leanna RN)

## 2023-05-26 RX ORDER — PANTOPRAZOLE SODIUM 40 MG/1
40 TABLET, DELAYED RELEASE ORAL DAILY
Qty: 90 TABLET | Refills: 1 | Status: SHIPPED | OUTPATIENT
Start: 2023-05-26 | End: 2023-08-21

## 2023-06-15 ENCOUNTER — TELEPHONE (OUTPATIENT)
Dept: FAMILY MEDICINE | Facility: CLINIC | Age: 68
End: 2023-06-15
Payer: MEDICARE

## 2023-06-15 NOTE — TELEPHONE ENCOUNTER
General Call    Contacts       Type Contact Phone/Fax    06/15/2023 11:31 AM CDT Phone (Incoming) Gopal Sanchez (Self) [phone] (H)        Reason for Call:  Fax number to provide, [place] [facility] [phone] Darshan Merino MD    What are your questions or concerns:  N/A    Date of last appointment with provider: 04/19/2023    Could we send this information to you in [employer] or would you prefer to receive a phone call?:   No preference   Okay to leave a detailed message?: Yes at Work number on file:  There is no work phone number on file.

## 2023-06-15 NOTE — TELEPHONE ENCOUNTER
FYI - Status Update    Who is Calling: patient    Update: Dr Lemus put in a referral for the [place] [facility]. And they are requesting a referral sent to them before his appointment. The fax number is [phone]    Does caller want a call/response back: Yes     Could we send this information to you in [employer] or would you prefer to receive a phone call?:   Patient would prefer a phone call   Okay to leave a detailed message?: Yes at Cell number on file:    Telephone Information:   Mobile [phone]

## 2023-06-19 ENCOUNTER — OFFICE VISIT (OUTPATIENT)
Dept: PHYSICAL MEDICINE AND REHAB | Facility: CLINIC | Age: 68
End: 2023-06-19
Payer: MEDICARE

## 2023-06-19 VITALS — HEART RATE: 68 BPM | SYSTOLIC BLOOD PRESSURE: 129 MMHG | DIASTOLIC BLOOD PRESSURE: 70 MMHG

## 2023-06-19 DIAGNOSIS — M48.061 FORAMINAL STENOSIS OF LUMBAR REGION: ICD-10-CM

## 2023-06-19 DIAGNOSIS — M54.16 LUMBAR RADICULAR PAIN: ICD-10-CM

## 2023-06-19 DIAGNOSIS — M79.18 MYOFASCIAL PAIN: ICD-10-CM

## 2023-06-19 DIAGNOSIS — M51.26 LUMBAR DISC HERNIATION: Primary | ICD-10-CM

## 2023-06-19 PROCEDURE — 99213 OFFICE O/P EST LOW 20 MIN: CPT | Performed by: PHYSICAL MEDICINE & REHABILITATION

## 2023-06-19 ASSESSMENT — PAIN SCALES - GENERAL: PAINLEVEL: MILD PAIN (2)

## 2023-06-19 NOTE — PROGRESS NOTES
Assessment/Plan:      Jamal was seen today for back pain.    Diagnoses and all orders for this visit:    Lumbar disc herniation    Lumbar radicular pain    Foraminal stenosis of lumbar region    Myofascial pain         Assessment: Pleasant 68 year old male with a history of hypertension, benign prostatic hypertrophy, gastroesophageal reflux, hyperlipidemia, atrial fibrillation, Status post stent placement with:     1.  Approximate 3-month history of left lower extremity radicular pain in L2 distribution.  Far lateral disc herniation at L2-3 on the left with severe foraminal stenosis and nerve compression.  Has significantly improved overall although no improvement with a left L2-3 TFESI.  Continues to take gabapentin 100 mg at bedtime unsure if it is helpful.  Overall again has improved since his initial presentation in January.     2.  Myofascial pain lumbar spine and left gluteal region.  Improved.     3.  Multilevel lumbar degenerative disc disease and facet arthropathy.       Discussion:    1.  Overall he is doing well despite lumbar epidural steroid injection was of no benefit.  We discussed the diagnosis of far lateral disc herniation and the natural progression.  He will likely continue to improve overall and has significantly improved since his initial presentation although no improvement with epidural.    2.  Continue with gabapentin 100 mg at bedtime but may wean as tolerated.    3.  Continue Tylenol as needed.    4.  Continue home exercises.    5.  Follow-up with me as needed if symptoms worsen.       It was our pleasure caring for your patient today, if there any questions or concerns please do not hesitate to contact us.      Subjective:   Patient ID: Jamal Sanchez is a 68 year old male.    History of Present Illness: Patient presents for follow-up of lumbar spine pain with left lower extremity radicular pain in L2 distribution.  His left leg pain has significantly improved overall since his  initial visit no change following lumbar epidural.  Had some mild initial benefit with lidocaine for couple of hours and then has not had benefit from the steroid.  His back pain is on the left mid lumbar spine worse with lifting over 35 pounds.  Does do well with swimming and some water traction.  Takes gabapentin 100 mg at bedtime is not taking 3 times daily feels it may be effective at night.  Pain is a 2/10 today at worst and at best.       Imaging: I reviewed the MRI again today for medical decision-making purposes.  Severe lateral recess stenosis with nerve root impingement on the right at L4-5 foraminal stenosis on the left at L2-3 with foraminal disc protrusion contacting the left L2 nerve.  Moderate foraminal stenosis bilaterally L3-4 and on the right L4-5.There is also  left lateral recess stenosis at L4-5 with a small disc bulge.  As well as annular tear at L4-5 on the left in the neuroforamen.    Review of Systems: Pertinent positives: Does get headaches.  Pertinent negatives: No numbness, tingling or weakness.  No bowel or bladder incontinence.  No urinary retention.  No fevers, unintentional weight loss, balance changes,   frequent falling, difficulty swallowing, or coordination difficulties.  All others reviewed are negative.  Past Medical History:   Diagnosis Date     A-fib (H)      BPH (benign prostatic hyperplasia)      Complication of anesthesia      Diverticulitis      GERD (gastroesophageal reflux disease)      Heart disease      HLD (hyperlipidemia)      Hypertension        The following portions of the patient's history were reviewed and updated as appropriate: allergies, current medications, past family history, past medical history, past social history, past surgical history and problem list.           Objective:   Physical Exam:    /70   Pulse 68   There is no height or weight on file to calculate BMI.      General: Alert and oriented with normal affect. Attention, knowledge, memory,  and language are intact. No acute distress.     CV: No lower extremity edema.  Skin: No rashes seen.    Gait:  Nonantalgic    Sensation is intact to light touch throughout the  lower extremities.  Reflexes are  . 2+ patellar and 1+ Achilles     Manual muscle testing reveals:  Right /Left out of 5     5/5 hip flexors  5/5 knee flexors  5/5 knee extensors  5/5 ankle plantar flexors  5/5 ankle dorsiflexors  5/5  EHL

## 2023-06-19 NOTE — LETTER
6/19/2023         RE: Jamal Sanchez  [address] [place] [place] [address]        Dear Colleague,    Thank you for referring your patient, Jamal Sanchez, to the [facility]. Please see a copy of my visit note below.    Assessment/Plan:      Jamal was seen today for back pain.    Diagnoses and all orders for this visit:    Lumbar disc herniation    Lumbar radicular pain    Foraminal stenosis of lumbar region    Myofascial pain         Assessment: Pleasant 68 year old male with a history of hypertension, benign prostatic hypertrophy, gastroesophageal reflux, hyperlipidemia, atrial fibrillation, Status post stent placement with:     1.  Approximate 3-month history of left lower extremity radicular pain in L2 distribution.  Far lateral disc herniation at L2-3 on the left with severe foraminal stenosis and nerve compression.  Has significantly improved overall although no improvement with a left L2-3 TFESI.  Continues to take gabapentin 100 mg at bedtime unsure if it is helpful.  Overall again has improved since his initial presentation in January.     2.  Myofascial pain lumbar spine and left gluteal region.  Improved.     3.  Multilevel lumbar degenerative disc disease and facet arthropathy.       Discussion:    1.  Overall he is doing well despite lumbar epidural steroid injection was of no benefit.  We discussed the diagnosis of far lateral disc herniation and the natural progression.  He will likely continue to improve overall and has significantly improved since his initial presentation although no improvement with epidural.    2.  Continue with gabapentin 100 mg at bedtime but may wean as tolerated.    3.  Continue Tylenol as needed.    4.  Continue home exercises.    5.  Follow-up with me as needed if symptoms worsen.       It was our pleasure caring for your patient today, if there any questions or concerns please do not hesitate to contact us.      Subjective:    Patient ID: Jamal Sanchez is a 68 year old male.    History of Present Illness: Patient presents for follow-up of lumbar spine pain with left lower extremity radicular pain in L2 distribution.  His left leg pain has significantly improved overall since his initial visit no change following lumbar epidural.  Had some mild initial benefit with lidocaine for couple of hours and then has not had benefit from the steroid.  His back pain is on the left mid lumbar spine worse with lifting over 35 pounds.  Does do well with swimming and some water traction.  Takes gabapentin 100 mg at bedtime is not taking 3 times daily feels it may be effective at night.  Pain is a 2/10 today at worst and at best.       Imaging: I reviewed the MRI again today for medical decision-making purposes.  Severe lateral recess stenosis with nerve root impingement on the right at L4-5 foraminal stenosis on the left at L2-3 with foraminal disc protrusion contacting the left L2 nerve.  Moderate foraminal stenosis bilaterally L3-4 and on the right L4-5.There is also  left lateral recess stenosis at L4-5 with a small disc bulge.  As well as annular tear at L4-5 on the left in the neuroforamen.    Review of Systems: Pertinent positives: Does get headaches.  Pertinent negatives: No numbness, tingling or weakness.  No bowel or bladder incontinence.  No urinary retention.  No fevers, unintentional weight loss, balance changes,   frequent falling, difficulty swallowing, or coordination difficulties.  All others reviewed are negative.  Past Medical History:   Diagnosis Date     A-fib (H)      BPH (benign prostatic hyperplasia)      Complication of anesthesia      Diverticulitis      GERD (gastroesophageal reflux disease)      Heart disease      HLD (hyperlipidemia)      Hypertension        The following portions of the patient's history were reviewed and updated as appropriate: allergies, current medications, past family history, past medical history,  past social history, past surgical history and problem list.           Objective:   Physical Exam:    /70   Pulse 68   There is no height or weight on file to calculate BMI.      General: Alert and oriented with normal affect. Attention, knowledge, memory, and language are intact. No acute distress.     CV: No lower extremity edema.  Skin: No rashes seen.    Gait:  Nonantalgic    Sensation is intact to light touch throughout the  lower extremities.  Reflexes are  . 2+ patellar and 1+ Achilles     Manual muscle testing reveals:  Right /Left out of 5     5/5 hip flexors  5/5 knee flexors  5/5 knee extensors  5/5 ankle plantar flexors  5/5 ankle dorsiflexors  5/5  EHL       Again, thank you for allowing me to participate in the care of your patient.        Sincerely,        Bernard Camarena, DO

## 2023-06-22 ENCOUNTER — TRANSFERRED RECORDS (OUTPATIENT)
Dept: HEALTH INFORMATION MANAGEMENT | Facility: CLINIC | Age: 68
End: 2023-06-22
Payer: MEDICARE

## 2023-06-22 DIAGNOSIS — L29.9 ITCHING: Primary | ICD-10-CM

## 2023-06-22 NOTE — TELEPHONE ENCOUNTER
Requested Prescriptions   Pending Prescriptions Disp Refills     triamcinolone (KENALOG) 0.1 % external cream 80 g 3     Sig: Apply topically 3 times daily       There is no refill protocol information for this order          Last office visit: 12/15/2022 ; last virtual visit: Visit date not found with prescribing provider:  Stephanie Fair  Future Office Visit:      Thank you,  Ryanne Vazquez  Specialty Clinic -   [facility]

## 2023-06-26 RX ORDER — TRIAMCINOLONE ACETONIDE 1 MG/G
CREAM TOPICAL 3 TIMES DAILY
Qty: 80 G | Refills: 2 | Status: SHIPPED | OUTPATIENT
Start: 2023-06-26 | End: 2024-07-15

## 2023-06-26 NOTE — TELEPHONE ENCOUNTER
Per 8-9-2021 dictation:     3. Itching in the sun (? PMLE) no rash today. Continue TAC PRN.     Last seen on 12-, so refilled since patient is scheduled for follow up appt in December.     Ebony Turcios RN

## 2023-07-06 ENCOUNTER — ANESTHESIA EVENT (OUTPATIENT)
Dept: GASTROENTEROLOGY | Facility: CLINIC | Age: 68
End: 2023-07-06
Payer: MEDICARE

## 2023-07-06 ASSESSMENT — ENCOUNTER SYMPTOMS: DYSRHYTHMIAS: 1

## 2023-07-06 NOTE — ANESTHESIA PREPROCEDURE EVALUATION
Anesthesia Pre-Procedure Evaluation    Patient: Jamal Sanchez   MRN: [id number] : 1955        Procedure : Procedure(s):  Esophagoscopy, gastroscopy, duodenoscopy (EGD), combined          Past Medical History:   Diagnosis Date     A-fib (H)      BPH (benign prostatic hyperplasia)      Complication of anesthesia      Diverticulitis      GERD (gastroesophageal reflux disease)      Heart disease      HLD (hyperlipidemia)      Hypertension       Past Surgical History:   Procedure Laterality Date     APPENDECTOMY       ARTHROSCOPY SHOULDER ROTATOR CUFF REPAIR Left      ARTHROSCOPY SHOULDER RT/LT Right 2018     CARDIAC SURGERY      One stent placed in my heart.     COLONOSCOPY  2008     COLONOSCOPY  10/09/2013    Procedure: COLONOSCOPY;;  Surgeon: Nicolas Lizama MD;  Location: [facility]     COLONOSCOPY N/A 2022    Procedure: COLONOSCOPY, FLEXIBLE, WITH LESION REMOVAL USING SNARE;  Surgeon: Vimal Stanley DO;  Location: [facility]     CV CORONARY ANGIOGRAM N/A 2022    Procedure: Coronary Angiogram;  Surgeon: Morris Sky MD;  Location: [facility] CARDIAC CATH LAB     CV INSTANTANEOUS WAVE-FREE RATIO N/A 2022    Procedure: Instantaneous Wave-Free Ratio;  Surgeon: Morris Sky MD;  Location: [facility] CARDIAC CATH LAB     CV LEFT HEART CATH N/A 2022    Procedure: Left Heart Catheterization;  Surgeon: Morris Sky MD;  Location: [facility] CARDIAC CATH LAB     CV LEFT VENTRICULOGRAM N/A 2022    Procedure: Left Ventriculogram;  Surgeon: Morris Sky MD;  Location: [facility] CARDIAC CATH LAB     CV PCI STENT DRUG ELUTING N/A 2022    Procedure: Percutaneous Coronary Intervention Stent;  Surgeon: Morris Sky MD;  Location: [facility] CARDIAC CATH LAB     ESOPHAGOSCOPY, GASTROSCOPY, DUODENOSCOPY (EGD), COMBINED  10/09/2013    Procedure: COMBINED ESOPHAGOSCOPY, GASTROSCOPY, DUODENOSCOPY (EGD), BIOPSY SINGLE OR MULTIPLE;;   Surgeon: Nicolas Lizama MD;  Location: [facility]     ESOPHAGOSCOPY, GASTROSCOPY, DUODENOSCOPY (EGD), COMBINED N/A 04/06/2022    Procedure: ESOPHAGOGASTRODUODENOSCOPY, WITH BIOPSY;  Surgeon: Vimal Stanley DO;  Location: [facility]     HERNIA REPAIR       LAPAROSCOPIC APPENDECTOMY N/A 01/07/2016    Procedure: LAPAROSCOPIC APPENDECTOMY;  Surgeon: Ab Guzman MD;  Location: [facility]     LAPAROSCOPIC HERNIORRHAPHY INGUINAL BILATERAL Bilateral 12/13/2016    Procedure: LAPAROSCOPIC HERNIORRHAPHY INGUINAL BILATERAL;  Surgeon: Ab Guzman MD;  Location: [facility]      Allergies   Allergen Reactions     Nka [No Known Allergies]       Social History     Tobacco Use     Smoking status: Never     Smokeless tobacco: Never   Substance Use Topics     Alcohol use: Yes     Comment: 6 pack beer on weekend.      Wt Readings from Last 1 Encounters:   05/24/23 93 kg (205 lb)        Anesthesia Evaluation   Pt has had prior anesthetic. Type: General, MAC and Regional.        ROS/MED HX  ENT/Pulmonary:  - neg pulmonary ROS     Neurologic:  - neg neurologic ROS     Cardiovascular: Comment: S/P left ventriculogram 9/7/22  S/P cardiac cath 9/7/22    (+) Dyslipidemia hypertension--CAD angina--stent-9/7/22. 1 ZEPEDA. dysrhythmias, a-fib, Previous cardiac testing   Echo: Date: 4/27/23 Results:  Interpretation Summary     Left ventricular size, wall motion and function are normal. The ejection  fraction is 60-65%.  Normal right ventricle size and systolic function.  The left atrium is borderline dilated.  No obvious valvular disease.     Left Ventricle  Left ventricular size, wall motion and function are normal. The ejection  fraction is 60-65%. There is normal left ventricular wall thickness. Grade I  or early diastolic dysfunction. Normal left ventricular wall motion.     Right Ventricle  Normal right ventricle size and systolic function.     Atria  The left atrium is borderline dilated. Right atrial size is normal. There is  no atrial  shunt seen.     Mitral Valve  The mitral valve leaflets appear thickened, but open well. There is trace to  mild mitral regurgitation. There is no mitral valve stenosis.     Tricuspid Valve  Tricuspid valve leaflets appear normal. There is no evidence of tricuspid  stenosis or clinically significant tricuspid regurgitation.     Aortic Valve  The aortic valve is trileaflet with aortic valve sclerosis. No aortic  regurgitation is present. No aortic stenosis is present.     Pulmonic Valve  The pulmonic valve is not well seen, but is grossly normal.     Vessels  The aorta root is normal. IVC diameter <2.1 cm collapsing >50% with sniff  suggests a normal RA pressure of 3 mmHg.     Pericardium  There is no pericardial effusion.     Rhythm  Sinus rhythm was noted.  Stress Test: Date: Results:    ECG Reviewed: Date: 4/21/23 Results:  Sinus Tachycardia   WITHIN NORMAL LIMITS  Cath: Date: Results:      METS/Exercise Tolerance:     Hematologic:     (+) anemia,     Musculoskeletal:   (+) arthritis,     GI/Hepatic:     (+) GERD, hiatal hernia, Inflammatory bowel disease,     Renal/Genitourinary:     (+) BPH,     Endo:     (+) Obesity,     Psychiatric/Substance Use:     (+) alcohol abuse     Infectious Disease:       Malignancy:  - neg malignancy ROS     Other:            Physical Exam    Airway        Mallampati: II   TM distance: > 3 FB   Neck ROM: full     Respiratory Devices and Support         Dental    unable to assess        Cardiovascular   cardiovascular exam normal          Pulmonary   pulmonary exam normal                OUTSIDE LABS:  CBC:   Lab Results   Component Value Date    WBC 4.6 04/13/2023    WBC 6.3 09/14/2022    HGB 14.7 04/13/2023    HGB 14.9 09/14/2022    HCT 44.1 04/13/2023    HCT 45.6 09/14/2022     04/13/2023     09/14/2022     BMP:   Lab Results   Component Value Date     04/13/2023     03/29/2023    POTASSIUM 4.0 04/13/2023    POTASSIUM 4.4 03/29/2023    CHLORIDE 99  04/13/2023    CHLORIDE 98 03/29/2023    CO2 28 04/13/2023    CO2 25 03/29/2023    BUN 16.9 04/13/2023    BUN 15.4 03/29/2023    CR 1.01 04/13/2023    CR 0.96 03/29/2023     (H) 04/13/2023    GLC 88 03/29/2023     COAGS:   Lab Results   Component Value Date    PTT 31 09/07/2022    INR 1.02 09/07/2022     POC: No results found for: BGM, HCG, HCGS  HEPATIC:   Lab Results   Component Value Date    ALBUMIN 3.7 11/23/2021    PROTTOTAL 7.6 11/23/2021    ALT 21 04/13/2023    AST 41 11/23/2021    ALKPHOS 85 11/23/2021    BILITOTAL 0.3 11/23/2021     OTHER:   Lab Results   Component Value Date    LACT 1.3 07/25/2017    A1C 6.1 (H) 03/29/2023    ALYCE 9.4 04/13/2023    MAG 2.0 04/13/2023    LIPASE 77 07/02/2019    TSH 1.33 03/30/2023    SED 9 08/29/2017       Anesthesia Plan    ASA Status:  3      Anesthesia Type: General.   Induction: Intravenous.           Consents    Anesthesia Plan(s) and associated risks, benefits, and realistic alternatives discussed. Questions answered and patient/representative(s) expressed understanding.     - Discussed: Risks, Benefits and Alternatives for the PROCEDURE were discussed     - Discussed with:  Patient         Postoperative Care    Pain management: IV analgesics, Oral pain medications.   PONV prophylaxis: Ondansetron (or other 5HT-3), Dexamethasone or Solumedrol     Comments:                HERBERTH Camejo CRNA

## 2023-07-07 ENCOUNTER — HOSPITAL ENCOUNTER (OUTPATIENT)
Dept: GENERAL RADIOLOGY | Facility: CLINIC | Age: 68
Discharge: HOME | End: 2023-07-07
Attending: INTERNAL MEDICINE | Admitting: INTERNAL MEDICINE
Payer: MEDICARE

## 2023-07-07 DIAGNOSIS — J98.6 DISORDERS OF DIAPHRAGM: ICD-10-CM

## 2023-07-07 PROCEDURE — 76000 FLUOROSCOPY <1 HR PHYS/QHP: CPT

## 2023-07-10 ENCOUNTER — ANESTHESIA (OUTPATIENT)
Dept: GASTROENTEROLOGY | Facility: CLINIC | Age: 68
End: 2023-07-10
Payer: MEDICARE

## 2023-07-10 ENCOUNTER — HOSPITAL ENCOUNTER (OUTPATIENT)
Facility: CLINIC | Age: 68
Discharge: HOME | End: 2023-07-10
Attending: SURGERY | Admitting: SURGERY
Payer: MEDICARE

## 2023-07-10 VITALS
SYSTOLIC BLOOD PRESSURE: 134 MMHG | TEMPERATURE: 97.6 F | BODY MASS INDEX: 29.35 KG/M2 | HEIGHT: 70 IN | RESPIRATION RATE: 16 BRPM | DIASTOLIC BLOOD PRESSURE: 93 MMHG | HEART RATE: 51 BPM | WEIGHT: 205 LBS | OXYGEN SATURATION: 98 %

## 2023-07-10 LAB — UPPER GI ENDOSCOPY: NORMAL

## 2023-07-10 PROCEDURE — 250N000011 HC RX IP 250 OP 636: Performed by: NURSE ANESTHETIST, CERTIFIED REGISTERED

## 2023-07-10 PROCEDURE — 250N000009 HC RX 250: Performed by: SURGERY

## 2023-07-10 PROCEDURE — 43239 EGD BIOPSY SINGLE/MULTIPLE: CPT | Performed by: SURGERY

## 2023-07-10 PROCEDURE — 88305 TISSUE EXAM BY PATHOLOGIST: CPT | Mod: 26

## 2023-07-10 PROCEDURE — 88305 TISSUE EXAM BY PATHOLOGIST: CPT | Mod: TC | Performed by: SURGERY

## 2023-07-10 PROCEDURE — 258N000003 HC RX IP 258 OP 636: Performed by: SURGERY

## 2023-07-10 PROCEDURE — 370N000017 HC ANESTHESIA TECHNICAL FEE, PER MIN: Performed by: SURGERY

## 2023-07-10 PROCEDURE — 250N000009 HC RX 250: Performed by: NURSE ANESTHETIST, CERTIFIED REGISTERED

## 2023-07-10 RX ORDER — ONDANSETRON 2 MG/ML
4 INJECTION INTRAMUSCULAR; INTRAVENOUS EVERY 30 MIN PRN
Status: DISCONTINUED | OUTPATIENT
Start: 2023-07-10 | End: 2023-07-10 | Stop reason: HOSPADM

## 2023-07-10 RX ORDER — FLUMAZENIL 0.1 MG/ML
0.2 INJECTION, SOLUTION INTRAVENOUS
Status: DISCONTINUED | OUTPATIENT
Start: 2023-07-10 | End: 2023-07-10 | Stop reason: HOSPADM

## 2023-07-10 RX ORDER — LIDOCAINE 40 MG/G
CREAM TOPICAL
Status: DISCONTINUED | OUTPATIENT
Start: 2023-07-10 | End: 2023-07-10 | Stop reason: HOSPADM

## 2023-07-10 RX ORDER — NALOXONE HYDROCHLORIDE 0.4 MG/ML
0.4 INJECTION, SOLUTION INTRAMUSCULAR; INTRAVENOUS; SUBCUTANEOUS
Status: DISCONTINUED | OUTPATIENT
Start: 2023-07-10 | End: 2023-07-10 | Stop reason: HOSPADM

## 2023-07-10 RX ORDER — ONDANSETRON 2 MG/ML
4 INJECTION INTRAMUSCULAR; INTRAVENOUS
Status: DISCONTINUED | OUTPATIENT
Start: 2023-07-10 | End: 2023-07-10 | Stop reason: HOSPADM

## 2023-07-10 RX ORDER — SODIUM CHLORIDE, SODIUM LACTATE, POTASSIUM CHLORIDE, CALCIUM CHLORIDE 600; 310; 30; 20 MG/100ML; MG/100ML; MG/100ML; MG/100ML
INJECTION, SOLUTION INTRAVENOUS CONTINUOUS
Status: DISCONTINUED | OUTPATIENT
Start: 2023-07-10 | End: 2023-07-10 | Stop reason: HOSPADM

## 2023-07-10 RX ORDER — ONDANSETRON 4 MG/1
4 TABLET, ORALLY DISINTEGRATING ORAL EVERY 30 MIN PRN
Status: DISCONTINUED | OUTPATIENT
Start: 2023-07-10 | End: 2023-07-10 | Stop reason: HOSPADM

## 2023-07-10 RX ORDER — PROPOFOL 10 MG/ML
INJECTION, EMULSION INTRAVENOUS PRN
Status: DISCONTINUED | OUTPATIENT
Start: 2023-07-10 | End: 2023-07-10

## 2023-07-10 RX ORDER — NALOXONE HYDROCHLORIDE 0.4 MG/ML
0.2 INJECTION, SOLUTION INTRAMUSCULAR; INTRAVENOUS; SUBCUTANEOUS
Status: DISCONTINUED | OUTPATIENT
Start: 2023-07-10 | End: 2023-07-10 | Stop reason: HOSPADM

## 2023-07-10 RX ORDER — PROPOFOL 10 MG/ML
INJECTION, EMULSION INTRAVENOUS CONTINUOUS PRN
Status: DISCONTINUED | OUTPATIENT
Start: 2023-07-10 | End: 2023-07-10

## 2023-07-10 RX ADMIN — LIDOCAINE HYDROCHLORIDE 0.2 ML: 10 INJECTION, SOLUTION EPIDURAL; INFILTRATION; INTRACAUDAL; PERINEURAL at 10:19

## 2023-07-10 RX ADMIN — TOPICAL ANESTHETIC 1 SPRAY: 200 SPRAY DENTAL; PERIODONTAL at 10:47

## 2023-07-10 RX ADMIN — PROPOFOL 200 MCG/KG/MIN: 10 INJECTION, EMULSION INTRAVENOUS at 10:52

## 2023-07-10 RX ADMIN — SODIUM CHLORIDE, POTASSIUM CHLORIDE, SODIUM LACTATE AND CALCIUM CHLORIDE: 600; 310; 30; 20 INJECTION, SOLUTION INTRAVENOUS at 10:19

## 2023-07-10 RX ADMIN — PROPOFOL 100 MG: 10 INJECTION, EMULSION INTRAVENOUS at 10:52

## 2023-07-10 ASSESSMENT — ACTIVITIES OF DAILY LIVING (ADL): ADLS_ACUITY_SCORE: 35

## 2023-07-10 NOTE — ANESTHESIA CARE TRANSFER NOTE
Patient: Jamal Sanchez    Procedure: Procedure(s):  Esophagoscopy, gastroscopy, duodenoscopy (EGD), combined       Diagnosis: GERD (gastroesophageal reflux disease) [K21.9]  Diagnosis Additional Information: No value filed.    Anesthesia Type:   General     Note:    Oropharynx: oropharynx clear of all foreign objects    Oxygen Supplementation: room air        Vital Signs Stable: post-procedure vital signs reviewed and stable  Report to RN Given: handoff report given  Patient transferred to: Phase II    Handoff Report: Identifed the Patient, Identified the Reponsible Provider, Reviewed the pertinent medical history, Discussed the surgical course, Reviewed Intra-OP anesthesia mangement and issues during anesthesia, Set expectations for post-procedure period and Allowed opportunity for questions and acknowledgement of understanding      Vitals:  Vitals Value Taken Time   BP     Temp     Pulse     Resp     SpO2         Electronically Signed By: HERBERTH Murray CRNA  July 10, 2023  11:01 AM

## 2023-07-10 NOTE — LETTER
Jamal Sanchez  [address] [address] [place] [id number]  July 14, 2023    Dear Jamal,   This letter is to inform you of the results of your pathology report on your upper endoscopy (EGD).    Your pathology report was:  Showed findings consistent with a mildly inflamed stomach. If you continue to have symptoms please follow up with your primary care doctor or with myself.    Final Diagnosis   A.  Stomach, antrum, biopsy-  - Suggestive of mild reactive gastropathy.  - Negative for active gastritis, intestinal metaplasia, gastric epithelial dysplasia, or malignancy.  - Negative for Helicobacter-like forms on routine stained sections.  - Sampling includes: Gastric antral mucosa.     B.  Stomach, body, biopsy-  - Fundic gland polyp; benign.     C.  Gastroesophageal junction, biopsy-  - Mild nonspecific esophagitis with basal cell hyperplasia.  - Negative for goblet cell-type intestinal metaplasia, dysplasia, or malignancy.  - Sampling includes: Squamous mucosal fragments.     D.  Esophagus, mid, biopsy-  - Negative for diagnostic pathologic alteration.  - Negative for goblet cell-type intestinal metaplasia, dysplasia, or malignancy.  - Sampling includes: Squamous mucosal fragments.        If you have any questions or concerns please do not hesitate to call my office at [phone].  Sincerely,   Vimal Stanley, DO   [facility]

## 2023-07-10 NOTE — ANESTHESIA POSTPROCEDURE EVALUATION
Patient: Jamal Sanchez    Procedure: Procedure(s):  Esophagoscopy, gastroscopy, duodenoscopy (EGD), combined       Anesthesia Type:  General    Note:  Disposition: Outpatient   Postop Pain Control: Uneventful            Sign Out: Well controlled pain   PONV: No   Neuro/Psych: Uneventful            Sign Out: Acceptable/Baseline neuro status   Airway/Respiratory: Uneventful            Sign Out: Acceptable/Baseline resp. status   CV/Hemodynamics: Uneventful            Sign Out: Acceptable CV status; No obvious hypovolemia; No obvious fluid overload   Other NRE: NONE   DID A NON-ROUTINE EVENT OCCUR?            Last vitals:  Vitals Value Taken Time   /76 07/10/23 1103   Temp     Pulse 65 07/10/23 1103   Resp     SpO2 94 % 07/10/23 1105   Vitals shown include unvalidated device data.    Electronically Signed By: HEBRERTH Murray CRNA  July 10, 2023  11:07 AM

## 2023-07-17 NOTE — TELEPHONE ENCOUNTER
Called pt to discuss. States he doesn't drink caffeine anymore, doesn't smoke or do illegal drugs, his girlfriend smokes pot so he is around the smoke but he doesn't smoke it himself, and e occasionally drinks a beer and up to a six pack on the weekends but no increase of late. He also notes that the past couple days his BP has been quite low--in the upper 80s/50s. He is on day 4 of a 7 day lasix 20 mg every day trial. He is also on lisinopril 10 mg every day and maxide 75-50 every day. States he is not a big water drinker. Instructed to increase water intake today, to not take lisinopril if AM BP under 85/ (per already established instructions) and to get CBC and mag as recommended by Celeste Simons NP. Lab appt made for today at 4pm. Will also discuss with Celeste to see if she would like anything else added to labs (?BMP). Shauna De La Rosa RN Cardiology April 7, 2023, 9:51 AM     Anesthesia Volume In Cc (Will Not Render If 0): 0.5

## 2023-07-24 DIAGNOSIS — M51.26 LUMBAR DISC HERNIATION: ICD-10-CM

## 2023-07-24 DIAGNOSIS — M54.16 LUMBAR RADICULAR PAIN: ICD-10-CM

## 2023-07-24 RX ORDER — GABAPENTIN 100 MG/1
100 CAPSULE ORAL 3 TIMES DAILY
Qty: 90 CAPSULE | Refills: 1 | Status: SHIPPED | OUTPATIENT
Start: 2023-07-24 | End: 2024-01-26

## 2023-07-25 DIAGNOSIS — R06.00 DYSPNEA: Primary | ICD-10-CM

## 2023-08-07 ENCOUNTER — TRANSFERRED RECORDS (OUTPATIENT)
Dept: HEALTH INFORMATION MANAGEMENT | Facility: CLINIC | Age: 68
End: 2023-08-07

## 2023-08-17 NOTE — PROGRESS NOTES
Chief Complaint   Patient presents with    Consult     Foreign body of left ear, initial encounter      History of Present Illness   Jamal Sanchez is a 68 year old male who presents today for ear evaluation. I am seeing this patient in consultation for foreign body left ear canal at the request of the provider Dr. Benitez. The patient seen for routine examination and found to have a foreign body in the left external auditory canal.  He does have a history of placing wadded up cotton or tissue in his ear when he goes to a local brewery.  He currently denies any otalgia, otorrhea, bloody otorrhea.  No dizziness or vertigo.  No prior history of ear surgery.  No hearing concerns.      Past Medical History  Patient Active Problem List   Diagnosis    Benign prostatic hypertrophy    CARDIOVASCULAR SCREENING; LDL GOAL LESS THAN 160    GERD (gastroesophageal reflux disease)    Advanced directives, counseling/discussion    Hypertension, goal below 140/90    Appendicitis    Diverticulosis of large intestine without hemorrhage    Diverticulitis of colon    Acute diverticulitis    ETOH abuse    Generalized abdominal pain    Anemia, iron deficiency    Hiatal hernia    Dyspnea on exertion    Status post coronary angiogram    Coronary artery disease of native heart with stable angina pectoris, unspecified vessel or lesion type (H)    Prediabetes    Dysphagia, unspecified type     Current Medications    Current Outpatient Medications:     aspirin (ASA) 81 MG EC tablet, Take 1 tablet (81 mg) by mouth daily Start tomorrow., Disp: 30 tablet, Rfl: 3    atorvastatin (LIPITOR) 40 MG tablet, Take 1 tablet (40 mg) by mouth daily, Disp: 90 tablet, Rfl: 3    calcium carbonate (TUMS) 500 MG chewable tablet, Take 1-2 chew tab by mouth daily as needed, Disp: , Rfl:     clopidogrel (PLAVIX) 75 MG tablet, Take 1 tablet (75 mg) by mouth daily, Disp: 90 tablet, Rfl: 3    cyclobenzaprine (FLEXERIL) 5 MG tablet, Take 1 tablet (5 mg) by mouth 3  times daily as needed for muscle spasms, Disp: 30 tablet, Rfl: 0    famotidine (PEPCID) 20 MG tablet, Take 1 tablet (20 mg) by mouth 2 times daily, Disp: 180 tablet, Rfl: 2    gabapentin (NEURONTIN) 100 MG capsule, Take 1 capsule (100 mg) by mouth 3 times daily, Disp: 90 capsule, Rfl: 1    Glucosamine-Chondroitin (GLUCOSAMINE CHONDR COMPLEX PO), Take 1 tablet by mouth daily, Disp: , Rfl:     lisinopril (ZESTRIL) 10 MG tablet, Take 1 tablet (10 mg) by mouth daily Hold if Systolic Blood Pressure is less than 85., Disp: 90 tablet, Rfl: 3    metoprolol succinate ER (TOPROL XL) 25 MG 24 hr tablet, Take 1 tablet (25 mg) by mouth daily, Disp: 30 tablet, Rfl: 11    multivitamin (CENTRUM SILVER) tablet, Take 1 tablet by mouth daily, Disp: , Rfl:     nitroGLYcerin (NITROSTAT) 0.4 MG sublingual tablet, For chest pain place 1 tablet under the tongue every 5 minutes for 3 doses. If symptoms persist 5 minutes after 2nd dose call 911., Disp: 25 tablet, Rfl: 3    oxybutynin ER (DITROPAN XL) 10 MG 24 hr tablet, Take 1 tablet (10 mg) by mouth daily, Disp: 90 tablet, Rfl: 3    pantoprazole (PROTONIX) 40 MG EC tablet, Take 1 tablet (40 mg) by mouth 2 times daily On an empty stomach about 30 minutes before a meal, Disp: 180 tablet, Rfl: 0    tadalafil (CIALIS) 10 MG tablet, Take 1-2 tablets daily as needed, 30-45 minutes prior to intended sexual activity, Disp: 30 tablet, Rfl: 4    tamsulosin (FLOMAX) 0.4 MG capsule, Take 1 capsule (0.4 mg) by mouth daily, Disp: 90 capsule, Rfl: 3    triamcinolone (KENALOG) 0.1 % external cream, Apply topically 3 times daily, Disp: 80 g, Rfl: 2    triamterene-HCTZ (MAXZIDE) 75-50 MG tablet, Take 1 tablet by mouth daily, Disp: 90 tablet, Rfl: 3    vitamin C (ASCORBIC ACID) 1000 MG TABS, Take 1,000 mg by mouth daily, Disp: , Rfl:     Allergies  Allergies   Allergen Reactions    Nka [No Known Allergies]        Social History  Social History     Socioeconomic History    Marital status:    Tobacco  "Use    Smoking status: Never    Smokeless tobacco: Never   Vaping Use    Vaping Use: Never used   Substance and Sexual Activity    Alcohol use: Yes     Comment: 6 pack beer on weekend.    Drug use: No    Sexual activity: Yes     Partners: Female     Birth control/protection: Post-menopausal   Other Topics Concern    Parent/sibling w/ CABG, MI or angioplasty before 65F 55M? No       Family History  Family History   Problem Relation Age of Onset    Diabetes Mother     Cerebrovascular Disease Mother     Neurologic Disorder Father         parkinson's    Cancer - colorectal Maternal Grandfather     Prostate Cancer Paternal Grandmother     Hypertension Brother     Cerebrovascular Disease Brother         stroke    Alcohol/Drug Brother     Unknown/Adopted Brother     Diabetes Sister     Obesity Sister        Review of Systems  As per HPI and PMHx, otherwise 7 system review of the head and neck negative.    Physical Exam  /79 (BP Location: Left arm, Patient Position: Chair, Cuff Size: Adult Regular)   Pulse 94   Temp 98.2  F (36.8  C) (Tympanic)   Ht 1.778 m (5' 10\")   Wt 93.9 kg (207 lb)   SpO2 96%   BMI 29.70 kg/m    GENERAL: Patient is a pleasant, cooperative 68 year old male in no acute distress.  HEAD: Normocephalic, atraumatic.  Hair and scalp are normal.  EYES: Pupils are equal, round, reactive to light and accommodation.  Extraocular movements are intact.  The sclera nonicteric without injection.  The extraocular structures are normal.  EARS: See below.  NOSE: Nares are patent.  Nasal mucosa is pink and moist.  Negative anterior rhinoscopy.  NEUROLOGIC: Cranial nerves II through XII are grossly intact.  Voice is strong.  Patient is House-Brackmann I/VI bilaterally.  CARDIOVASCULAR: Extremities are warm and well-perfused.  No significant peripheral edema.  RESPIRATORY: Patient has nonlabored breathing without cough, wheeze, stridor.  PSYCHIATRIC: Patient is alert and oriented.  Mood and affect appear " normal.  SKIN: Warm and dry.  No scalp, face, or neck lesions noted.    Physical Exam and Procedure    EARS: Normal shape and symmetry.  No tenderness when palpating the mastoid or tragal areas bilaterally.  The ears were then examined under the otic binocular microscope to perform foreign body removal.  Otoscopic exam on the right reveals a clear canal.  The right tympanic membrane was round, intact without evidence of effusion.  No retraction, granulation, drainage, or evidence of cholesteatoma.      Attention was turned to the left ear.  Otoscopic exam on the left reveals a foreign body occluding the ear canal consistent with some wadded up tissue paper encased in cerumen.  The foreign body and cerumen removed with an alligator forceps.  The left tympanic membrane was round, intact without evidence of effusion.  No retraction, granulation, drainage, or evidence of cholesteatoma.      Audiogram  The patient underwent an audiogram performed today.  My review of the audiogram shows normal hearing to 1000 Hz sloping to mild sloping to moderate sloping to moderately severe sensorineural hearing loss in both ears.  Pure-tone average is 28 dB on the right and 18 dB on the left.  Speech reception threshold is 15 dB on the right and 10 dB on the left.  The patient had 96% word recognition on the right and 100% word recognition on the left.  The patient had a type A deep tympanogram on the right and a type A tympanogram on the left.    Assessment and Plan     ICD-10-CM    1. Foreign body of left ear, initial encounter  T16.2XXA REMV EXT CANAL FOREIGN BODY     Adult ENT  Referral     Adult Audiology  Referral      2. Bilateral high frequency sensorineural hearing loss  H90.3 REMV EXT CANAL FOREIGN BODY     Adult Audiology  Referral        It was my pleasure seeing Jamal Sanchez today in clinic.  The patient had a left external auditory canal foreign body successfully removed in office.  We  spent the remainder of today's visit on education including not putting any instrument in the ear, cotton, or tissue in the ear.  We discussed good earplug options including using silicone putty, musicians earplugs, or Doc's pro earplugs.    The patient does have some mild to moderate high-frequency sensorineural hearing loss in both ears but overall hears well and has no concerns.  I would recommend observation.    The patient will follow up as needed for his ears in the future.    Shabbir Ramos MD  Department of Otolaryngology-Head and Neck Surgery  [facility]

## 2023-08-21 ENCOUNTER — VIRTUAL VISIT (OUTPATIENT)
Dept: GASTROENTEROLOGY | Facility: CLINIC | Age: 68
End: 2023-08-21
Attending: PHYSICIAN ASSISTANT
Payer: MEDICARE

## 2023-08-21 DIAGNOSIS — K44.9 HIATAL HERNIA: ICD-10-CM

## 2023-08-21 DIAGNOSIS — K21.00 GASTROESOPHAGEAL REFLUX DISEASE WITH ESOPHAGITIS WITHOUT HEMORRHAGE: Primary | ICD-10-CM

## 2023-08-21 DIAGNOSIS — R13.10 DYSPHAGIA, UNSPECIFIED TYPE: ICD-10-CM

## 2023-08-21 PROCEDURE — 99215 OFFICE O/P EST HI 40 MIN: CPT | Mod: 95 | Performed by: PHYSICIAN ASSISTANT

## 2023-08-21 RX ORDER — PANTOPRAZOLE SODIUM 40 MG/1
40 TABLET, DELAYED RELEASE ORAL 2 TIMES DAILY
Qty: 180 TABLET | Refills: 0 | Status: SHIPPED | OUTPATIENT
Start: 2023-08-21 | End: 2023-11-10

## 2023-08-21 ASSESSMENT — PAIN SCALES - GENERAL: PAINLEVEL: NO PAIN (0)

## 2023-08-21 NOTE — PATIENT INSTRUCTIONS
It was a pleasure taking care of you today.  I've included a brief summary of our discussion and care plan from today's visit below.  Please review this information with your primary care provider.  _______________________________________________________________________     My recommendations are summarized as follows:     - increase pantoprazole to twice daily. This is best taken on an empty stomach about 30 minutes before eating  - continue famotidine 20mg twice daily as you are doing   - reduce alcohol intake as this can make reflux symptoms worse and can also hurt your liver   - follow up in 3 months, sooner if needed new, persistent or worsening symptoms    ______________________________________________________________________     How do I schedule labs, imaging studies, or procedures that were ordered in clinic today?      Labs: To schedule lab appointment you can contact your local [facility] or call [phone] to schedule at any convenient [facility] location.     Procedures: If a colonoscopy, upper endoscopy, breath test, esophageal manometry, or pH impedence was ordered today, our endoscopy team will call you to schedule this. If you have not heard from our endoscopy team within a week, please call [phone] to schedule.      Imaging Studies: If you were scheduled for a CT scan, X-ray, MRI, ultrasound, HIDA scan or other imaging study, please call [phone] to have this scheduled.      Referral: If a referral to another specialty was ordered, expect a phone call or follow instructions above. If you have not heard from anyone regarding your referral in a week, please call our clinic to check the status.      Who do I call with any questions after my visit?  Please be in touch if there are any further questions that arise following today's visit.  There are multiple ways to contact your gastroenterology care team.       During business hours, you may reach a Gastroenterology nurse  at [phone]     To schedule or reschedule an appointment, please call [phone].      You can always send a secure message through [employer].  [employer] messages are answered by your nurse or doctor typically within 24 hours.  Please allow extra time on weekends and holidays.       For urgent/emergent questions after business hours, you may reach the on-call GI Fellow by contacting the [facility]  at [phone].     How will I get the results of any tests ordered?    You will receive all of your results.  If you have signed up for [employer], any tests ordered at your visit will be available to you after your physician reviews them.  Typically this takes 1-2 weeks.  If there are urgent results that require a change in your care plan, your physician or nurse will call you to discuss the next steps.       What is [employer] unlimited?  [employer] unlimited is a secure way for you to access all of your healthcare records from the [facility].  It is a web based computer program, so you can sign on to it from any location.  It also allows you to send secure messages to your care team.  I recommend signing up for [employer] unlimited access if you have not already done so and are comfortable with using a computer.       How to I schedule a follow-up visit?  If you did not schedule a follow-up visit today, please call [phone] to schedule a follow-up office visit.      Narendra Kramer PA-C  Division of Gastroenterology, Hepatology and Nutrition   [facility]

## 2023-08-21 NOTE — NURSING NOTE
Is the patient currently in the state of [place]? YES    Visit mode:VIDEO    If the visit is dropped, the patient can be reconnected by: VIDEO VISIT: Text to cell phone:   Telephone Information:   Mobile [phone]       Will anyone else be joining the visit? NO  (If patient encounters technical issues they should call [phone]    How would you like to obtain your AVS? MyChart    Are changes needed to the allergy or medication list? No    Reason for visit: No chief complaint on file.    Lenora ADAMS

## 2023-08-21 NOTE — PROGRESS NOTES
GI FOLLOW UP VISIT     CC/REFERRING MD:    Raad Benitez DO     REASON FOR VISIT: FOLLOW UP     HISTORY OF PRESENT ILLNESS:    Jamal Sanchez is 68 year old male with coronary artery disease status post recent PCI with stent on Plavix, hyperlipidemia, GERD and hiatal hernia who presents for follow-up.    He was initially evaluated in our GI clinic with Dr. Marisela De Leon in December 2022.  I however first met him at a follow-up about 3 months ago in May 2023.  Please see prior notes for more details.  Briefly, he has a long history of reflux which unfortunately was not well controlled despite omeprazole 40 mg daily and famotidine 20 mg twice daily.  She continued to have chest discomfort, persistent reflux symptoms as well as difficulty swallowing, often feeling that foods are getting stuck in his chest and more difficult to dislodge.  As a result he has become very mindful of how he is eating and tries to eat smaller bites and chew his foods very well.    A prior EGD in January 2022 noted esophageal stenosis treated with dilation, gastritis and medium sized hiatal hernia.  Repeat EGD in April 2022 without stricture in the esophagus.    Since his last visit his omeprazole was switched to pantoprazole given an interaction with his Plavix. He has also continued famotidine 20mg twice daily. We updated his EGD last month in July 2023 which noted LA grade B esophagitis, 3 cm hiatal hernia, and gastric polyps.  Biopsies with mild inflammation but negative for H. pylori and negative for Freire's esophagus.  Gastric polyps were benign. He reports that he does feel further improvement with the pantoprazole however still with reflux symptoms and dysphagia.  Throat feels scratchy all the time, sometimes results in coughing. He is concerned if this could be aspiration occurring.     He does have some acidic foods in his diet. He eats tomatoes about 3-4 times a day and uses garlic regularly in meals. Overall hasn't had  an issue with these items. He denies carbonated beverages. He drinks electrolyte water. He does drink regularly. Not necessarily every day however does enjoy 6-8 beers a day max on weekends.     Gopal  has a past medical history of A-fib (H), BPH (benign prostatic hyperplasia), Complication of anesthesia, Diverticulitis, GERD (gastroesophageal reflux disease), Heart disease, HLD (hyperlipidemia), and Hypertension.    He  has a past surgical history that includes colonoscopy (03/05/2008); Esophagoscopy, gastroscopy, duodenoscopy (EGD), combined (10/09/2013); Colonoscopy (10/09/2013); Laparoscopic appendectomy (N/A, 01/07/2016); Laparoscopic herniorrhaphy inguinal bilateral (Bilateral, 12/13/2016); arthroscopy shoulder rt/lt (Right, 01/2018); appendectomy; Arthroscopy shoulder rotator cuff repair (Left); hernia repair; Esophagoscopy, gastroscopy, duodenoscopy (EGD), combined (N/A, 04/06/2022); Colonoscopy (N/A, 08/17/2022); Coronary Angiogram (N/A, 09/07/2022); Left Heart Catheterization (N/A, 09/07/2022); Instantaneous Wave-Free Ratio (N/A, 09/07/2022); Left Ventriculogram (N/A, 09/07/2022); Percutaneous Coronary Intervention Stent (N/A, 09/07/2022); Cardiac surgery (November, 2022); and Esophagoscopy, gastroscopy, duodenoscopy (EGD), combined (N/A, 7/10/2023).    He  reports that he has never smoked. He has never used smokeless tobacco. He reports current alcohol use. He reports that he does not use drugs.    His family history includes Alcohol/Drug in his brother; Cancer - colorectal in his maternal grandfather; Cerebrovascular Disease in his brother and mother; Diabetes in his mother and sister; Hypertension in his brother; Neurologic Disorder in his father; Obesity in his sister; Prostate Cancer in his paternal grandmother; Unknown/Adopted in his brother.    ALLERGIES:  Nka [no known allergies]      PREVIOUS ENDOSCOPY:  UPPER ENDOSCOPY 7/10/2023  Impression:     - Normal examined duodenum.                           - A few gastric polyps. Biopsied.                          - Gastroesophageal flap valve classified as Hill Grade IV (no fold, wide open lumen, hiatal hernia present).                          - 3 cm hiatal hernia.                          - LA Grade B reflux esophagitis with no bleeding. Rule out Freire's esophagus. Biopsied.                          - Biopsies were taken with a cold forceps for histology in the middle third of the esophagus and in the gastric antrum.     A.  Stomach, antrum, biopsy-  - Suggestive of mild reactive gastropathy.  - Negative for active gastritis, intestinal metaplasia, gastric epithelial dysplasia, or malignancy.  - Negative for Helicobacter-like forms on routine stained sections.  - Sampling includes: Gastric antral mucosa.     B.  Stomach, body, biopsy-  - Fundic gland polyp; benign.     C.  Gastroesophageal junction, biopsy-  - Mild nonspecific esophagitis with basal cell hyperplasia.  - Negative for goblet cell-type intestinal metaplasia, dysplasia, or malignancy.  - Sampling includes: Squamous mucosal fragments.     D.  Esophagus, mid, biopsy-  - Negative for diagnostic pathologic alteration.  - Negative for goblet cell-type intestinal metaplasia, dysplasia, or malignancy.  - Sampling includes: Squamous mucosal fragments.     PERTINENT MEDICATIONS:    Current Outpatient Medications:     aspirin (ASA) 81 MG EC tablet, Take 1 tablet (81 mg) by mouth daily Start tomorrow., Disp: 30 tablet, Rfl: 3    atorvastatin (LIPITOR) 40 MG tablet, Take 1 tablet (40 mg) by mouth daily, Disp: 90 tablet, Rfl: 3    calcium carbonate (TUMS) 500 MG chewable tablet, Take 1-2 chew tab by mouth daily as needed, Disp: , Rfl:     clopidogrel (PLAVIX) 75 MG tablet, Take 1 tablet (75 mg) by mouth daily, Disp: 90 tablet, Rfl: 3    cyclobenzaprine (FLEXERIL) 5 MG tablet, Take 1 tablet (5 mg) by mouth 3 times daily as needed for muscle spasms, Disp: 30 tablet, Rfl: 0    famotidine (PEPCID) 20 MG tablet, Take  1 tablet (20 mg) by mouth 2 times daily, Disp: 180 tablet, Rfl: 2    gabapentin (NEURONTIN) 100 MG capsule, Take 1 capsule (100 mg) by mouth 3 times daily, Disp: 90 capsule, Rfl: 1    Glucosamine-Chondroitin (GLUCOSAMINE CHONDR COMPLEX PO), Take 1 tablet by mouth daily, Disp: , Rfl:     lisinopril (ZESTRIL) 10 MG tablet, Take 1 tablet (10 mg) by mouth daily Hold if Systolic Blood Pressure is less than 85., Disp: 90 tablet, Rfl: 3    metoprolol succinate ER (TOPROL XL) 25 MG 24 hr tablet, Take 1 tablet (25 mg) by mouth daily, Disp: 30 tablet, Rfl: 11    multivitamin (CENTRUM SILVER) tablet, Take 1 tablet by mouth daily, Disp: , Rfl:     nitroGLYcerin (NITROSTAT) 0.4 MG sublingual tablet, For chest pain place 1 tablet under the tongue every 5 minutes for 3 doses. If symptoms persist 5 minutes after 2nd dose call 911., Disp: 25 tablet, Rfl: 3    oxybutynin ER (DITROPAN XL) 10 MG 24 hr tablet, Take 1 tablet (10 mg) by mouth daily, Disp: 90 tablet, Rfl: 3    pantoprazole (PROTONIX) 40 MG EC tablet, Take 1 tablet (40 mg) by mouth daily, Disp: 90 tablet, Rfl: 1    tadalafil (CIALIS) 10 MG tablet, Take 1-2 tablets daily as needed, 30-45 minutes prior to intended sexual activity, Disp: 30 tablet, Rfl: 4    tamsulosin (FLOMAX) 0.4 MG capsule, Take 1 capsule (0.4 mg) by mouth daily, Disp: 90 capsule, Rfl: 3    triamcinolone (KENALOG) 0.1 % external cream, Apply topically 3 times daily, Disp: 80 g, Rfl: 2    triamterene-HCTZ (MAXZIDE) 75-50 MG tablet, Take 1 tablet by mouth daily, Disp: 90 tablet, Rfl: 3    vitamin C (ASCORBIC ACID) 1000 MG TABS, Take 1,000 mg by mouth daily, Disp: , Rfl:         PHYSICAL EXAMINATION:  Constitutional: aaox3, cooperative, pleasant, not dyspneic/diaphoretic, no acute distress      GENERAL: Healthy, alert and no distress  EYES: Eyes grossly normal to inspection.  No discharge or erythema, or obvious scleral/conjunctival abnormalities.  RESP: No audible wheeze, cough, or visible cyanosis.  No  visible retractions or increased work of breathing.    SKIN: Visible skin clear. No significant rash, abnormal pigmentation or lesions.  NEURO: Cranial nerves grossly intact.  Mentation and speech appropriate for age.  PSYCH: Mentation appears normal, affect normal/bright, judgement and insight intact, normal speech and appearance well-groomed.          PERTINENT STUDIES: (I personally reviewed these laboratory studies today)  Most recent CBC:   Recent Labs   Lab Test 04/13/23  0950 09/14/22  1636   WBC 4.6 6.3   HGB 14.7 14.9   HCT 44.1 45.6    186     Most recent hepatic panel:  Recent Labs   Lab Test 04/13/23  0950 11/14/22  0805 04/19/22  1108 11/23/21  1243 07/02/19  0927   ALT 21 23   < > 83* 26   AST  --   --   --  41 14    < > = values in this interval not displayed.     Most recent creatinine:  Recent Labs   Lab Test 04/13/23  0950 03/29/23  1103   CR 1.01 0.96       TSH   Date Value Ref Range Status   03/30/2023 1.33 0.30 - 4.20 uIU/mL Final   11/23/2021 1.33 0.40 - 4.00 mU/L Final   08/12/2015 1.44 0.40 - 4.00 mU/L Final       ASSESSMENT/PLAN:    1. Gastroesophageal reflux disease with esophagitis without hemorrhage  - Adult GI Clinic Follow-Up Order (Blank)  - pantoprazole (PROTONIX) 40 MG EC tablet; Take 1 tablet (40 mg) by mouth 2 times daily On an empty stomach about 30 minutes before a meal  Dispense: 180 tablet; Refill: 0    2. Dysphagia, unspecified type  - Adult GI Clinic Follow-Up Order (Blank)  - pantoprazole (PROTONIX) 40 MG EC tablet; Take 1 tablet (40 mg) by mouth 2 times daily On an empty stomach about 30 minutes before a meal  Dispense: 180 tablet; Refill: 0    3. Hiatal hernia        Jamal Sanchez is a 68 year old male with coronary artery disease status post recent PCI with stent on Plavix, hyperlipidemia, GERD and hiatal hernia who presents for follow up.     Patient with long hx of GERD with persistent symptoms despite therapy. Since his last visit his omeprazole was switched  to pantoprazole given an interaction with his Plavix. He also takes famotidine 20mg twice daily. We also updated EGD last month in July 2023 which noted LA grade B esophagitis, 3 cm hiatal hernia, and gastric polyps.  Biopsies with mild inflammation but negative for H. pylori and negative for Freire's esophagus.  Gastric polyps were benign. He reports that he does feel further improvement with the pantoprazole (although this is lower potency) however still with reflux symptoms and dysphagia.  Also has throat scratchiness, coughing and concerns for aspiration. Given persistent symptoms and findings of esophagitis while on once daily PPI, will increase to double dose PPI. Advised to take on an empty stomach about 30 minutes before a meal (which isn't how he was taking it before). Reviewed lifestyle modifications including decreasing alcohol intake (he has increased intake every weekend). We did also briefly discuss negative effects of alcohol on the liver including liver cirrhosis. If symptoms persist despite high dose PPI consider esophageal manometry + 24 hr pH impedence and a video swallow study.     Recommendations:   - increase pantoprazole to BID  - continue famotidine 20mg BID   - lifestyle modifications including decreasing alcohol.   - HRM + pH impedence and VSS if no improvement in symptoms   - follow up in 3 months, sooner if needed.         Thank you for this consultation.  It was a pleasure to participate in the care of this patient; please contact us with any further questions.        This note was created with voice recognition software, and while reviewed for accuracy, typos may remain.       I spent a total of 48 minutes on the day of the visit.   Time spent by me doing chart review, history and exam, documentation and further activities per the note      Narendra Kramer PA-C  Division of Gastroenterology, Hepatology and Nutrition   [facility]  [place]            Video-Visit Details    Video Start Time: 9:27 AM    Type of service:  Video Visit    Video End Time:9:41 AM    Originating Location (pt. Location): Home    Distant Location (provider location):  Off-site    Platform used for Video Visit: Thelma

## 2023-08-23 ENCOUNTER — OFFICE VISIT (OUTPATIENT)
Dept: OTOLARYNGOLOGY | Facility: CLINIC | Age: 68
End: 2023-08-23
Attending: FAMILY MEDICINE
Payer: MEDICARE

## 2023-08-23 ENCOUNTER — OFFICE VISIT (OUTPATIENT)
Dept: AUDIOLOGY | Facility: CLINIC | Age: 68
End: 2023-08-23
Attending: FAMILY MEDICINE
Payer: MEDICARE

## 2023-08-23 VITALS
HEART RATE: 94 BPM | WEIGHT: 207 LBS | OXYGEN SATURATION: 96 % | BODY MASS INDEX: 29.63 KG/M2 | TEMPERATURE: 98.2 F | DIASTOLIC BLOOD PRESSURE: 79 MMHG | SYSTOLIC BLOOD PRESSURE: 122 MMHG | HEIGHT: 70 IN

## 2023-08-23 DIAGNOSIS — H90.3 SENSORINEURAL HEARING LOSS, ASYMMETRICAL: Primary | ICD-10-CM

## 2023-08-23 DIAGNOSIS — H90.3 BILATERAL HIGH FREQUENCY SENSORINEURAL HEARING LOSS: ICD-10-CM

## 2023-08-23 DIAGNOSIS — T16.2XXA FOREIGN BODY OF LEFT EAR, INITIAL ENCOUNTER: Primary | ICD-10-CM

## 2023-08-23 DIAGNOSIS — T16.2XXA FOREIGN BODY OF LEFT EAR, INITIAL ENCOUNTER: ICD-10-CM

## 2023-08-23 PROCEDURE — 69200 CLEAR OUTER EAR CANAL: CPT | Mod: LT | Performed by: OTOLARYNGOLOGY

## 2023-08-23 PROCEDURE — 92550 TYMPANOMETRY & REFLEX THRESH: CPT | Performed by: AUDIOLOGIST

## 2023-08-23 PROCEDURE — 92557 COMPREHENSIVE HEARING TEST: CPT | Performed by: AUDIOLOGIST

## 2023-08-23 PROCEDURE — 99213 OFFICE O/P EST LOW 20 MIN: CPT | Mod: 25 | Performed by: OTOLARYNGOLOGY

## 2023-08-23 ASSESSMENT — PAIN SCALES - GENERAL: PAINLEVEL: NO PAIN (0)

## 2023-08-23 NOTE — LETTER
8/23/2023         RE: Jamal Sanchez  [id number] [facility] [id number]        Dear Colleague,    Thank you for referring your patient, Jamal Sanchez, to the [facility]. Please see a copy of my visit note below.    Chief Complaint   Patient presents with     Consult     Foreign body of left ear, initial encounter      History of Present Illness   Jamal Sanchez is a 68 year old male who presents today for ear evaluation. I am seeing this patient in consultation for foreign body left ear canal at the request of the provider Dr. Benitez. The patient seen for routine examination and found to have a foreign body in the left external auditory canal.  He does have a history of placing wadded up cotton or tissue in his ear when he goes to a local brewery.  He currently denies any otalgia, otorrhea, bloody otorrhea.  No dizziness or vertigo.  No prior history of ear surgery.  No hearing concerns.      Past Medical History  Patient Active Problem List   Diagnosis     Benign prostatic hypertrophy     CARDIOVASCULAR SCREENING; LDL GOAL LESS THAN 160     GERD (gastroesophageal reflux disease)     Advanced directives, counseling/discussion     Hypertension, goal below 140/90     Appendicitis     Diverticulosis of large intestine without hemorrhage     Diverticulitis of colon     Acute diverticulitis     ETOH abuse     Generalized abdominal pain     Anemia, iron deficiency     Hiatal hernia     Dyspnea on exertion     Status post coronary angiogram     Coronary artery disease of native heart with stable angina pectoris, unspecified vessel or lesion type (H)     Prediabetes     Dysphagia, unspecified type     Current Medications    Current Outpatient Medications:      aspirin (ASA) 81 MG EC tablet, Take 1 tablet (81 mg) by mouth daily Start tomorrow., Disp: 30 tablet, Rfl: 3     atorvastatin (LIPITOR) 40 MG tablet, Take 1 tablet (40 mg) by mouth daily, Disp: 90 tablet, Rfl: 3     calcium  carbonate (TUMS) 500 MG chewable tablet, Take 1-2 chew tab by mouth daily as needed, Disp: , Rfl:      clopidogrel (PLAVIX) 75 MG tablet, Take 1 tablet (75 mg) by mouth daily, Disp: 90 tablet, Rfl: 3     cyclobenzaprine (FLEXERIL) 5 MG tablet, Take 1 tablet (5 mg) by mouth 3 times daily as needed for muscle spasms, Disp: 30 tablet, Rfl: 0     famotidine (PEPCID) 20 MG tablet, Take 1 tablet (20 mg) by mouth 2 times daily, Disp: 180 tablet, Rfl: 2     gabapentin (NEURONTIN) 100 MG capsule, Take 1 capsule (100 mg) by mouth 3 times daily, Disp: 90 capsule, Rfl: 1     Glucosamine-Chondroitin (GLUCOSAMINE CHONDR COMPLEX PO), Take 1 tablet by mouth daily, Disp: , Rfl:      lisinopril (ZESTRIL) 10 MG tablet, Take 1 tablet (10 mg) by mouth daily Hold if Systolic Blood Pressure is less than 85., Disp: 90 tablet, Rfl: 3     metoprolol succinate ER (TOPROL XL) 25 MG 24 hr tablet, Take 1 tablet (25 mg) by mouth daily, Disp: 30 tablet, Rfl: 11     multivitamin (CENTRUM SILVER) tablet, Take 1 tablet by mouth daily, Disp: , Rfl:      nitroGLYcerin (NITROSTAT) 0.4 MG sublingual tablet, For chest pain place 1 tablet under the tongue every 5 minutes for 3 doses. If symptoms persist 5 minutes after 2nd dose call 911., Disp: 25 tablet, Rfl: 3     oxybutynin ER (DITROPAN XL) 10 MG 24 hr tablet, Take 1 tablet (10 mg) by mouth daily, Disp: 90 tablet, Rfl: 3     pantoprazole (PROTONIX) 40 MG EC tablet, Take 1 tablet (40 mg) by mouth 2 times daily On an empty stomach about 30 minutes before a meal, Disp: 180 tablet, Rfl: 0     tadalafil (CIALIS) 10 MG tablet, Take 1-2 tablets daily as needed, 30-45 minutes prior to intended sexual activity, Disp: 30 tablet, Rfl: 4     tamsulosin (FLOMAX) 0.4 MG capsule, Take 1 capsule (0.4 mg) by mouth daily, Disp: 90 capsule, Rfl: 3     triamcinolone (KENALOG) 0.1 % external cream, Apply topically 3 times daily, Disp: 80 g, Rfl: 2     triamterene-HCTZ (MAXZIDE) 75-50 MG tablet, Take 1 tablet by mouth daily,  "Disp: 90 tablet, Rfl: 3     vitamin C (ASCORBIC ACID) 1000 MG TABS, Take 1,000 mg by mouth daily, Disp: , Rfl:     Allergies  Allergies   Allergen Reactions     Nka [No Known Allergies]        Social History  Social History     Socioeconomic History     Marital status:    Tobacco Use     Smoking status: Never     Smokeless tobacco: Never   Vaping Use     Vaping Use: Never used   Substance and Sexual Activity     Alcohol use: Yes     Comment: 6 pack beer on weekend.     Drug use: No     Sexual activity: Yes     Partners: Female     Birth control/protection: Post-menopausal   Other Topics Concern     Parent/sibling w/ CABG, MI or angioplasty before 65F 55M? No       Family History  Family History   Problem Relation Age of Onset     Diabetes Mother      Cerebrovascular Disease Mother      Neurologic Disorder Father         parkinson's     Cancer - colorectal Maternal Grandfather      Prostate Cancer Paternal Grandmother      Hypertension Brother      Cerebrovascular Disease Brother         stroke     Alcohol/Drug Brother      Unknown/Adopted Brother      Diabetes Sister      Obesity Sister        Review of Systems  As per HPI and PMHx, otherwise 7 system review of the head and neck negative.    Physical Exam  /79 (BP Location: Left arm, Patient Position: Chair, Cuff Size: Adult Regular)   Pulse 94   Temp 98.2  F (36.8  C) (Tympanic)   Ht 1.778 m (5' 10\")   Wt 93.9 kg (207 lb)   SpO2 96%   BMI 29.70 kg/m    GENERAL: Patient is a pleasant, cooperative 68 year old male in no acute distress.  HEAD: Normocephalic, atraumatic.  Hair and scalp are normal.  EYES: Pupils are equal, round, reactive to light and accommodation.  Extraocular movements are intact.  The sclera nonicteric without injection.  The extraocular structures are normal.  EARS: See below.  NOSE: Nares are patent.  Nasal mucosa is pink and moist.  Negative anterior rhinoscopy.  NEUROLOGIC: Cranial nerves II through XII are grossly intact.  " Voice is strong.  Patient is House-Brackmann I/VI bilaterally.  CARDIOVASCULAR: Extremities are warm and well-perfused.  No significant peripheral edema.  RESPIRATORY: Patient has nonlabored breathing without cough, wheeze, stridor.  PSYCHIATRIC: Patient is alert and oriented.  Mood and affect appear normal.  SKIN: Warm and dry.  No scalp, face, or neck lesions noted.    Physical Exam and Procedure    EARS: Normal shape and symmetry.  No tenderness when palpating the mastoid or tragal areas bilaterally.  The ears were then examined under the otic binocular microscope to perform foreign body removal.  Otoscopic exam on the right reveals a clear canal.  The right tympanic membrane was round, intact without evidence of effusion.  No retraction, granulation, drainage, or evidence of cholesteatoma.      Attention was turned to the left ear.  Otoscopic exam on the left reveals a foreign body occluding the ear canal consistent with some wadded up tissue paper encased in cerumen.  The foreign body and cerumen removed with an alligator forceps.  The left tympanic membrane was round, intact without evidence of effusion.  No retraction, granulation, drainage, or evidence of cholesteatoma.      Audiogram  The patient underwent an audiogram performed today.  My review of the audiogram shows normal hearing to 1000 Hz sloping to mild sloping to moderate sloping to moderately severe sensorineural hearing loss in both ears.  Pure-tone average is 28 dB on the right and 18 dB on the left.  Speech reception threshold is 15 dB on the right and 10 dB on the left.  The patient had 96% word recognition on the right and 100% word recognition on the left.  The patient had a type A deep tympanogram on the right and a type A tympanogram on the left.    Assessment and Plan     ICD-10-CM    1. Foreign body of left ear, initial encounter  T16.2XXA REMV EXT CANAL FOREIGN BODY     Adult ENT  Referral     Adult Audiology  Referral       2. Bilateral high frequency sensorineural hearing loss  H90.3 REMV EXT CANAL FOREIGN BODY     Adult Audiology  Referral        It was my pleasure seeing Jamal Sanchez today in clinic.  The patient had a left external auditory canal foreign body successfully removed in office.  We spent the remainder of today's visit on education including not putting any instrument in the ear, cotton, or tissue in the ear.  We discussed good earplug options including using silicone putty, musicians earplugs, or Doc's pro earplugs.    The patient does have some mild to moderate high-frequency sensorineural hearing loss in both ears but overall hears well and has no concerns.  I would recommend observation.    The patient will follow up as needed for his ears in the future.    Shabbir Ramos MD  Department of Otolaryngology-Head and Neck Surgery  [facility]       Again, thank you for allowing me to participate in the care of your patient.        Sincerely,        Shabbir Ramos MD

## 2023-08-23 NOTE — PROGRESS NOTES
AUDIOLOGY REPORT    SUBJECTIVE:  Jamal Sanchez is a 68 year old male who was seen in the Audiology Clinic at the [facility] for audiologic evaluation, referred by Raad Benitez D.O. The patient has been seen previously in this clinic on 10/07/2021 for assessment. The patient reports a gradual decrease in his hearing . He is wondering about a possible foreign body in his left ear. The patient denies  bilateral otalgia and bilateral drainage.  The patient notes difficulty with communication in a variety of listening situations.  They were accompanied today by their self. .    OBJECTIVE:    Otoscopic exam indicates cerumen in the left ear     Pure Tone Thresholds assessed using conventional audiometry with good  reliability from 250-8000 Hz bilaterally using insert earphones and circumaural headphones     RIGHT:  normal sloping to moderate and severe sensorineural hearing loss    LEFT:    normal sloping to moderate and severe sensorineural hearing loss    Tympanogram:    RIGHT: hyper eardrum mobility (Type Ad)    LEFT:   normal eardrum mobility    Reflexes (reported by stimulus ear):  RIGHT: Ipsilateral is CNT as TM too noisy  RIGHT: Contralateral is absent at frequencies tested  LEFT:   Ipsilateral is present at normal levels  LEFT:   Contralateral is CNT as TM is too noisy      Speech Reception Threshold:    RIGHT: 15 dB HL    LEFT:   10 dB HL  Word Recognition Score:     RIGHT: 96% at 55 dB HL using NU-6 recorded word list.    LEFT:   100% at 50 dB HL using NU-6 recorded word list.      ASSESSMENT:     ICD-10-CM    1. Sensorineural hearing loss, asymmetrical  H90.3           Compared to patient's previous audiogram dated 10/07/2021, hearing has decreased 10 dB at 3000 Hz in the right ear and 5469-6450 Hz in the left ear. Today s results were discussed with the patient in detail.     PLAN:  Patient was counseled regarding hearing loss and impact on communication. It is recommended that the  patient be seen by Dr. Ramos for medical evaluation of their ears and hearing evaluation.  .  Please call this clinic with questions regarding these results or recommendations.        Layne RUBIO-[facility], [id number]

## 2023-08-23 NOTE — NURSING NOTE
"Initial /79 (BP Location: Left arm, Patient Position: Chair, Cuff Size: Adult Regular)   Pulse 94   Temp 98.2  F (36.8  C) (Tympanic)   Ht 1.778 m (5' 10\")   Wt 93.9 kg (207 lb)   SpO2 96%   BMI 29.70 kg/m   Estimated body mass index is 29.7 kg/m  as calculated from the following:    Height as of this encounter: 1.778 m (5' 10\").    Weight as of this encounter: 93.9 kg (207 lb). .  Gene Mora on 8/23/2023 at 2:47 PM    "

## 2023-09-06 DIAGNOSIS — I25.118 CORONARY ARTERY DISEASE OF NATIVE ARTERY OF NATIVE HEART WITH STABLE ANGINA PECTORIS (H): ICD-10-CM

## 2023-09-06 RX ORDER — LISINOPRIL 10 MG/1
10 TABLET ORAL DAILY
Qty: 90 TABLET | Refills: 0 | Status: SHIPPED | OUTPATIENT
Start: 2023-09-06 | End: 2023-12-06

## 2023-09-21 DIAGNOSIS — I25.118 CORONARY ARTERY DISEASE OF NATIVE ARTERY OF NATIVE HEART WITH STABLE ANGINA PECTORIS (H): ICD-10-CM

## 2023-09-21 RX ORDER — CLOPIDOGREL BISULFATE 75 MG/1
75 TABLET ORAL DAILY
Qty: 90 TABLET | Refills: 0 | Status: SHIPPED | OUTPATIENT
Start: 2023-09-21 | End: 2023-12-06

## 2023-09-21 NOTE — TELEPHONE ENCOUNTER
[facility] Cardiology Refill Guideline reviewed.  Medication meets criteria for refill.    Marian Rutherford RN

## 2023-09-21 NOTE — TELEPHONE ENCOUNTER
Last Office Visit: 05/24/23 PATRICIA Simons  Next Office Visit: 09/26/23 Dr. Cruz  Last Fill Date: 06/05/23  Mariya Tripathi MA Cardiology   9/21/2023 8:58 AM

## 2023-09-26 ENCOUNTER — OFFICE VISIT (OUTPATIENT)
Dept: CARDIOLOGY | Facility: CLINIC | Age: 68
End: 2023-09-26
Attending: NURSE PRACTITIONER
Payer: MEDICARE

## 2023-09-26 VITALS
OXYGEN SATURATION: 99 % | SYSTOLIC BLOOD PRESSURE: 127 MMHG | HEART RATE: 58 BPM | WEIGHT: 207 LBS | RESPIRATION RATE: 18 BRPM | HEIGHT: 70 IN | BODY MASS INDEX: 29.63 KG/M2 | DIASTOLIC BLOOD PRESSURE: 83 MMHG

## 2023-09-26 DIAGNOSIS — I10 BENIGN ESSENTIAL HYPERTENSION: ICD-10-CM

## 2023-09-26 DIAGNOSIS — R06.09 DOE (DYSPNEA ON EXERTION): ICD-10-CM

## 2023-09-26 DIAGNOSIS — I25.118 CORONARY ARTERY DISEASE OF NATIVE ARTERY OF NATIVE HEART WITH STABLE ANGINA PECTORIS (H): ICD-10-CM

## 2023-09-26 DIAGNOSIS — E78.5 HYPERLIPIDEMIA LDL GOAL <70: ICD-10-CM

## 2023-09-26 DIAGNOSIS — R00.0 TACHYCARDIA: ICD-10-CM

## 2023-09-26 DIAGNOSIS — I25.10 CORONARY ARTERY DISEASE INVOLVING NATIVE CORONARY ARTERY OF NATIVE HEART WITHOUT ANGINA PECTORIS: ICD-10-CM

## 2023-09-26 PROCEDURE — 99214 OFFICE O/P EST MOD 30 MIN: CPT | Performed by: INTERNAL MEDICINE

## 2023-09-26 RX ORDER — ATORVASTATIN CALCIUM 40 MG/1
40 TABLET, FILM COATED ORAL DAILY
Qty: 90 TABLET | Refills: 3 | Status: SHIPPED | OUTPATIENT
Start: 2023-09-26 | End: 2024-08-28

## 2023-09-26 NOTE — PROGRESS NOTES
CARDIOLOGY CLINIC CONSULTATION    PRIMARY CARE PHYSICIAN:  Raad Benitez    HISTORY OF PRESENT ILLNESS:  This is a very pleasant 68-year-old gentleman who initially saw me in consultation in August 2022.  He is seeing me in cardiology clinic today for routine follow-up.  He has the following pertinent medical issues    Hypertension hyperlipidemia  History of coronary artery disease with severe coronary artery calcifications and LAD stent based on positive IFR in 2022 September  Paralyzed right hemidiaphragm  Chronic dyspnea on exertion  Gastroesophageal reflux disease    Patient is here for cardiology follow-up.  After he saw me in August he was referred for coronary angiography and underwent LAD stenting.  He has been on dual antiplatelet therapy.  His EF has been normal.    The patient has chronic dyspnea on exertion.  He has no signs of volume overload.  He had tried Lasix in the past but that did not make any difference.  He has no orthopnea PND or edema.  He has been following up with [facility] for evaluation of paralyzed right hemidiaphragm.    Patient denies any recent cardiovascular symptoms or procedures.  He is compliant with dual antiplatelet therapy.  Denies any bleeding problems.  Last LDL is 53.    PAST MEDICAL HISTORY:  Past Medical History:   Diagnosis Date    Coronary artery disease     BPH (benign prostatic hyperplasia)     Complication of anesthesia     Diverticulitis     GERD (gastroesophageal reflux disease)     Heart disease     HLD (hyperlipidemia)     Hypertension        MEDICATIONS:  Current Outpatient Medications   Medication    atorvastatin (LIPITOR) 40 MG tablet    calcium carbonate (TUMS) 500 MG chewable tablet    clopidogrel (PLAVIX) 75 MG tablet    cyclobenzaprine (FLEXERIL) 5 MG tablet    famotidine (PEPCID) 20 MG tablet    gabapentin (NEURONTIN) 100 MG capsule    Glucosamine-Chondroitin (GLUCOSAMINE CHONDR COMPLEX PO)    lisinopril (ZESTRIL) 10 MG tablet    metoprolol succinate  ER (TOPROL XL) 25 MG 24 hr tablet    multivitamin (CENTRUM SILVER) tablet    nitroGLYcerin (NITROSTAT) 0.4 MG sublingual tablet    oxybutynin ER (DITROPAN XL) 10 MG 24 hr tablet    pantoprazole (PROTONIX) 40 MG EC tablet    tadalafil (CIALIS) 10 MG tablet    tamsulosin (FLOMAX) 0.4 MG capsule    triamcinolone (KENALOG) 0.1 % external cream    triamterene-HCTZ (MAXZIDE) 75-50 MG tablet    vitamin C (ASCORBIC ACID) 1000 MG TABS     No current facility-administered medications for this visit.       SOCIAL HISTORY:  I have reviewed this patient's social history and updated it with pertinent information if needed. Jamal WOLFF Daniel  reports that he has never smoked. He has never used smokeless tobacco. He reports current alcohol use. He reports that he does not use drugs.    PHYSICAL EXAM:  Pulse:  [58] 58  Resp:  [18] 18  BP: (127)/(83) 127/83  SpO2:  [99 %] 99 %  207 lbs 0 oz    Constitutional: alert, no distress  Respiratory: Good bilateral air entry  Cardiovascular: Normal regular heart sounds no edema normal JVP  GI: nondistended  Neuropsychiatric: appropriate affact    ASSESSMENT: Pertinent issues addressed/ reviewed during this cardiology visit  Stable coronary artery disease  Chronic dyspnea on exertion likely noncardiac    RECOMMENDATIONS:  In regards to CAD, patient is without angina.  Recommend stopping aspirin and continuing clopidogrel monotherapy for the rest of his life.  If this gets interrupted for any procedures, he should be on aspirin in the interim.  Continue statin.  LDL is at goal.  He has no cardiac symptoms.  I will see him back in clinic in a year from now sooner if anything changes clinically.  Dyspnea appears to be noncardiac.  Defer further evaluation to primary thoracic and pulmonary.  If there is any clinical dilemma, we are happy to perform a right heart catheterization to ensure volume status is normal as suggested based on normal physical exam.    It was a pleasure seeing this patient in  clinic today. Please do not hesitate to contact me with any future questions.     CLINTON Martinez, [facility]  Cardiology - [facility]  September 26, 2023    Review of the result(s) of each unique test - Last ECG lipids BMP CBC     The level of medical decision making during this visit was of moderate complexity.    This note was completed in part using dictation via the Dragon voice recognition software. Some word and grammatical errors may occur and must be interpreted in the appropriate clinical context.  If there are any questions pertaining to this issue, please contact me for further clarification.

## 2023-09-26 NOTE — LETTER
9/26/2023    Raad Benitez, DO  [address] [facility] [address]    RE: Jamal NEW Sanchez       Dear Colleague,     I had the pleasure of seeing Jamal Sanchez in the [facility].  CARDIOLOGY CLINIC CONSULTATION    PRIMARY CARE PHYSICIAN:  Raad Benitez    HISTORY OF PRESENT ILLNESS:  This is a very pleasant 68-year-old gentleman who initially saw me in consultation in August 2022.  He is seeing me in cardiology clinic today for routine follow-up.  He has the following pertinent medical issues    Hypertension hyperlipidemia  History of coronary artery disease with severe coronary artery calcifications and LAD stent based on positive IFR in 2022 September  Paralyzed right hemidiaphragm  Chronic dyspnea on exertion  Gastroesophageal reflux disease    Patient is here for cardiology follow-up.  After he saw me in August he was referred for coronary angiography and underwent LAD stenting.  He has been on dual antiplatelet therapy.  His EF has been normal.    The patient has chronic dyspnea on exertion.  He has no signs of volume overload.  He had tried Lasix in the past but that did not make any difference.  He has no orthopnea PND or edema.  He has been following up with [facility] for evaluation of paralyzed right hemidiaphragm.    Patient denies any recent cardiovascular symptoms or procedures.  He is compliant with dual antiplatelet therapy.  Denies any bleeding problems.  Last LDL is 53.    PAST MEDICAL HISTORY:  Past Medical History:   Diagnosis Date    Coronary artery disease     BPH (benign prostatic hyperplasia)     Complication of anesthesia     Diverticulitis     GERD (gastroesophageal reflux disease)     Heart disease     HLD (hyperlipidemia)     Hypertension        MEDICATIONS:  Current Outpatient Medications   Medication    atorvastatin (LIPITOR) 40 MG tablet    calcium carbonate (TUMS) 500 MG chewable tablet    clopidogrel (PLAVIX) 75 MG tablet    cyclobenzaprine (FLEXERIL) 5  MG tablet    famotidine (PEPCID) 20 MG tablet    gabapentin (NEURONTIN) 100 MG capsule    Glucosamine-Chondroitin (GLUCOSAMINE CHONDR COMPLEX PO)    lisinopril (ZESTRIL) 10 MG tablet    metoprolol succinate ER (TOPROL XL) 25 MG 24 hr tablet    multivitamin (CENTRUM SILVER) tablet    nitroGLYcerin (NITROSTAT) 0.4 MG sublingual tablet    oxybutynin ER (DITROPAN XL) 10 MG 24 hr tablet    pantoprazole (PROTONIX) 40 MG EC tablet    tadalafil (CIALIS) 10 MG tablet    tamsulosin (FLOMAX) 0.4 MG capsule    triamcinolone (KENALOG) 0.1 % external cream    triamterene-HCTZ (MAXZIDE) 75-50 MG tablet    vitamin C (ASCORBIC ACID) 1000 MG TABS     No current facility-administered medications for this visit.       SOCIAL HISTORY:  I have reviewed this patient's social history and updated it with pertinent information if needed. Jamal WOLFF Daniel  reports that he has never smoked. He has never used smokeless tobacco. He reports current alcohol use. He reports that he does not use drugs.    PHYSICAL EXAM:  Pulse:  [58] 58  Resp:  [18] 18  BP: (127)/(83) 127/83  SpO2:  [99 %] 99 %  207 lbs 0 oz    Constitutional: alert, no distress  Respiratory: Good bilateral air entry  Cardiovascular: Normal regular heart sounds no edema normal JVP  GI: nondistended  Neuropsychiatric: appropriate affact    ASSESSMENT: Pertinent issues addressed/ reviewed during this cardiology visit  Stable coronary artery disease  Chronic dyspnea on exertion likely noncardiac    RECOMMENDATIONS:  In regards to CAD, patient is without angina.  Recommend stopping aspirin and continuing clopidogrel monotherapy for the rest of his life.  If this gets interrupted for any procedures, he should be on aspirin in the interim.  Continue statin.  LDL is at goal.  He has no cardiac symptoms.  I will see him back in clinic in a year from now sooner if anything changes clinically.  Dyspnea appears to be noncardiac.  Defer further evaluation to primary thoracic and pulmonary.  If  there is any clinical dilemma, we are happy to perform a right heart catheterization to ensure volume status is normal as suggested based on normal physical exam.    It was a pleasure seeing this patient in clinic today. Please do not hesitate to contact me with any future questions.     CLINTON Martinez, [facility]  Cardiology - [facility]  September 26, 2023    Review of the result(s) of each unique test - Last ECG lipids BMP CBC     The level of medical decision making during this visit was of moderate complexity.    This note was completed in part using dictation via the Dragon voice recognition software. Some word and grammatical errors may occur and must be interpreted in the appropriate clinical context.  If there are any questions pertaining to this issue, please contact me for further clarification.      Thank you for allowing me to participate in the care of your patient.      Sincerely,     Wilian Cruz MD     [facility]  cc:   HERBERTH Barba CNP  [address] [place] [address]

## 2023-10-05 ENCOUNTER — OFFICE VISIT (OUTPATIENT)
Dept: PULMONOLOGY | Facility: CLINIC | Age: 68
End: 2023-10-05
Attending: NURSE PRACTITIONER
Payer: MEDICARE

## 2023-10-05 VITALS
HEIGHT: 70 IN | WEIGHT: 207.01 LBS | SYSTOLIC BLOOD PRESSURE: 149 MMHG | TEMPERATURE: 98.2 F | OXYGEN SATURATION: 98 % | BODY MASS INDEX: 29.64 KG/M2 | DIASTOLIC BLOOD PRESSURE: 87 MMHG | HEART RATE: 79 BPM

## 2023-10-05 DIAGNOSIS — R06.02 SOBOE (SHORTNESS OF BREATH ON EXERTION): ICD-10-CM

## 2023-10-05 DIAGNOSIS — J98.4 RESTRICTIVE LUNG DISEASE: ICD-10-CM

## 2023-10-05 PROCEDURE — 99205 OFFICE O/P NEW HI 60 MIN: CPT | Performed by: INTERNAL MEDICINE

## 2023-10-05 NOTE — PATIENT INSTRUCTIONS
Gopal,    It was nice meeting you!  Your symptoms, your lung function, and chest imaging.    Recommendations as follows:   -We will hold off on referral to thoracic surgery for candidacy evaluation for potential plication   - we will hold off on repeating pulmonary function testing in the supine position pending work-up tomorrow at [place]  - I agree with initiating positive pressure ventilation at nighttime and encourage you to continue evaluation at the sleep center  - You are due for a COVID booster, influenza vaccine, and I would also recommend the RSV vaccine  - Please call the pulmonary clinic with any questions. The pulmonary clinic number is: [phone].     Stay up to date with vaccinations including your yearly flu vaccine. Wash your hands regularly. Consider wearing a mask in crowded places to reduce the risk of respiratory illnesses. I recommend that you receive the COVID-19 vaccine and stay up to date with boosters.     Have a nice rob and stay well! Please let me know if you have questions or concerns.     Shauna Shaikh MD  Pulmonary, Allergy, Critical Care, and Sleep Medicine   [facility]        (0) swallows foods and liquids w/o difficulty

## 2023-10-05 NOTE — NURSING NOTE
"Initial BP (!) 149/87 (BP Location: Right arm, Patient Position: Sitting, Cuff Size: Adult Regular)   Pulse 79   Temp 98.2  F (36.8  C) (Tympanic)   Ht 1.778 m (5' 10\")   Wt 93.9 kg (207 lb 0.2 oz)   SpO2 98%   BMI 29.70 kg/m   Estimated body mass index is 29.7 kg/m  as calculated from the following:    Height as of this encounter: 1.778 m (5' 10\").    Weight as of this encounter: 93.9 kg (207 lb 0.2 oz). .  Chel Treviño MA    "

## 2023-10-05 NOTE — PROGRESS NOTES
[facility]  October 5, 2023         Assessment and Plan:   Gopal Sanchez is a 68-year-old male with complex medical history including atrial fibrillation, diverticulitis, GERD, hypertension, coronary artery disease status post PCI who was referred to the pulmonary clinic for shortness of breath.  Patient developed right hemidiaphragmatic paralysis sometime after 2019 based on imaging.  He has become quite symptomatic from this and etiology of it is unknown at this time as there is no clear infectious or traumatic event that he has noted.   CT chest was reviewed and it does not appear that he has any significant parenchymal lung disease contributing to his symptomatology.  (Sleep study has already been performed) he may have some nighttime CO2 retention based on a bicarb of 28 on a BMP from April and based on his symptomatology that is worse in the supine position would recommend that he start positive pressure ventilation at nighttime.  He does not report recurrent infections but given his degree of symptomatology would have him see the thoracic surgeons for consideration of plication versus other.    1.  Moderate restrictive lung disease secondary to right hemidiaphragmatic paralysis  2.  Dyspnea on exertion  3.  Coronary artery disease status post PCI of LAD.     Recommendations as follows:  -Repeat pulmonary function testing in a supine versus upright position  -Positive pressure ventilation at nighttime   -referral to thoracic surgery  - try to increase regular activity with a goal of at least 3 sessions/week of 30 minutes of cardiovascular exercise; start slow and work up to this goal  -Stay up-to-date with yearly influenza vaccines, pneumonia series (prevnar 13 and pneumovax), and Covid shots    Return to clinic as needed.    Daniela Shaikh MD  Pulmonary, Allergy, Critical Care, and Sleep Medicine   Pager: [phone]    This note was created using  dictation software and may contain errors.  Please contact the creator for any clarifications that are needed.    I have spent 60 minutes on the day of the visit to review the chart, interview and examine the patient, review labs and imaging, formulate a plan, document and submit orders. Time documented is excluding time spent for PFT interpretation          HPI:    Gopal Sanchez is a 68-year-old male with complex medical history including atrial fibrillation, diverticulitis, GERD, hypertension, coronary artery disease status post PCI who was referred to the pulmonary clinic for shortness of breath.      He reports that he has been short of breath for approximately 3 years and it has been progressive.  He has seen Dr. Merino at [facility] for this issue it was recommended that he get a CPET.  Imaging revealed that he has had an elevated right-sided hemidiaphragm since at least 2019.  He did undergo sniff study which showed a paralyzed hemidiaphragm.  Pulmonary function testing does show restrictive lung disease.     He reports suffering a cracked sternum in high school, no recent trauma including back or neck surgeries.  He cannot recall any significant viral or other illnesses over the last 3 years.  He denies significant aspiration and cannot recall any specific events during which time he may have aspirated food.    He underwent PCI of the mid-LAD with an IFR of 0.86 and 0% residual stenosis in September 2022. He was also found to have 50% stenosis proximal Cx, 50% stenosis, distal Cx. This did not affect his  dyspnea on exertion, or chronic pain.  He saw Dr. Cruz who does not believe that his symptoms are cardiac in nature.      He can walk approximately 300 feet before he has to stop and rest.  He does report that breathing is more difficult when he lays down flat.  He recalls that in 2018 he could walk several miles without any issues.  Breathing is worse when the air is smoky or charlee.    He does have cats  but is allergic to them.  Typically does get seasonal allergies in the spring and takes Sudafed infrequently for this.  He has a known hiatal hernia and acid reflux.  This is well controlled currently on Protonix once a day, famotidine twice a day.  He rarely takes Tums.    He did undergo a sleep study but these results are not available for review.           Review of Systems:     A 13 point ROS was performed and was negative except as listed above in the HPI.           Past Medical and Surgical History:     Past Medical History:   Diagnosis Date    A-fib (H)     BPH (benign prostatic hyperplasia)     Complication of anesthesia     Diverticulitis     GERD (gastroesophageal reflux disease)     Heart disease     HLD (hyperlipidemia)     Hypertension      Past Surgical History:   Procedure Laterality Date    APPENDECTOMY      ARTHROSCOPY SHOULDER ROTATOR CUFF REPAIR Left     ARTHROSCOPY SHOULDER RT/LT Right 01/2018    CARDIAC SURGERY  November, 2022    One stent placed in my heart.    COLONOSCOPY  03/05/2008    COLONOSCOPY  10/09/2013    Procedure: COLONOSCOPY;;  Surgeon: Nicolas Lizama MD;  Location: [facility]    COLONOSCOPY N/A 08/17/2022    Procedure: COLONOSCOPY, FLEXIBLE, WITH LESION REMOVAL USING SNARE;  Surgeon: Vimal Stanley DO;  Location: [facility]    CV CORONARY ANGIOGRAM N/A 09/07/2022    Procedure: Coronary Angiogram;  Surgeon: Morris Sky MD;  Location: [facility] CARDIAC CATH LAB    CV INSTANTANEOUS WAVE-FREE RATIO N/A 09/07/2022    Procedure: Instantaneous Wave-Free Ratio;  Surgeon: Morris Sky MD;  Location: [facility] CARDIAC CATH LAB    CV LEFT HEART CATH N/A 09/07/2022    Procedure: Left Heart Catheterization;  Surgeon: Morris Sky MD;  Location: [facility] CARDIAC CATH LAB    CV LEFT VENTRICULOGRAM N/A 09/07/2022    Procedure: Left Ventriculogram;  Surgeon: Morris Sky MD;  Location: [facility] CARDIAC CATH LAB    CV PCI STENT DRUG ELUTING N/A 09/07/2022     Procedure: Percutaneous Coronary Intervention Stent;  Surgeon: Morris Sky MD;  Location: [facility] CARDIAC CATH LAB    ESOPHAGOSCOPY, GASTROSCOPY, DUODENOSCOPY (EGD), COMBINED  10/09/2013    Procedure: COMBINED ESOPHAGOSCOPY, GASTROSCOPY, DUODENOSCOPY (EGD), BIOPSY SINGLE OR MULTIPLE;;  Surgeon: Nicolas Lizama MD;  Location: [facility]    ESOPHAGOSCOPY, GASTROSCOPY, DUODENOSCOPY (EGD), COMBINED N/A 04/06/2022    Procedure: ESOPHAGOGASTRODUODENOSCOPY, WITH BIOPSY;  Surgeon: Vimal Stanley DO;  Location: [facility]    ESOPHAGOSCOPY, GASTROSCOPY, DUODENOSCOPY (EGD), COMBINED N/A 7/10/2023    Procedure: Esophagoscopy, gastroscopy, duodenoscopy, combined;  Surgeon: Vimal Stanley DO;  Location: [facility]    HERNIA REPAIR      LAPAROSCOPIC APPENDECTOMY N/A 01/07/2016    Procedure: LAPAROSCOPIC APPENDECTOMY;  Surgeon: Ab Guzman MD;  Location: [facility]    LAPAROSCOPIC HERNIORRHAPHY INGUINAL BILATERAL Bilateral 12/13/2016    Procedure: LAPAROSCOPIC HERNIORRHAPHY INGUINAL BILATERAL;  Surgeon: Ab Guzman MD;  Location: [facility]           Family History:     Family History   Problem Relation Age of Onset    Diabetes Mother     Cerebrovascular Disease Mother     Neurologic Disorder Father         parkinson's    Cancer - colorectal Maternal Grandfather     Prostate Cancer Paternal Grandmother     Hypertension Brother     Cerebrovascular Disease Brother         stroke    Alcohol/Drug Brother     Unknown/Adopted Brother     Diabetes Sister     Obesity Sister             Social History:     Social History     Socioeconomic History    Marital status:      Spouse name: Not on file    Number of children: Not on file    Years of education: Not on file    Highest education level: Not on file   Occupational History    Not on file   Tobacco Use    Smoking status: Never    Smokeless tobacco: Never   Vaping Use    Vaping Use: Never used   Substance and Sexual Activity    Alcohol use: Yes     Comment: 6 pack  "beer on weekend.    Drug use: No    Sexual activity: Yes     Partners: Female     Birth control/protection: Post-menopausal   Other Topics Concern    Parent/sibling w/ CABG, MI or angioplasty before 65F 55M? No   Social History Narrative    Not on file     Social Determinants of Health     Financial Resource Strain: Not on file   Food Insecurity: Not on file   Transportation Needs: Not on file   Physical Activity: Not on file   Stress: Not on file   Social Connections: Not on file   Interpersonal Safety: Not on file   Housing Stability: Not on file            Medications:     Current Outpatient Medications   Medication    atorvastatin (LIPITOR) 40 MG tablet    calcium carbonate (TUMS) 500 MG chewable tablet    clopidogrel (PLAVIX) 75 MG tablet    cyclobenzaprine (FLEXERIL) 5 MG tablet    gabapentin (NEURONTIN) 100 MG capsule    Glucosamine-Chondroitin (GLUCOSAMINE CHONDR COMPLEX PO)    lisinopril (ZESTRIL) 10 MG tablet    metoprolol succinate ER (TOPROL XL) 25 MG 24 hr tablet    oxybutynin ER (DITROPAN XL) 10 MG 24 hr tablet    pantoprazole (PROTONIX) 40 MG EC tablet    tadalafil (CIALIS) 10 MG tablet    tamsulosin (FLOMAX) 0.4 MG capsule    triamterene-HCTZ (MAXZIDE) 75-50 MG tablet    vitamin C (ASCORBIC ACID) 1000 MG TABS    famotidine (PEPCID) 20 MG tablet    multivitamin (CENTRUM SILVER) tablet    nitroGLYcerin (NITROSTAT) 0.4 MG sublingual tablet    triamcinolone (KENALOG) 0.1 % external cream     No current facility-administered medications for this visit.            Physical Exam:   BP (!) 149/87 (BP Location: Right arm, Patient Position: Sitting, Cuff Size: Adult Regular)   Pulse 79   Temp 98.2  F (36.8  C) (Tympanic)   Ht 1.778 m (5' 10\")   Wt 93.9 kg (207 lb 0.2 oz)   SpO2 98%   BMI 29.70 kg/m      Constitutional:   Awake, alert and in no apparent distress     Eyes:   nonicteric     HEENT:   Supple without supraclavicular or cervical lymphadenopathy     Lungs:   Good air flow.  Reduced posterior " excursion of R-posterior expiratory muscles. No crackles. No rhonchi.  No wheezes.     Cardiovascular:   Normal S1 and S2.  RRR.  No murmur, gallop or rub.     Musculoskeletal:   No edema, no digital clubbing present     Neurologic:   Alert and conversant.     Skin:   Warm, dry.  No rash on limited exam.             Data:   All laboratory and imaging data reviewed personally.      Specimen Description   Date Value Ref Range Status   10/08/2018 Midstream Urine  Final   06/19/2018 Midstream Urine  Final   12/19/2017 Midstream Urine  Final    Culture Micro   Date Value Ref Range Status   10/08/2018 No growth  Final   06/19/2018 <10,000 colonies/mL  mixed urogenital thierno    Final   12/19/2017 No growth  Final        No results found for this or any previous visit (from the past 168 hour(s)).    PFT: Spirometry interpretation:    Moderate restrictive lung disease.  Mild diffusion defect.  Compared to 2021 there is been a significant reduction.  Testing meets ATS criteria.    Severity Z-score*   Normal > -1.645   Mild -1.65 to -2.5   Moderate -2.5 to -4   Severe < -4   * accounts for sex, age, height, ancestry     Use z-score to assess airflow obstruction, restriction and DLCO  - FEV1 z-score for airflow obstruction  - TLC z-score for restriction  - DLCO z-score for diffusion defect      CT [id number] of lungs reviewed which are notable for mild right-sided basilar atelectasis.  CT chest-4/20/2023-personally reviewed I agree with radiologist read of:   Narrative & Impression   CT CHEST W/O CONTRAST 4/24/2023 12:54 PM     CLINICAL HISTORY: (shortness of breath on exertion)     TECHNIQUE: CT chest without IV contrast. Multiplanar reformats were  obtained. Dose reduction techniques were used.  CONTRAST: None.     COMPARISON: 4/19/2023, 11/22/2018     FINDINGS:   LUNGS AND PLEURA: No evidence of pneumonitis or pleural effusions.  Right diaphragm. Bilateral lung base atelectasis. Patent central  airways.      MEDIASTINUM/AXILLAE: No lymphadenopathy. No thoracic aortic aneurysm.  Small left thyroid nodule. Severe coronary artery atherosclerosis.  Small hiatal hernia.     UPPER ABDOMEN: No significant finding.     MUSCULOSKELETAL: Unremarkable.                                                                      IMPRESSION:   1.  No evidence of pneumonitis or pleural effusions.  2.  Elevated right diaphragm with bilateral lung base atelectasis.  3.  Severe coronary artery atherosclerosis.

## 2023-10-06 ENCOUNTER — MEDICAL CORRESPONDENCE (OUTPATIENT)
Dept: HEALTH INFORMATION MANAGEMENT | Facility: CLINIC | Age: 68
End: 2023-10-06
Payer: MEDICARE

## 2023-10-18 ENCOUNTER — IMMUNIZATION (OUTPATIENT)
Dept: FAMILY MEDICINE | Facility: CLINIC | Age: 68
End: 2023-10-18
Payer: MEDICARE

## 2023-10-18 DIAGNOSIS — Z23 NEED FOR PROPHYLACTIC VACCINATION AND INOCULATION AGAINST INFLUENZA: Primary | ICD-10-CM

## 2023-10-18 PROCEDURE — G0008 ADMIN INFLUENZA VIRUS VAC: HCPCS

## 2023-10-18 PROCEDURE — 90662 IIV NO PRSV INCREASED AG IM: CPT

## 2023-10-18 PROCEDURE — 99207 PR NO CHARGE NURSE ONLY: CPT

## 2023-10-20 ENCOUNTER — TRANSCRIBE ORDERS (OUTPATIENT)
Dept: OTHER | Age: 68
End: 2023-10-20

## 2023-10-20 DIAGNOSIS — J98.6 DIAPHRAGM PARALYSIS: Primary | ICD-10-CM

## 2023-10-23 ENCOUNTER — TRANSCRIBE ORDERS (OUTPATIENT)
Dept: OTHER | Age: 68
End: 2023-10-23

## 2023-11-02 ENCOUNTER — HOSPITAL ENCOUNTER (OUTPATIENT)
Dept: CARDIAC REHAB | Facility: CLINIC | Age: 68
Discharge: HOME | End: 2023-11-02
Attending: PHYSICIAN ASSISTANT
Payer: MEDICARE

## 2023-11-02 PROCEDURE — G0238 OTH RESP PROC, INDIV: HCPCS | Performed by: REHABILITATION PRACTITIONER

## 2023-11-07 ENCOUNTER — HOSPITAL ENCOUNTER (OUTPATIENT)
Dept: CARDIAC REHAB | Facility: CLINIC | Age: 68
Discharge: HOME | End: 2023-11-07
Attending: THORACIC SURGERY (CARDIOTHORACIC VASCULAR SURGERY)
Payer: MEDICARE

## 2023-11-07 PROCEDURE — G0238 OTH RESP PROC, INDIV: HCPCS | Performed by: REHABILITATION PRACTITIONER

## 2023-11-08 ENCOUNTER — TRANSFERRED RECORDS (OUTPATIENT)
Dept: HEALTH INFORMATION MANAGEMENT | Facility: CLINIC | Age: 68
End: 2023-11-08
Payer: MEDICARE

## 2023-11-09 ENCOUNTER — HOSPITAL ENCOUNTER (OUTPATIENT)
Dept: CARDIAC REHAB | Facility: CLINIC | Age: 68
Discharge: HOME | End: 2023-11-09
Attending: THORACIC SURGERY (CARDIOTHORACIC VASCULAR SURGERY)
Payer: MEDICARE

## 2023-11-09 PROCEDURE — G0239 OTH RESP PROC, GROUP: HCPCS

## 2023-11-10 DIAGNOSIS — R13.10 DYSPHAGIA, UNSPECIFIED TYPE: ICD-10-CM

## 2023-11-10 DIAGNOSIS — K21.00 GASTROESOPHAGEAL REFLUX DISEASE WITH ESOPHAGITIS WITHOUT HEMORRHAGE: ICD-10-CM

## 2023-11-10 RX ORDER — PANTOPRAZOLE SODIUM 40 MG/1
40 TABLET, DELAYED RELEASE ORAL 2 TIMES DAILY
Qty: 180 TABLET | Refills: 0 | Status: SHIPPED | OUTPATIENT
Start: 2023-11-10 | End: 2024-03-08

## 2023-11-10 NOTE — TELEPHONE ENCOUNTER
Pantoprazole (Protonix) 40mg EC tablet.  Take 1 tablet (40mg) by mouth 2 times daily on an empty stomach about 30 minutes before a meal.    Last Written Prescription Date:  8/21/23  Last Fill Quantity: 180,  # refills: 0   Last office visit: Visit date not found ; last virtual visit: 8/21/2023 with prescribing provider:     Future Office Visit: 11/29/23    Refill request approved per [facility] protocol.  Sophia Swenson RN

## 2023-11-14 ENCOUNTER — HOSPITAL ENCOUNTER (OUTPATIENT)
Dept: CARDIAC REHAB | Facility: CLINIC | Age: 68
Discharge: HOME | End: 2023-11-14
Attending: THORACIC SURGERY (CARDIOTHORACIC VASCULAR SURGERY)
Payer: MEDICARE

## 2023-11-14 PROCEDURE — G0239 OTH RESP PROC, GROUP: HCPCS

## 2023-11-16 ENCOUNTER — HOSPITAL ENCOUNTER (OUTPATIENT)
Dept: CARDIAC REHAB | Facility: CLINIC | Age: 68
Discharge: HOME | End: 2023-11-16
Attending: THORACIC SURGERY (CARDIOTHORACIC VASCULAR SURGERY)
Payer: MEDICARE

## 2023-11-16 PROCEDURE — G0239 OTH RESP PROC, GROUP: HCPCS

## 2023-11-28 ENCOUNTER — HOSPITAL ENCOUNTER (OUTPATIENT)
Dept: CARDIAC REHAB | Facility: CLINIC | Age: 68
Discharge: HOME | End: 2023-11-28
Attending: THORACIC SURGERY (CARDIOTHORACIC VASCULAR SURGERY)
Payer: MEDICARE

## 2023-11-28 PROCEDURE — G0239 OTH RESP PROC, GROUP: HCPCS

## 2023-11-29 ENCOUNTER — VIRTUAL VISIT (OUTPATIENT)
Dept: GASTROENTEROLOGY | Facility: CLINIC | Age: 68
End: 2023-11-29
Attending: PHYSICIAN ASSISTANT
Payer: MEDICARE

## 2023-11-29 VITALS — WEIGHT: 207 LBS | BODY MASS INDEX: 29.7 KG/M2

## 2023-11-29 DIAGNOSIS — K44.9 HIATAL HERNIA: ICD-10-CM

## 2023-11-29 DIAGNOSIS — K21.00 GASTROESOPHAGEAL REFLUX DISEASE WITH ESOPHAGITIS WITHOUT HEMORRHAGE: Primary | ICD-10-CM

## 2023-11-29 PROCEDURE — 99213 OFFICE O/P EST LOW 20 MIN: CPT | Mod: 95 | Performed by: PHYSICIAN ASSISTANT

## 2023-11-29 ASSESSMENT — PAIN SCALES - GENERAL: PAINLEVEL: NO PAIN (0)

## 2023-11-29 NOTE — NURSING NOTE
Is the patient currently in the state of [place]? YES    Visit mode:VIDEO    If the visit is dropped, the patient can be reconnected by: VIDEO VISIT: Text to cell phone:   Telephone Information:   Mobile [phone]       Will anyone else be joining the visit? NO  (If patient encounters technical issues they should call [phone]    How would you like to obtain your AVS? MyChart    Are changes needed to the allergy or medication list? No    Reason for visit: No chief complaint on file.    Osito ADAMS

## 2023-11-29 NOTE — PROGRESS NOTES
GI FOLLOW UP VISIT - Virtual     CC/REFERRING MD:    Raad Benitez DO     REASON FOR VISIT: FOLLOW UP     HISTORY OF PRESENT ILLNESS:    Jamal Sanchez is 68 year old male with pmhx of coronary artery disease status post recent PCI with stent on Plavix, hyperlipidemia, GERD and hiatal hernia who presents for follow-up.    His EGD in July 2023 noted LA grade B esophagitis, 3 cm hiatal hernia, and gastric polyps.  Biopsies with mild inflammation but negative for H. pylori and negative for Freire's esophagus.  Gastric polyps were benign. He was still experiencing reflux despite protonix 40mg once daily. At last visit we increased his protonix to twice daily. He reports significant improvement in his symptoms since last visit. No reflux. No heartburn. No n/v. No dysphagia. He is pleased with his progress.       ALLERGIES:Nka [no known allergies]    See patient med list for current medications.     PHYSICAL EXAMINATION:  Limited physical exam due to virtual visit. Patient was also not able to get his camera to work. He did speak in clear sentences. No audible signs of distress.         ASSESSMENT/PLAN:    GERD  Hiatal hernia     Jamal Sanchez is a 68 year old male with pmhx of coronary artery disease status post recent PCI with stent on Plavix, hyperlipidemia, GERD and hiatal hernia who presents for follow-up. He is symptomatically improved with protonix 40mg BID. We will have him continue this for now and will attempt to decrease down to once daily at follow up.     Follow up in 3-4 months, sooner if needed.     Thank you for this consultation.  It was a pleasure to participate in the care of this patient; please contact us with any further questions.        This note was created with voice recognition software, and while reviewed for accuracy, typos may remain.         Narendra Kramer PA-C  Division of Gastroenterology, Hepatology and Nutrition   [facility]  [place]            Video-Visit Details    Video Start Time: 1:47 PM    Type of service:  Video Visit    Video End Time:1:55 PM    Originating Location (pt. Location): Home    Distant Location (provider location):  Off-site    Platform used for Video Visit: Thelma

## 2023-11-29 NOTE — PATIENT INSTRUCTIONS
It was a pleasure taking care of you today.  I've included a brief summary of our discussion and care plan from today's visit below.  Please review this information with your primary care provider.  _______________________________________________________________________     My recommendations are summarized as follows:     - continue protonix 40mg twice daily  - follow up in 3-4 months, sooner if needed   ______________________________________________________________________     How do I schedule labs, imaging studies, or procedures that were ordered in clinic today?      Labs: To schedule lab appointment you can contact your local [facility] or call [phone] to schedule at any convenient [facility] location.     Procedures: If a colonoscopy, upper endoscopy, breath test, esophageal manometry, or pH impedence was ordered today, our endoscopy team will call you to schedule this. If you have not heard from our endoscopy team within a week, please call [phone] to schedule.      Imaging Studies: If you were scheduled for a CT scan, X-ray, MRI, ultrasound, HIDA scan or other imaging study, please call [phone] to have this scheduled.      Referral: If a referral to another specialty was ordered, expect a phone call or follow instructions above. If you have not heard from anyone regarding your referral in a week, please call our clinic to check the status.      Who do I call with any questions after my visit?  Please be in touch if there are any further questions that arise following today's visit.  There are multiple ways to contact your gastroenterology care team.       During business hours, you may reach a Gastroenterology nurse at [phone]     To schedule or reschedule an appointment, please call [phone].      You can always send a secure message through Getfugu.  Getfugu messages are answered by your nurse or doctor typically within 24 hours.  Please allow extra time on  weekends and holidays.       For urgent/emergent questions after business hours, you may reach the on-call GI Fellow by contacting the [facility]  at [phone].     How will I get the results of any tests ordered?    You will receive all of your results.  If you have signed up for RealRiderhart, any tests ordered at your visit will be available to you after your physician reviews them.  Typically this takes 1-2 weeks.  If there are urgent results that require a change in your care plan, your physician or nurse will call you to discuss the next steps.       What is Verold?  Verold is a secure way for you to access all of your healthcare records from the [facility].  It is a web based computer program, so you can sign on to it from any location.  It also allows you to send secure messages to your care team.  I recommend signing up for Verold access if you have not already done so and are comfortable with using a computer.       How to I schedule a follow-up visit?  If you did not schedule a follow-up visit today, please call [phone] to schedule a follow-up office visit.      Narendra Kramer PA-C  Division of Gastroenterology, Hepatology and Nutrition   [facility]

## 2023-11-30 ENCOUNTER — HOSPITAL ENCOUNTER (OUTPATIENT)
Dept: CARDIAC REHAB | Facility: CLINIC | Age: 68
Discharge: HOME | End: 2023-11-30
Attending: THORACIC SURGERY (CARDIOTHORACIC VASCULAR SURGERY)
Payer: MEDICARE

## 2023-11-30 PROCEDURE — G0239 OTH RESP PROC, GROUP: HCPCS

## 2023-12-05 ENCOUNTER — HOSPITAL ENCOUNTER (OUTPATIENT)
Dept: CARDIAC REHAB | Facility: CLINIC | Age: 68
Discharge: HOME | End: 2023-12-05
Attending: THORACIC SURGERY (CARDIOTHORACIC VASCULAR SURGERY)
Payer: MEDICARE

## 2023-12-05 PROCEDURE — G0239 OTH RESP PROC, GROUP: HCPCS

## 2023-12-06 DIAGNOSIS — I25.118 CORONARY ARTERY DISEASE OF NATIVE ARTERY OF NATIVE HEART WITH STABLE ANGINA PECTORIS (H): ICD-10-CM

## 2023-12-06 RX ORDER — CLOPIDOGREL BISULFATE 75 MG/1
75 TABLET ORAL DAILY
Qty: 90 TABLET | Refills: 3 | Status: SHIPPED | OUTPATIENT
Start: 2023-12-06

## 2023-12-06 RX ORDER — LISINOPRIL 10 MG/1
10 TABLET ORAL DAILY
Qty: 90 TABLET | Refills: 3 | Status: SHIPPED | OUTPATIENT
Start: 2023-12-06

## 2023-12-06 NOTE — TELEPHONE ENCOUNTER
Last Office Visit: 09/26/23 Dr. Cruz  Next Office Visit: TBD  Last Fill Date: 09/16/23  Mariya Tripathi MA Cardiology   12/6/2023 8:17 AM

## 2023-12-06 NOTE — TELEPHONE ENCOUNTER
[facility] Cardiology Refill Guideline reviewed.  Medication meets criteria for refill. Shauna De La Rosa RN Cardiology December 6, 2023, 8:21 AM

## 2023-12-10 ENCOUNTER — MYC MEDICAL ADVICE (OUTPATIENT)
Dept: PULMONOLOGY | Facility: CLINIC | Age: 68
End: 2023-12-10
Payer: MEDICARE

## 2023-12-10 DIAGNOSIS — J98.4 RESTRICTIVE LUNG DISEASE: Primary | ICD-10-CM

## 2023-12-10 DIAGNOSIS — K21.9 GASTROESOPHAGEAL REFLUX DISEASE, UNSPECIFIED WHETHER ESOPHAGITIS PRESENT: ICD-10-CM

## 2023-12-10 DIAGNOSIS — J98.6 DIAPHRAGMATIC PARALYSIS: ICD-10-CM

## 2023-12-11 RX ORDER — FAMOTIDINE 20 MG/1
20 TABLET, FILM COATED ORAL 2 TIMES DAILY
Qty: 180 TABLET | Refills: 0 | Status: SHIPPED | OUTPATIENT
Start: 2023-12-11 | End: 2024-03-14

## 2023-12-11 NOTE — TELEPHONE ENCOUNTER
Per Dr. Good @ [place] on 10/6/2023:  I had an extensive discussion with the patient and his significant other about anatomic and functional abnormality in patients with hemidiaphragm paralysis. I suspect that his diaphragm has been paralyzed for several years. The etiology is not obvious. He is very symptomatic. I think he would be a reasonable candidate for a right hemidiaphragm plication. I would want him to complete a pulmonary rehab program to get him in better overall physical condition and would also plan on having him complete pulmonary rehab post plication.     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   Please advise if this should be directed to thoracic or if you have recommendations on tests/procedures patient is referring to.     Thank you!  Rio RANDOLPH RN

## 2023-12-12 ENCOUNTER — HOSPITAL ENCOUNTER (OUTPATIENT)
Dept: CARDIAC REHAB | Facility: CLINIC | Age: 68
Discharge: HOME | End: 2023-12-12
Attending: THORACIC SURGERY (CARDIOTHORACIC VASCULAR SURGERY)
Payer: MEDICARE

## 2023-12-12 DIAGNOSIS — N32.0 BLADDER OUTLET OBSTRUCTION: ICD-10-CM

## 2023-12-12 PROCEDURE — G0239 OTH RESP PROC, GROUP: HCPCS

## 2023-12-12 RX ORDER — TAMSULOSIN HYDROCHLORIDE 0.4 MG/1
0.4 CAPSULE ORAL DAILY
Qty: 90 CAPSULE | Refills: 0 | Status: SHIPPED | OUTPATIENT
Start: 2023-12-12 | End: 2024-03-13

## 2023-12-12 NOTE — TELEPHONE ENCOUNTER
Last OV 5/8/23 with Marilyn Grayson. Per note:    Plan:  1. Continue tamsulosin 0.4 mg and oxybutynin XR 10 mg daily.   2. Start tadalafil 10 mg prn, take 1-2 tablets 30-45 minutes prior to sexual activity. Advised not to combine with nitroglycerin. If not effective, could try injection with lower alprostadil component.     Refilled one 90 day supply. Patient due for follow up 5/2024.    Marti Biswas RN on 12/12/2023 at 12:55 PM

## 2023-12-12 NOTE — TELEPHONE ENCOUNTER
Requested Prescriptions   Pending Prescriptions Disp Refills    tamsulosin (FLOMAX) 0.4 MG capsule 90 capsule 3     Sig: Take 1 capsule (0.4 mg) by mouth daily       There is no refill protocol information for this order          Last office visit: 5/8/2023 ; last virtual visit: Visit date not found with prescribing provider:  Matteo Caba   Future Office Visit:   Next 5 appointments (look out 90 days)      Dec 14, 2023 11:45 AM  (Arrive by 11:30 AM)  Return Visit with RADHA Panchal [facility] ) [address] [address] [phone]           Thank you,  Ryanne ROSALES [facility]

## 2023-12-14 ENCOUNTER — OFFICE VISIT (OUTPATIENT)
Dept: DERMATOLOGY | Facility: CLINIC | Age: 68
End: 2023-12-14
Payer: MEDICARE

## 2023-12-14 ENCOUNTER — HOSPITAL ENCOUNTER (OUTPATIENT)
Dept: CARDIAC REHAB | Facility: CLINIC | Age: 68
Discharge: HOME | End: 2023-12-14
Attending: THORACIC SURGERY (CARDIOTHORACIC VASCULAR SURGERY)
Payer: MEDICARE

## 2023-12-14 DIAGNOSIS — L82.1 SEBORRHEIC KERATOSIS: ICD-10-CM

## 2023-12-14 DIAGNOSIS — L85.9 HYPERKERATOSIS: Primary | ICD-10-CM

## 2023-12-14 DIAGNOSIS — L84 CALLUS: ICD-10-CM

## 2023-12-14 DIAGNOSIS — D22.9 NEVUS: ICD-10-CM

## 2023-12-14 DIAGNOSIS — D18.01 ANGIOMA OF SKIN: ICD-10-CM

## 2023-12-14 DIAGNOSIS — L81.4 LENTIGO: ICD-10-CM

## 2023-12-14 PROCEDURE — G0239 OTH RESP PROC, GROUP: HCPCS

## 2023-12-14 PROCEDURE — 99214 OFFICE O/P EST MOD 30 MIN: CPT | Performed by: PHYSICIAN ASSISTANT

## 2023-12-14 RX ORDER — UREA 40 %
CREAM (GRAM) TOPICAL
Qty: 198 G | Refills: 11 | Status: SHIPPED | OUTPATIENT
Start: 2023-12-14

## 2023-12-14 ASSESSMENT — PAIN SCALES - GENERAL: PAINLEVEL: NO PAIN (0)

## 2023-12-14 NOTE — LETTER
12/14/2023         RE: Jamal Sanchez  [address] [place] [address]        Dear Colleague,    Thank you for referring your patient, Jamal Sanchez, to the [facility]. Please see a copy of my visit note below.    HPI:   Chief complaints: Jamal Sanchez is a pleasant 68 year old male who presents for Full skin cancer screening to rule out skin cancer   Last Skin Exam: 2021 1st Baseline: no  Personal HX of Skin Cancer: no   Personal HX of Malignant Melanoma: no   Family HX of Skin Cancer / Malignant Melanoma: no  Personal HX of Atypical Moles:   no  Risk factors: history of sun exposure and burns; current tanning bed use  New / Changing lesions: yes hard skin between toes and a sore spot on the bottom of the foot  Social History: Going to [facility] for [employer]   On review of systems, there are no further skin complaints, patient is feeling otherwise well.   ROS of the following were done and are negative: Constitutional, Eyes, Ears, Nose,   Mouth, Throat, Cardiovascular, Respiratory, GI, Genitourinary, Musculoskeletal,   Psychiatric, Endocrine, Allergic/Immunologic.    PHYSICAL EXAM:   There were no vitals taken for this visit.  Skin exam performed as follows: Type 2 skin. Mood appropriate  Alert and Oriented X 3. Well developed, well nourished in no distress.  General appearance: Normal  Head including face: Normal  Eyes: conjunctiva and lids: Normal  Mouth: Lips, teeth, gums: Normal  Neck: Normal  Chest-breast/axillae: Normal  Back: Normal  Extremities: digits/nails (clubbing): Normal  Eccrine and Apocrine glands: Normal  Right upper extremity: Normal  Left upper extremity: Normal  Right lower extremity: Normal  Left lower extremity: Normal  Skin: Scalp and body hair: See below    Pt deferred exam of breasts, groin, buttocks: No    Other physical findings:  1. Multiple pigmented macules on extremities and trunk  2. Multiple pigmented macules on face, trunk and  extremities  3. Multiple vascular papules on trunk, arms and legs  4. Multiple scattered keratotic plaques  5. Callus on the right 5th web space       Except as noted above, no other signs of skin cancer or melanoma.     ASSESSMENT/PLAN:   Benign Full skin cancer screening today. . Patient with history of none  Advised on monthly self exams and 1 year  Patient Education: Appropriate brochures given.    Multiple benign appearing melanocytic nevi on arms, legs and trunk. Discussed ABCDEs of melanoma and sunscreen.   Multiple lentigos on arms, legs and trunk. Advised benign, no treatment needed.  Multiple scattered angiomas. Advised benign, no treatment needed.   Seborrheic keratosis on arms, legs and trunk. Advised benign, no treatment needed.  Callus of the right 5th web space; clavus on the plantar surface  --Start 40% urea cream QHS          Follow-up: yearly/PRN sooner    1.) Patient was asked about new and changing moles. YES  2.) Patient received a complete physical skin examination: YES  3.) Patient was counseled to perform a monthly self skin examination: YES  Scribed By: Stephanie Fair, MS, PA-C      Again, thank you for allowing me to participate in the care of your patient.        Sincerely,        Stephanie Fair PA-C

## 2023-12-14 NOTE — NURSING NOTE
Jamal Sanchez's chief complaint for this visit includes:  Chief Complaint   Patient presents with    Skin Check     Patient is here for skin check. Patient is concerned about right foot/pinky toe.      PCP: Raad Benitez    Referring Provider:  No referring provider defined for this encounter.    There were no vitals taken for this visit.  No Pain (0)        Allergies   Allergen Reactions    Nka [No Known Allergies]          Do you need any medication refills at today's visit? NO    Renetta Jay MA

## 2023-12-14 NOTE — PROGRESS NOTES
HPI:   Chief complaints: Jamal Sanchez is a pleasant 68 year old male who presents for Full skin cancer screening to rule out skin cancer   Last Skin Exam: 2021 1st Baseline: no  Personal HX of Skin Cancer: no   Personal HX of Malignant Melanoma: no   Family HX of Skin Cancer / Malignant Melanoma: no  Personal HX of Atypical Moles:   no  Risk factors: history of sun exposure and burns; current tanning bed use  New / Changing lesions: yes hard skin between toes and a sore spot on the bottom of the foot  Social History: Going to [employer] for [employer]   On review of systems, there are no further skin complaints, patient is feeling otherwise well.   ROS of the following were done and are negative: Constitutional, Eyes, Ears, Nose,   Mouth, Throat, Cardiovascular, Respiratory, GI, Genitourinary, Musculoskeletal,   Psychiatric, Endocrine, Allergic/Immunologic.    PHYSICAL EXAM:   There were no vitals taken for this visit.  Skin exam performed as follows: Type 2 skin. Mood appropriate  Alert and Oriented X 3. Well developed, well nourished in no distress.  General appearance: Normal  Head including face: Normal  Eyes: conjunctiva and lids: Normal  Mouth: Lips, teeth, gums: Normal  Neck: Normal  Chest-breast/axillae: Normal  Back: Normal  Extremities: digits/nails (clubbing): Normal  Eccrine and Apocrine glands: Normal  Right upper extremity: Normal  Left upper extremity: Normal  Right lower extremity: Normal  Left lower extremity: Normal  Skin: Scalp and body hair: See below    Pt deferred exam of breasts, groin, buttocks: No    Other physical findings:  1. Multiple pigmented macules on extremities and trunk  2. Multiple pigmented macules on face, trunk and extremities  3. Multiple vascular papules on trunk, arms and legs  4. Multiple scattered keratotic plaques  5. Callus on the right 5th web space       Except as noted above, no other signs of skin cancer or melanoma.     ASSESSMENT/PLAN:   Benign Full skin  cancer screening today. . Patient with history of none  Advised on monthly self exams and 1 year  Patient Education: Appropriate brochures given.    Multiple benign appearing melanocytic nevi on arms, legs and trunk. Discussed ABCDEs of melanoma and sunscreen.   Multiple lentigos on arms, legs and trunk. Advised benign, no treatment needed.  Multiple scattered angiomas. Advised benign, no treatment needed.   Seborrheic keratosis on arms, legs and trunk. Advised benign, no treatment needed.  Callus of the right 5th web space; clavus on the plantar surface  --Start 40% urea cream QHS          Follow-up: yearly/PRN sooner    1.) Patient was asked about new and changing moles. YES  2.) Patient received a complete physical skin examination: YES  3.) Patient was counseled to perform a monthly self skin examination: YES  Scribed By: Stephanie Fair MS, PA-C

## 2023-12-18 NOTE — TELEPHONE ENCOUNTER
"Hx of hemidiaphragmatic paralysis. Started pulm rehab.   Patient interested in EMG  Would EMG testing give patient the information he is requesting : to find the \"short\" in the nerve    Order pended    Rio RANDOLPH RN  [facility] Special[facility]               "

## 2023-12-19 ENCOUNTER — HOSPITAL ENCOUNTER (OUTPATIENT)
Dept: CARDIAC REHAB | Facility: CLINIC | Age: 68
Discharge: HOME | End: 2023-12-19
Attending: THORACIC SURGERY (CARDIOTHORACIC VASCULAR SURGERY)
Payer: MEDICARE

## 2023-12-19 PROCEDURE — G0239 OTH RESP PROC, GROUP: HCPCS

## 2023-12-26 ENCOUNTER — HOSPITAL ENCOUNTER (OUTPATIENT)
Dept: CARDIAC REHAB | Facility: CLINIC | Age: 68
Discharge: HOME | End: 2023-12-26
Attending: THORACIC SURGERY (CARDIOTHORACIC VASCULAR SURGERY)
Payer: MEDICARE

## 2023-12-26 PROCEDURE — G0239 OTH RESP PROC, GROUP: HCPCS

## 2023-12-28 ENCOUNTER — HOSPITAL ENCOUNTER (OUTPATIENT)
Dept: CARDIAC REHAB | Facility: CLINIC | Age: 68
Discharge: HOME | End: 2023-12-28
Attending: THORACIC SURGERY (CARDIOTHORACIC VASCULAR SURGERY)
Payer: MEDICARE

## 2023-12-28 PROCEDURE — G0239 OTH RESP PROC, GROUP: HCPCS

## 2024-01-02 ENCOUNTER — HOSPITAL ENCOUNTER (OUTPATIENT)
Dept: CARDIAC REHAB | Facility: CLINIC | Age: 69
Discharge: HOME | End: 2024-01-02
Attending: THORACIC SURGERY (CARDIOTHORACIC VASCULAR SURGERY)
Payer: MEDICARE

## 2024-01-02 PROCEDURE — G0239 OTH RESP PROC, GROUP: HCPCS

## 2024-01-02 NOTE — TELEPHONE ENCOUNTER
"I don't know that it will tell us \"where\" in the nerve the short is but if he's okay with that, then yes, please order the EMG.    Thanks!    Shauna Shaikh MD  Pulmonary, Allergy, Critical Care, and Sleep Medicine  [facility]  Pager: [phone]   "

## 2024-01-04 ENCOUNTER — HOSPITAL ENCOUNTER (OUTPATIENT)
Dept: CARDIAC REHAB | Facility: CLINIC | Age: 69
Discharge: HOME | End: 2024-01-04
Attending: THORACIC SURGERY (CARDIOTHORACIC VASCULAR SURGERY)
Payer: MEDICARE

## 2024-01-04 PROCEDURE — G0239 OTH RESP PROC, GROUP: HCPCS

## 2024-01-09 ENCOUNTER — HOSPITAL ENCOUNTER (OUTPATIENT)
Dept: CARDIAC REHAB | Facility: CLINIC | Age: 69
Discharge: HOME | End: 2024-01-09
Attending: FAMILY MEDICINE
Payer: MEDICARE

## 2024-01-09 PROCEDURE — G0239 OTH RESP PROC, GROUP: HCPCS

## 2024-01-10 DIAGNOSIS — N52.9 ERECTILE DYSFUNCTION, UNSPECIFIED ERECTILE DYSFUNCTION TYPE: ICD-10-CM

## 2024-01-10 RX ORDER — TADALAFIL 10 MG/1
TABLET ORAL
Qty: 30 TABLET | Refills: 4 | Status: SHIPPED | OUTPATIENT
Start: 2024-01-10 | End: 2024-05-03

## 2024-01-10 NOTE — TELEPHONE ENCOUNTER
Last OV 5/8/23 with Marilyn Grayson. Per note:     Plan:  1. Continue tamsulosin 0.4 mg and oxybutynin XR 10 mg daily.   2. Start tadalafil 10 mg prn, take 1-2 tablets 30-45 minutes prior to sexual activity. Advised not to combine with nitroglycerin. If not effective, could try injection with lower alprostadil component.      Refilled  Rio RANDOLPH RN  [facility] Specialty [facility]

## 2024-01-10 NOTE — TELEPHONE ENCOUNTER
Requested Prescriptions   Pending Prescriptions Disp Refills    tadalafil (CIALIS) 10 MG tablet 30 tablet 4     Sig: Take 1-2 tablets daily as needed, 30-45 minutes prior to intended sexual activity       There is no refill protocol information for this order          Last office visit: 5/8/2023 ; last virtual visit: Visit date not found with prescribing provider:  Marilyn Grayson     Future Office Visit:

## 2024-01-11 ENCOUNTER — HOSPITAL ENCOUNTER (OUTPATIENT)
Dept: CARDIAC REHAB | Facility: CLINIC | Age: 69
Discharge: HOME | End: 2024-01-11
Attending: FAMILY MEDICINE
Payer: MEDICARE

## 2024-01-11 PROCEDURE — G0239 OTH RESP PROC, GROUP: HCPCS

## 2024-01-16 ENCOUNTER — HOSPITAL ENCOUNTER (OUTPATIENT)
Dept: CARDIAC REHAB | Facility: CLINIC | Age: 69
Discharge: HOME | End: 2024-01-16
Attending: FAMILY MEDICINE
Payer: MEDICARE

## 2024-01-16 PROCEDURE — G0239 OTH RESP PROC, GROUP: HCPCS

## 2024-01-18 ENCOUNTER — HOSPITAL ENCOUNTER (OUTPATIENT)
Dept: CARDIAC REHAB | Facility: CLINIC | Age: 69
Discharge: HOME | End: 2024-01-18
Attending: FAMILY MEDICINE
Payer: MEDICARE

## 2024-01-18 PROCEDURE — G0239 OTH RESP PROC, GROUP: HCPCS

## 2024-01-23 ENCOUNTER — HOSPITAL ENCOUNTER (OUTPATIENT)
Dept: CARDIAC REHAB | Facility: CLINIC | Age: 69
Discharge: HOME | End: 2024-01-23
Attending: FAMILY MEDICINE
Payer: MEDICARE

## 2024-01-23 PROCEDURE — G0239 OTH RESP PROC, GROUP: HCPCS

## 2024-01-25 ENCOUNTER — HOSPITAL ENCOUNTER (OUTPATIENT)
Dept: CARDIAC REHAB | Facility: CLINIC | Age: 69
Discharge: HOME | End: 2024-01-25
Attending: FAMILY MEDICINE
Payer: MEDICARE

## 2024-01-25 ENCOUNTER — OFFICE VISIT (OUTPATIENT)
Dept: SPIRITUAL SERVICES | Facility: CLINIC | Age: 69
End: 2024-01-25
Payer: MEDICARE

## 2024-01-25 PROCEDURE — G0239 OTH RESP PROC, GROUP: HCPCS

## 2024-01-25 NOTE — PROGRESS NOTES
Stress Management Group Note    UNIT - [facility]    Name:    Jamal Sanchez                                                                     YOB: 1955    MRN:     [id number]                                                                       Age: 69 year old      Patient attended -led group, which included discussion of living with serious illness, the role of spirituality, coping with stress relating to illness, building resilience and group participation around common challenges.    Patient attended group for 0.5 hrs.    The patient actively participated in group discussion and since he was the only one in the group he was able to share many insights around his self care.  He has plans following [place] to return to [place] and have a procedure to tighten his diaphragm.    Asim Bustos M.A., [facility]  Staff    Spiritual Health Services  [facility]  [phone] (Office)  [phone] (Pager)  [email or web address]

## 2024-01-26 DIAGNOSIS — M51.26 LUMBAR DISC HERNIATION: ICD-10-CM

## 2024-01-26 DIAGNOSIS — M54.16 LUMBAR RADICULAR PAIN: ICD-10-CM

## 2024-01-26 RX ORDER — GABAPENTIN 100 MG/1
100 CAPSULE ORAL 3 TIMES DAILY
Qty: 90 CAPSULE | Refills: 1 | Status: SHIPPED | OUTPATIENT
Start: 2024-01-26

## 2024-01-30 ENCOUNTER — MEDICAL CORRESPONDENCE (OUTPATIENT)
Dept: HEALTH INFORMATION MANAGEMENT | Facility: CLINIC | Age: 69
End: 2024-01-30
Payer: MEDICARE

## 2024-01-30 ENCOUNTER — HOSPITAL ENCOUNTER (OUTPATIENT)
Dept: CARDIAC REHAB | Facility: CLINIC | Age: 69
Discharge: HOME | End: 2024-01-30
Attending: FAMILY MEDICINE
Payer: MEDICARE

## 2024-01-30 PROCEDURE — G0239 OTH RESP PROC, GROUP: HCPCS

## 2024-01-31 ENCOUNTER — TRANSCRIBE ORDERS (OUTPATIENT)
Dept: OTHER | Age: 69
End: 2024-01-31

## 2024-01-31 ENCOUNTER — TRANSFERRED RECORDS (OUTPATIENT)
Dept: HEALTH INFORMATION MANAGEMENT | Facility: CLINIC | Age: 69
End: 2024-01-31
Payer: MEDICARE

## 2024-01-31 ENCOUNTER — MEDICAL CORRESPONDENCE (OUTPATIENT)
Dept: HEALTH INFORMATION MANAGEMENT | Facility: CLINIC | Age: 69
End: 2024-01-31
Payer: MEDICARE

## 2024-01-31 DIAGNOSIS — J98.6 PARALYSIS, DIAPHRAGM: Primary | ICD-10-CM

## 2024-02-01 ENCOUNTER — HOSPITAL ENCOUNTER (OUTPATIENT)
Dept: CARDIAC REHAB | Facility: CLINIC | Age: 69
Discharge: HOME | End: 2024-02-01
Attending: FAMILY MEDICINE
Payer: MEDICARE

## 2024-02-01 PROCEDURE — G0239 OTH RESP PROC, GROUP: HCPCS

## 2024-02-05 ENCOUNTER — HOSPITAL ENCOUNTER (OUTPATIENT)
Dept: RESPIRATORY THERAPY | Facility: CLINIC | Age: 69
Discharge: HOME | End: 2024-02-05
Attending: THORACIC SURGERY (CARDIOTHORACIC VASCULAR SURGERY) | Admitting: THORACIC SURGERY (CARDIOTHORACIC VASCULAR SURGERY)
Payer: MEDICARE

## 2024-02-05 ENCOUNTER — OFFICE VISIT (OUTPATIENT)
Dept: PHARMACY | Facility: CLINIC | Age: 69
End: 2024-02-05
Payer: COMMERCIAL

## 2024-02-05 VITALS — HEART RATE: 54 BPM | SYSTOLIC BLOOD PRESSURE: 110 MMHG | DIASTOLIC BLOOD PRESSURE: 73 MMHG

## 2024-02-05 DIAGNOSIS — I25.118 CORONARY ARTERY DISEASE OF NATIVE HEART WITH STABLE ANGINA PECTORIS, UNSPECIFIED VESSEL OR LESION TYPE (H): ICD-10-CM

## 2024-02-05 DIAGNOSIS — J98.6 PARALYSIS, DIAPHRAGM: ICD-10-CM

## 2024-02-05 DIAGNOSIS — Z78.9 TAKES DIETARY SUPPLEMENTS: ICD-10-CM

## 2024-02-05 DIAGNOSIS — R21 RASH: ICD-10-CM

## 2024-02-05 DIAGNOSIS — M54.16 LUMBAR RADICULAR PAIN: ICD-10-CM

## 2024-02-05 DIAGNOSIS — N52.9 ERECTILE DYSFUNCTION, UNSPECIFIED ERECTILE DYSFUNCTION TYPE: ICD-10-CM

## 2024-02-05 DIAGNOSIS — N40.1 BENIGN PROSTATIC HYPERPLASIA WITH NOCTURIA: ICD-10-CM

## 2024-02-05 DIAGNOSIS — E78.5 DYSLIPIDEMIA: ICD-10-CM

## 2024-02-05 DIAGNOSIS — I10 HYPERTENSION, GOAL BELOW 140/90: Primary | ICD-10-CM

## 2024-02-05 DIAGNOSIS — K21.9 GASTROESOPHAGEAL REFLUX DISEASE, UNSPECIFIED WHETHER ESOPHAGITIS PRESENT: ICD-10-CM

## 2024-02-05 DIAGNOSIS — R35.1 BENIGN PROSTATIC HYPERPLASIA WITH NOCTURIA: ICD-10-CM

## 2024-02-05 PROCEDURE — 99605 MTMS BY PHARM NP 15 MIN: CPT | Performed by: PHARMACIST

## 2024-02-05 PROCEDURE — 99607 MTMS BY PHARM ADDL 15 MIN: CPT | Performed by: PHARMACIST

## 2024-02-05 PROCEDURE — 94060 EVALUATION OF WHEEZING: CPT

## 2024-02-05 PROCEDURE — 94729 DIFFUSING CAPACITY: CPT

## 2024-02-05 PROCEDURE — 94726 PLETHYSMOGRAPHY LUNG VOLUMES: CPT

## 2024-02-05 RX ORDER — INHALER, ASSIST DEVICES
1 SPACER (EA) MISCELLANEOUS ONCE
Status: DISCONTINUED | OUTPATIENT
Start: 2024-02-05 | End: 2024-02-06 | Stop reason: HOSPADM

## 2024-02-05 NOTE — PATIENT INSTRUCTIONS
"Recommendations from today's MTM visit:                                                    MTM (medication therapy management) is a service provided by a clinical pharmacist designed to help you get the most of out of your medicines.   Today we reviewed what your medicines are for, how to know if they are working, that your medicines are safe and how to make your medicine regimen as easy as possible.      Try off gabapentin and monitor for back/nerve pain.  Only take cyclobenzaprine as needed for muscle spasms.  Can call pharmacy to refill nitroglycerin bottle.    Follow-up: Return in about 1 year (around 2/5/2025) for Medication Therapy Management.    It was great speaking with you today.  I value your experience and would be very thankful for your time in providing feedback in our clinic survey. In the next few days, you may receive an email or text message from [employer] with a link to a survey related to your  clinical pharmacist.\"     To schedule another MTM appointment, please call the clinic directly or you may call the MTM scheduling line at [phone] or toll-free at [phone].     My Clinical Pharmacist's contact information:                                                      Please feel free to contact me with any questions or concerns you have.      Francesca Canales, PharmD, BCACP  Medication Therapy Management Pharmacist  Voicemail: [phone] (Mon/Wed only)     "

## 2024-02-05 NOTE — PROGRESS NOTES
Medication Therapy Management (MTM) Encounter    ASSESSMENT:                            Medication Adherence/Access: No issues identified.    Hypertension/CAD: Stable. Patient is meeting blood pressure goal of < 140/90mmHg (per problem list). Patient needs education on Nitrostat stability/expiration.     Hyperlipidemia: Stable. High-intensity statin is appropriate.    GERD: Stable. PPI plus H2 blocker is effective. Follow-up plan in place with GI.    BPH: Stable per patient.    ED: Stable per patient.    Back Pain: Patient may benefit from trialing off low-dose gabapentin and will monitor closely. Patient needs education on muscle relaxer as needed instead of scheduled.     Rash: Stable.    Supplements: Stable.    PLAN:                            Try off gabapentin and monitor for back/nerve pain.  Only take cyclobenzaprine as needed for muscle spasms.  Can call pharmacy to refill nitroglycerin bottle.    Follow-up: Return in about 1 year (around 2/5/2025) for Medication Therapy Management.    SUBJECTIVE/OBJECTIVE:                          Gopal Sanchez is a 69 year old male coming in for a follow-up visit from 5/22/23.       Reason for visit: General medication review. Patient brings in all his medication bottles today.    Allergies/ADRs: Reviewed in chart  Past Medical History: Reviewed in chart  Tobacco: He reports that he has never smoked. He has never used smokeless tobacco.  Alcohol: 6 pack on the weekends    Medication Adherence/Access:   Patient uses pill box(es), sets up himself.  Patient takes medications 2 times per day.   Per patient, misses medication 0 times per week.   The patient fills medications at [facility]: NO, fills medications at [facility].    Hypertension/CAD:   Clopidogrel 75mg daily.  Lisinopril 10mg daily.  Triamterene/hydrochlorothiazide 75-50mg daily.  Metoprolol ER 25mg daily.  Carries Nitrostat in his pocket - last refilled 3/2023.  Patient has a wrist BP machine, but doesn't check  "regularly.   Going to pulmonary rehab and feels it's helping.  Patient reports no current medication side effects.  Per last cards note from 9/26/23 - \"Recommend stopping aspirin and continuing clopidogrel monotherapy for the rest of his life. If this gets interrupted for any procedures, he should be on aspirin in the interim.\"    BP Readings from Last 3 Encounters:   02/05/24 110/73   10/05/23 (!) 149/87   09/26/23 127/83     Pulse Readings from Last 3 Encounters:   02/05/24 54   10/05/23 79   09/26/23 58     Last Comprehensive Metabolic Panel:  Lab Results   Component Value Date     04/13/2023    POTASSIUM 4.0 04/13/2023    CHLORIDE 99 04/13/2023    CO2 28 04/13/2023    ANIONGAP 10 04/13/2023     (H) 04/13/2023    BUN 16.9 04/13/2023    CR 1.01 04/13/2023    GFRESTIMATED 81 04/13/2023    ALYCE 9.4 04/13/2023     Hyperlipidemia:   Atorvastatin 40mg daily  Patient reports no current medication side effects.    Recent Labs   Lab Test 04/13/23  0950 11/14/22  0805   CHOL 109 133   HDL 44 51   LDL 53 63   TRIG 58 95     GERD:   Pantoprazole 40mg twice daily.  Famotidine 20mg twice daily.  Tums as needed - only after breweries.   Patient reports no current symptoms.   Patient feels that current regimen is effective.  Follows with KATY Kramer PA-C.    BPH/ED:   Tamsulosin 0.4mg daily.  No longer taking oxybutynin and hasn't noticed a difference.  Patient feels current therapy is effective - wakes up to urinate once nightly.  Patient reports no current medication side effects.    ED:  Tadalafil 20mg as needed.  Patient feels current therapy is not really helpful.   Previously tried sildenafil which also didn't work and higher doses caused headache.  Patient states he's 69 years old and his girlfriend is 50.   Patient doesn't feel any changes are necessary at this time.    Back Pain:   Gabapentin 100mg at bedtime (prescribed as 3 times daily).  Cyclobenzaprine 10mg at bedtime (scheduled not as " needed).  Stopped taking joint supplement and hasn't noticed a difference.  Patient hasn't had any trouble with back pain lately, sometimes after chopping wood.  Patient reports no current medication side effects.    Rash:   Urea 40% cream on feet as needed.   Triamcinolone 0.1% cream as needed for itching in the summer.  No concerns today.    Supplements:  Multivitamin daily.  No longer taking vitamin C.  No concerns today.    Today's Vitals: /73   Pulse 54  (declined weight)  ----------------    I spent 30 minutes with this patient today. All changes were made via collaborative practice agreement with Raad Benitez DO. A copy of the visit note was provided to the patient's provider(s).    A summary of these recommendations was given to the patient.    Francesca Canales, PharmD, BCACP  Medication Therapy Management Pharmacist  [facility]     Medication Therapy Recommendations  Coronary artery disease of native heart with stable angina pectoris, unspecified vessel or lesion type (H24)    Current Medication: nitroGLYcerin (NITROSTAT) 0.4 MG sublingual tablet   Rationale: More effective medication available - Ineffective medication - Effectiveness   Recommendation: Change Medication   Status: Patient Agreed - Adherence/Education         Lumbar radicular pain    Current Medication: cyclobenzaprine (FLEXERIL) 5 MG tablet   Rationale: Frequency inappropriate - Dosage too high - Safety   Recommendation: Decrease Frequency   Status: Patient Agreed - Adherence/Education          Current Medication: gabapentin (NEURONTIN) 100 MG capsule   Rationale: No medical indication at this time - Unnecessary medication therapy - Indication   Recommendation: Discontinue Medication   Status: Accepted per CPA

## 2024-02-06 ENCOUNTER — HOSPITAL ENCOUNTER (OUTPATIENT)
Dept: CARDIAC REHAB | Facility: CLINIC | Age: 69
Discharge: HOME | End: 2024-02-06
Attending: FAMILY MEDICINE
Payer: MEDICARE

## 2024-02-06 PROCEDURE — G0239 OTH RESP PROC, GROUP: HCPCS

## 2024-02-07 LAB
DLCOUNC-%PRED-PRE: 81 %
DLCOUNC-PRE: 21.03 ML/MIN/MMHG
DLCOUNC-PRED: 25.88 ML/MIN/MMHG
ERV-%PRED-PRE: 23 %
ERV-PRE: 0.35 L
ERV-PRED: 1.46 L
EXPTIME-PRE: 6.24 SEC
FEF2575-%PRED-POST: 179 %
FEF2575-%PRED-PRE: 79 %
FEF2575-POST: 4.18 L/SEC
FEF2575-PRE: 1.86 L/SEC
FEF2575-PRED: 2.32 L/SEC
FEFMAX-%PRED-PRE: 71 %
FEFMAX-PRE: 5.97 L/SEC
FEFMAX-PRED: 8.35 L/SEC
FEV1-%PRED-PRE: 65 %
FEV1-PRE: 1.97 L
FEV1FEV6-PRE: 80 %
FEV1FEV6-PRED: 78 %
FEV1FVC-PRE: 79 %
FEV1FVC-PRED: 77 %
FEV1SVC-PRE: 70 %
FEV1SVC-PRED: 71 %
FIFMAX-PRE: 3.15 L/SEC
FRCPLETH-%PRED-PRE: 62 %
FRCPLETH-PRE: 2.29 L
FRCPLETH-PRED: 3.7 L
FVC-%PRED-PRE: 63 %
FVC-PRE: 2.48 L
FVC-PRED: 3.91 L
IC-%PRED-PRE: 77 %
IC-PRE: 2.27 L
IC-PRED: 2.92 L
RVPLETH-%PRED-PRE: 66 %
RVPLETH-PRE: 1.77 L
RVPLETH-PRED: 2.64 L
TLCPLETH-%PRED-PRE: 63 %
TLCPLETH-PRE: 4.56 L
TLCPLETH-PRED: 7.13 L
VA-%PRED-PRE: 67 %
VA-PRE: 4.34 L
VC-%PRED-PRE: 66 %
VC-PRE: 2.79 L
VC-PRED: 4.2 L

## 2024-02-08 ENCOUNTER — HOSPITAL ENCOUNTER (OUTPATIENT)
Dept: CARDIAC REHAB | Facility: CLINIC | Age: 69
Discharge: HOME | End: 2024-02-08
Attending: FAMILY MEDICINE
Payer: MEDICARE

## 2024-02-08 PROCEDURE — G0239 OTH RESP PROC, GROUP: HCPCS

## 2024-02-13 ENCOUNTER — HOSPITAL ENCOUNTER (OUTPATIENT)
Dept: CARDIAC REHAB | Facility: CLINIC | Age: 69
Discharge: HOME | End: 2024-02-13
Attending: THORACIC SURGERY (CARDIOTHORACIC VASCULAR SURGERY)
Payer: MEDICARE

## 2024-02-13 PROCEDURE — G0239 OTH RESP PROC, GROUP: HCPCS

## 2024-02-15 ENCOUNTER — HOSPITAL ENCOUNTER (OUTPATIENT)
Dept: CARDIAC REHAB | Facility: CLINIC | Age: 69
Discharge: HOME | End: 2024-02-15
Attending: THORACIC SURGERY (CARDIOTHORACIC VASCULAR SURGERY)
Payer: MEDICARE

## 2024-02-15 PROCEDURE — G0239 OTH RESP PROC, GROUP: HCPCS

## 2024-02-16 DIAGNOSIS — S39.012D: ICD-10-CM

## 2024-02-19 RX ORDER — CYCLOBENZAPRINE HCL 5 MG
5 TABLET ORAL 3 TIMES DAILY PRN
Qty: 30 TABLET | Refills: 1 | Status: SHIPPED | OUTPATIENT
Start: 2024-02-19

## 2024-02-20 ENCOUNTER — HOSPITAL ENCOUNTER (OUTPATIENT)
Dept: CARDIAC REHAB | Facility: CLINIC | Age: 69
Discharge: HOME | End: 2024-02-20
Attending: THORACIC SURGERY (CARDIOTHORACIC VASCULAR SURGERY)
Payer: MEDICARE

## 2024-02-20 PROCEDURE — G0239 OTH RESP PROC, GROUP: HCPCS

## 2024-02-22 ENCOUNTER — HOSPITAL ENCOUNTER (OUTPATIENT)
Dept: CARDIAC REHAB | Facility: CLINIC | Age: 69
Discharge: HOME | End: 2024-02-22
Attending: THORACIC SURGERY (CARDIOTHORACIC VASCULAR SURGERY)
Payer: MEDICARE

## 2024-02-22 PROCEDURE — G0239 OTH RESP PROC, GROUP: HCPCS

## 2024-02-27 ENCOUNTER — HOSPITAL ENCOUNTER (OUTPATIENT)
Dept: CARDIAC REHAB | Facility: CLINIC | Age: 69
Discharge: HOME | End: 2024-02-27
Attending: THORACIC SURGERY (CARDIOTHORACIC VASCULAR SURGERY)
Payer: MEDICARE

## 2024-02-27 PROCEDURE — G0239 OTH RESP PROC, GROUP: HCPCS

## 2024-02-29 ENCOUNTER — HOSPITAL ENCOUNTER (OUTPATIENT)
Dept: CARDIAC REHAB | Facility: CLINIC | Age: 69
Discharge: HOME | End: 2024-02-29
Attending: THORACIC SURGERY (CARDIOTHORACIC VASCULAR SURGERY)
Payer: MEDICARE

## 2024-02-29 PROCEDURE — G0239 OTH RESP PROC, GROUP: HCPCS

## 2024-03-03 DIAGNOSIS — R13.10 DYSPHAGIA, UNSPECIFIED TYPE: ICD-10-CM

## 2024-03-03 DIAGNOSIS — K21.00 GASTROESOPHAGEAL REFLUX DISEASE WITH ESOPHAGITIS WITHOUT HEMORRHAGE: ICD-10-CM

## 2024-03-04 DIAGNOSIS — I25.10 CORONARY ARTERY DISEASE INVOLVING NATIVE CORONARY ARTERY OF NATIVE HEART WITHOUT ANGINA PECTORIS: ICD-10-CM

## 2024-03-04 DIAGNOSIS — R00.0 TACHYCARDIA: ICD-10-CM

## 2024-03-04 LAB
ABO/RH(D): NORMAL
ANTIBODY SCREEN: NEGATIVE
SPECIMEN EXPIRATION DATE: NORMAL

## 2024-03-04 RX ORDER — METOPROLOL SUCCINATE 25 MG/1
25 TABLET, EXTENDED RELEASE ORAL DAILY
Qty: 90 TABLET | Refills: 2 | Status: SHIPPED | OUTPATIENT
Start: 2024-03-04

## 2024-03-04 NOTE — TELEPHONE ENCOUNTER
[facility] Cardiology Refill Guideline reviewed.  Medication meets criteria for refill. Refills sent. Shauna De La Rosa RN Cardiology March 4, 2024, 10:21 AM

## 2024-03-04 NOTE — TELEPHONE ENCOUNTER
Last office visit: 09/26/2023  Next office visit: needs to be scheduled  Last filled: 04/21/2023    Maren Mcghee MA, RMA  3/4/2024 8:52 AM.

## 2024-03-05 ENCOUNTER — HOSPITAL ENCOUNTER (OUTPATIENT)
Dept: CARDIAC REHAB | Facility: CLINIC | Age: 69
Discharge: HOME | End: 2024-03-05
Attending: THORACIC SURGERY (CARDIOTHORACIC VASCULAR SURGERY)
Payer: MEDICARE

## 2024-03-05 ENCOUNTER — LAB (OUTPATIENT)
Dept: LAB | Facility: CLINIC | Age: 69
End: 2024-03-05
Payer: MEDICARE

## 2024-03-05 DIAGNOSIS — J98.6 PARALYSIS, DIAPHRAGM: Primary | ICD-10-CM

## 2024-03-05 LAB
ANION GAP SERPL CALCULATED.3IONS-SCNC: 10 MMOL/L (ref 7–15)
BUN SERPL-MCNC: 10.9 MG/DL (ref 8–23)
CALCIUM SERPL-MCNC: 9.9 MG/DL (ref 8.8–10.2)
CHLORIDE SERPL-SCNC: 98 MMOL/L (ref 98–107)
CREAT SERPL-MCNC: 0.9 MG/DL (ref 0.67–1.17)
DEPRECATED HCO3 PLAS-SCNC: 28 MMOL/L (ref 22–29)
EGFRCR SERPLBLD CKD-EPI 2021: >90 ML/MIN/1.73M2
ERYTHROCYTE [DISTWIDTH] IN BLOOD BY AUTOMATED COUNT: 13 % (ref 10–15)
GLUCOSE SERPL-MCNC: 156 MG/DL (ref 70–99)
HCT VFR BLD AUTO: 45.7 % (ref 40–53)
HGB BLD-MCNC: 14.9 G/DL (ref 13.3–17.7)
MCH RBC QN AUTO: 30.2 PG (ref 26.5–33)
MCHC RBC AUTO-ENTMCNC: 32.6 G/DL (ref 31.5–36.5)
MCV RBC AUTO: 93 FL (ref 78–100)
PLATELET # BLD AUTO: 170 10E3/UL (ref 150–450)
POTASSIUM SERPL-SCNC: 4.7 MMOL/L (ref 3.4–5.3)
RBC # BLD AUTO: 4.94 10E6/UL (ref 4.4–5.9)
SODIUM SERPL-SCNC: 136 MMOL/L (ref 135–145)
WBC # BLD AUTO: 4.7 10E3/UL (ref 4–11)

## 2024-03-05 PROCEDURE — G0239 OTH RESP PROC, GROUP: HCPCS

## 2024-03-05 PROCEDURE — 86901 BLOOD TYPING SEROLOGIC RH(D): CPT

## 2024-03-05 PROCEDURE — 85027 COMPLETE CBC AUTOMATED: CPT

## 2024-03-05 PROCEDURE — 80048 BASIC METABOLIC PNL TOTAL CA: CPT

## 2024-03-05 PROCEDURE — 86900 BLOOD TYPING SEROLOGIC ABO: CPT

## 2024-03-05 PROCEDURE — 86850 RBC ANTIBODY SCREEN: CPT

## 2024-03-05 PROCEDURE — 36415 COLL VENOUS BLD VENIPUNCTURE: CPT

## 2024-03-07 ENCOUNTER — HOSPITAL ENCOUNTER (OUTPATIENT)
Dept: CARDIAC REHAB | Facility: CLINIC | Age: 69
Discharge: HOME | End: 2024-03-07
Attending: THORACIC SURGERY (CARDIOTHORACIC VASCULAR SURGERY)
Payer: MEDICARE

## 2024-03-07 PROCEDURE — G0239 OTH RESP PROC, GROUP: HCPCS

## 2024-03-08 RX ORDER — PANTOPRAZOLE SODIUM 40 MG/1
40 TABLET, DELAYED RELEASE ORAL 2 TIMES DAILY
Qty: 60 TABLET | Refills: 1 | Status: SHIPPED | OUTPATIENT
Start: 2024-03-08 | End: 2024-05-06

## 2024-03-08 NOTE — TELEPHONE ENCOUNTER
LVD: 11/29/2023  [facility] Narendra Linares PA-C  Gastroenterology Gastroesophageal reflux disease with esophagitis     Refilled per protocol. Next visit 4/2024

## 2024-03-12 ENCOUNTER — HOSPITAL ENCOUNTER (OUTPATIENT)
Dept: CARDIAC REHAB | Facility: CLINIC | Age: 69
Discharge: HOME | End: 2024-03-12
Attending: THORACIC SURGERY (CARDIOTHORACIC VASCULAR SURGERY)
Payer: MEDICARE

## 2024-03-12 PROCEDURE — G0239 OTH RESP PROC, GROUP: HCPCS

## 2024-03-13 ENCOUNTER — OFFICE VISIT (OUTPATIENT)
Dept: NEUROLOGY | Facility: CLINIC | Age: 69
End: 2024-03-13
Attending: INTERNAL MEDICINE
Payer: MEDICARE

## 2024-03-13 DIAGNOSIS — J98.6 DIAPHRAGMATIC PARALYSIS: ICD-10-CM

## 2024-03-13 DIAGNOSIS — J98.4 RESTRICTIVE LUNG DISEASE: ICD-10-CM

## 2024-03-13 DIAGNOSIS — K21.9 GASTROESOPHAGEAL REFLUX DISEASE, UNSPECIFIED WHETHER ESOPHAGITIS PRESENT: ICD-10-CM

## 2024-03-13 DIAGNOSIS — N32.0 BLADDER OUTLET OBSTRUCTION: ICD-10-CM

## 2024-03-13 PROCEDURE — 99207 PR NO CHARGE LOS: CPT | Performed by: PSYCHIATRY & NEUROLOGY

## 2024-03-13 RX ORDER — TAMSULOSIN HYDROCHLORIDE 0.4 MG/1
0.4 CAPSULE ORAL DAILY
Qty: 90 CAPSULE | Refills: 0 | Status: SHIPPED | OUTPATIENT
Start: 2024-03-13 | End: 2024-05-03

## 2024-03-13 NOTE — LETTER
3/13/2024         RE: Jamal Sanchez  [id number] [facility] [id number]        Dear Colleague,    Thank you for referring your patient, Jamal Sanchez, to the [facility]. Please see a copy of my visit note below.    The patient had requested an EMG. In discussion his hopes were that we could pinpoint where exactly the injury was on the phrenic nerve and why it had happened. I discussed the limitations of the EMG/NCS for such a purpose and the risks of the study. He has had symptoms for 4 years and is scheduled for a surgery at [place] on 3/26 for plication of the diaphragm. After discussion we both felt it would be worthwhile having a neurology consultation with a peripheral nerve specialist to discuss work up to understand injury but we held off on EMG until that consult occurred. I have sent a message to Dr Chung to see if she would be kind enough to work into her schedule preferably before the [place] surgery date.     No charge for time today.     Dori Teajda         Again, thank you for allowing me to participate in the care of your patient.        Sincerely,        Dori Tejada MD

## 2024-03-13 NOTE — PROGRESS NOTES
The patient had requested an EMG. In discussion his hopes were that we could pinpoint where exactly the injury was on the phrenic nerve and why it had happened. I discussed the limitations of the EMG/NCS for such a purpose and the risks of the study. He has had symptoms for 4 years and is scheduled for a surgery at [place] on 3/26 for plication of the diaphragm. After discussion we both felt it would be worthwhile having a neurology consultation with a peripheral nerve specialist to discuss work up to understand injury but we held off on EMG until that consult occurred. I have sent a message to Dr Chung to see if she would be kind enough to work into her schedule preferably before the [place] surgery date.     No charge for time today.     Dori Tejada

## 2024-03-14 ENCOUNTER — HOSPITAL ENCOUNTER (OUTPATIENT)
Dept: CARDIAC REHAB | Facility: CLINIC | Age: 69
Discharge: HOME | End: 2024-03-14
Attending: THORACIC SURGERY (CARDIOTHORACIC VASCULAR SURGERY)
Payer: MEDICARE

## 2024-03-14 PROCEDURE — G0239 OTH RESP PROC, GROUP: HCPCS

## 2024-03-14 RX ORDER — FAMOTIDINE 20 MG/1
20 TABLET, FILM COATED ORAL 2 TIMES DAILY
Qty: 180 TABLET | Refills: 0 | Status: SHIPPED | OUTPATIENT
Start: 2024-03-14 | End: 2024-04-23

## 2024-03-14 NOTE — TELEPHONE ENCOUNTER
Needs in clinic appointment for further refills.   Prescription approved per [facility] Refill Protocol.  Julie Behrendt RN

## 2024-03-19 ENCOUNTER — HOSPITAL ENCOUNTER (OUTPATIENT)
Dept: CARDIAC REHAB | Facility: CLINIC | Age: 69
Discharge: HOME | End: 2024-03-19
Attending: THORACIC SURGERY (CARDIOTHORACIC VASCULAR SURGERY)
Payer: MEDICARE

## 2024-03-19 PROCEDURE — G0239 OTH RESP PROC, GROUP: HCPCS

## 2024-03-20 ENCOUNTER — PRE VISIT (OUTPATIENT)
Dept: NEUROLOGY | Facility: CLINIC | Age: 69
End: 2024-03-20

## 2024-03-20 ENCOUNTER — OFFICE VISIT (OUTPATIENT)
Dept: NEUROLOGY | Facility: CLINIC | Age: 69
End: 2024-03-20
Payer: MEDICARE

## 2024-03-20 VITALS
SYSTOLIC BLOOD PRESSURE: 128 MMHG | DIASTOLIC BLOOD PRESSURE: 80 MMHG | HEART RATE: 62 BPM | BODY MASS INDEX: 28.55 KG/M2 | HEIGHT: 70 IN | OXYGEN SATURATION: 96 % | WEIGHT: 199.4 LBS

## 2024-03-20 DIAGNOSIS — J98.4 RESTRICTIVE LUNG DISEASE: ICD-10-CM

## 2024-03-20 DIAGNOSIS — Z11.4 ENCOUNTER FOR SCREENING FOR HUMAN IMMUNODEFICIENCY VIRUS (HIV): ICD-10-CM

## 2024-03-20 DIAGNOSIS — R79.9 ABNORMAL FINDING OF BLOOD CHEMISTRY, UNSPECIFIED: ICD-10-CM

## 2024-03-20 DIAGNOSIS — J98.6 DIAPHRAGMATIC PARALYSIS: Primary | ICD-10-CM

## 2024-03-20 PROCEDURE — 99417 PROLNG OP E/M EACH 15 MIN: CPT | Performed by: STUDENT IN AN ORGANIZED HEALTH CARE EDUCATION/TRAINING PROGRAM

## 2024-03-20 PROCEDURE — 99205 OFFICE O/P NEW HI 60 MIN: CPT | Performed by: STUDENT IN AN ORGANIZED HEALTH CARE EDUCATION/TRAINING PROGRAM

## 2024-03-20 RX ORDER — SILDENAFIL CITRATE 20 MG/1
TABLET ORAL
COMMUNITY
Start: 2023-05-10

## 2024-03-20 ASSESSMENT — PAIN SCALES - GENERAL: PAINLEVEL: NO PAIN (0)

## 2024-03-20 NOTE — LETTER
3/20/2024       RE: Jamal Sanchez  [address] [facility] [id number]     Dear Colleague,    Thank you for referring your patient, Jamal Sanchez, to the [facility] NEUROLOGY CLIN[facility] [place] [facility]. Please see a copy of my visit note below.    CHIEF COMPLAINT / REASON FOR VISIT  Right hemidiaphragm paralysis    Referred by Dr. Benitez    HISTORY OF PRESENT ILLNESS   is a 69 year old male presenting to [facility]rom[facility] Clinic for evaluation of right hemidiaphragm paralysis. Patient is accompanied by a friend, who helped provide collateral history.     Mr. Sanchez was previously a vvery healthy and physically active person.  About 4 to 5 years ago, he started to notice dyspnea on exertion.  The symptoms progressed rapidly over 3 months to the point that he had to stop to catch his breath when he carries woods into the house for his fireplace. He also has difficulties breathing when he lies down and finds it easier to breathe when he lies on the right side.  He denies any associating neck pain, or pain in the right arm at the onset. He reports occasional numbness and tingling sensation in the right>left arm that typically come on after waking up from sleep and then go away.  No weakness in the arms.  He did not remember if he had any preceding viral illness or vaccination.  He did have shoulder surgery a few months prior to the start of symptoms.  He was evaluated locally.  Cardiac workup has been unremarkable.  He was eventually found to have right hemidiaphragm paralysis on sniff test and restrictive lung disease on PFT. He is scheduled to have diaphragm plication surgery at [facility] next week.  He is being referred to this clinic to be evaluated for etiology of the right hemidiaphragm weakness.    He denies weakness in the legs.  He continues to be able to walk long distances but have to catch his breath along the way.  No double  vision no droopy eyelids.  No trouble with swallowing.  No change in his voice.  He has not noticed any muscle twitching in his body.  He denies any trauma to the neck.    - HbA1c 6.1% in March 2023  - X-ray cervical spine: he C7 vertebral body and cervicothoracic junction are  partially obscured on the lateral view due to radiographic overlap  with the patient's shoulders. No gross vertebral body height loss is  identified. Grade 1 degenerative anterolisthesis of C4 on C5 measuring  2-3 mm, new from prior. Straightening of the normal cervical lordosis,  which may be positional. Mild levoconvex curvature of the partially  visualized thoracic spine.Interval progression of moderate appearing disc space narrowing at  C5-C6 with marginal endplate and uncovertebral osteophytes. Minimal  disc space narrowing elsewhere. Multilevel degenerative facet  arthropathy. Suboptimal open-mouth odontoid view due to radiographic  overlap. No displaced fracture of the base of the dens is identified,  and the lateral masses of C1 and C2 appear symmetric. Presumed  atherosclerotic calcifications noted projecting over the expected  location of the right carotid bifurcation. The prevertebral soft  tissues otherwise appear normal.   -MRI brain November 2021 was unremarkable.  Moderately extensive scattered nonspecific patchy T2/FLAIR hyperintense signal abnormalities in the periventricular/deep and subcortical cerebral white matter.  There is mild generalized brain parenchymal volume loss.  - CT chest: .  Elevation of the right hemidiaphragm, which can be seen in the setting of weakness or paralysis, with compressive atelectasis in the adjacent right middle and right lower lobe of the lung which appears new since 07/02/2019 abdominal CT.   2.  Several tiny 1 to 2 mm pulmonary micronodules. Management considerations are provided at the end of the report.   3.  Severe atherosclerotic coronary artery calcification.       REVIEW OF  SYSTEMS  All negative except for what indicated in the HPI. The following portions of the patient's history were reviewed and updated as appropriate: allergies, current medications, family history, medical history, surgical history, social history, and problem list.     PAST MEDICAL/SURGICAL HISTORY   Past Medical History:   Diagnosis Date    A-fib (H)     BPH (benign prostatic hyperplasia)     Complication of anesthesia     Diverticulitis     GERD (gastroesophageal reflux disease)     Heart disease     HLD (hyperlipidemia)     Hypertension      Past Surgical History:   Procedure Laterality Date    APPENDECTOMY      ARTHROSCOPY SHOULDER ROTATOR CUFF REPAIR Left     ARTHROSCOPY SHOULDER RT/LT Right 01/2018    CARDIAC SURGERY  November, 2022    One stent placed in my heart.    COLONOSCOPY  03/05/2008    COLONOSCOPY  10/09/2013    Procedure: COLONOSCOPY;;  Surgeon: Nicolas Lizama MD;  Location: [facility]    COLONOSCOPY N/A 08/17/2022    Procedure: COLONOSCOPY, FLEXIBLE, WITH LESION REMOVAL USING SNARE;  Surgeon: Vimal Stanley DO;  Location: [facility]    CV CORONARY ANGIOGRAM N/A 09/07/2022    Procedure: Coronary Angiogram;  Surgeon: Morris Sky MD;  Location: [facility] CARDIAC CATH LAB    CV INSTANTANEOUS WAVE-FREE RATIO N/A 09/07/2022    Procedure: Instantaneous Wave-Free Ratio;  Surgeon: Morris Sky MD;  Location: [facility] CARDIAC CATH LAB    CV LEFT HEART CATH N/A 09/07/2022    Procedure: Left Heart Catheterization;  Surgeon: Morris Sky MD;  Location: [facility] CARDIAC CATH LAB    CV LEFT VENTRICULOGRAM N/A 09/07/2022    Procedure: Left Ventriculogram;  Surgeon: Morris Sky MD;  Location: [facility] CARDIAC CATH LAB    CV PCI STENT DRUG ELUTING N/A 09/07/2022    Procedure: Percutaneous Coronary Intervention Stent;  Surgeon: Morris Sky MD;  Location: [facility] CARDIAC CATH LAB    ESOPHAGOSCOPY, GASTROSCOPY, DUODENOSCOPY (EGD), COMBINED  10/09/2013    Procedure:  COMBINED ESOPHAGOSCOPY, GASTROSCOPY, DUODENOSCOPY (EGD), BIOPSY SINGLE OR MULTIPLE;;  Surgeon: Nicolas Lizama MD;  Location: [facility]    ESOPHAGOSCOPY, GASTROSCOPY, DUODENOSCOPY (EGD), COMBINED N/A 04/06/2022    Procedure: ESOPHAGOGASTRODUODENOSCOPY, WITH BIOPSY;  Surgeon: Vimal Stanley DO;  Location: [facility]    ESOPHAGOSCOPY, GASTROSCOPY, DUODENOSCOPY (EGD), COMBINED N/A 7/10/2023    Procedure: Esophagoscopy, gastroscopy, duodenoscopy, combined;  Surgeon: Vimal Stanley DO;  Location: [facility]    HERNIA REPAIR      LAPAROSCOPIC APPENDECTOMY N/A 01/07/2016    Procedure: LAPAROSCOPIC APPENDECTOMY;  Surgeon: Ab Guzman MD;  Location: [facility]    LAPAROSCOPIC HERNIORRHAPHY INGUINAL BILATERAL Bilateral 12/13/2016    Procedure: LAPAROSCOPIC HERNIORRHAPHY INGUINAL BILATERAL;  Surgeon: Ab Guzman MD;  Location: [facility]        MEDICATIONS    Current Outpatient Medications:     atorvastatin (LIPITOR) 40 MG tablet, Take 1 tablet (40 mg) by mouth daily, Disp: 90 tablet, Rfl: 3    calcium carbonate (TUMS) 500 MG chewable tablet, Take 1-2 chew tab by mouth daily as needed, Disp: , Rfl:     clopidogrel (PLAVIX) 75 MG tablet, Take 1 tablet (75 mg) by mouth daily, Disp: 90 tablet, Rfl: 3    cyclobenzaprine (FLEXERIL) 5 MG tablet, Take 1 tablet (5 mg) by mouth 3 times daily as needed for muscle spasms, Disp: 30 tablet, Rfl: 1    famotidine (PEPCID) 20 MG tablet, TAKE 1 TABLET BY MOUTH TWICE DAILY, Disp: 180 tablet, Rfl: 0    gabapentin (NEURONTIN) 100 MG capsule, Take 1 capsule (100 mg) by mouth 3 times daily, Disp: 90 capsule, Rfl: 1    lisinopril (ZESTRIL) 10 MG tablet, Take 1 tablet (10 mg) by mouth daily Hold if Systolic Blood Pressure is less than 85., Disp: 90 tablet, Rfl: 3    metoprolol succinate ER (TOPROL XL) 25 MG 24 hr tablet, Take 1 tablet (25 mg) by mouth daily, Disp: 90 tablet, Rfl: 2    multivitamin (CENTRUM SILVER) tablet, Take 1 tablet by mouth daily, Disp: , Rfl:     nitroGLYcerin  "(NITROSTAT) 0.4 MG sublingual tablet, For chest pain place 1 tablet under the tongue every 5 minutes for 3 doses. If symptoms persist 5 minutes after 2nd dose call 911., Disp: 25 tablet, Rfl: 3    pantoprazole (PROTONIX) 40 MG EC tablet, Take 1 tablet (40 mg) by mouth 2 times daily On an empty stomach about 30 minutes before a meal, Disp: 60 tablet, Rfl: 1    sildenafil (REVATIO) 20 MG tablet, Take 2 to 5 tablets 1 hour prior to intercourse, Disp: , Rfl:     tadalafil (CIALIS) 10 MG tablet, Take 1-2 tablets daily as needed, 30-45 minutes prior to intended sexual activity, Disp: 30 tablet, Rfl: 4    tamsulosin (FLOMAX) 0.4 MG capsule, Take 1 capsule (0.4 mg) by mouth daily PLEASE SCHEDULE MAY FOLLOW UP APPT, Disp: 90 capsule, Rfl: 0    triamcinolone (KENALOG) 0.1 % external cream, Apply topically 3 times daily, Disp: 80 g, Rfl: 2    triamterene-HCTZ (MAXZIDE) 75-50 MG tablet, Take 1 tablet by mouth daily, Disp: 90 tablet, Rfl: 3    Urea 40 % CREA, Apply to feet QHS, Disp: 198 g, Rfl: 11    ALLERGIES:  Allergies   Allergen Reactions    Nka [No Known Allergies]        PHYSICAL EXAM  /80 (BP Location: Right arm, Patient Position: Sitting, Cuff Size: Adult Regular)   Pulse 62   Ht 1.778 m (5' 10\")   Wt 90.4 kg (199 lb 6.4 oz)   SpO2 96%   BMI 28.61 kg/m      NEUROLOGICAL EXAMINATION  Mental status: normal.  Cranial nerves: normal. No ptosis. Full EOM. NO facial weakness. No tongue fasciculations.   Muscle strength: Normal muscle strength 5/5 proximally and distally in upper and lower limbs.No fasciculations. No fatigability. No muscle atrophy of the right upper limb. No scapular winging. No truncal waekness able to sit up from supine without any difficulties  Sensation: Intact sensation to light touch, pinprick, cold temperature, vibration, and joint proprioception in upper and lower limbs.   Deep tendon reflexes: Normal reflexes in the upper and lower limbs including intact bilateral ankle " reflexes  Coordination: normal rapid alternating movements and finger to nose testing  Gait: normal.  Walk on heels: yes, bilaterally.  Walk on toes: yes, bilaterally.    Arise from squatting position: yes.  Getting up from seated position without pushing on chair: yes.  Posture: normal.  Romberg: negative.    ASSESSMENT / PLAN  #1 Chronic right hemidiaphragm paralysis  #2 Restrictive lung disease secondary to #1    Mr. Sanchez's neurological exam today is normal other than mild abdominal paradox on the right side. There was no muscle weakness to suggest underlying myopathy, motor neuron or neuromuscular junction disorder. The lack of weakness and numbness in right arm also point away from right brachial plexopathy. The isolated weakness in right hemidiaphragm could be due to right phrenic neuropathy or high cervical radiculopathy. The lack of neck pain/radiating pain make the latter less likely. We discussed that isolated phrenic neuropathy could be from viral infection or inflammation or idiopathic. It is unlikely that the phrenic neuropathy will spontaneously resolve at this point as he has had symptoms for 4-5 years without any improvement.     After discussion with Mr. Sanchez, we have agreed to proceed with the following investigations and management:    Recommendations:  - MRI C-spine to rule out structural lesion at C3-5 nerve roots  - Labs to rule out underlying connective tissue/rheumatological diseases  - Symptomatic management per pulmonary and thoracic surgery team. Agree with BiPAP at night time. He may benefit from diaphragm plication.   - Follow-up in 4 months.     I spent a total of 75 minutes on the day of the visit for chart review, face-to-face visit, counseling/coordination of care, and documentation. Please see the note for further information on patient assessment and treatment.         PATIENT EDUCATION  Ready to learn, no apparent learning barriers were identified; learning preferences  include listening.  Explained diagnosis and treatment plan; patient expressed understanding of the content.        Again, thank you for allowing me to participate in the care of your patient.      Sincerely,    Katia Chung MD

## 2024-03-20 NOTE — PROGRESS NOTES
CHIEF COMPLAINT / REASON FOR VISIT  Right hemidiaphragm paralysis    Referred by Dr. Benitez    HISTORY OF PRESENT ILLNESS   is a 69 year old male presenting [facility] for evaluation of right hemidiaphragm paralysis. Patient is accompanied by a friend, who helped provide collateral history.     Mr. Sanchez was previously a vvery healthy and physically active person.  About 4 to 5 years ago, he started to notice dyspnea on exertion.  The symptoms progressed rapidly over 3 months to the point that he had to stop to catch his breath when he carries woods into the house for his fireplace. He also has difficulties breathing when he lies down and finds it easier to breathe when he lies on the right side.  He denies any associating neck pain, or pain in the right arm at the onset. He reports occasional numbness and tingling sensation in the right>left arm that typically come on after waking up from sleep and then go away.  No weakness in the arms.  He did not remember if he had any preceding viral illness or vaccination.  He did have shoulder surgery a few months prior to the start of symptoms.  He was evaluated locally.  Cardiac workup has been unremarkable.  He was eventually found to have right hemidiaphragm paralysis on sniff test and restrictive lung disease on PFT. He is scheduled to have diaphragm plication surgery at [facility] next week.  He is being referred to this clinic to be evaluated for etiology of the right hemidiaphragm weakness.    He denies weakness in the legs.  He continues to be able to walk long distances but have to catch his breath along the way.  No double vision no droopy eyelids.  No trouble with swallowing.  No change in his voice.  He has not noticed any muscle twitching in his body.  He denies any trauma to the neck.    - HbA1c 6.1% in March 2023  - X-ray cervical spine: he C7 vertebral body and cervicothoracic junction are  partially obscured on the lateral view  due to radiographic overlap  with the patient's shoulders. No gross vertebral body height loss is  identified. Grade 1 degenerative anterolisthesis of C4 on C5 measuring  2-3 mm, new from prior. Straightening of the normal cervical lordosis,  which may be positional. Mild levoconvex curvature of the partially  visualized thoracic spine.Interval progression of moderate appearing disc space narrowing at  C5-C6 with marginal endplate and uncovertebral osteophytes. Minimal  disc space narrowing elsewhere. Multilevel degenerative facet  arthropathy. Suboptimal open-mouth odontoid view due to radiographic  overlap. No displaced fracture of the base of the dens is identified,  and the lateral masses of C1 and C2 appear symmetric. Presumed  atherosclerotic calcifications noted projecting over the expected  location of the right carotid bifurcation. The prevertebral soft  tissues otherwise appear normal.   -MRI brain November 2021 was unremarkable.  Moderately extensive scattered nonspecific patchy T2/FLAIR hyperintense signal abnormalities in the periventricular/deep and subcortical cerebral white matter.  There is mild generalized brain parenchymal volume loss.  - CT chest: .  Elevation of the right hemidiaphragm, which can be seen in the setting of weakness or paralysis, with compressive atelectasis in the adjacent right middle and right lower lobe of the lung which appears new since 07/02/2019 abdominal CT.   2.  Several tiny 1 to 2 mm pulmonary micronodules. Management considerations are provided at the end of the report.   3.  Severe atherosclerotic coronary artery calcification.       REVIEW OF SYSTEMS  All negative except for what indicated in the HPI. The following portions of the patient's history were reviewed and updated as appropriate: allergies, current medications, family history, medical history, surgical history, social history, and problem list.     PAST MEDICAL/SURGICAL HISTORY   Past Medical History:    Diagnosis Date    A-fib (H)     BPH (benign prostatic hyperplasia)     Complication of anesthesia     Diverticulitis     GERD (gastroesophageal reflux disease)     Heart disease     HLD (hyperlipidemia)     Hypertension      Past Surgical History:   Procedure Laterality Date    APPENDECTOMY      ARTHROSCOPY SHOULDER ROTATOR CUFF REPAIR Left     ARTHROSCOPY SHOULDER RT/LT Right 01/2018    CARDIAC SURGERY  November, 2022    One stent placed in my heart.    COLONOSCOPY  03/05/2008    COLONOSCOPY  10/09/2013    Procedure: COLONOSCOPY;;  Surgeon: Nicolas Lizama MD;  Location[facility]    COLONOSCOPY N/A 08/17/2022    Procedure: COLONOSCOPY, FLEXIBLE, WITH LESION REMOVAL USING SNARE;  Surgeon: Vimal Stanley DO;  Location: [facility]    CV CORONARY ANGIOGRAM N/A 09/07/2022    Procedure: Coronary Angiogram;  Surgeon: Morris Sky MD;  Location: [facility] CARDIAC CATH LAB    CV INSTANTANEOUS WAVE-FREE RATIO N/A 09/07/2022    Procedure: Instantaneous Wave-Free Ratio;  Surgeon: Morris Sky MD;  Location:  HEART CARDIAC CATH LAB    CV LEFT HEART CATH N/A 09/07/2022    Procedure: Left Heart Catheterization;  Surgeon: Morris Sky MD;  Location: [facility] CARDIAC CATH LAB    CV LEFT VENTRICULOGRAM N/A 09/07/2022    Procedure: Left Ventriculogram;  Surgeon: Morris Sky MD;  Location: [facility] CARDIAC CATH LAB    CV PCI STENT DRUG ELUTING N/A 09/07/2022    Procedure: Percutaneous Coronary Intervention Stent;  Surgeon: Morris Sky MD;  Location: [facility] CARDIAC CATH LAB    ESOPHAGOSCOPY, GASTROSCOPY, DUODENOSCOPY (EGD), COMBINED  10/09/2013    Procedure: COMBINED ESOPHAGOSCOPY, GASTROSCOPY, DUODENOSCOPY (EGD), BIOPSY SINGLE OR MULTIPLE;;  Surgeon: Nicolas Lizama MD;  Location: [facility]    ESOPHAGOSCOPY, GASTROSCOPY, DUODENOSCOPY (EGD), COMBINED N/A 04/06/2022    Procedure: ESOPHAGOGASTRODUODENOSCOPY, WITH BIOPSY;  Surgeon: Vimal Stanley DO;  Location: [facility]     ESOPHAGOSCOPY, GASTROSCOPY, DUODENOSCOPY (EGD), COMBINED N/A 7/10/2023    Procedure: Esophagoscopy, gastroscopy, duodenoscopy, combined;  Surgeon: Vimal Stanley DO;  Location: [facility]    HERNIA REPAIR      LAPAROSCOPIC APPENDECTOMY N/A 01/07/2016    Procedure: LAPAROSCOPIC APPENDECTOMY;  Surgeon: Ab Guzman MD;  Location: [facility]    LAPAROSCOPIC HERNIORRHAPHY INGUINAL BILATERAL Bilateral 12/13/2016    Procedure: LAPAROSCOPIC HERNIORRHAPHY INGUINAL BILATERAL;  Surgeon: Ab Guzman MD;  Location: [facility]        MEDICATIONS    Current Outpatient Medications:     atorvastatin (LIPITOR) 40 MG tablet, Take 1 tablet (40 mg) by mouth daily, Disp: 90 tablet, Rfl: 3    calcium carbonate (TUMS) 500 MG chewable tablet, Take 1-2 chew tab by mouth daily as needed, Disp: , Rfl:     clopidogrel (PLAVIX) 75 MG tablet, Take 1 tablet (75 mg) by mouth daily, Disp: 90 tablet, Rfl: 3    cyclobenzaprine (FLEXERIL) 5 MG tablet, Take 1 tablet (5 mg) by mouth 3 times daily as needed for muscle spasms, Disp: 30 tablet, Rfl: 1    famotidine (PEPCID) 20 MG tablet, TAKE 1 TABLET BY MOUTH TWICE DAILY, Disp: 180 tablet, Rfl: 0    gabapentin (NEURONTIN) 100 MG capsule, Take 1 capsule (100 mg) by mouth 3 times daily, Disp: 90 capsule, Rfl: 1    lisinopril (ZESTRIL) 10 MG tablet, Take 1 tablet (10 mg) by mouth daily Hold if Systolic Blood Pressure is less than 85., Disp: 90 tablet, Rfl: 3    metoprolol succinate ER (TOPROL XL) 25 MG 24 hr tablet, Take 1 tablet (25 mg) by mouth daily, Disp: 90 tablet, Rfl: 2    multivitamin (CENTRUM SILVER) tablet, Take 1 tablet by mouth daily, Disp: , Rfl:     nitroGLYcerin (NITROSTAT) 0.4 MG sublingual tablet, For chest pain place 1 tablet under the tongue every 5 minutes for 3 doses. If symptoms persist 5 minutes after 2nd dose call 911., Disp: 25 tablet, Rfl: 3    pantoprazole (PROTONIX) 40 MG EC tablet, Take 1 tablet (40 mg) by mouth 2 times daily On an empty stomach about 30 minutes before a meal,  "Disp: 60 tablet, Rfl: 1    sildenafil (REVATIO) 20 MG tablet, Take 2 to 5 tablets 1 hour prior to intercourse, Disp: , Rfl:     tadalafil (CIALIS) 10 MG tablet, Take 1-2 tablets daily as needed, 30-45 minutes prior to intended sexual activity, Disp: 30 tablet, Rfl: 4    tamsulosin (FLOMAX) 0.4 MG capsule, Take 1 capsule (0.4 mg) by mouth daily PLEASE SCHEDULE MAY FOLLOW UP APPT, Disp: 90 capsule, Rfl: 0    triamcinolone (KENALOG) 0.1 % external cream, Apply topically 3 times daily, Disp: 80 g, Rfl: 2    triamterene-HCTZ (MAXZIDE) 75-50 MG tablet, Take 1 tablet by mouth daily, Disp: 90 tablet, Rfl: 3    Urea 40 % CREA, Apply to feet QHS, Disp: 198 g, Rfl: 11    ALLERGIES:  Allergies   Allergen Reactions    Nka [No Known Allergies]        PHYSICAL EXAM  /80 (BP Location: Right arm, Patient Position: Sitting, Cuff Size: Adult Regular)   Pulse 62   Ht 1.778 m (5' 10\")   Wt 90.4 kg (199 lb 6.4 oz)   SpO2 96%   BMI 28.61 kg/m      NEUROLOGICAL EXAMINATION  Mental status: normal.  Cranial nerves: normal. No ptosis. Full EOM. NO facial weakness. No tongue fasciculations.   Muscle strength: Normal muscle strength 5/5 proximally and distally in upper and lower limbs.No fasciculations. No fatigability. No muscle atrophy of the right upper limb. No scapular winging. No truncal waekness able to sit up from supine without any difficulties  Sensation: Intact sensation to light touch, pinprick, cold temperature, vibration, and joint proprioception in upper and lower limbs.   Deep tendon reflexes: Normal reflexes in the upper and lower limbs including intact bilateral ankle reflexes  Coordination: normal rapid alternating movements and finger to nose testing  Gait: normal.  Walk on heels: yes, bilaterally.  Walk on toes: yes, bilaterally.    Arise from squatting position: yes.  Getting up from seated position without pushing on chair: yes.  Posture: normal.  Romberg: negative.    ASSESSMENT / PLAN  #1 Chronic right " hemidiaphragm paralysis  #2 Restrictive lung disease secondary to #1    Mr. Sanchez's neurological exam today is normal other than mild abdominal paradox on the right side. There was no muscle weakness to suggest underlying myopathy, motor neuron or neuromuscular junction disorder. The lack of weakness and numbness in right arm also point away from right brachial plexopathy. The isolated weakness in right hemidiaphragm could be due to right phrenic neuropathy or high cervical radiculopathy. The lack of neck pain/radiating pain make the latter less likely. We discussed that isolated phrenic neuropathy could be from viral infection or inflammation or idiopathic. It is unlikely that the phrenic neuropathy will spontaneously resolve at this point as he has had symptoms for 4-5 years without any improvement.     After discussion with Mr. Sanchez, we have agreed to proceed with the following investigations and management:    Recommendations:  - MRI C-spine to rule out structural lesion at C3-5 nerve roots  - Labs to rule out underlying connective tissue/rheumatological diseases  - Symptomatic management per pulmonary and thoracic surgery team. Agree with BiPAP at night time. He may benefit from diaphragm plication.   - Follow-up in 4 months.     I spent a total of 75 minutes on the day of the visit for chart review, face-to-face visit, counseling/coordination of care, and documentation. Please see the note for further information on patient assessment and treatment.         PATIENT EDUCATION  Ready to learn, no apparent learning barriers were identified; learning preferences include listening.  Explained diagnosis and treatment plan; patient expressed understanding of the content.

## 2024-03-20 NOTE — NURSING NOTE
Chief Complaint   Patient presents with    New Patient    NEUROPATHY       Vitals were taken and medications were reconciled.    Cherelle Gould, Technician  2:52 PM  March 20, 2024

## 2024-03-22 ENCOUNTER — LAB (OUTPATIENT)
Dept: LAB | Facility: CLINIC | Age: 69
End: 2024-03-22
Payer: MEDICARE

## 2024-03-22 DIAGNOSIS — J98.6 DIAPHRAGMATIC PARALYSIS: ICD-10-CM

## 2024-03-22 DIAGNOSIS — J98.4 RESTRICTIVE LUNG DISEASE: ICD-10-CM

## 2024-03-22 DIAGNOSIS — R79.9 ABNORMAL FINDING OF BLOOD CHEMISTRY, UNSPECIFIED: ICD-10-CM

## 2024-03-22 DIAGNOSIS — Z11.4 ENCOUNTER FOR SCREENING FOR HUMAN IMMUNODEFICIENCY VIRUS (HIV): ICD-10-CM

## 2024-03-22 LAB
CK SERPL-CCNC: 347 U/L (ref 39–308)
CRP SERPL-MCNC: <3 MG/L
HBA1C MFR BLD: 6.2 % (ref 0–5.6)
Lab: NORMAL
Lab: NORMAL
PERFORMING LABORATORY: NORMAL
PERFORMING LABORATORY: NORMAL
TEST NAME: NORMAL
TEST NAME: NORMAL

## 2024-03-22 PROCEDURE — 86037 ANCA TITER EACH ANTIBODY: CPT

## 2024-03-22 PROCEDURE — 84155 ASSAY OF PROTEIN SERUM: CPT

## 2024-03-22 PROCEDURE — 84182 PROTEIN WESTERN BLOT TEST: CPT

## 2024-03-22 PROCEDURE — 87389 HIV-1 AG W/HIV-1&-2 AB AG IA: CPT

## 2024-03-22 PROCEDURE — 84165 PROTEIN E-PHORESIS SERUM: CPT | Performed by: PATHOLOGY

## 2024-03-22 PROCEDURE — 82607 VITAMIN B-12: CPT

## 2024-03-22 PROCEDURE — 83036 HEMOGLOBIN GLYCOSYLATED A1C: CPT

## 2024-03-22 PROCEDURE — 83519 RIA NONANTIBODY: CPT | Mod: 90

## 2024-03-22 PROCEDURE — 36415 COLL VENOUS BLD VENIPUNCTURE: CPT

## 2024-03-22 PROCEDURE — 86140 C-REACTIVE PROTEIN: CPT

## 2024-03-22 PROCEDURE — 86235 NUCLEAR ANTIGEN ANTIBODY: CPT

## 2024-03-22 PROCEDURE — 86255 FLUORESCENT ANTIBODY SCREEN: CPT

## 2024-03-22 PROCEDURE — 86431 RHEUMATOID FACTOR QUANT: CPT

## 2024-03-22 PROCEDURE — 82550 ASSAY OF CK (CPK): CPT

## 2024-03-22 PROCEDURE — 83789 MASS SPECTROMETRY QUAL/QUAN: CPT

## 2024-03-22 PROCEDURE — 86038 ANTINUCLEAR ANTIBODIES: CPT

## 2024-03-22 PROCEDURE — 86036 ANCA SCREEN EACH ANTIBODY: CPT

## 2024-03-22 PROCEDURE — 86334 IMMUNOFIX E-PHORESIS SERUM: CPT | Performed by: PATHOLOGY

## 2024-03-23 LAB
HIV 1+2 AB+HIV1 P24 AG SERPL QL IA: NONREACTIVE
RHEUMATOID FACT SERPL-ACNC: <10 IU/ML
TOTAL PROTEIN SERUM FOR ELP: 7.1 G/DL (ref 6.4–8.3)
VIT B12 SERPL-MCNC: 908 PG/ML (ref 232–1245)

## 2024-03-25 LAB
ACHR BIND AB SER-SCNC: 0 NMOL/L
ALBUMIN SERPL ELPH-MCNC: 4.6 G/DL (ref 3.7–5.1)
ALPHA1 GLOB SERPL ELPH-MCNC: 0.2 G/DL (ref 0.2–0.4)
ALPHA2 GLOB SERPL ELPH-MCNC: 0.6 G/DL (ref 0.5–0.9)
ANA SER QL IF: NEGATIVE
ANCA AB PATTERN SER IF-IMP: NORMAL
B-GLOBULIN SERPL ELPH-MCNC: 0.8 G/DL (ref 0.6–1)
C-ANCA TITR SER IF: NORMAL {TITER}
GAMMA GLOB SERPL ELPH-MCNC: 0.9 G/DL (ref 0.7–1.6)
M PROTEIN SERPL ELPH-MCNC: 0 G/DL
PROT PATTERN SERPL ELPH-IMP: NORMAL
PROT PATTERN SERPL IFE-IMP: NORMAL

## 2024-03-26 LAB
ENA SM IGG SER IA-ACNC: 1 U/ML
ENA SM IGG SER IA-ACNC: NEGATIVE
ENA SS-A AB SER IA-ACNC: <0.5 U/ML
ENA SS-A AB SER IA-ACNC: NEGATIVE
ENA SS-B IGG SER IA-ACNC: <0.6 U/ML
ENA SS-B IGG SER IA-ACNC: NEGATIVE
U1 SNRNP IGG SER IA-ACNC: 1.4 U/ML
U1 SNRNP IGG SER IA-ACNC: NEGATIVE

## 2024-03-27 LAB — MAYO MISC RESULT: NORMAL

## 2024-03-29 LAB — MAYO MISC RESULT: NORMAL

## 2024-04-03 ENCOUNTER — VIRTUAL VISIT (OUTPATIENT)
Dept: GASTROENTEROLOGY | Facility: CLINIC | Age: 69
End: 2024-04-03
Attending: PHYSICIAN ASSISTANT
Payer: MEDICARE

## 2024-04-03 DIAGNOSIS — K59.00 CONSTIPATION, UNSPECIFIED CONSTIPATION TYPE: ICD-10-CM

## 2024-04-03 DIAGNOSIS — K21.9 GASTROESOPHAGEAL REFLUX DISEASE WITHOUT ESOPHAGITIS: Primary | ICD-10-CM

## 2024-04-03 DIAGNOSIS — K44.9 HIATAL HERNIA: ICD-10-CM

## 2024-04-03 PROCEDURE — 99213 OFFICE O/P EST LOW 20 MIN: CPT | Mod: 95 | Performed by: PHYSICIAN ASSISTANT

## 2024-04-03 NOTE — PROGRESS NOTES
GI FOLLOW UP VISIT       HPI: Jamal Sanchez is 69 year old male with coronary artery disease status post recent PCI with stent on Plavix, hyperlipidemia, GERD and hiatal hernia who presents for follow-up.     He was initially evaluated in our GI clinic with Dr. Marisela De Leon in December 2022.  I however first met him at a follow-up in May 2023.  Please see prior notes for more details.  Briefly, he has a long history of reflux which unfortunately was not well controlled despite omeprazole 40 mg daily and famotidine 20 mg twice daily.  He continued to have chest discomfort, persistent reflux symptoms as well as difficulty swallowing, often feeling that foods were getting stuck in his chest and more difficult to dislodge.  As a result he has become very mindful of how he was eating and tried to eat smaller bites and chew his foods very well. A prior EGD in January 2022 noted esophageal stenosis treated with dilation, gastritis and medium sized hiatal hernia. Repeat EGD in April 2022 without stricture in the esophagus.     Interval history August 21, 2023  Since his last visit his omeprazole was switched to pantoprazole given an interaction with his Plavix. He has also continued famotidine 20mg twice daily. We updated his EGD last month in July 2023 which noted LA grade B esophagitis, 3 cm hiatal hernia, and gastric polyps.  Biopsies with mild inflammation but negative for H. pylori and negative for Freire's esophagus.  Gastric polyps were benign. He reports that he does feel further improvement with the pantoprazole however still with reflux symptoms and dysphagia.  Throat feels scratchy all the time, sometimes results in coughing. He is concerned if this could be aspiration occurring.      He does have some acidic foods in his diet. He eats tomatoes about 3-4 times a day and uses garlic regularly in meals. Overall hasn't had an issue with these items. He denies carbonated beverages. He drinks electrolyte water.  He does drink regularly. Not necessarily every day however does enjoy 6-8 beers a day max on weekends.      Interval history April 3, 2024   He recently had plication diaphragm thoracotomy last week at [place]. He is still recovering from surgery and has pain in his side. It hurts when he coughs. Reports slowly recovering from his surgery. He is on pain medication currently and has developed constipation as a result. He is able to mange with senna stool softeners. Reflux hasn't been a problem or concern recently. He continues his protonix 40mg twice daily and pepcid twice daily.     ALLERGIES:Nka [no known allergies]      Current Outpatient Medications:     atorvastatin (LIPITOR) 40 MG tablet, Take 1 tablet (40 mg) by mouth daily, Disp: 90 tablet, Rfl: 3    calcium carbonate (TUMS) 500 MG chewable tablet, Take 1-2 chew tab by mouth daily as needed, Disp: , Rfl:     clopidogrel (PLAVIX) 75 MG tablet, Take 1 tablet (75 mg) by mouth daily, Disp: 90 tablet, Rfl: 3    cyclobenzaprine (FLEXERIL) 5 MG tablet, Take 1 tablet (5 mg) by mouth 3 times daily as needed for muscle spasms, Disp: 30 tablet, Rfl: 1    famotidine (PEPCID) 20 MG tablet, TAKE 1 TABLET BY MOUTH TWICE DAILY, Disp: 180 tablet, Rfl: 0    gabapentin (NEURONTIN) 100 MG capsule, Take 1 capsule (100 mg) by mouth 3 times daily, Disp: 90 capsule, Rfl: 1    lisinopril (ZESTRIL) 10 MG tablet, Take 1 tablet (10 mg) by mouth daily Hold if Systolic Blood Pressure is less than 85., Disp: 90 tablet, Rfl: 3    metoprolol succinate ER (TOPROL XL) 25 MG 24 hr tablet, Take 1 tablet (25 mg) by mouth daily, Disp: 90 tablet, Rfl: 2    multivitamin (CENTRUM SILVER) tablet, Take 1 tablet by mouth daily, Disp: , Rfl:     nitroGLYcerin (NITROSTAT) 0.4 MG sublingual tablet, For chest pain place 1 tablet under the tongue every 5 minutes for 3 doses. If symptoms persist 5 minutes after 2nd dose call 911., Disp: 25 tablet, Rfl: 3    pantoprazole (PROTONIX) 40 MG EC tablet, Take 1 tablet  (40 mg) by mouth 2 times daily On an empty stomach about 30 minutes before a meal, Disp: 60 tablet, Rfl: 1    sildenafil (REVATIO) 20 MG tablet, Take 2 to 5 tablets 1 hour prior to intercourse, Disp: , Rfl:     tadalafil (CIALIS) 10 MG tablet, Take 1-2 tablets daily as needed, 30-45 minutes prior to intended sexual activity, Disp: 30 tablet, Rfl: 4    tamsulosin (FLOMAX) 0.4 MG capsule, Take 1 capsule (0.4 mg) by mouth daily PLEASE SCHEDULE MAY FOLLOW UP APPT, Disp: 90 capsule, Rfl: 0    triamcinolone (KENALOG) 0.1 % external cream, Apply topically 3 times daily, Disp: 80 g, Rfl: 2    triamterene-HCTZ (MAXZIDE) 75-50 MG tablet, Take 1 tablet by mouth daily, Disp: 90 tablet, Rfl: 3    Urea 40 % CREA, Apply to feet QHS, Disp: 198 g, Rfl: 11        PHYSICAL EXAMINATION:  Constitutional: aaox3, cooperative, pleasant, not dyspneic/diaphoretic, no acute distress  GENERAL: alert and no distress  EYES: Eyes grossly normal to inspection.  No discharge or erythema, or obvious scleral/conjunctival abnormalities.  RESP: No audible wheeze, cough, or visible cyanosis.    SKIN: Visible skin clear. No significant rash, abnormal pigmentation or lesions.  NEURO: Cranial nerves grossly intact.  Mentation and speech appropriate for age.  PSYCH: Appropriate affect, tone, and pace of words    ASSESSMENT/PLAN:    # GERD  # Hiatal hernia    # constipation     Jamal Sanchez is a 69 year old male with pmhx with of coronary artery disease status post recent PCI with stent on Plavix, hyperlipidemia, GERD and hiatal hernia who presents for follow-up. He is in the early stages of recovering from plication diaphragm thoracotomy. He is having pain from surgery on pain meds. He is experiencing constipation as a result, but managing this with senna stool softeners. Can additionally use miralax as needed. Reflux continues to be well managed with protonix and pepcid twice daily. We previously discussed trying to decrease doses at this follow up  however we mutually agreed to continue his current dose as he is recovering from his surgery and since the benefits outweigh the risks at this time.     Follow up in 4-6 months, sooner if needed     Thank you for this consultation.  It was a pleasure to participate in the care of this patient; please contact us with any further questions.      This note was created with voice recognition software, and while reviewed for accuracy, typos may remain.     I spent a total of 27 minutes on the day of the visit.   Time spent by me doing chart review, history and exam, documentation and further activities per the note      Narendra Kramer PA-C  Division of Gastroenterology, Hepatology and Nutrition   [facility]        Video-Visit Details  Type of service:  Video Visit    Joined the call at 4/3/2024, 9:58:24 am.    Left the call at 4/3/2024, 10:04:38 am.    You were on the call for 6 minutes 13 seconds .    Originating Location (pt. Location): Home    Distant Location (provider location):  Off-site    Platform used for Video Visit: Thelma

## 2024-04-03 NOTE — NURSING NOTE
Is the patient currently in the state of [place]? YES    Visit mode:VIDEO    If the visit is dropped, the patient can be reconnected by: VIDEO VISIT: Text to cell phone:   Telephone Information:   Mobile [phone]       Will anyone else be joining the visit? NO  (If patient encounters technical issues they should call [phone]    How would you like to obtain your AVS? MyChart    Are changes needed to the allergy or medication list? No    Reason for visit: RECHECK    Pat RIVERAF

## 2024-04-04 NOTE — PATIENT INSTRUCTIONS
It was a pleasure taking care of you today.  I've included a brief summary of our discussion and care plan from today's visit below.  Please review this information with your primary care provider.  _______________________________________________________________________     My recommendations are summarized as follows:     -- continue current reflux regimen    -- continue stool softeners as needed for constipation. You can additionally use miralax as needed for constipation     -- follow up in 4-6 months, sooner if needed.    ______________________________________________________________________     How do I schedule labs, imaging studies, or procedures that were ordered in clinic today?      Labs: To schedule lab appointment you can contact your local [facility] or call [phone] to schedule at any convenient [facility] location.     Procedures: If a colonoscopy, upper endoscopy, breath test, esophageal manometry, or pH impedence was ordered today, our endoscopy team will call you to schedule this. If you have not heard from our endoscopy team within a week, please call [phone] to schedule.      Imaging Studies: If you were scheduled for a CT scan, X-ray, MRI, ultrasound, HIDA scan or other imaging study, please call [phone] to have this scheduled.      Referral: If a referral to another specialty was ordered, expect a phone call or follow instructions above. If you have not heard from anyone regarding your referral in a week, please call our clinic to check the status.      Who do I call with any questions after my visit?  Please be in touch if there are any further questions that arise following today's visit.  There are multiple ways to contact your gastroenterology care team.       During business hours, you may reach a Gastroenterology nurse at [phone]     To schedule or reschedule an appointment, please call [phone].      You can always send a secure message through  Quad/Graphicshart.  Soundwave messages are answered by your nurse or doctor typically within 24 hours.  Please allow extra time on weekends and holidays.       For urgent/emergent questions after business hours, you may reach the on-call GI Fellow by contacting the [facility]  at [phone].     How will I get the results of any tests ordered?    You will receive all of your results.  If you have signed up for [employer], any tests ordered at your visit will be available to you after your physician reviews them.  Typically this takes 1-2 weeks.  If there are urgent results that require a change in your care plan, your physician or nurse will call you to discuss the next steps.       What is Soundwave?  Soundwave is a secure way for you to access all of your healthcare records from the [facility].  It is a web based computer program, so you can sign on to it from any location.  It also allows you to send secure messages to your care team.  I recommend signing up for Soundwave access if you have not already done so and are comfortable with using a computer.       How to I schedule a follow-up visit?  If you did not schedule a follow-up visit today, please call [phone] to schedule a follow-up office visit.      Narendra Kramer PA-C  Division of Gastroenterology, Hepatology and Nutrition   [facility]

## 2024-04-17 ENCOUNTER — HOSPITAL ENCOUNTER (OUTPATIENT)
Dept: MRI IMAGING | Facility: CLINIC | Age: 69
Discharge: HOME | End: 2024-04-17
Attending: STUDENT IN AN ORGANIZED HEALTH CARE EDUCATION/TRAINING PROGRAM | Admitting: STUDENT IN AN ORGANIZED HEALTH CARE EDUCATION/TRAINING PROGRAM
Payer: MEDICARE

## 2024-04-17 DIAGNOSIS — J98.4 RESTRICTIVE LUNG DISEASE: ICD-10-CM

## 2024-04-17 DIAGNOSIS — J98.6 DIAPHRAGMATIC PARALYSIS: ICD-10-CM

## 2024-04-17 PROCEDURE — 72156 MRI NECK SPINE W/O & W/DYE: CPT | Mod: MF

## 2024-04-17 PROCEDURE — A9585 GADOBUTROL INJECTION: HCPCS | Performed by: STUDENT IN AN ORGANIZED HEALTH CARE EDUCATION/TRAINING PROGRAM

## 2024-04-17 PROCEDURE — 255N000002 HC RX 255 OP 636: Performed by: STUDENT IN AN ORGANIZED HEALTH CARE EDUCATION/TRAINING PROGRAM

## 2024-04-17 RX ORDER — GADOBUTROL 604.72 MG/ML
9 INJECTION INTRAVENOUS ONCE
Status: COMPLETED | OUTPATIENT
Start: 2024-04-17 | End: 2024-04-17

## 2024-04-17 RX ADMIN — GADOBUTROL 9 ML: 604.72 INJECTION INTRAVENOUS at 10:53

## 2024-04-19 ENCOUNTER — TELEPHONE (OUTPATIENT)
Dept: NEUROLOGY | Facility: CLINIC | Age: 69
End: 2024-04-19
Payer: MEDICARE

## 2024-04-19 NOTE — TELEPHONE ENCOUNTER
Left Voicemail (1st Attempt) and Sent Mychart (1st Attempt) for the patient to call back and schedule the following:    Appointment type: Virtual Follow Up  Provider: Dr. Gaona  Return date: Next avail  Specialty phone number: [phone]  Additional appointment(s) needed:   Additonal Notes:     Appt Notes, Please enter **MRI results**      **Please check [facility] [place] for openings, the appt can be virtual, if the appt is virtual, it will have to be scheduled as an In Person then after finalizing the appt you will need to change the appt type to video**

## 2024-04-22 ENCOUNTER — HOSPITAL ENCOUNTER (OUTPATIENT)
Dept: GENERAL RADIOLOGY | Facility: CLINIC | Age: 69
Discharge: HOME | End: 2024-04-22
Attending: INTERNAL MEDICINE | Admitting: INTERNAL MEDICINE
Payer: MEDICARE

## 2024-04-22 DIAGNOSIS — J98.6 DISORDERS OF DIAPHRAGM: ICD-10-CM

## 2024-04-22 PROCEDURE — 71046 X-RAY EXAM CHEST 2 VIEWS: CPT

## 2024-04-23 ENCOUNTER — OFFICE VISIT (OUTPATIENT)
Dept: FAMILY MEDICINE | Facility: CLINIC | Age: 69
End: 2024-04-23
Payer: MEDICARE

## 2024-04-23 ENCOUNTER — TRANSCRIBE ORDERS (OUTPATIENT)
Dept: OTHER | Age: 69
End: 2024-04-23

## 2024-04-23 VITALS
TEMPERATURE: 98.2 F | WEIGHT: 192 LBS | SYSTOLIC BLOOD PRESSURE: 122 MMHG | DIASTOLIC BLOOD PRESSURE: 82 MMHG | HEIGHT: 69 IN | HEART RATE: 66 BPM | OXYGEN SATURATION: 98 % | BODY MASS INDEX: 28.44 KG/M2 | RESPIRATION RATE: 20 BRPM

## 2024-04-23 DIAGNOSIS — Z98.890 S/P PLICATION OF DIAPHRAGM: ICD-10-CM

## 2024-04-23 DIAGNOSIS — I25.118 CORONARY ARTERY DISEASE OF NATIVE HEART WITH STABLE ANGINA PECTORIS, UNSPECIFIED VESSEL OR LESION TYPE (H): ICD-10-CM

## 2024-04-23 DIAGNOSIS — Z00.00 ENCOUNTER FOR MEDICARE ANNUAL WELLNESS EXAM: Primary | ICD-10-CM

## 2024-04-23 DIAGNOSIS — N40.1 BENIGN PROSTATIC HYPERPLASIA WITH NOCTURIA: ICD-10-CM

## 2024-04-23 DIAGNOSIS — K44.9 HIATAL HERNIA: ICD-10-CM

## 2024-04-23 DIAGNOSIS — I10 HYPERTENSION, GOAL BELOW 140/90: ICD-10-CM

## 2024-04-23 DIAGNOSIS — R35.1 BENIGN PROSTATIC HYPERPLASIA WITH NOCTURIA: ICD-10-CM

## 2024-04-23 DIAGNOSIS — R73.03 PREDIABETES: ICD-10-CM

## 2024-04-23 DIAGNOSIS — E78.5 HYPERLIPIDEMIA, UNSPECIFIED HYPERLIPIDEMIA TYPE: ICD-10-CM

## 2024-04-23 DIAGNOSIS — J98.6 DIAPHRAGM PARALYSIS: Primary | ICD-10-CM

## 2024-04-23 DIAGNOSIS — K21.9 GASTROESOPHAGEAL REFLUX DISEASE, UNSPECIFIED WHETHER ESOPHAGITIS PRESENT: ICD-10-CM

## 2024-04-23 LAB
ALBUMIN SERPL BCG-MCNC: 4.4 G/DL (ref 3.5–5.2)
ALP SERPL-CCNC: 104 U/L (ref 40–150)
ALT SERPL W P-5'-P-CCNC: 15 U/L (ref 0–70)
ANION GAP SERPL CALCULATED.3IONS-SCNC: 14 MMOL/L (ref 7–15)
AST SERPL W P-5'-P-CCNC: 15 U/L (ref 0–45)
BILIRUB SERPL-MCNC: 0.5 MG/DL
BUN SERPL-MCNC: 10 MG/DL (ref 8–23)
CALCIUM SERPL-MCNC: 10.1 MG/DL (ref 8.8–10.2)
CHLORIDE SERPL-SCNC: 98 MMOL/L (ref 98–107)
CHOLEST SERPL-MCNC: 137 MG/DL
CREAT SERPL-MCNC: 0.82 MG/DL (ref 0.67–1.17)
DEPRECATED HCO3 PLAS-SCNC: 24 MMOL/L (ref 22–29)
EGFRCR SERPLBLD CKD-EPI 2021: >90 ML/MIN/1.73M2
FASTING STATUS PATIENT QL REPORTED: YES
GLUCOSE SERPL-MCNC: 107 MG/DL (ref 70–99)
HDLC SERPL-MCNC: 61 MG/DL
LDLC SERPL CALC-MCNC: 63 MG/DL
NONHDLC SERPL-MCNC: 76 MG/DL
POTASSIUM SERPL-SCNC: 4.5 MMOL/L (ref 3.4–5.3)
PROT SERPL-MCNC: 7.6 G/DL (ref 6.4–8.3)
SODIUM SERPL-SCNC: 136 MMOL/L (ref 135–145)
TRIGL SERPL-MCNC: 66 MG/DL

## 2024-04-23 PROCEDURE — 80053 COMPREHEN METABOLIC PANEL: CPT | Performed by: FAMILY MEDICINE

## 2024-04-23 PROCEDURE — 80061 LIPID PANEL: CPT | Performed by: FAMILY MEDICINE

## 2024-04-23 PROCEDURE — 99214 OFFICE O/P EST MOD 30 MIN: CPT | Mod: 25 | Performed by: FAMILY MEDICINE

## 2024-04-23 PROCEDURE — 36415 COLL VENOUS BLD VENIPUNCTURE: CPT | Performed by: FAMILY MEDICINE

## 2024-04-23 PROCEDURE — 99397 PER PM REEVAL EST PAT 65+ YR: CPT | Performed by: FAMILY MEDICINE

## 2024-04-23 RX ORDER — TRIAMTERENE AND HYDROCHLOROTHIAZIDE 75; 50 MG/1; MG/1
1 TABLET ORAL DAILY
Qty: 90 TABLET | Refills: 3 | Status: SHIPPED | OUTPATIENT
Start: 2024-04-23

## 2024-04-23 RX ORDER — FAMOTIDINE 20 MG/1
20 TABLET, FILM COATED ORAL 2 TIMES DAILY
Qty: 180 TABLET | Refills: 3 | Status: SHIPPED | OUTPATIENT
Start: 2024-04-23

## 2024-04-23 RX ORDER — METHOCARBAMOL 500 MG/1
500 TABLET, FILM COATED ORAL 4 TIMES DAILY PRN
COMMUNITY
Start: 2024-03-30 | End: 2024-05-01

## 2024-04-23 SDOH — HEALTH STABILITY: PHYSICAL HEALTH: ON AVERAGE, HOW MANY DAYS PER WEEK DO YOU ENGAGE IN MODERATE TO STRENUOUS EXERCISE (LIKE A BRISK WALK)?: 3 DAYS

## 2024-04-23 ASSESSMENT — PAIN SCALES - GENERAL: PAINLEVEL: MODERATE PAIN (5)

## 2024-04-23 ASSESSMENT — SOCIAL DETERMINANTS OF HEALTH (SDOH): HOW OFTEN DO YOU GET TOGETHER WITH FRIENDS OR RELATIVES?: TWICE A WEEK

## 2024-04-23 NOTE — PATIENT INSTRUCTIONS
Lab work today.   Continue current cares.   Follow up with pulmonary rehab.   Follow up with [facility] as scheduled.       Preventive Care Advice   This is general advice given by our system to help you stay healthy. However, your care team may have specific advice just for you. Please talk to your care team about your preventive care needs.  Nutrition  Eat 5 or more servings of fruits and vegetables each day.  Try wheat bread, brown rice and whole grain pasta (instead of white bread, rice, and pasta).  Get enough calcium and vitamin D. Check the label on foods and aim for 100% of the RDA (recommended daily allowance).  Lifestyle  Exercise at least 150 minutes each week   (30 minutes a day, 5 days a week).  Do muscle strengthening activities 2 days a week. These help control your weight and prevent disease.  No smoking.  Wear sunscreen to prevent skin cancer.  Have a dental exam and cleaning every 6 months.  Yearly exams  See your health care team every year to talk about:  Any changes in your health.  Any medicines your care team has prescribed.  Preventive care, family planning, and ways to prevent chronic diseases.  Shots (vaccines)   HPV shots (up to age 26), if you've never had them before.  Hepatitis B shots (up to age 59), if you've never had them before.  COVID-19 shot: Get this shot when it's due.  Flu shot: Get a flu shot every year.  Tetanus shot: Get a tetanus shot every 10 years.  Pneumococcal, hepatitis A, and RSV shots: Ask your care team if you need these based on your risk.  Shingles shot (for age 50 and up).  General health tests  Diabetes screening:  Starting at age 35, Get screened for diabetes at least every 3 years.  If you are younger than age 35, ask your care team if you should be screened for diabetes.  Cholesterol test: At age 39, start having a cholesterol test every 5 years, or more often if advised.  Bone density scan (DEXA): At age 50, ask your care team if you should have this scan  for osteoporosis (brittle bones).  Hepatitis C: Get tested at least once in your life.  STIs (sexually transmitted infections)  Before age 24: Ask your care team if you should be screened for STIs.  After age 24: Get screened for STIs if you're at risk. You are at risk for STIs (including HIV) if:  You are sexually active with more than one person.  You don't use condoms every time.  You or a partner was diagnosed with a sexually transmitted infection.  If you are at risk for HIV, ask about PrEP medicine to prevent HIV.  Get tested for HIV at least once in your life, whether you are at risk for HIV or not.  Cancer screening tests  Cervical cancer screening: If you have a cervix, begin getting regular cervical cancer screening tests at age 21. Most people who have regular screenings with normal results can stop after age 65. Talk about this with your provider.  Breast cancer scan (mammogram): If you've ever had breasts, begin having regular mammograms starting at age 40. This is a scan to check for breast cancer.  Colon cancer screening: It is important to start screening for colon cancer at age 45.  Have a colonoscopy test every 10 years (or more often if you're at risk) Or, ask your provider about stool tests like a FIT test every year or Cologuard test every 3 years.  To learn more about your testing options, visit: [email or web address].  For help making a decision, visit: https://bit.ly/yx57511.  Prostate cancer screening test: If you have a prostate and are age 55 to 69, ask your provider if you would benefit from a yearly prostate cancer screening test.  Lung cancer screening: If you are a current or former smoker age 50 to 80, ask your care team if ongoing lung cancer screenings are right for you.  For informational purposes only. Not to replace the advice of your health care provider. Copyright   2023 [employer]. All rights reserved. Clinically reviewed by the [facility]  Transitions Program. [employer] [id number] - REV 01/24.    Learning About Activities of Daily Living  What are activities of daily living?     Activities of daily living (ADLs) are the basic self-care tasks you do every day. These include eating, bathing, dressing, and moving around.  As you age, and if you have health problems, you may find that it's harder to do some of these tasks. If so, your doctor can suggest ideas that may help.  To measure what kind of help you may need, your doctor will ask how well you are able to do ADLs. Let your doctor know if there are any tasks that you are having trouble doing. This is an important first step to getting help. And when you have the help you need, you can stay as independent as possible.  How will a doctor assess your ADLs?  Asking about ADLs is part of a routine health checkup your doctor will likely do as you age. Your health check might be done in a doctor's office, in your home, or at a hospital. The goal is to find out if you are having any problems that could make it hard to care for yourself or that make it unsafe for you to be on your own.  To measure your ADLs, your doctor will ask how hard it is for you to do routine tasks. Your doctor may also want to know if you have changed the way you do a task because of a health problem. Your doctor may watch how you:  Walk back and forth.  Keep your balance while you stand or walk.  Move from sitting to standing or from a bed to a chair.  Button or unbutton a shirt or sweater.  Remove and put on your shoes.  It's common to feel a little worried or anxious if you find you can't do all the things you used to be able to do. Talking with your doctor about ADLs is a way to make sure you're as safe as possible and able to care for yourself as well as you can. You may want to bring a caregiver, friend, or family member to your checkup. They can help you talk to your doctor.  Follow-up care is a key part of your treatment and  safety. Be sure to make and go to all appointments, and call your doctor if you are having problems. It's also a good idea to know your test results and keep a list of the medicines you take.  Current as of: October 24, 2023               Content Version: 14.0    9348-4243 [employer].   Care instructions adapted under license by your healthcare professional. If you have questions about a medical condition or this instruction, always ask your healthcare professional. [employer] disclaims any warranty or liability for your use of this information.      Hearing Loss: Care Instructions  Overview     Hearing loss is a sudden or slow decrease in how well you hear. It can range from slight to profound. Permanent hearing loss can occur with aging. It also can happen when you are exposed long-term to loud noise. Examples include listening to loud music, riding motorcycles, or being around other loud machines.  Hearing loss can affect your work and home life. It can make you feel lonely or depressed. You may feel that you have lost your independence. But hearing aids and other devices can help you hear better and feel connected to others.  Follow-up care is a key part of your treatment and safety. Be sure to make and go to all appointments, and call your doctor if you are having problems. It's also a good idea to know your test results and keep a list of the medicines you take.  How can you care for yourself at home?  Avoid loud noises whenever possible. This helps keep your hearing from getting worse.  Always wear hearing protection around loud noises.  Wear a hearing aid as directed.  A professional can help you pick a hearing aid that will work best for you.  You can also get hearing aids over the counter for mild to moderate hearing loss.  Have hearing tests as your doctor suggests. They can show whether your hearing has changed. Your hearing aid may need to be adjusted.  Use other devices as  "needed. These may include:  Telephone amplifiers and hearing aids that can connect to a television, stereo, radio, or microphone.  Devices that use lights or vibrations. These alert you to the doorbell, a ringing telephone, or a baby monitor.  Television closed-captioning. This shows the words at the bottom of the screen. Most new TVs can do this.  TTY (text telephone). This lets you type messages back and forth on the telephone instead of talking or listening. These devices are also called TDD. When messages are typed on the keyboard, they are sent over the phone line to a receiving TTY. The message is shown on a monitor.  Use text messaging, social media, and email if it is hard for you to communicate by telephone.  Try to learn a listening technique called speechreading. It is not lipreading. You pay attention to people's gestures, expressions, posture, and tone of voice. These clues can help you understand what a person is saying. Face the person you are talking to, and have them face you. Make sure the lighting is good. You need to see the other person's face clearly.  Think about counseling if you need help to adjust to your hearing loss.  When should you call for help?  Watch closely for changes in your health, and be sure to contact your doctor if:    You think your hearing is getting worse.     You have new symptoms, such as dizziness or nausea.   Where can you learn more?  Go to https://www.Moment.me.net/patiented  Enter R798 in the search box to learn more about \"Hearing Loss: Care Instructions.\"  Current as of: September 27, 2023               Content Version: 14.0    4839-9345 [employer].   Care instructions adapted under license by your healthcare professional. If you have questions about a medical condition or this instruction, always ask your healthcare professional. [employer] disclaims any warranty or liability for your use of this information.      Learning About " Stress  What is stress?     Stress is your body's response to a hard situation. Your body can have a physical, emotional, or mental response. Stress is a fact of life for most people, and it affects everyone differently. What causes stress for you may not be stressful for someone else.  A lot of things can cause stress. You may feel stress when you go on a job interview, take a test, or run a race. This kind of short-term stress is normal and even useful. It can help you if you need to work hard or react quickly. For example, stress can help you finish an important job on time.  Long-term stress is caused by ongoing stressful situations or events. Examples of long-term stress include long-term health problems, ongoing problems at work, or conflicts in your family. Long-term stress can harm your health.  How does stress affect your health?  When you are stressed, your body responds as though you are in danger. It makes hormones that speed up your heart, make you breathe faster, and give you a burst of energy. This is called the fight-or-flight stress response. If the stress is over quickly, your body goes back to normal and no harm is done.  But if stress happens too often or lasts too long, it can have bad effects. Long-term stress can make you more likely to get sick, and it can make symptoms of some diseases worse. If you tense up when you are stressed, you may develop neck, shoulder, or low back pain. Stress is linked to high blood pressure and heart disease.  Stress also harms your emotional health. It can make you porter, tense, or depressed. Your relationships may suffer, and you may not do well at work or school.  What can you do to manage stress?  You can try these things to help manage stress:   Do something active. Exercise or activity can help reduce stress. Walking is a great way to get started. Even everyday activities such as housecleaning or yard work can help.  Try yoga or rosette chi. These techniques  combine exercise and meditation. You may need some training at first to learn them.  Do something you enjoy. For example, listen to music or go to a movie. Practice your hobby or do volunteer work.  Meditate. This can help you relax, because you are not worrying about what happened before or what may happen in the future.  Do guided imagery. Imagine yourself in any setting that helps you feel calm. You can use online videos, books, or a teacher to guide you.  Do breathing exercises. For example:  From a standing position, bend forward from the waist with your knees slightly bent. Let your arms dangle close to the floor.  Breathe in slowly and deeply as you return to a standing position. Roll up slowly and lift your head last.  Hold your breath for just a few seconds in the standing position.  Breathe out slowly and bend forward from the waist.  Let your feelings out. Talk, laugh, cry, and express anger when you need to. Talking with supportive friends or family, a counselor, or a jonel leader about your feelings is a healthy way to relieve stress. Avoid discussing your feelings with people who make you feel worse.  Write. It may help to write about things that are bothering you. This helps you find out how much stress you feel and what is causing it. When you know this, you can find better ways to cope.  What can you do to prevent stress?  You might try some of these things to help prevent stress:  Manage your time. This helps you find time to do the things you want and need to do.  Get enough sleep. Your body recovers from the stresses of the day while you are sleeping.  Get support. Your family, friends, and community can make a difference in how you experience stress.  Limit your news feed. Avoid or limit time on social media or news that may make you feel stressed.  Do something active. Exercise or activity can help reduce stress. Walking is a great way to get started.  Where can you learn more?  Go to  "https://www.[employer]lock.net/patiented  Enter N032 in the search box to learn more about \"Learning About Stress.\"  Current as of: October 24, 2023               Content Version: 14.0    7791-9252 [employer].   Care instructions adapted under license by your healthcare professional. If you have questions about a medical condition or this instruction, always ask your healthcare professional. [employer] disclaims any warranty or liability for your use of this information.      Learning About Sleeping Well  What does sleeping well mean?     Sleeping well means getting enough sleep to feel good and stay healthy. How much sleep is enough varies among people.  The number of hours you sleep and how you feel when you wake up are both important. If you do not feel refreshed, you probably need more sleep. Another sign of not getting enough sleep is feeling tired during the day.  Experts recommend that adults get at least 7 or more hours of sleep per day. Children and older adults need more sleep.  Why is getting enough sleep important?  Getting enough quality sleep is a basic part of good health. When your sleep suffers, your physical health, mood, and your thoughts can suffer too. You may find yourself feeling more grumpy or stressed. Not getting enough sleep also can lead to serious problems, including injury, accidents, anxiety, and depression.  What might cause poor sleeping?  Many things can cause sleep problems, including:  Changes to your sleep schedule.  Stress. Stress can be caused by fear about a single event, such as giving a speech. Or you may have ongoing stress, such as worry about work or school.  Depression, anxiety, and other mental or emotional conditions.  Changes in your sleep habits or surroundings. This includes changes that happen where you sleep, such as noise, light, or sleeping in a different bed. It also includes changes in your sleep pattern, such as having jet lag or " "working a late shift.  Health problems, such as pain, breathing problems, and restless legs syndrome.  Lack of regular exercise.  Using alcohol, nicotine, or caffeine before bed.  How can you help yourself?  Here are some tips that may help you sleep more soundly and wake up feeling more refreshed.  Your sleeping area   Use your bedroom only for sleeping and sex. A bit of light reading may help you fall asleep. But if it doesn't, do your reading elsewhere in the house. Try not to use your TV, computer, smartphone, or tablet while you are in bed.  Be sure your bed is big enough to stretch out comfortably, especially if you have a sleep partner.  Keep your bedroom quiet, dark, and cool. Use curtains, blinds, or a sleep mask to block out light. To block out noise, use earplugs, soothing music, or a \"white noise\" machine.  Your evening and bedtime routine   Create a relaxing bedtime routine. You might want to take a warm shower or bath, or listen to soothing music.  Go to bed at the same time every night. And get up at the same time every morning, even if you feel tired.  What to avoid   Limit caffeine (coffee, tea, caffeinated sodas) during the day, and don't have any for at least 6 hours before bedtime.  Avoid drinking alcohol before bedtime. Alcohol can cause you to wake up more often during the night.  Try not to smoke or use tobacco, especially in the evening. Nicotine can keep you awake.  Limit naps during the day, especially close to bedtime.  Avoid lying in bed awake for too long. If you can't fall asleep or if you wake up in the middle of the night and can't get back to sleep within about 20 minutes, get out of bed and go to another room until you feel sleepy.  Avoid taking medicine right before bed that may keep you awake or make you feel hyper or energized. Your doctor can tell you if your medicine may do this and if you can take it earlier in the day.  If you can't sleep   Imagine yourself in a peaceful, " "pleasant scene. Focus on the details and feelings of being in a place that is relaxing.  Get up and do a quiet or boring activity until you feel sleepy.  Avoid drinking any liquids before going to bed to help prevent waking up often to use the bathroom.  Where can you learn more?  Go to https://www.[employer].net/patiented  Enter J942 in the search box to learn more about \"Learning About Sleeping Well.\"  Current as of: July 10, 2023               Content Version: 14.0    3113-0274 [employer].   Care instructions adapted under license by your healthcare professional. If you have questions about a medical condition or this instruction, always ask your healthcare professional. [employer] disclaims any warranty or liability for your use of this information.      Bladder Training: Care Instructions  Your Care Instructions     Bladder training is used to treat urge incontinence and stress incontinence. Urge incontinence means that the need to urinate comes on so fast that you can't get to a toilet in time. Stress incontinence means that you leak urine because of pressure on your bladder. For example, it may happen when you laugh, cough, or lift something heavy.  Bladder training can increase how long you can wait before you have to urinate. It can also help your bladder hold more urine. And it can give you better control over the urge to urinate.  It is important to remember that bladder training takes a few weeks to a few months to make a difference. You may not see results right away, but don't give up.  Follow-up care is a key part of your treatment and safety. Be sure to make and go to all appointments, and call your doctor if you are having problems. It's also a good idea to know your test results and keep a list of the medicines you take.  How can you care for yourself at home?  Work with your doctor to come up with a bladder training program that is right for you. You may use one or more " "of the following methods.  Delayed urination  In the beginning, try to keep from urinating for 5 minutes after you first feel the need to go.  While you wait, take deep, slow breaths to relax. Kegel exercises can also help you delay the need to go to the bathroom.  After some practice, when you can easily wait 5 minutes to urinate, try to wait 10 minutes before you urinate.  Slowly increase the waiting period until you are able to control when you have to urinate.  Scheduled urination  Empty your bladder when you first wake up in the morning.  Schedule times throughout the day when you will urinate.  Start by going to the bathroom every hour, even if you don't need to go.  Slowly increase the time between trips to the bathroom.  When you have found a schedule that works well for you, keep doing it.  If you wake up during the night and have to urinate, do it. Apply your schedule to waking hours only.  Kegel exercises  These tighten and strengthen pelvic muscles, which can help you control the flow of urine. (If doing these exercises causes pain, stop doing them and talk with your doctor.) To do Kegel exercises:  Squeeze your muscles as if you were trying not to pass gas. Or squeeze your muscles as if you were stopping the flow of urine. Your belly, legs, and buttocks shouldn't move.  Hold the squeeze for 3 seconds, then relax for 5 to 10 seconds.  Start with 3 seconds, then add 1 second each week until you are able to squeeze for 10 seconds.  Repeat the exercise 10 times a session. Do 3 to 8 sessions a day.  When should you call for help?  Watch closely for changes in your health, and be sure to contact your doctor if:    Your incontinence is getting worse.     You do not get better as expected.   Where can you learn more?  Go to https://www.healthwise.net/patiented  Enter V684 in the search box to learn more about \"Bladder Training: Care Instructions.\"  Current as of: November 15, 2023               Content Version: " 14.0    [id number] [employer].   Care instructions adapted under license by your healthcare professional. If you have questions about a medical condition or this instruction, always ask your healthcare professional. [employer] disclaims any warranty or liability for your use of this information.      Chronic Pain: Care Instructions  Your Care Instructions     Chronic pain is pain that lasts a long time (months or even years) and may or may not have a clear cause. It is different from acute pain, which usually does have a clear cause--like an injury or illness--and gets better over time. Chronic pain:  Lasts over time but may vary from day to day.  Does not go away despite efforts to end it.  May disrupt your sleep and lead to fatigue.  May cause depression or anxiety.  May make your muscles tense, causing more pain.  Can disrupt your work, hobbies, home life, and relationships with friends and family.  Chronic pain is a very real condition. It is not just in your head. Treatment can help and usually includes several methods used together, such as medicines, physical therapy, exercise, and other treatments. Learning how to relax and changing negative thought patterns can also help you cope.  Chronic pain is complex. Taking an active role in your treatment will help you better manage your pain. Tell your doctor if you have trouble dealing with your pain. You may have to try several things before you find what works best for you.  Follow-up care is a key part of your treatment and safety. Be sure to make and go to all appointments, and call your doctor if you are having problems. It's also a good idea to know your test results and keep a list of the medicines you take.  How can you care for yourself at home?  Pace yourself. Break up large jobs into smaller tasks. Save harder tasks for days when you have less pain, or go back and forth between hard tasks and easier ones. Take rest  breaks.  Relax, and reduce stress. Relaxation techniques such as deep breathing or meditation can help.  Keep moving. Gentle, daily exercise can help reduce pain over the long run. Try low- or no-impact exercises such as walking, swimming, and stationary biking. Do stretches to stay flexible.  Try heat, cold packs, and massage.  Get enough sleep. Chronic pain can make you tired and drain your energy. Talk with your doctor if you have trouble sleeping because of pain.  Think positive. Your thoughts can affect your pain level. Do things that you enjoy to distract yourself when you have pain instead of focusing on the pain. See a movie, read a book, listen to music, or spend time with a friend.  If you think you are depressed, talk to your doctor about treatment.  Keep a daily pain diary. Record how your moods, thoughts, sleep patterns, activities, and medicine affect your pain. You may find that your pain is worse during or after certain activities or when you are feeling a certain emotion. Having a record of your pain can help you and your doctor find the best ways to treat your pain.  Take pain medicines exactly as directed.  If the doctor gave you a prescription medicine for pain, take it as prescribed.  If you are not taking a prescription pain medicine, ask your doctor if you can take an over-the-counter medicine.  Reducing constipation caused by pain medicine  Talk to your doctor about a laxative. If a laxative doesn't work, your doctor may suggest a prescription medicine.  Include fruits, vegetables, beans, and whole grains in your diet each day. These foods are high in fiber.  If your doctor recommends it, get more exercise. Walking is a good choice. Bit by bit, increase the amount you walk every day. Try for at least 30 minutes on most days of the week.  Schedule time each day for a bowel movement. A daily routine may help. Take your time and do not strain when having a bowel movement.  When should you call  "for help?   Call your doctor now or seek immediate medical care if:    Your pain gets worse or is out of control.     You feel down or blue, or you do not enjoy things like you once did. You may be depressed, which is common in people with chronic pain. Depression can be treated.     You have vomiting or cramps for more than 2 hours.   Watch closely for changes in your health, and be sure to contact your doctor if:    You cannot sleep because of pain.     You are very worried or anxious about your pain.     You have trouble taking your pain medicine.     You have any concerns about your pain medicine.     You have trouble with bowel movements, such as:  No bowel movement in 3 days.  Blood in the anal area, in your stool, or on the toilet paper.  Diarrhea for more than 24 hours.   Where can you learn more?  Go to https://www.[employer].net/patiented  Enter N004 in the search box to learn more about \"Chronic Pain: Care Instructions.\"  Current as of: July 10, 2023               Content Version: 14.0    0211-7767 [employer].   Care instructions adapted under license by your healthcare professional. If you have questions about a medical condition or this instruction, always ask your healthcare professional. [employer] disclaims any warranty or liability for your use of this information.      "

## 2024-04-23 NOTE — PROGRESS NOTES
Preventive Care Visit  [facility]  Raad Benitez DO, Family Medicine  Apr 23, 2024      Assessment & Plan     Encounter for Medicare annual wellness exam  Cholesterol: atorvastatin 40 mg daily.   Diabetes: prediabetes   Colon Cancer: colonoscopy 8/22 repeat 7 years.   Prostate: screened 2022 and satisfactory.   Tobacco: non-user     Prediabetes  Recent A1c stable.     Hypertension, goal below 140/90  Stable. Refill provided. Recheck CMP today   - triamterene-HCTZ (MAXZIDE) 75-50 MG tablet  Dispense: 90 tablet; Refill: 3  - Comprehensive metabolic panel (BMP + Alb, Alk Phos, ALT, AST, Total. Bili, TP)    Gastroesophageal reflux disease, unspecified whether esophagitis present  Stable. Refill provided.   - famotidine (PEPCID) 20 MG tablet  Dispense: 180 tablet; Refill: 3  - Comprehensive metabolic panel (BMP + Alb, Alk Phos, ALT, AST, Total. Bili, TP)    Coronary artery disease of native heart with stable angina pectoris, unspecified vessel or lesion type (H24)  Hyperlipidemia, unspecified hyperlipidemia type  Stable. On atorvastatin. Check lab work today as fasting.   - Lipid panel reflex to direct LDL Fasting  - Comprehensive metabolic panel (BMP + Alb, Alk Phos, ALT, AST, Total. Bili, TP)      S/P plication of diaphragm  Diaphragm paralysis  Recently underwent Plication Diaphragm on 3/26/2024 through [facility]   -- has follow up with [facility] this next week.     Benign prostatic hyperplasia with nocturia  Follows with Urology. On Tamsulosin.       Patient has been advised of split billing requirements and indicates understanding: Yes by MA     The risks, benefits and treatment options of prescribed medications or other treatments have been discussed with the patient. The patient verbalized their understanding and should call or follow up if no improvement or if they develop further problems.        BMI  Estimated body mass index is 28.35 kg/m  as calculated from the following:     "Height as of this encounter: 1.753 m (5' 9\").    Weight as of this encounter: 87.1 kg (192 lb).   Weight management plan: Discussed healthy diet and exercise guidelines    Counseling  Appropriate preventive services were discussed with this patient, including applicable screening as appropriate for fall prevention, nutrition, physical activity, Tobacco-use cessation, weight loss and cognition.  Checklist reviewing preventive services available has been given to the patient.  Reviewed patient's diet, addressing concerns and/or questions.   He is at risk for lack of exercise and has been provided with information to increase physical activity for the benefit of his well-being.   He is at risk for psychosocial distress and has been provided with information to reduce risk.   Discussed possible causes of fatigue. Patient reported safety concerns were addressed today.The patient was provided with written information regarding signs of hearing loss.   Information on urinary incontinence and treatment options given to patient.   I have reviewed Opioid Use Disorder and Substance Use Disorder risk factors and made any needed referrals.           Subjective   Gopal is a 69 year old, presenting for the following:  Wellness Visit and Hypertension        4/23/2024    10:55 AM   Additional Questions   Roomed by Nina FITCH         Health Care Directive  Patient does not have a Health Care Directive or Living Will: Discussed advance care planning with patient; information given to patient to review.    HPI    69 year old male who presents to clinic for annual wellness.     Cholesterol: atorvastatin 40 mg daily.   Diabetes: prediabetes   Colon Cancer: colonoscopy 8/22 repeat 7 years.   Prostate: screened 2022 and satisfactory.   Tobacco: non-user       Recently underwent Plication Diaphragm on 3/26/2024 through [facility]   Using methocarbamol for muscle spasms which seems to help.   Has follow up with [facility] for a virtual appt next week. "   Continues to have some discomfort from the procedure.   Plans to proceed with pulmonary rehab.     Hypertension Follow-up    Do you check your blood pressure regularly outside of the clinic? Yes   Are you following a low salt diet? No  Are your blood pressures ever more than 140 on the top number (systolic) OR more   than 90 on the bottom number (diastolic), for example 140/90? No      4/23/2024   General Health   How would you rate your overall physical health? (!) FAIR   Feel stress (tense, anxious, or unable to sleep) Only a little   (!) STRESS CONCERN      4/23/2024   Nutrition   Diet: Regular (no restrictions)         4/23/2024   Exercise   Days per week of moderate/strenous exercise 3 days         4/23/2024   Social Factors   Frequency of gathering with friends or relatives Twice a week   Worry food won't last until get money to buy more No   Food not last or not have enough money for food? No   Do you have housing?  Yes   Are you worried about losing your housing? No   Lack of transportation? No   Unable to get utilities (heat,electricity)? No         4/23/2024   Fall Risk   Fallen 2 or more times in the past year? No   Trouble with walking or balance? No          4/23/2024   Activities of Daily Living- Home Safety   Needs help with the following daily activites None of the above   Safety concerns in the home No grab bars in the bathroom         4/23/2024   Dental   Dentist two times every year? Yes         4/23/2024   Hearing Screening   Hearing concerns? (!) I FEEL THAT PEOPLE ARE MUMBLING OR NOT SPEAKING CLEARLY.    (!) I NEED TO ASK PEOPLE TO SPEAK UP OR REPEAT THEMSELVES.    (!) IT'S HARDER TO UNDERSTAND WOMEN'S VOICES THAN MEN'S VOICES.    (!) IT'S HARD TO FOLLOW A CONVERSATION IN A NOISY RESTAURANT OR CROWDED ROOM.    (!) TROUBLE UNDESTANDING A SPEAKER IN A PUBLIC MEETING OR [employer].    (!) TROUBLE UNDERSTANDING SOFT OR WHISPERED SPEECH.         4/23/2024   Driving Risk Screening    Patient/family members have concerns about driving No         4/23/2024   General Alertness/Fatigue Screening   Have you been more tired than usual lately? (!) YES         4/23/2024   Urinary Incontinence Screening   Bothered by leaking urine in past 6 months Yes         4/23/2024   TB Screening   Were you born outside of the [place]? Yes         Today's PHQ-2 Score:       4/23/2024    10:50 AM   PHQ-2 ( 1999 [employer])   Q1: Little interest or pleasure in doing things 1   Q2: Feeling down, depressed or hopeless 0   PHQ-2 Score 1   Q1: Little interest or pleasure in doing things Several days   Q2: Feeling down, depressed or hopeless Not at all   PHQ-2 Score 1           4/23/2024   Substance Use   Alcohol more than 3/day or more than 7/wk No   Do you have a current opioid prescription? (!) YES   How severe/bad is pain from 1 to 10? 5/10   Do you use any other substances recreationally? No     Social History     Tobacco Use    Smoking status: Never    Smokeless tobacco: Never   Vaping Use    Vaping status: Never Used   Substance Use Topics    Alcohol use: Yes     Comment: 6 pack beer on weekend.    Drug use: No           4/23/2024   AAA Screening   Family history of Abdominal Aortic Aneurysm (AAA)? Unsure   ASCVD Risk   The 10-year ASCVD risk score (Jose A DK, et al., 2019) is: 14.7%    Values used to calculate the score:      Age: 69 years      Sex: Male      Is Non- : No      Diabetic: No      Tobacco smoker: No      Systolic Blood Pressure: 122 mmHg      Is BP treated: Yes      HDL Cholesterol: 61 mg/dL      Total Cholesterol: 137 mg/dL            Reviewed and updated as needed this visit by Provider   Tobacco  Allergies  Meds  Problems  Med Hx  Surg Hx  Fam Hx            Current providers sharing in care for this patient include:  Patient Care Team:  Raad Benitez DO as PCP - General (Family Medicine)  Raad Benitez DO as Referring Physician (Family Medicine)  [place]  Stephanie SAMANIEGO PA-C as Physician Assistant (Dermatology)  Raad Benitez DO as Assigned PCP  Tamar Bob PA-C (Inactive) as Physician Assistant (Gastroenterology)  Tamar Bob PA-C (Inactive) as Physician Assistant (Gastroenterology)  Celeste Simons APRN CNP as Assigned Heart and Vascular Provider  Layne Muñoz AuD as Audiologist (Audiology)  Marilyn Grayson PA-C as Physician Assistant (Urology)  Bernard Camarena DO as Assigned Neuroscience Provider  Hitesh Quintero PA-C as Physician Assistant (Gastroenterology)  Narendra Kramer PA-C as Physician Assistant (Gastroenterology)  Francesca Canales RPH as Pharmacist (Pharmacist)  Francesca Canales RPH as Assigned MTM Pharmacist  Daniela Shaikh MD as Assigned Pulmonology Provider  Stephanie Fair PA-C as Assigned Surgical Provider  Dori Tejada MD as MD (Neurology)  Narendra Kramer PA-C as Assigned Gastroenterology Provider  MCKENNA Good MD, MD as MD (Cardiovascular & Thoracic Surgery)    The following health maintenance items are reviewed in Epic and correct as of today:  Health Maintenance   Topic Date Due    ANNUAL REVIEW OF HM ORDERS  05/06/2023    COVID-19 Vaccine (7 - 2023-24 season) 02/29/2024    MEDICARE ANNUAL WELLNESS VISIT  04/23/2025    LIPID  04/23/2025    FALL RISK ASSESSMENT  04/23/2025    GLUCOSE  03/05/2027    ADVANCE CARE PLANNING  03/29/2028    DTAP/TDAP/TD IMMUNIZATION (3 - Td or Tdap) 07/10/2029    COLORECTAL CANCER SCREENING  08/17/2029    HEPATITIS C SCREENING  Completed    PHQ-2 (once per calendar year)  Completed    INFLUENZA VACCINE  Completed    Pneumococcal Vaccine: 65+ Years  Completed    ZOSTER IMMUNIZATION  Completed    RSV VACCINE (Pregnancy & 60+)  Completed    IPV IMMUNIZATION  Aged Out    HPV IMMUNIZATION  Aged Out    MENINGITIS IMMUNIZATION  Aged Out    RSV MONOCLONAL ANTIBODY  Aged Out          Objective    Exam  /82 (BP Location: Right arm, Patient Position:  "Chair, Cuff Size: Adult Regular)   Pulse 66   Temp 98.2  F (36.8  C) (Oral)   Resp 20   Ht 1.753 m (5' 9\")   Wt 87.1 kg (192 lb)   SpO2 98%   BMI 28.35 kg/m     Estimated body mass index is 28.35 kg/m  as calculated from the following:    Height as of this encounter: 1.753 m (5' 9\").    Weight as of this encounter: 87.1 kg (192 lb).    Physical Exam  GENERAL: alert and no distress  EYES: Eyes grossly normal to inspection,  conjunctivae and sclerae normal  HENT: ear canals and TM's normal, nose and mouth without ulcers or lesions  NECK: no adenopathy, no asymmetry, masses, or scars  RESP: lungs clear to auscultation - no rales, rhonchi or wheezes  CV: regular rate and rhythm  ABDOMEN: soft, nontender, no hepatosplenomegaly, no masses and bowel sounds normal  SKIN: surgical scar clean, dry, intact.   NEURO: Normal strength and tone, mentation intact and speech normal  PSYCH: mentation appears normal, affect normal/bright        4/23/2024   Mini Cog   Clock Draw Score 2 Normal   3 Item Recall 3 objects recalled   Mini Cog Total Score 5              Signed Electronically by: Raad Benitez DO    "

## 2024-04-24 DIAGNOSIS — J98.6 DIAPHRAGM PARALYSIS: Primary | ICD-10-CM

## 2024-04-25 NOTE — PROGRESS NOTES
CHIEF COMPLAINT / REASON FOR VISIT  Mr. Sanchez returned to discuss the results of the tests and evaluations.   Consult conducted via real-time audio/video technology by Katia Chung M.D. in [facility], Neurology Clinic  to the patient at home.    - MRI C-spine:   1. Multilevel degenerative changes  2. Mild spinal canal stenosis at C5-C6 and mild to moderate spinal  canal stenosis C6-C7.  3. Varying degrees of multilevel degenerative neural foraminal  Stenosis.  C3-C4, there is severe right and moderate to severe left neuroforaminal stenosis.  At C4-C5, there is moderate to severe right and mild to moderate left neuroforaminal stenosis.  At C5-C6, there is severe right and moderate to severe left neuroforaminal stenosis.  4. Patchy mild edema-like marrow signal changes centered about the  right C3-C4 facet joint (series 4 image 3), nonspecific, but possibly  reactive to degenerative facet disease.    -Hemoglobin A1c is 6.2%  -  -Acetylcholine receptor antibody was negative  -Vitamin B12, SPEP/JOSLYN, ASHVIN, ANCA, rheumatoid factor, KYLE, CRP, HIV, were normal and negative.  -Pompe disease blood spot was negative    He underwent diaphragm plication surgery on 3/26/2024. He is still recovering from the surgery. Everyday he feels better but still has a long way to go. He has pulmonary rehabilitation scheduled in the coming week.     The following portions of the patient's history were reviewed and updated as appropriate: allergies, current medications, family history, medical history, surgical history, social history, and problem list.     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 Labs did not show any evidence of underlying connective tissue/rheumatological diseases. CK level  could be considered normal with his body mass; prior exam did not show any muscle weakness that would suggest myopathy. The MRI showed cervical spondylosis with multilevel neural foraminal stenosis including severe stenosis of the right C3-C4, C4-C5, and C5-C6 where the phrenic nerve rises from. These findings may explain his right diaphragm weakness. Given he currently does not have any neck pain or radiating pain, I do not think there is any role for surgical intervention of the neck at this time. He agree with this plan and will continue to observe his symptoms.     Multiple questions were answered. No need to follow-up in neuromuscular clinic unless he were to develop new or worsening symptoms.     I spent a total of 30 minutes on the day of the visit for chart review, video visit, counseling/coordination of care, and documentation. Please see the note for further information on patient assessment and treatment.         PATIENT EDUCATION  Ready to learn, no apparent learning barriers were identified; learning preferences include listening.  Explained diagnosis and treatment plan; patient expressed understanding of the content.

## 2024-04-26 ENCOUNTER — VIRTUAL VISIT (OUTPATIENT)
Dept: NEUROLOGY | Facility: CLINIC | Age: 69
End: 2024-04-26
Payer: MEDICARE

## 2024-04-26 DIAGNOSIS — M54.12 CERVICAL RADICULOPATHY: ICD-10-CM

## 2024-04-26 DIAGNOSIS — J98.6 DIAPHRAGMATIC PARALYSIS: Primary | ICD-10-CM

## 2024-04-26 DIAGNOSIS — J98.4 RESTRICTIVE LUNG DISEASE: ICD-10-CM

## 2024-04-26 PROCEDURE — 99214 OFFICE O/P EST MOD 30 MIN: CPT | Mod: 95 | Performed by: STUDENT IN AN ORGANIZED HEALTH CARE EDUCATION/TRAINING PROGRAM

## 2024-04-26 NOTE — NURSING NOTE
Is the patient currently in the state of [place]? YES    Visit mode:VIDEO    If the visit is dropped, the patient can be reconnected by: TELEPHONE VISIT: Phone number:   Telephone Information:   Mobile [phone]       Will anyone else be joining the visit? NO  (If patient encounters technical issues they should call [phone]    How would you like to obtain your AVS? MyChart    Are changes needed to the allergy or medication list? Pt stated no med changes    Are refills needed on medications prescribed by this physician? NO    Reason for visit: Video Visit (MRI results )    Diann ADAMS

## 2024-04-26 NOTE — LETTER
4/26/2024       RE: Jamal Sanchez  [id number] [place] [address]       Dear Colleague,    Thank you for referring your patient, Jamal Sanchez, to the [facility]UROLOG[facility] [place] at [facility]. Please see a copy of my visit note below.    CHIEF COMPLAINT / REASON FOR VISIT  Mr. Sanchez returned to discuss the results of the tests and evaluations.   Consult conducted via real-time audio/video technology by Katia Chung M.D. in [facility], Neurology Clinic  to the patient at home.    - MRI C-spine:   1. Multilevel degenerative changes  2. Mild spinal canal stenosis at C5-C6 and mild to moderate spinal  canal stenosis C6-C7.  3. Varying degrees of multilevel degenerative neural foraminal  Stenosis.  C3-C4, there is severe right and moderate to severe left neuroforaminal stenosis.  At C4-C5, there is moderate to severe right and mild to moderate left neuroforaminal stenosis.  At C5-C6, there is severe right and moderate to severe left neuroforaminal stenosis.  4. Patchy mild edema-like marrow signal changes centered about the  right C3-C4 facet joint (series 4 image 3), nonspecific, but possibly  reactive to degenerative facet disease.    -Hemoglobin A1c is 6.2%  -  -Acetylcholine receptor antibody was negative  -Vitamin B12, SPEP/JOSLYN, ASHVIN, ANCA, rheumatoid factor, KYLE, CRP, HIV, were normal and negative.  -Pompe disease blood spot was negative    He underwent diaphragm plication surgery on 3/26/2024. He is still recovering from the surgery. Everyday he feels better but still has a long way to go. He has pulmonary rehabilitation scheduled in the coming week.     The following portions of the patient's history were reviewed and updated as appropriate: allergies, current medications, family history, medical history, surgical history, social history, and problem list.     ASSESSMENT / PLAN   #1 Chronic right  hemidiaphragm paralysis possibly from high cervical radiculopathy  #2 Restrictive lung disease secondary to #1  #3 Cervical spondylosis with multilevel neural foraminal stenosis     We discussed the results of the above tests. Labs did not show any evidence of underlying connective tissue/rheumatological diseases. CK level could be considered normal with his body mass; prior exam did not show any muscle weakness that would suggest myopathy. The MRI showed cervical spondylosis with multilevel neural foraminal stenosis including severe stenosis of the right C3-C4, C4-C5, and C5-C6 where the phrenic nerve rises from. These findings may explain his right diaphragm weakness. Given he currently does not have any neck pain or radiating pain, I do not think there is any role for surgical intervention of the neck at this time. He agree with this plan and will continue to observe his symptoms.     Multiple questions were answered. No need to follow-up in neuromuscular clinic unless he were to develop new or worsening symptoms.     I spent a total of 30 minutes on the day of the visit for chart review, face-to-face visit, counseling/coordination of care, and documentation. Please see the note for further information on patient assessment and treatment.         PATIENT EDUCATION  Ready to learn, no apparent learning barriers were identified; learning preferences include listening.  Explained diagnosis and treatment plan; patient expressed understanding of the content.        Again, thank you for allowing me to participate in the care of your patient.      Sincerely,    Katia Chung MD

## 2024-05-01 ENCOUNTER — MYC REFILL (OUTPATIENT)
Dept: FAMILY MEDICINE | Facility: CLINIC | Age: 69
End: 2024-05-01
Payer: MEDICARE

## 2024-05-01 DIAGNOSIS — M62.838 MUSCLE SPASM: Primary | ICD-10-CM

## 2024-05-02 ENCOUNTER — HOSPITAL ENCOUNTER (OUTPATIENT)
Dept: CARDIAC REHAB | Facility: CLINIC | Age: 69
Discharge: HOME | End: 2024-05-02
Attending: FAMILY MEDICINE
Payer: MEDICARE

## 2024-05-02 PROCEDURE — G0238 OTH RESP PROC, INDIV: HCPCS | Performed by: REHABILITATION PRACTITIONER

## 2024-05-02 NOTE — TELEPHONE ENCOUNTER
Pending Prescriptions:                       Disp   Refills    methocarbamol (ROBAXIN) 500 MG tablet                         Sig: Take 1 tablet (500 mg) by mouth 4 times daily as           needed for muscle spasms    Routing refill request to provider for review/approval because:  Drug not on the [facility] refill protocol   Medication is reported/historical      Kam Dudley RN

## 2024-05-03 ENCOUNTER — OFFICE VISIT (OUTPATIENT)
Dept: UROLOGY | Facility: CLINIC | Age: 69
End: 2024-05-03
Payer: MEDICARE

## 2024-05-03 VITALS
HEART RATE: 78 BPM | TEMPERATURE: 96.8 F | HEIGHT: 69 IN | WEIGHT: 192 LBS | BODY MASS INDEX: 28.44 KG/M2 | SYSTOLIC BLOOD PRESSURE: 126 MMHG | DIASTOLIC BLOOD PRESSURE: 76 MMHG | OXYGEN SATURATION: 99 %

## 2024-05-03 DIAGNOSIS — N52.9 ERECTILE DYSFUNCTION, UNSPECIFIED ERECTILE DYSFUNCTION TYPE: ICD-10-CM

## 2024-05-03 DIAGNOSIS — Z12.5 SCREENING FOR PROSTATE CANCER: Primary | ICD-10-CM

## 2024-05-03 DIAGNOSIS — N32.0 BLADDER OUTLET OBSTRUCTION: ICD-10-CM

## 2024-05-03 LAB — PSA SERPL DL<=0.01 NG/ML-MCNC: 1.74 NG/ML (ref 0–6.5)

## 2024-05-03 PROCEDURE — G0103 PSA SCREENING: HCPCS | Performed by: STUDENT IN AN ORGANIZED HEALTH CARE EDUCATION/TRAINING PROGRAM

## 2024-05-03 PROCEDURE — 99214 OFFICE O/P EST MOD 30 MIN: CPT | Performed by: STUDENT IN AN ORGANIZED HEALTH CARE EDUCATION/TRAINING PROGRAM

## 2024-05-03 PROCEDURE — 36415 COLL VENOUS BLD VENIPUNCTURE: CPT | Performed by: STUDENT IN AN ORGANIZED HEALTH CARE EDUCATION/TRAINING PROGRAM

## 2024-05-03 RX ORDER — TADALAFIL 10 MG/1
TABLET ORAL
Qty: 30 TABLET | Refills: 4 | Status: SHIPPED | OUTPATIENT
Start: 2024-05-03

## 2024-05-03 RX ORDER — METHOCARBAMOL 500 MG/1
500 TABLET, FILM COATED ORAL 4 TIMES DAILY PRN
Qty: 120 TABLET | Refills: 1 | Status: SHIPPED | OUTPATIENT
Start: 2024-05-03

## 2024-05-03 RX ORDER — TAMSULOSIN HYDROCHLORIDE 0.4 MG/1
0.4 CAPSULE ORAL DAILY
Qty: 90 CAPSULE | Refills: 3 | Status: SHIPPED | OUTPATIENT
Start: 2024-05-03

## 2024-05-03 ASSESSMENT — PAIN SCALES - GENERAL: PAINLEVEL: NO PAIN (0)

## 2024-05-03 NOTE — PROGRESS NOTES
"    UROLOGY FOLLOW-UP NOTE          Chief Complaint:   Today I had the pleasure of seeing Mr. Jamal Sanchez in follow-up for a chief complaint of LUTS.          Interval Update   Jamal Sacnhez is a very pleasant 69 year old male with a history of CAD, prediabetes, diverticulitis, and HTN.     Brief History: Mr. Jamal Sanchez has followed with urology for LUTS and ED. He was taking tamsulosin 0.4 mg and oxybutynin XR 10 mg daily. He stopped the oxybutynin recently without worsening symptoms. He is currently using tadalafil for ED.     Today's notes: He is doing well. He reports nocturia x 2. He denies dysuria and gross hematuria.          Physical Exam:   Patient is a 69 year old  male   Vitals: Blood pressure 126/76, pulse 78, temperature 96.8  F (36  C), temperature source Tympanic, height 1.753 m (5' 9\"), weight 87.1 kg (192 lb), SpO2 99%.  General: Alert and oriented x 3, no acute distress.  Respiratory: Non-labored breathing.  Cardiac: Regular rate.        Labs and Pathology:    I personally reviewed all applicable laboratory data and went over findings with patient  Significant for:    CBC RESULTS:  Recent Labs   Lab Test 03/05/24  1127 04/13/23  0950 09/14/22  1636 09/07/22  0917   WBC 4.7 4.6 6.3 5.5   HGB 14.9 14.7 14.9 15.1    185 186 202        BMP RESULTS:  Recent Labs   Lab Test 04/23/24  1147 03/05/24  1127 04/13/23  0950 03/29/23  1103 11/23/21  1243 02/22/21  1428 10/01/19  1536 07/10/19  0820 07/02/19  0927    136 137 138   < > 136 137 133 137   POTASSIUM 4.5 4.7 4.0 4.4   < > 4.2 5.0 3.9 3.9   CHLORIDE 98 98 99 98   < > 102 104 100 105   CO2 24 28 28 25   < > 28 29 30 26   ANIONGAP 14 10 10 15   < > 6 4 3 6   * 156* 100* 88   < > 90 91 125* 126*   BUN 10.0 10.9 16.9 15.4   < > 23 19 18 17   CR 0.82 0.90 1.01 0.96   < > 0.89 0.92 0.98 0.91   GFRESTIMATED >90 >90 81 86   < > 88 86 80 88   GFRESTBLACK  --   --   --   --   --  >90 >90 >90 >90   ALYCE 10.1 9.9 9.4 10.1   < > " 9.4 9.5 9.5 9.1    < > = values in this interval not displayed.       UA RESULTS:   Recent Labs   Lab Test 07/02/19  1255 10/08/18  1652 06/19/18  0757 12/19/17  1530 11/07/17  0835   SG 1.005 <=1.005 1.010 1.010 1.010   URINEPH 7.0 6.0 7.0 7.0 7.0   NITRITE Negative Negative Negative Negative Negative   RBCU  --   --  O - 2 O - 2 O - 2   WBCU  --   --  0 - 5 O - 2 O - 2       PSA RESULTS  PSA   Date Value Ref Range Status   07/09/2021 1.60 0 - 4 ug/L Final     Comment:     Assay Method:  Chemiluminescence using Siemens Vista analyzer   10/01/2019 1.63 0 - 4 ug/L Final     Comment:     Assay Method:  Chemiluminescence using Siemens Vista analyzer   05/12/2014 1.86 0 - 4 ug/L Final   11/11/2009 1.05 0 - 4 ug/L Final   07/18/2007 0.88 0 - 4 ug/L Final   12/03/2003 1.15 0 - 4 ug/L Final     Prostate Specific Antigen Screen   Date Value Ref Range Status   11/14/2022 1.63 0.00 - 4.50 ng/mL Final                Assessment/Plan   69 year old male seen in follow up for LUTS and ED. He was taking tamsulosin 0.4 mg and oxybutynin XR 10 mg daily. He stopped the oxybutynin recently without worsening symptoms. He is currently using tadalafil for ED.     His symptoms are currently well managed. He reports nocturia x 2.    Plan:  Continue tamsulosin 0.4 mg daily.   Continue tadalafil 10-20 mg prn for sexual activity.   PSA today for annual prostate cancer screening.   Follow up in one year, sooner if concerns.              Past Medical History:     Past Medical History:   Diagnosis Date    A-fib (H)     BPH (benign prostatic hyperplasia)     Complication of anesthesia     Diverticulitis     GERD (gastroesophageal reflux disease)     Heart disease     HLD (hyperlipidemia)     Hypertension             Past Surgical History:     Past Surgical History:   Procedure Laterality Date    APPENDECTOMY      ARTHROSCOPY SHOULDER ROTATOR CUFF REPAIR Left     ARTHROSCOPY SHOULDER RT/LT Right 01/2018    CARDIAC SURGERY  November, 2022    One  stent placed in my heart.    COLONOSCOPY  03/05/2008    COLONOSCOPY  10/09/2013    Procedure: COLONOSCOPY;;  Surgeon: Nicolas Lizama MD;  Location: [facility]    COLONOSCOPY N/A 08/17/2022    Procedure: COLONOSCOPY, FLEXIBLE, WITH LESION REMOVAL USING SNARE;  Surgeon: Vimal Stanley DO;  Location: [facility]    CV CORONARY ANGIOGRAM N/A 09/07/2022    Procedure: Coronary Angiogram;  Surgeon: Morris Sky MD;  Location:  [facility]    CV INSTANTANEOUS WAVE-FREE RATIO N/A 09/07/2022    Procedure: Instantaneous Wave-Free Ratio;  Surgeon: Morris Sky MD;  Location:  [facility]    CV LEFT HEART CATH N/A 09/07/2022    Procedure: Left Heart Catheterization;  Surgeon: Morris Sky MD;  Location: [facility] CARDIAC CATH LAB    CV LEFT VENTRICULOGRAM N/A 09/07/2022    Procedure: Left Ventriculogram;  Surgeon: Morris Sky MD;  Location:  [facility]    CV PCI STENT DRUG ELUTING N/A 09/07/2022    Procedure: Percutaneous Coronary Intervention Stent;  Surgeon: Morris Sky MD;  Location: [facility] CARDIAC CATH LAB    ESOPHAGOSCOPY, GASTROSCOPY, DUODENOSCOPY (EGD), COMBINED  10/09/2013    Procedure: COMBINED ESOPHAGOSCOPY, GASTROSCOPY, DUODENOSCOPY (EGD), BIOPSY SINGLE OR MULTIPLE;;  Surgeon: Nicolas Lizama MD;  Location: [facility]    ESOPHAGOSCOPY, GASTROSCOPY, DUODENOSCOPY (EGD), COMBINED N/A 04/06/2022    Procedure: ESOPHAGOGASTRODUODENOSCOPY, WITH BIOPSY;  Surgeon: Vimal Stanley DO;  Location: [facility]    ESOPHAGOSCOPY, GASTROSCOPY, DUODENOSCOPY (EGD), COMBINED N/A 07/10/2023    Procedure: Esophagoscopy, gastroscopy, duodenoscopy, combined;  Surgeon: Vimal Stanley DO;  Location: [facility]    HERNIA REPAIR      LAPAROSCOPIC APPENDECTOMY N/A 01/07/2016    Procedure: LAPAROSCOPIC APPENDECTOMY;  Surgeon: Ab Guzman MD;  Location: [facility]    LAPAROSCOPIC HERNIORRHAPHY INGUINAL BILATERAL Bilateral 12/13/2016    Procedure:  LAPAROSCOPIC HERNIORRHAPHY INGUINAL BILATERAL;  Surgeon: Ab Guzman MD;  Location: [facility]    SURGICAL HISTORY OF -       diaphragm plication for diaphragm paralysis [place] 3/26/2024            Medications     Current Outpatient Medications   Medication Sig Dispense Refill    atorvastatin (LIPITOR) 40 MG tablet Take 1 tablet (40 mg) by mouth daily 90 tablet 3    calcium carbonate (TUMS) 500 MG chewable tablet Take 1-2 chew tab by mouth daily as needed      clopidogrel (PLAVIX) 75 MG tablet Take 1 tablet (75 mg) by mouth daily 90 tablet 3    cyclobenzaprine (FLEXERIL) 5 MG tablet Take 1 tablet (5 mg) by mouth 3 times daily as needed for muscle spasms 30 tablet 1    famotidine (PEPCID) 20 MG tablet Take 1 tablet (20 mg) by mouth 2 times daily 180 tablet 3    gabapentin (NEURONTIN) 100 MG capsule Take 1 capsule (100 mg) by mouth 3 times daily 90 capsule 1    lisinopril (ZESTRIL) 10 MG tablet Take 1 tablet (10 mg) by mouth daily Hold if Systolic Blood Pressure is less than 85. 90 tablet 3    methocarbamol (ROBAXIN) 500 MG tablet Take 500 mg by mouth 4 times daily as needed for muscle spasms      metoprolol succinate ER (TOPROL XL) 25 MG 24 hr tablet Take 1 tablet (25 mg) by mouth daily 90 tablet 2    multivitamin (CENTRUM SILVER) tablet Take 1 tablet by mouth daily      nitroGLYcerin (NITROSTAT) 0.4 MG sublingual tablet For chest pain place 1 tablet under the tongue every 5 minutes for 3 doses. If symptoms persist 5 minutes after 2nd dose call 911. 25 tablet 3    pantoprazole (PROTONIX) 40 MG EC tablet Take 1 tablet (40 mg) by mouth 2 times daily On an empty stomach about 30 minutes before a meal 60 tablet 1    sildenafil (REVATIO) 20 MG tablet Take 2 to 5 tablets 1 hour prior to intercourse      tadalafil (CIALIS) 10 MG tablet Take 1-2 tablets daily as needed, 30-45 minutes prior to intended sexual activity 30 tablet 4    tamsulosin (FLOMAX) 0.4 MG capsule Take 1 capsule (0.4 mg) by mouth daily PLEASE SCHEDULE MAY  FOLLOW UP APPT 90 capsule 0    triamcinolone (KENALOG) 0.1 % external cream Apply topically 3 times daily 80 g 2    triamterene-HCTZ (MAXZIDE) 75-50 MG tablet Take 1 tablet by mouth daily 90 tablet 3    Urea 40 % CREA Apply to feet  g 11     No current facility-administered medications for this visit.            Family History:     Family History   Problem Relation Age of Onset    Diabetes Mother         Diabetirs. Stents in back of her brain. Surgery left my mother with a cholostomy bag. She chose hospice.  st age 86.    Cerebrovascular Disease Mother     Neurologic Disorder Father         parkinson's    Diabetes Father         Stents in his necjk.. Parkinsons disease, Failure to thrive. Doed st age 86.    Diabetes Maternal Grandmother         Heart attack at about age77.    Cancer - colorectal Maternal Grandfather     Diabetes Maternal Grandfather         Prostate Cancer at about age 72.    Prostate Cancer Paternal Grandmother     Hypertension Brother     Cerebrovascular Disease Brother         stroke    Alcohol/Drug Brother     Unknown/Adopted Brother     Diabetes Brother         Alcholism. High Blood Pressure.Stroke. still alive. He is 72 years old    Diabetes Sister         Obesity. Adebayo-Art disease. She is stillalive at 75. Living in a rehabilitation center for about 2 years now.    Obesity Sister             Social History:     Social History     Socioeconomic History    Marital status:      Spouse name: Not on file    Number of children: Not on file    Years of education: Not on file    Highest education level: Not on file   Occupational History    Not on file   Tobacco Use    Smoking status: Never    Smokeless tobacco: Never   Vaping Use    Vaping status: Never Used   Substance and Sexual Activity    Alcohol use: Yes     Comment: 6 pack beer on weekend.    Drug use: No    Sexual activity: Yes     Partners: Female     Birth control/protection: Post-menopausal   Other Topics Concern     Parent/sibling w/ CABG, MI or angioplasty before 65F 55M? 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      Social Determinants of Health     Financial Resource Strain: Low Risk  (4/23/2024)    Financial Resource Strain     Within the past 12 months, have you or your family members you live with been unable to get utilities (heat, electricity) when it was really needed?: No   Food Insecurity: Low Risk  (4/23/2024)    Food Insecurity     Within the past 12 months, did you worry that your food would run out before you got money to buy more?: No     Within the past 12 months, did the food you bought just not last and you didn t have money to get more?: No   Transportation Needs: Low Risk  (4/23/2024)    Transportation Needs     Within the past 12 months, has lack of transportation kept you from medical appointments, getting your medicines, non-medical meetings or appointments, work, or from getting things that you need?: No   Physical Activity: Unknown (4/23/2024)    Exercise Vital Sign     Days of Exercise per Week: 3 days     Minutes of Exercise per Session: Not on file   Stress: No Stress Concern Present (4/23/2024)    [employer] - Occupational Stress Questionnaire     Feeling of Stress : Only a little   Social Connections: Unknown (4/23/2024)    Social Connection and Isolation Panel [NHANES]     Frequency of Communication with Friends and Family: Not on file     Frequency of Social Gatherings with Friends and Family: Twice a week     Attends Anabaptist Services: Not on file     Active Member of Clubs or Organizations: Not on file     Attends Club or Organization Meetings: Not on file     Marital Status: Not on file   Interpersonal Safety: Low Risk  (4/23/2024)    Interpersonal Safety     Do you feel physically and emotionally safe where you currently live?: Yes     Within the past 12 months, have you been hit, slapped, kicked or otherwise  physically hurt by someone?: No     Within the past 12 months, have you been humiliated or emotionally abused in other ways by your partner or ex-partner?: No   Housing Stability: Low Risk  (4/23/2024)    Housing Stability     Do you have housing? : Yes     Are you worried about losing your housing?: No            Allergies:   Nka [no known allergies]         Review of Systems:  From intake questionnaire   Negative 14 system review except as noted on HPI, nurse's note.        KULDEEP AMBROSE PA-C  Department of Urology

## 2024-05-04 DIAGNOSIS — R13.10 DYSPHAGIA, UNSPECIFIED TYPE: ICD-10-CM

## 2024-05-04 DIAGNOSIS — K21.00 GASTROESOPHAGEAL REFLUX DISEASE WITH ESOPHAGITIS WITHOUT HEMORRHAGE: ICD-10-CM

## 2024-05-06 RX ORDER — PANTOPRAZOLE SODIUM 40 MG/1
40 TABLET, DELAYED RELEASE ORAL 2 TIMES DAILY
Qty: 60 TABLET | Refills: 1 | Status: SHIPPED | OUTPATIENT
Start: 2024-05-06 | End: 2024-07-01

## 2024-05-06 NOTE — TELEPHONE ENCOUNTER
Routing refill request to provider for review/approval because:  Prescriber no longer with [facility]

## 2024-05-07 ENCOUNTER — HOSPITAL ENCOUNTER (OUTPATIENT)
Dept: CARDIAC REHAB | Facility: CLINIC | Age: 69
Discharge: HOME | End: 2024-05-07
Attending: FAMILY MEDICINE
Payer: MEDICARE

## 2024-05-07 PROCEDURE — G0239 OTH RESP PROC, GROUP: HCPCS

## 2024-05-09 ENCOUNTER — HOSPITAL ENCOUNTER (OUTPATIENT)
Dept: CARDIAC REHAB | Facility: CLINIC | Age: 69
Discharge: HOME | End: 2024-05-09
Attending: FAMILY MEDICINE
Payer: MEDICARE

## 2024-05-09 PROCEDURE — G0239 OTH RESP PROC, GROUP: HCPCS

## 2024-06-07 ENCOUNTER — TELEPHONE (OUTPATIENT)
Dept: NEUROLOGY | Facility: CLINIC | Age: 69
End: 2024-06-07
Payer: MEDICARE

## 2024-06-07 NOTE — TELEPHONE ENCOUNTER
Sent Mychart (1st Attempt) and Left Voicemail (2nd Attempt) for the patient to call back and schedule the following:    Appointment type: Resched July appt   Provider: Dr. Chung  Return date: July 2024  Specialty phone number: [phone]  Additional appointment(s) needed:   Additonal Notes:     Can check [facility] [place]

## 2024-06-12 ENCOUNTER — TRANSFERRED RECORDS (OUTPATIENT)
Dept: HEALTH INFORMATION MANAGEMENT | Facility: CLINIC | Age: 69
End: 2024-06-12
Payer: MEDICARE

## 2024-06-18 ENCOUNTER — HOSPITAL ENCOUNTER (OUTPATIENT)
Dept: CARDIAC REHAB | Facility: CLINIC | Age: 69
Discharge: HOME | End: 2024-06-18
Attending: FAMILY MEDICINE
Payer: MEDICARE

## 2024-06-18 PROCEDURE — G0239 OTH RESP PROC, GROUP: HCPCS

## 2024-06-20 ENCOUNTER — HOSPITAL ENCOUNTER (OUTPATIENT)
Dept: CARDIAC REHAB | Facility: CLINIC | Age: 69
Discharge: HOME | End: 2024-06-20
Attending: FAMILY MEDICINE
Payer: MEDICARE

## 2024-06-20 PROCEDURE — G0239 OTH RESP PROC, GROUP: HCPCS

## 2024-06-25 ENCOUNTER — HOSPITAL ENCOUNTER (OUTPATIENT)
Dept: CARDIAC REHAB | Facility: CLINIC | Age: 69
Discharge: HOME | End: 2024-06-25
Attending: FAMILY MEDICINE
Payer: MEDICARE

## 2024-06-25 PROCEDURE — G0239 OTH RESP PROC, GROUP: HCPCS

## 2024-07-01 DIAGNOSIS — R13.10 DYSPHAGIA, UNSPECIFIED TYPE: ICD-10-CM

## 2024-07-01 DIAGNOSIS — K21.00 GASTROESOPHAGEAL REFLUX DISEASE WITH ESOPHAGITIS WITHOUT HEMORRHAGE: ICD-10-CM

## 2024-07-08 ENCOUNTER — OFFICE VISIT (OUTPATIENT)
Dept: NEUROLOGY | Facility: CLINIC | Age: 69
End: 2024-07-08
Payer: MEDICARE

## 2024-07-08 VITALS — SYSTOLIC BLOOD PRESSURE: 131 MMHG | DIASTOLIC BLOOD PRESSURE: 72 MMHG | OXYGEN SATURATION: 97 % | HEART RATE: 52 BPM

## 2024-07-08 DIAGNOSIS — M54.12 CERVICAL RADICULOPATHY: Primary | ICD-10-CM

## 2024-07-08 DIAGNOSIS — J98.4 RESTRICTIVE LUNG DISEASE: ICD-10-CM

## 2024-07-08 DIAGNOSIS — J98.6 DIAPHRAGMATIC PARALYSIS: ICD-10-CM

## 2024-07-08 PROBLEM — I11.9 HYPERTENSIVE HEART DISEASE WITHOUT HEART FAILURE: Status: ACTIVE | Noted: 2023-10-06

## 2024-07-08 PROBLEM — R00.0 TACHYCARDIA: Status: ACTIVE | Noted: 2023-03-30

## 2024-07-08 PROBLEM — K57.30 DIVERTICULOSIS OF LARGE INTESTINE WITHOUT PERFORATION OR ABSCESS WITHOUT BLEEDING: Status: ACTIVE | Noted: 2019-01-21

## 2024-07-08 PROCEDURE — 99214 OFFICE O/P EST MOD 30 MIN: CPT | Performed by: STUDENT IN AN ORGANIZED HEALTH CARE EDUCATION/TRAINING PROGRAM

## 2024-07-08 RX ORDER — RESPIRATORY SYNCYTIAL VIRUS VACCINE 120MCG/0.5
0.5 KIT INTRAMUSCULAR ONCE
COMMUNITY
Start: 2023-10-27

## 2024-07-08 RX ORDER — ACETAMINOPHEN 500 MG
500 TABLET ORAL EVERY 6 HOURS PRN
COMMUNITY
Start: 2024-03-30

## 2024-07-08 RX ORDER — COVID-19 VACCINE, MRNA 50 UG/.5ML
50 INJECTION, SUSPENSION INTRAMUSCULAR ONCE
COMMUNITY
Start: 2023-10-27

## 2024-07-08 NOTE — PROGRESS NOTES
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  It took him 6 weeks to recover.  He is \" glad he did the surgery\".  His breathing significantly improved.  He can walk his dog for 3 miles without getting short of breath.  He can now lie on his back and side without any issues.  He reports chronic neck ache and crunches.  Denies any radicular pain down the arms.  No worsening weakness or numbness in the arms.  No new symptoms.    The following portions of the patient's history were reviewed and updated as appropriate: allergies, current medications, family history, medical history, surgical history, social history, and problem list.     PHYSICAL EXAM    NEUROLOGICAL EXAMINATION  Mental status: normal.  Cranial nerves: normal. No ptosis. Full EOM. NO facial weakness. No tongue fasciculations.   Muscle strength: Normal muscle strength 5/5 proximally and distally in upper and lower limbs.No fasciculations. No fatigability. No muscle atrophy of the right upper limb. No scapular winging. No truncal waekness able to sit up from supine without any difficulties  Sensation: Intact sensation to light touch, pinprick, cold temperature, vibration, and joint proprioception in upper and lower limbs.   Deep tendon reflexes: Normal reflexes in the upper and lower limbs including intact bilateral ankle reflexes  Coordination: normal rapid alternating movements and finger to nose testing  Gait: normal.  Walk on heels: yes, bilaterally.  Walk on toes: yes, bilaterally.    Arise from squatting position: yes.  Getting up from seated position without pushing on chair: yes.  Posture: normal.  Romberg: negative.    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 Sanchez is doing well after right diaphragm plication surgery.  We again discussed the results of his MRI, which showed cervical spondylosis with multilevel neuroforaminal stenosis including severe stenosis of the right C3-C4, C4-C5, and C5-C6 where the phrenic nerve rises from. These findings may explain his right diaphragm weakness. Given he currently does not have any radiating pain, weakness or numbness in the arms, I do not think there is any role for surgical intervention of the neck at this time.  There continues to be no symptoms or sign of myopathy, motor neuron, or neuromuscular junction disorder.  He should continue to observe his symptoms.  He should continue active lifestyle and regular exercise. he will let me know if he were to develop new or worsening symptoms.    I spent a total of 30 minutes on the day of the visit for chart review, face-to-face visit, counseling/coordination of care, and documentation. Please see the note for further information on patient assessment and treatment.         PATIENT EDUCATION  Ready to learn, no apparent learning barriers were identified; learning preferences include listening.  Explained diagnosis and treatment plan; patient expressed understanding of the content.

## 2024-07-08 NOTE — LETTER
"7/8/2024      Jamal Sanchez Jr.  [address] [address] [place] [id number]      Dear Colleague,    Thank you for referring your patient, Jamal Sanchez Jr., to the [facility]. Please see a copy of my visit note below.    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  It took him 6 weeks to recover.  He is \" glad he did the surgery\".  His breathing significantly improved.  He can walk his dog for 3 miles without getting short of breath.  He can now lie on his back and side without any issues.  He reports chronic neck ache and crunches.  Denies any radicular pain down the arms.  No worsening weakness or numbness in the arms.  No new symptoms.    The following portions of the patient's history were reviewed and updated as appropriate: allergies, current medications, family history, medical history, surgical history, social history, and problem list.     PHYSICAL EXAM    NEUROLOGICAL EXAMINATION  Mental status: normal.  Cranial nerves: normal. No ptosis. Full EOM. NO facial weakness. No tongue fasciculations.   Muscle strength: Normal muscle strength 5/5 proximally and distally in upper and lower limbs.No fasciculations. No fatigability. No muscle atrophy of the right upper limb. No scapular winging. No truncal waekness able to sit up from supine without any difficulties  Sensation: Intact sensation to light touch, pinprick, cold temperature, vibration, and joint proprioception in upper and lower limbs.   Deep tendon reflexes: Normal reflexes in the upper and lower limbs including intact bilateral ankle reflexes  Coordination: normal rapid alternating movements and finger to nose testing  Gait: normal.  Walk on heels: yes, bilaterally.  Walk on toes: yes, bilaterally.    Arise from squatting " position: yes.  Getting up from seated position without pushing on chair: yes.  Posture: normal.  Romberg: negative.    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 Sanchez is doing well after right diaphragm plication surgery.  We again discussed the results of his MRI, which showed cervical spondylosis with multilevel neuroforaminal stenosis including severe stenosis of the right C3-C4, C4-C5, and C5-C6 where the phrenic nerve rises from. These findings may explain his right diaphragm weakness. Given he currently does not have any radiating pain, weakness or numbness in the arms, I do not think there is any role for surgical intervention of the neck at this time.  There continues to be no symptoms or sign of myopathy, motor neuron, or neuromuscular junction disorder.  He should continue to observe his symptoms.  He should continue active lifestyle and regular exercise. he will let me know if he were to develop new or worsening symptoms.    I spent a total of 30 minutes on the day of the visit for chart review, face-to-face visit, counseling/coordination of care, and documentation. Please see the note for further information on patient assessment and treatment.         PATIENT EDUCATION  Ready to learn, no apparent learning barriers were identified; learning preferences include listening.  Explained diagnosis and treatment plan; patient expressed understanding of the content.      Again, thank you for allowing me to participate in the care of your patient.        Sincerely,        Katia Chung MD

## 2024-07-09 RX ORDER — PANTOPRAZOLE SODIUM 40 MG/1
40 TABLET, DELAYED RELEASE ORAL 2 TIMES DAILY
Qty: 180 TABLET | Refills: 2 | Status: SHIPPED | OUTPATIENT
Start: 2024-07-09 | End: 2024-08-14

## 2024-07-09 NOTE — TELEPHONE ENCOUNTER
PPI Protocol Passed     pantoprazole (PROTONIX) 40 MG EC tablet       60 tablet 1 5/6/2024     Last Office Visit : 4-3-2024  Future Office visit:  8-

## 2024-07-15 DIAGNOSIS — L29.9 ITCHING: ICD-10-CM

## 2024-07-15 RX ORDER — TRIAMCINOLONE ACETONIDE 1 MG/G
CREAM TOPICAL 3 TIMES DAILY
Qty: 80 G | Refills: 0 | Status: SHIPPED | OUTPATIENT
Start: 2024-07-15

## 2024-07-15 NOTE — TELEPHONE ENCOUNTER
Due for December 2025 Derm appt. Needs appointment. Courtesy letter sent and note sent to pharmacy as well. Ebony Turcios RN

## 2024-07-15 NOTE — LETTER
July 15, 2024      Jamal Sanchez Jr.  [address] [address] [place] [phone]        Dear Jamal,     We recently provided you with a medication refill. Prescription medications require routine follow-up appointments with your Dermatology Provider.      Per [facility] medication refill protocol, you do need to be seen at least annually to renew a prescription while on prescribed medication(s). A prescription is valid for only one year before it expires.      At this time we ask that: You schedule a routine January 2025 follow up Dermatology office visit to follow your skin issues and renew your prescription(s).    Your prescription: Has been refilled as requested.     We are currently booking Dermatology appointments into January 2025, so please schedule follow up Dermatology appointment as soon as possible to avoid going without medication.    [phone] to schedule a Dermatology appointment-all locations. You may be seen for follow up with any of our 3 Dermatology Providers via telephone or virtual video if you prefer-as long as you have been seen in person once in the last 3 years..     Sincerely,      Stephanie Edgar PA-C / [facility]

## 2024-07-15 NOTE — TELEPHONE ENCOUNTER
Requested Prescriptions   Pending Prescriptions Disp Refills    triamcinolone (KENALOG) 0.1 % external cream 80 g 2     Sig: Apply topically 3 times daily       There is no refill protocol information for this order          Last office visit: 12/14/2023 ; last virtual visit: Visit date not found with prescribing provider:  Stephanie Edgar     Future Office Visit:        Lulu Gtz   Clinic Station    [facility]  [phone]

## 2024-07-31 ENCOUNTER — MYC MEDICAL ADVICE (OUTPATIENT)
Dept: GASTROENTEROLOGY | Facility: CLINIC | Age: 69
End: 2024-07-31
Payer: MEDICARE

## 2024-08-09 NOTE — TELEPHONE ENCOUNTER
Called to remind patient of their upcoming appointment with our GI clinic, on Wed Aug 14th at 1:00pm with Stephanie Nix PA-C. This appointment is scheduled as an in-person appt. Please arrive 15 minutes early to check in for your appointment. , if your appointment is virtual (video or telephone) you need to be in [place] for the visit. To reschedule or cancel patient to call [phone].    Love Bejarano

## 2024-08-13 NOTE — PROGRESS NOTES
GI CLINIC VISIT    CC: follow up       ASSESSMENT/PLAN:  1. Gastroesophageal reflux disease with esophagitis without hemorrhage  2. Dysphagia, unspecified type  3. Hiatal hernia  - pantoprazole (PROTONIX) 20 MG EC tablet; Take 1 tablet (20 mg) by mouth 2 times daily On an empty stomach about 30 minutes before a meal  Dispense: 180 tablet; Refill: 3  - Adult GI Clinic Follow-Up Order (Blank); Future    Jamal Sanchez is a 69 year old male with pmhx with of coronary artery disease status post recent PCI with stent on Plavix, hyperlipidemia, GERD and hiatal hernia who presents for follow-up. He has recovered from plication diaphragm thoracotomy and reports he has been doing better. Reflux continues to be well managed with protonix 40 mg twice daily and pepcid 20 milligrams twice daily. There was previous discussion on decreasing doses at this follow up however dose was kept the same during his recovery from surgery.  It is very rare for him to have any breakthrough acid reflux symptoms.  He would like to cut down on his med list if possible.  He does have a history of LA grade B esophagitis without Freire's but he would likely need some PPI indefinitely.  He does take high-dose Protonix on 40 mg twice daily.  He would like to try decreasing to 20 mg twice daily and he will continue on famotidine 20 mg twice daily as well. If he has any recurrence of dysphagia symptoms or significant increase in heartburn, will recommend going back up to 40 mg twice daily Protonix.       - Decrease Protonix to to 20 mg twice daily - new prescription sent   - Continue Famotidine 20 mg twice daily   - Follow up in 3 months for a video visit      Specialty Problems          Gastroenterology Diagnoses    GERD (gastroesophageal reflux disease)        Appendicitis        Acute diverticulitis        Generalized abdominal pain        Diverticulitis of colon        Diverticulosis of large intestine without hemorrhage        Diverticulosis of  large intestine without perforation or abscess without bleeding        Hiatal hernia        Dysphagia, unspecified type           RTC 3 months    It was a pleasure to participate in the care of this patient; please contact us with any further questions.  A total of 24 face-to-face minutes was spent with this patient, >50% of which was counseling regarding the above delineated issues. An additional 12 minutes was spent on the date of the encounter doing chart review, documentation, and further activities as noted above.    Stephanie Nix PA-C  Division of Gastroenterology, Hepatology and Nutrition  [facility]    ---------------------------------------------------------------------------------------------------  HPI:  Jamal Sanchez Jr. is a 69 year old male with past medical history significant for coronary artery disease status post PCI with stent on Plavix, hyperlipidemia, GERD and hiatal hernia  who presents for follow up on reflux and dysphagia.    He was initially evaluated in our GI clinic with Dr. Marisela De Leon in December 2022 and then worked with Narendra Kramer PA-C. Briefly, he has a long history of reflux which unfortunately was not well controlled despite omeprazole 40 mg daily and famotidine 20 mg twice daily.  He continued to have chest discomfort, persistent reflux symptoms as well as difficulty swallowing, often feeling that foods were getting stuck in his chest and more difficult to dislodge.  As a result he has become very mindful of how he was eating and tried to eat smaller bites and chew his foods very well. A prior EGD in January 2022 noted esophageal stenosis treated with dilation, gastritis and medium sized hiatal hernia. Repeat EGD in April 2022 without stricture in the esophagus.     Interval history August 21, 2023  Since his last visit his omeprazole was switched to pantoprazole given an interaction with his Plavix. He has also continued famotidine 20mg twice daily. We updated  his EGD last month in July 2023 which noted LA grade B esophagitis, 3 cm hiatal hernia, and gastric polyps.  Biopsies with mild inflammation but negative for H. pylori and negative for Freire's esophagus.  Gastric polyps were benign. He reports that he does feel further improvement with the pantoprazole however still with reflux symptoms and dysphagia.  Throat feels scratchy all the time, sometimes results in coughing. He is concerned if this could be aspiration occurring.      He does have some acidic foods in his diet. He eats tomatoes about 3-4 times a day and uses garlic regularly in meals. Overall hasn't had an issue with these items. He denies carbonated beverages. He drinks electrolyte water. He does drink regularly. Not necessarily every day however does enjoy 6-8 beers a day max on weekends.      Interval history April 3, 2024   He recently had plication diaphragm thoracotomy last week at [place]. He is still recovering from surgery and has pain in his side. It hurts when he coughs. Reports slowly recovering from his surgery. He is on pain medication currently and has developed constipation as a result. He is able to mange with senna stool softeners. Reflux hasn't been a problem or concern recently. He continues his protonix 40mg twice daily and pepcid twice daily.      Interval history 8/2024:   He has recovered from surgery and he feels he can breathe better. Eats and drinks slowly. Eats somewhat smaller portion sizes. With BID protonix and famotidine, it is rare to have breakthrough symptoms. Takes Protonix 30 mins before eating. Will forget second dose sometimes. Will forget 3-4 times per week and will not typically notice any increase in symptoms. Had once instance with rice that felt lodged in the esophagus that eventually passed after a half an hour. This happened in January.  He makes an effort to take small bites and eats slowly. No dysphagia to liquids . Denies pain or pressure in upper abdomen with  meals. Rare regurgitation which happens upright as much as lying down. No nocturnal awakenings with symptoms. Not needing tums as much as he used to.     Daily bowel movements, soft and easy to pass. No blood in the stool.     Wt Readings from Last 10 Encounters:   08/14/24 88 kg (194 lb)   05/03/24 87.1 kg (192 lb)   04/23/24 87.1 kg (192 lb)   03/20/24 90.4 kg (199 lb 6.4 oz)   11/29/23 93.9 kg (207 lb)   10/05/23 93.9 kg (207 lb 0.2 oz)   09/26/23 93.9 kg (207 lb)   08/23/23 93.9 kg (207 lb)   07/10/23 93 kg (205 lb)   05/24/23 93 kg (205 lb)        ROS:    A 10 point review of systems was performed and is negative except as noted in the HPI.     PROBLEM LIST  Patient Active Problem List    Diagnosis Date Noted    S/P plication of diaphragm 04/23/2024     Priority: Medium    Hyperlipidemia 10/06/2023     Priority: Medium    Hypertensive heart disease without heart failure 10/06/2023     Priority: Medium    Dysphagia, unspecified type 05/12/2023     Priority: Medium    Tachycardia 03/30/2023     Priority: Medium    Prediabetes 03/29/2023     Priority: Medium    Coronary artery disease of native heart with stable angina pectoris, unspecified vessel or lesion type (H24) 03/21/2023     Priority: Medium    Status post coronary angiogram 09/07/2022     Priority: Medium    Dyspnea on exertion 07/12/2022     Priority: Medium    Hiatal hernia 01/04/2022     Priority: Medium    Anemia, iron deficiency 11/25/2021     Priority: Medium    Diverticulosis of large intestine without hemorrhage 01/21/2019     Priority: Medium    Diverticulitis of colon 01/21/2019     Priority: Medium    Diverticulosis of large intestine without perforation or abscess without bleeding 01/21/2019     Priority: Medium    ETOH abuse 11/25/2018     Priority: Medium    Acute diverticulitis 11/22/2018     Priority: Medium    Generalized abdominal pain 11/22/2018     Priority: Medium    Appendicitis 01/06/2016     Priority: Medium    Hypertension, goal  "below 140/90 07/06/2015     Priority: Medium    GERD (gastroesophageal reflux disease) 08/07/2013     Priority: Medium    Benign prostatic hypertrophy 04/16/2008     Priority: Medium    CARDIOVASCULAR SCREENING; LDL GOAL LESS THAN 160 10/31/2010     Priority: Low         PHYSICAL EXAMINATION:  Vitals /78   Pulse 55   Ht 1.753 m (5' 9\")   Wt 88 kg (194 lb)   SpO2 98%   BMI 28.65 kg/m     Wt   Wt Readings from Last 2 Encounters:   08/14/24 88 kg (194 lb)   05/03/24 87.1 kg (192 lb)      Gen: Pt sitting up on exam table in NAD, interactive and cooperative on exam  Eyes: sclerae anicteric, no injection  ENT:  OP clear, MMM, no thyromegaly or thyroid nodules palpated   Cardiac: RRR, nl S1, S2, no murmurs, rubs or gallops   Resp: Clear on anterior exam  GI: Normoactive BS, abd soft, flat, nontender throughout all quadrants, no organomegaly or masses palpated  Skin: Warm, dry, no jaundice, nails appear healthy  Lymph: no submandibular, no cervical, and no supraclavicular LAD  Neuro: alert, oriented, answers questions appropriately  "

## 2024-08-14 ENCOUNTER — OFFICE VISIT (OUTPATIENT)
Dept: GASTROENTEROLOGY | Facility: CLINIC | Age: 69
End: 2024-08-14
Attending: PHYSICIAN ASSISTANT
Payer: MEDICARE

## 2024-08-14 VITALS
WEIGHT: 194 LBS | HEART RATE: 55 BPM | OXYGEN SATURATION: 98 % | HEIGHT: 69 IN | BODY MASS INDEX: 28.73 KG/M2 | DIASTOLIC BLOOD PRESSURE: 78 MMHG | SYSTOLIC BLOOD PRESSURE: 118 MMHG

## 2024-08-14 DIAGNOSIS — K21.00 GASTROESOPHAGEAL REFLUX DISEASE WITH ESOPHAGITIS WITHOUT HEMORRHAGE: Primary | ICD-10-CM

## 2024-08-14 DIAGNOSIS — R13.10 DYSPHAGIA, UNSPECIFIED TYPE: ICD-10-CM

## 2024-08-14 DIAGNOSIS — K44.9 HIATAL HERNIA: ICD-10-CM

## 2024-08-14 PROCEDURE — 99214 OFFICE O/P EST MOD 30 MIN: CPT | Performed by: STUDENT IN AN ORGANIZED HEALTH CARE EDUCATION/TRAINING PROGRAM

## 2024-08-14 RX ORDER — LACTULOSE 10 G/15ML
SOLUTION ORAL; RECTAL
COMMUNITY
Start: 2024-03-30

## 2024-08-14 RX ORDER — OXYCODONE HYDROCHLORIDE 5 MG/1
TABLET ORAL
COMMUNITY
Start: 2024-03-30

## 2024-08-14 RX ORDER — PANTOPRAZOLE SODIUM 20 MG/1
20 TABLET, DELAYED RELEASE ORAL 2 TIMES DAILY
Qty: 180 TABLET | Refills: 3 | Status: SHIPPED | OUTPATIENT
Start: 2024-08-14

## 2024-08-14 ASSESSMENT — PAIN SCALES - GENERAL: PAINLEVEL: NO PAIN (0)

## 2024-08-14 NOTE — LETTER
8/14/2024      Jamal Sanchez Jr.  [address] [address] [place] [id number]      Dear Colleague,    Thank you for referring your patient, Jamal Sanchez Jr., to the [facility] [place]. Please see a copy of my visit note below.    GI CLINIC VISIT    CC: follow up       ASSESSMENT/PLAN:  1. Gastroesophageal reflux disease with esophagitis without hemorrhage  2. Dysphagia, unspecified type  3. Hiatal hernia  - pantoprazole (PROTONIX) 20 MG EC tablet; Take 1 tablet (20 mg) by mouth 2 times daily On an empty stomach about 30 minutes before a meal  Dispense: 180 tablet; Refill: 3  - Adult GI Clinic Follow-Up Order (Blank); Future    Jamal Sanchez is a 69 year old male with pmhx with of coronary artery disease status post recent PCI with stent on Plavix, hyperlipidemia, GERD and hiatal hernia who presents for follow-up. He has recovered from plication diaphragm thoracotomy and reports he has been doing better. Reflux continues to be well managed with protonix 40 mg twice daily and pepcid 20 milligrams twice daily. There was previous discussion on decreasing doses at this follow up however dose was kept the same during his recovery from surgery.  It is very rare for him to have any breakthrough acid reflux symptoms.  He would like to cut down on his med list if possible.  He does have a history of LA grade B esophagitis without Freire's but he would likely need some PPI indefinitely.  He does take high-dose Protonix on 40 mg twice daily.  He would like to try decreasing to 20 mg twice daily and he will continue on famotidine 20 mg twice daily as well. If he has any recurrence of dysphagia symptoms or significant increase in heartburn, will recommend going back up to 40 mg twice daily Protonix.       - Decrease Protonix to to 20 mg twice daily - new prescription sent   - Continue Famotidine 20 mg twice daily   - Follow up in 3 months for a video visit      Specialty Problems           Gastroenterology Diagnoses    GERD (gastroesophageal reflux disease)        Appendicitis        Acute diverticulitis        Generalized abdominal pain        Diverticulitis of colon        Diverticulosis of large intestine without hemorrhage        Diverticulosis of large intestine without perforation or abscess without bleeding        Hiatal hernia        Dysphagia, unspecified type           RTC 3 months    It was a pleasure to participate in the care of this patient; please contact us with any further questions.  A total of 24 face-to-face minutes was spent with this patient, >50% of which was counseling regarding the above delineated issues. An additional 12 minutes was spent on the date of the encounter doing chart review, documentation, and further activities as noted above.    Stephanie Nix PA-C  Division of Gastroenterology, Hepatology and Nutrition  [facility]    ---------------------------------------------------------------------------------------------------  HPI:  Jamal Sanchez Jr. is a 69 year old male with past medical history significant for coronary artery disease status post PCI with stent on Plavix, hyperlipidemia, GERD and hiatal hernia  who presents for follow up on reflux and dysphagia.    He was initially evaluated in our GI clinic with Dr. Marisela De Leon in December 2022 and then worked with Narendra Kramer PA-C. Briefly, he has a long history of reflux which unfortunately was not well controlled despite omeprazole 40 mg daily and famotidine 20 mg twice daily.  He continued to have chest discomfort, persistent reflux symptoms as well as difficulty swallowing, often feeling that foods were getting stuck in his chest and more difficult to dislodge.  As a result he has become very mindful of how he was eating and tried to eat smaller bites and chew his foods very well. A prior EGD in January 2022 noted esophageal stenosis treated with dilation, gastritis and medium sized hiatal  hernia. Repeat EGD in April 2022 without stricture in the esophagus.     Interval history August 21, 2023  Since his last visit his omeprazole was switched to pantoprazole given an interaction with his Plavix. He has also continued famotidine 20mg twice daily. We updated his EGD last month in July 2023 which noted LA grade B esophagitis, 3 cm hiatal hernia, and gastric polyps.  Biopsies with mild inflammation but negative for H. pylori and negative for Freire's esophagus.  Gastric polyps were benign. He reports that he does feel further improvement with the pantoprazole however still with reflux symptoms and dysphagia.  Throat feels scratchy all the time, sometimes results in coughing. He is concerned if this could be aspiration occurring.      He does have some acidic foods in his diet. He eats tomatoes about 3-4 times a day and uses garlic regularly in meals. Overall hasn't had an issue with these items. He denies carbonated beverages. He drinks electrolyte water. He does drink regularly. Not necessarily every day however does enjoy 6-8 beers a day max on weekends.      Interval history April 3, 2024   He recently had plication diaphragm thoracotomy last week at [place]. He is still recovering from surgery and has pain in his side. It hurts when he coughs. Reports slowly recovering from his surgery. He is on pain medication currently and has developed constipation as a result. He is able to mange with senna stool softeners. Reflux hasn't been a problem or concern recently. He continues his protonix 40mg twice daily and pepcid twice daily.      Interval history 8/2024:   He has recovered from surgery and he feels he can breathe better. Eats and drinks slowly. Eats somewhat smaller portion sizes. With BID protonix and famotidine, it is rare to have breakthrough symptoms. Takes Protonix 30 mins before eating. Will forget second dose sometimes. Will forget 3-4 times per week and will not typically notice any increase in  symptoms. Had once instance with rice that felt lodged in the esophagus that eventually passed after a half an hour. This happened in January.  He makes an effort to take small bites and eats slowly. No dysphagia to liquids . Denies pain or pressure in upper abdomen with meals. Rare regurgitation which happens upright as much as lying down. No nocturnal awakenings with symptoms. Not needing tums as much as he used to.     Daily bowel movements, soft and easy to pass. No blood in the stool.     Wt Readings from Last 10 Encounters:   08/14/24 88 kg (194 lb)   05/03/24 87.1 kg (192 lb)   04/23/24 87.1 kg (192 lb)   03/20/24 90.4 kg (199 lb 6.4 oz)   11/29/23 93.9 kg (207 lb)   10/05/23 93.9 kg (207 lb 0.2 oz)   09/26/23 93.9 kg (207 lb)   08/23/23 93.9 kg (207 lb)   07/10/23 93 kg (205 lb)   05/24/23 93 kg (205 lb)        ROS:    A 10 point review of systems was performed and is negative except as noted in the HPI.     PROBLEM LIST  Patient Active Problem List    Diagnosis Date Noted     S/P plication of diaphragm 04/23/2024     Priority: Medium     Hyperlipidemia 10/06/2023     Priority: Medium     Hypertensive heart disease without heart failure 10/06/2023     Priority: Medium     Dysphagia, unspecified type 05/12/2023     Priority: Medium     Tachycardia 03/30/2023     Priority: Medium     Prediabetes 03/29/2023     Priority: Medium     Coronary artery disease of native heart with stable angina pectoris, unspecified vessel or lesion type (H24) 03/21/2023     Priority: Medium     Status post coronary angiogram 09/07/2022     Priority: Medium     Dyspnea on exertion 07/12/2022     Priority: Medium     Hiatal hernia 01/04/2022     Priority: Medium     Anemia, iron deficiency 11/25/2021     Priority: Medium     Diverticulosis of large intestine without hemorrhage 01/21/2019     Priority: Medium     Diverticulitis of colon 01/21/2019     Priority: Medium     Diverticulosis of large intestine without perforation or  "abscess without bleeding 01/21/2019     Priority: Medium     ETOH abuse 11/25/2018     Priority: Medium     Acute diverticulitis 11/22/2018     Priority: Medium     Generalized abdominal pain 11/22/2018     Priority: Medium     Appendicitis 01/06/2016     Priority: Medium     Hypertension, goal below 140/90 07/06/2015     Priority: Medium     GERD (gastroesophageal reflux disease) 08/07/2013     Priority: Medium     Benign prostatic hypertrophy 04/16/2008     Priority: Medium     CARDIOVASCULAR SCREENING; LDL GOAL LESS THAN 160 10/31/2010     Priority: Low         PHYSICAL EXAMINATION:  Vitals /78   Pulse 55   Ht 1.753 m (5' 9\")   Wt 88 kg (194 lb)   SpO2 98%   BMI 28.65 kg/m     Wt   Wt Readings from Last 2 Encounters:   08/14/24 88 kg (194 lb)   05/03/24 87.1 kg (192 lb)      Gen: Pt sitting up on exam table in NAD, interactive and cooperative on exam  Eyes: sclerae anicteric, no injection  ENT:  OP clear, MMM, no thyromegaly or thyroid nodules palpated   Cardiac: RRR, nl S1, S2, no murmurs, rubs or gallops   Resp: Clear on anterior exam  GI: Normoactive BS, abd soft, flat, nontender throughout all quadrants, no organomegaly or masses palpated  Skin: Warm, dry, no jaundice, nails appear healthy  Lymph: no submandibular, no cervical, and no supraclavicular LAD  Neuro: alert, oriented, answers questions appropriately      Again, thank you for allowing me to participate in the care of your patient.        Sincerely,        Stephanie Nix PA-C  "

## 2024-08-14 NOTE — PATIENT INSTRUCTIONS
It was a pleasure meeting with you today and discussing your healthcare plan. Below is a summary of what we covered:    - Decrease Protonix to to 20 mg twice daily - new prescription sent   - Continue Famotidine 20 mg twice daily   - Follow up in 3 months for a video visit- someone will call you to schedule this    Please see below for any additional questions and scheduling guidelines.    Sign up for Weave: Weave patient portal serves as a secure platform for accessing your medical records from the [facility]. Additionally, Weave facilitates easy, timely, and secure messaging with your care team. If you have not signed up, you may do so by using the provided code or calling [phone].    Coordinating your care after your visit:  There are multiple options for scheduling your follow-up care based on your provider's recommendation.    How do I schedule a follow-up clinic appointment:   After your appointment, you may receive scheduling assistance with the Clinic Coordinators by having a seat in the waiting room and a Clinic Coordinator will call you up to schedule.  Virtual visits or after you leave the clinic:  Your provider has placed a follow-up order in the [facility] portal for scheduling your return appointment. A member of the scheduling team will contact you to schedule.  Pin or [facility] Scheduling: Timely scheduling through Weave is advised to ensure appointment availability.   Call to schedule: You may schedule your follow-up appointment(s) by calling [phone], option 1.    How do I schedule my endoscopy or colonoscopy procedure:  If a procedure, such as a colonoscopy or upper endoscopy was ordered by your provider, the scheduling team will contact you to schedule this procedure. Or you may choose to call to schedule at   [phone], option 2.  Please allow 20-30 minutes when scheduling a procedure.    How do I get my blood work done? To get your blood work done, you need to schedule a  lab appointment at an [facility] Laboratory. There are multiple ways to schedule:   At the clinic: The Clinic Coordinator you meet after your visit can help you schedule a lab appointment.   [facility] scheduling: [facility] offers online lab scheduling at all [facility] laboratory locations.   Call to schedule: You can call [phone] to schedule your lab appointment.    How do I schedule my imaging study: To schedule imaging studies, such as CT scans, ultrasounds, MRIs, or X-rays, contact Imaging Services at [phone].    How do I schedule a referral to another doctor: If your provider recommended a referral to another specialist(s), the referral order was placed by your provider. You will receive a phone call to schedule this referral, or you may choose to call the number attached to the referral to self-schedule.    For Post-Visit Question(s):  For any inquiries following today's visit:  Please utilize [facility] messaging and allow 48 hours for reply or contact the Call Center during normal business hours at [phone], option 3.  For Emergent After-hours questions, contact the On-Call GI Fellow through the [facility]  at [phone].  In addition, you may contact your Nurse directly using the provided contact information.    Test Results:  Test results will be accessible via [facility] in compliance with the 21st Century Cures Act. This means that your results will be available to you at the same time as your provider. Often you may see your results before your provider does. Results are reviewed by staff within two weeks with communication follow-up. Results may be released in the patient portal prior to your care team review.    Prescription Refill(s):  Medication prescribed by your provider will be addressed during your visit. For future refills, please coordinate with your pharmacy. If you have not had a recent clinic visit or routine labs, for your safety, your provider may  not be able to refill your prescription.

## 2024-08-14 NOTE — NURSING NOTE
"Chief Complaint   Patient presents with    Follow Up       Vitals:    08/14/24 1248   BP: 118/78   Pulse: 55   SpO2: 98%   Weight: 88 kg (194 lb)   Height: 1.753 m (5' 9\")       Body mass index is 28.65 kg/m .    Gali Jacobs MA    "

## 2024-08-28 ENCOUNTER — TELEPHONE (OUTPATIENT)
Dept: GASTROENTEROLOGY | Facility: CLINIC | Age: 69
End: 2024-08-28
Payer: MEDICARE

## 2024-08-28 DIAGNOSIS — I25.118 CORONARY ARTERY DISEASE OF NATIVE ARTERY OF NATIVE HEART WITH STABLE ANGINA PECTORIS (H): ICD-10-CM

## 2024-08-28 RX ORDER — ATORVASTATIN CALCIUM 40 MG/1
40 TABLET, FILM COATED ORAL DAILY
Qty: 90 TABLET | Refills: 1 | Status: SHIPPED | OUTPATIENT
Start: 2024-08-28

## 2024-08-28 NOTE — TELEPHONE ENCOUNTER
[facility] Cardiology Refill Guideline reviewed.  Medication meets criteria for refill. Appt with Dr Cruz for 10/1/24. Refills sent. Shauna De La Rosa RN Cardiology August 28, 2024, 8:43 AM'

## 2024-08-28 NOTE — TELEPHONE ENCOUNTER
Last Office Visit: 09/26/23 Dr. Cruz  Next Office Visit: 10/01/24  Last Fill Date: 06/08/24  Mariya Tripathi MA Cardiology   8/28/2024 8:38 AM

## 2024-08-28 NOTE — TELEPHONE ENCOUNTER
Left Voicemail (1st Attempt) and Sent Mychart (1st Attempt) for the patient to call back and schedule the following:    Appointment type: Return GI  Provider: Stephanie Nix   Return date: 3 mo (around 11/14/24)  Specialty phone number: [phone]  Additional appointment(s) needed: NA  Additonal Notes: LVM/Celeste x1

## 2024-08-30 ENCOUNTER — TELEPHONE (OUTPATIENT)
Dept: GASTROENTEROLOGY | Facility: CLINIC | Age: 69
End: 2024-08-30
Payer: MEDICARE

## 2024-08-30 NOTE — TELEPHONE ENCOUNTER
Left Voicemail (2nd Attempt) for the patient to call back and schedule the following:    Appointment type: RET GI   Provider: Stephanie Nix PA-C  Return date: 11/14/2024  Specialty phone number: [phone]  Additional appointment(s) needed: n/a  Additonal Notes: n/a

## 2024-09-10 ENCOUNTER — HOSPITAL ENCOUNTER (OUTPATIENT)
Dept: CARDIOLOGY | Facility: CLINIC | Age: 69
Discharge: HOME | End: 2024-09-10
Attending: INTERNAL MEDICINE | Admitting: INTERNAL MEDICINE
Payer: MEDICARE

## 2024-09-10 DIAGNOSIS — I25.10 CORONARY ARTERY DISEASE INVOLVING NATIVE CORONARY ARTERY OF NATIVE HEART WITHOUT ANGINA PECTORIS: ICD-10-CM

## 2024-09-10 LAB — LVEF ECHO: NORMAL

## 2024-09-10 PROCEDURE — 93306 TTE W/DOPPLER COMPLETE: CPT | Mod: 26 | Performed by: INTERNAL MEDICINE

## 2024-09-10 PROCEDURE — 93306 TTE W/DOPPLER COMPLETE: CPT

## 2024-10-01 ENCOUNTER — OFFICE VISIT (OUTPATIENT)
Dept: CARDIOLOGY | Facility: CLINIC | Age: 69
End: 2024-10-01
Payer: COMMERCIAL

## 2024-10-01 VITALS
RESPIRATION RATE: 18 BRPM | HEART RATE: 60 BPM | HEIGHT: 69 IN | BODY MASS INDEX: 30.33 KG/M2 | DIASTOLIC BLOOD PRESSURE: 76 MMHG | OXYGEN SATURATION: 98 % | WEIGHT: 204.8 LBS | SYSTOLIC BLOOD PRESSURE: 134 MMHG

## 2024-10-01 DIAGNOSIS — I25.10 CORONARY ARTERY DISEASE INVOLVING NATIVE CORONARY ARTERY OF NATIVE HEART WITHOUT ANGINA PECTORIS: Primary | ICD-10-CM

## 2024-10-01 PROCEDURE — 99213 OFFICE O/P EST LOW 20 MIN: CPT | Performed by: INTERNAL MEDICINE

## 2024-10-01 NOTE — LETTER
10/1/2024    Raad Benitez, DO  [address] [facility] [address]    RE: Jamal Sanchez Jr.       Dear Colleague,     I had the pleasure of seeing Jamal Sanchez Jr. in the [facility].  CARDIOLOGY CLINIC CONSULTATION    PRIMARY CARE PHYSICIAN:  Raad Benitez    HISTORY OF PRESENT ILLNESS:  This is a very pleasant 69-year-old gentleman who initially saw me in initial consultation in August 2022.  He is seeing me in cardiology clinic today for routine follow-up.  He has the following pertinent medical issues     Hypertension hyperlipidemia  History of coronary artery disease with severe coronary artery calcifications and LAD stent based on positive IFR in 2022 September  Paralyzed right hemidiaphragm plication 2024  Gastroesophageal reflux disease     Patient is here for cardiology follow-up.   His EF has been normal.  LDL is at goal.  Denies any cardiac symptoms.  He says his symptoms of dyspnea got better significantly after diaphragmatic plication.  No angina reported.  No syncope presyncope.  He is currently on Plavix monotherapy.  Denies bleeding.    PAST MEDICAL HISTORY:  Past Medical History:   Diagnosis Date     Coronary artery disease with LAD stenting      BPH (benign prostatic hyperplasia)      Complication of anesthesia      Diverticulitis      GERD (gastroesophageal reflux disease)      Heart disease      HLD (hyperlipidemia)      Hypertension        MEDICATIONS:  Current Outpatient Medications   Medication Sig Dispense Refill     acetaminophen (TYLENOL) 500 MG tablet Take 500 mg by mouth every 6 hours as needed for fever, pain or mild pain       atorvastatin (LIPITOR) 40 MG tablet Take 1 tablet (40 mg) by mouth daily. 90 tablet 1     calcium carbonate (TUMS) 500 MG chewable tablet Take 1-2 chew tab by mouth daily as needed       clopidogrel (PLAVIX) 75 MG tablet Take 1 tablet (75 mg) by mouth daily 90 tablet 3     cyclobenzaprine (FLEXERIL) 5 MG tablet Take 1 tablet (5 mg)  by mouth 3 times daily as needed for muscle spasms 30 tablet 1     famotidine (PEPCID) 20 MG tablet Take 1 tablet (20 mg) by mouth 2 times daily 180 tablet 3     gabapentin (NEURONTIN) 100 MG capsule Take 1 capsule (100 mg) by mouth 3 times daily 90 capsule 1     lisinopril (ZESTRIL) 10 MG tablet Take 1 tablet (10 mg) by mouth daily Hold if Systolic Blood Pressure is less than 85. 90 tablet 3     methocarbamol (ROBAXIN) 500 MG tablet Take 1 tablet (500 mg) by mouth 4 times daily as needed for muscle spasms 120 tablet 1     metoprolol succinate ER (TOPROL XL) 25 MG 24 hr tablet Take 1 tablet (25 mg) by mouth daily 90 tablet 2     multivitamin (CENTRUM SILVER) tablet Take 1 tablet by mouth daily       nitroGLYcerin (NITROSTAT) 0.4 MG sublingual tablet For chest pain place 1 tablet under the tongue every 5 minutes for 3 doses. If symptoms persist 5 minutes after 2nd dose call 911. 25 tablet 3     pantoprazole (PROTONIX) 20 MG EC tablet Take 1 tablet (20 mg) by mouth 2 times daily On an empty stomach about 30 minutes before a meal 180 tablet 3     sildenafil (REVATIO) 20 MG tablet Take 2 to 5 tablets 1 hour prior to intercourse       tadalafil (CIALIS) 10 MG tablet Take 1-2 tablets daily as needed, 30-45 minutes prior to intended sexual activity 30 tablet 4     tamsulosin (FLOMAX) 0.4 MG capsule Take 1 capsule (0.4 mg) by mouth daily 90 capsule 3     triamcinolone (KENALOG) 0.1 % external cream Apply topically 3 times daily 80 g 0     triamterene-HCTZ (MAXZIDE) 75-50 MG tablet Take 1 tablet by mouth daily 90 tablet 3     Urea 40 % CREA Apply to feet  g 11     ABRYSVO injection Inject 0.5 mLs into the muscle once (Patient not taking: Reported on 10/1/2024)       ENULOSE 10 GM/15ML SOLUTION  (Patient not taking: Reported on 10/1/2024)       oxyCODONE (ROXICODONE) 5 MG tablet  (Patient not taking: Reported on 10/1/2024)       SPIKEVAX 50 MCG/0.5ML injection Inject 50 mcg into the muscle once (Patient not taking:  Reported on 10/1/2024)       No current facility-administered medications for this visit.       SOCIAL HISTORY:  I have reviewed this patient's social history and updated it with pertinent information if needed. Jamal Sanchez Jr.  reports that he has never smoked. He has never used smokeless tobacco. He reports current alcohol use. He reports that he does not use drugs.    PHYSICAL EXAM:  Pulse:  [60] 60  Resp:  [18] 18  BP: (134)/(76) 134/76  SpO2:  [98 %] 98 %  204 lbs 12.8 oz    Constitutional: alert, no distress  Respiratory: Good bilateral air entry  Cardiovascular: Normal regular heart sounds  GI: nondistended  Neuropsychiatric: appropriate affact    ASSESSMENT: Pertinent issues addressed/ reviewed during this cardiology visit  Stable coronary artery disease  Noncardiac dyspnea    RECOMMENDATIONS:  Patient is doing well from a cardiac standpoint.  Normal volume status on exam.  No angina or heart failure symptoms.  Continue Plavix and statin therapy.  Follow-up routinely in cardiology clinic in 1 year sooner if anything changes clinically.  We discussed about his dyspnea which has been chronic and significantly improved after diaphragmatic plication.  At this time I do not think there is significant value in doing a right heart catheterization.  His volume status on exam looks normal    It was a pleasure seeing this patient in clinic today. Please do not hesitate to contact me with any future questions.     CLINTON Martinez, [facility]  Cardiology - [facility]  October 1, 2024    This note was completed in part using dictation via the Dragon voice recognition software. Some word and grammatical errors may occur and must be interpreted in the appropriate clinical context.  If there are any questions pertaining to this issue, please contact me for further clarification.      Thank you for allowing me to participate in the care of your patient.      Sincerely,     Wilian Cruz MD     [facility]  cc:   Wilian Cruz MD  [address]  [place] [id number]

## 2024-10-01 NOTE — PROGRESS NOTES
CARDIOLOGY CLINIC CONSULTATION    PRIMARY CARE PHYSICIAN:  Raad Benitez    HISTORY OF PRESENT ILLNESS:  This is a very pleasant 69-year-old gentleman who initially saw me in initial consultation in August 2022.  He is seeing me in cardiology clinic today for routine follow-up.  He has the following pertinent medical issues     Hypertension hyperlipidemia  History of coronary artery disease with severe coronary artery calcifications and LAD stent based on positive IFR in 2022 September  Paralyzed right hemidiaphragm plication 2024  Gastroesophageal reflux disease     Patient is here for cardiology follow-up.   His EF has been normal.  LDL is at goal.  Denies any cardiac symptoms.  He says his symptoms of dyspnea got better significantly after diaphragmatic plication.  No angina reported.  No syncope presyncope.  He is currently on Plavix monotherapy.  Denies bleeding.    PAST MEDICAL HISTORY:  Past Medical History:   Diagnosis Date    Coronary artery disease with LAD stenting     BPH (benign prostatic hyperplasia)     Complication of anesthesia     Diverticulitis     GERD (gastroesophageal reflux disease)     Heart disease     HLD (hyperlipidemia)     Hypertension        MEDICATIONS:  Current Outpatient Medications   Medication Sig Dispense Refill    acetaminophen (TYLENOL) 500 MG tablet Take 500 mg by mouth every 6 hours as needed for fever, pain or mild pain      atorvastatin (LIPITOR) 40 MG tablet Take 1 tablet (40 mg) by mouth daily. 90 tablet 1    calcium carbonate (TUMS) 500 MG chewable tablet Take 1-2 chew tab by mouth daily as needed      clopidogrel (PLAVIX) 75 MG tablet Take 1 tablet (75 mg) by mouth daily 90 tablet 3    cyclobenzaprine (FLEXERIL) 5 MG tablet Take 1 tablet (5 mg) by mouth 3 times daily as needed for muscle spasms 30 tablet 1    famotidine (PEPCID) 20 MG tablet Take 1 tablet (20 mg) by mouth 2 times daily 180 tablet 3    gabapentin (NEURONTIN) 100 MG capsule Take 1 capsule (100 mg) by  mouth 3 times daily 90 capsule 1    lisinopril (ZESTRIL) 10 MG tablet Take 1 tablet (10 mg) by mouth daily Hold if Systolic Blood Pressure is less than 85. 90 tablet 3    methocarbamol (ROBAXIN) 500 MG tablet Take 1 tablet (500 mg) by mouth 4 times daily as needed for muscle spasms 120 tablet 1    metoprolol succinate ER (TOPROL XL) 25 MG 24 hr tablet Take 1 tablet (25 mg) by mouth daily 90 tablet 2    multivitamin (CENTRUM SILVER) tablet Take 1 tablet by mouth daily      nitroGLYcerin (NITROSTAT) 0.4 MG sublingual tablet For chest pain place 1 tablet under the tongue every 5 minutes for 3 doses. If symptoms persist 5 minutes after 2nd dose call 911. 25 tablet 3    pantoprazole (PROTONIX) 20 MG EC tablet Take 1 tablet (20 mg) by mouth 2 times daily On an empty stomach about 30 minutes before a meal 180 tablet 3    sildenafil (REVATIO) 20 MG tablet Take 2 to 5 tablets 1 hour prior to intercourse      tadalafil (CIALIS) 10 MG tablet Take 1-2 tablets daily as needed, 30-45 minutes prior to intended sexual activity 30 tablet 4    tamsulosin (FLOMAX) 0.4 MG capsule Take 1 capsule (0.4 mg) by mouth daily 90 capsule 3    triamcinolone (KENALOG) 0.1 % external cream Apply topically 3 times daily 80 g 0    triamterene-HCTZ (MAXZIDE) 75-50 MG tablet Take 1 tablet by mouth daily 90 tablet 3    Urea 40 % CREA Apply to feet  g 11    ABRYSVO injection Inject 0.5 mLs into the muscle once (Patient not taking: Reported on 10/1/2024)      ENULOSE 10 GM/15ML SOLUTION  (Patient not taking: Reported on 10/1/2024)      oxyCODONE (ROXICODONE) 5 MG tablet  (Patient not taking: Reported on 10/1/2024)      SPIKEVAX 50 MCG/0.5ML injection Inject 50 mcg into the muscle once (Patient not taking: Reported on 10/1/2024)       No current facility-administered medications for this visit.       SOCIAL HISTORY:  I have reviewed this patient's social history and updated it with pertinent information if needed. Jamal Sanchez Jr.  reports  that he has never smoked. He has never used smokeless tobacco. He reports current alcohol use. He reports that he does not use drugs.    PHYSICAL EXAM:  Pulse:  [60] 60  Resp:  [18] 18  BP: (134)/(76) 134/76  SpO2:  [98 %] 98 %  204 lbs 12.8 oz    Constitutional: alert, no distress  Respiratory: Good bilateral air entry  Cardiovascular: Normal regular heart sounds  GI: nondistended  Neuropsychiatric: appropriate affact    ASSESSMENT: Pertinent issues addressed/ reviewed during this cardiology visit  Stable coronary artery disease  Noncardiac dyspnea    RECOMMENDATIONS:  Patient is doing well from a cardiac standpoint.  Normal volume status on exam.  No angina or heart failure symptoms.  Continue Plavix and statin therapy.  Follow-up routinely in cardiology clinic in 1 year sooner if anything changes clinically.  We discussed about his dyspnea which has been chronic and significantly improved after diaphragmatic plication.  At this time I do not think there is significant value in doing a right heart catheterization.  His volume status on exam looks normal    It was a pleasure seeing this patient in clinic today. Please do not hesitate to contact me with any future questions.     CLINTON Martinez, [facility]  Cardiology - [facility]  October 1, 2024    This note was completed in part using dictation via the Dragon voice recognition software. Some word and grammatical errors may occur and must be interpreted in the appropriate clinical context.  If there are any questions pertaining to this issue, please contact me for further clarification.

## 2024-11-15 ENCOUNTER — ALLIED HEALTH/NURSE VISIT (OUTPATIENT)
Dept: FAMILY MEDICINE | Facility: CLINIC | Age: 69
End: 2024-11-15
Payer: COMMERCIAL

## 2024-11-15 DIAGNOSIS — Z23 ENCOUNTER FOR IMMUNIZATION: Primary | ICD-10-CM

## 2024-11-15 PROCEDURE — 90480 ADMN SARSCOV2 VAC 1/ONLY CMP: CPT

## 2024-11-15 PROCEDURE — 90662 IIV NO PRSV INCREASED AG IM: CPT

## 2024-11-15 PROCEDURE — 99207 PR NO CHARGE NURSE ONLY: CPT

## 2024-11-15 PROCEDURE — 90471 IMMUNIZATION ADMIN: CPT

## 2024-11-15 PROCEDURE — 91320 SARSCV2 VAC 30MCG TRS-SUC IM: CPT

## 2024-12-11 NOTE — PROGRESS NOTES
Virtual Visit Details    Type of service:  Video Visit   Video Start Time: 8:33 AM  Video End Time: 9:00 AM    Originating Location (pt. Location): Home    Distant Location (provider location):  Off-site  Platform used for Video Visit: [employer]    GI VIDEO VISIT    CC: follow up       ASSESSMENT/PLAN:  1. Gastroesophageal reflux disease with esophagitis without hemorrhage  2. Dysphagia, unspecified type  3. Hiatal hernia    Mr. Sanchez is a 69 year old male with pmhx with of coronary artery disease status post PCI with stent on Plavix, hyperlipidemia, GERD and hiatal hernia who presents for follow-up. He has recovered from plication diaphragm thoracotomy and reports he has been doing better. Reflux continues to be well managed with protonix 40 mg twice daily and pepcid 20 milligrams twice daily. There was previous discussion on decreasing doses at this follow up however dose was kept the same during his recovery from surgery.  It is very rare for him to have any breakthrough acid reflux symptoms.  He would like to cut down on his med list if possible.  He does have a history of LA grade B esophagitis without Freire's but he would likely need some PPI indefinitely.      At our last visit, he decreased from protonix 40 mg BID to 20 mg BID. He states he forgets the second dose about 40% of the time and does very well without it. He has also been taking famotidine 20 mg twice daily as well. He would like to cut down on number of medications. We discussed stopping famotidine, and continuing on 20 mg BID protonix. He will monitor for any worsening of symptoms, which we discussed. He denies any dysphagia.     # Colon cancer screening   Colonoscopy was performed 2022, recommended to repeat in 7 years.    RTC 3 months    It was a pleasure to participate in the care of this patient; please contact us with any further questions.  A total of 29 face-to-face minutes was spent with this patient, >50% of which was counseling  regarding the above delineated issues. An additional 11 minutes was spent on the date of the encounter doing chart review, documentation, and further activities as noted above.    Stephanie Nix PA-C  Division of Gastroenterology, Hepatology and Nutrition  [facility]    ---------------------------------------------------------------------------------------------------  HPI:  Jamal Sanchez Jr. is a 69 year old male with past medical history significant for coronary artery disease status post PCI with stent on Plavix, hyperlipidemia, GERD and hiatal hernia  who presents for follow up on reflux and dysphagia.    He was initially evaluated in our GI clinic with Dr. Marisela De Leon in December 2022 and then worked with Narendra Kramer PA-C. Briefly, he has a long history of reflux which unfortunately was not well controlled despite omeprazole 40 mg daily and famotidine 20 mg twice daily.  He continued to have chest discomfort, persistent reflux symptoms as well as difficulty swallowing, often feeling that foods were getting stuck in his chest and more difficult to dislodge.  As a result he has become very mindful of how he was eating and tried to eat smaller bites and chew his foods very well. A prior EGD in January 2022 noted esophageal stenosis treated with dilation, gastritis and medium sized hiatal hernia. Repeat EGD in April 2022 without stricture in the esophagus.     Interval history August 21, 2023  Since his last visit his omeprazole was switched to pantoprazole given an interaction with his Plavix. He has also continued famotidine 20mg twice daily. We updated his EGD last month in July 2023 which noted LA grade B esophagitis, 3 cm hiatal hernia, and gastric polyps.  Biopsies with mild inflammation but negative for H. pylori and negative for Freire's esophagus.  Gastric polyps were benign. He reports that he does feel further improvement with the pantoprazole however still with reflux symptoms and  dysphagia.  Throat feels scratchy all the time, sometimes results in coughing. He is concerned if this could be aspiration occurring.      He does have some acidic foods in his diet. He eats tomatoes about 3-4 times a day and uses garlic regularly in meals. Overall hasn't had an issue with these items. He denies carbonated beverages. He drinks electrolyte water. He does drink regularly. Not necessarily every day however does enjoy 6-8 beers a day max on weekends.      Interval history April 3, 2024   He recently had plication diaphragm thoracotomy last week at [place]. He is still recovering from surgery and has pain in his side. It hurts when he coughs. Reports slowly recovering from his surgery. He is on pain medication currently and has developed constipation as a result. He is able to mange with senna stool softeners. Reflux hasn't been a problem or concern recently. He continues his protonix 40mg twice daily and pepcid twice daily.      Interval history 8/2024:   He has recovered from surgery and he feels he can breathe better. Eats and drinks slowly. Eats somewhat smaller portion sizes. With BID protonix and famotidine, it is rare to have breakthrough symptoms. Takes Protonix 30 mins before eating. Will forget second dose sometimes. Will forget 3-4 times per week and will not typically notice any increase in symptoms. Had once instance with rice that felt lodged in the esophagus that eventually passed after a half an hour. This happened in January.  He makes an effort to take small bites and eats slowly. No dysphagia to liquids . Denies pain or pressure in upper abdomen with meals. Rare regurgitation which happens upright as much as lying down. No nocturnal awakenings with symptoms. Not needing tums as much as he used to.     Daily bowel movements, soft and easy to pass. No blood in the stool.     Interval history 12/2024:   Gopal reports he is doing well. He misses his evening dose of protonix about 40% of the  "time and has not had any trouble with heartburn, acid reflux, dysphagia, abdominal pain. He does take famotidine 20 mg twice daily. He feels things are doing very well.     Wt Readings from Last 10 Encounters:   12/12/24 93 kg (205 lb)   10/01/24 92.9 kg (204 lb 12.8 oz)   08/14/24 88 kg (194 lb)   05/03/24 87.1 kg (192 lb)   04/23/24 87.1 kg (192 lb)   03/20/24 90.4 kg (199 lb 6.4 oz)   11/29/23 93.9 kg (207 lb)   10/05/23 93.9 kg (207 lb 0.2 oz)   09/26/23 93.9 kg (207 lb)   08/23/23 93.9 kg (207 lb)        ROS:    A 10 point review of systems was performed and is negative except as noted in the HPI.     PHYSICAL EXAMINATION:  Vitals Ht 1.753 m (5' 9\")   Wt 93 kg (205 lb)   BMI 30.27 kg/m     Wt   Wt Readings from Last 2 Encounters:   12/12/24 93 kg (205 lb)   10/01/24 92.9 kg (204 lb 12.8 oz)      Constitutional - general appearance is well and in no acute distress. Body habitus normal  Eyes - No redness or discharge  Respiratory - No cough, unlabored breathing  Musculoskeletal - range of motion intact: Neck and arms  Skin - No discoloration or lesions  Neurological - No tremors  Psychiatric - No anxiety, alert & oriented   "

## 2024-12-12 ENCOUNTER — VIRTUAL VISIT (OUTPATIENT)
Dept: GASTROENTEROLOGY | Facility: CLINIC | Age: 69
End: 2024-12-12
Attending: STUDENT IN AN ORGANIZED HEALTH CARE EDUCATION/TRAINING PROGRAM
Payer: COMMERCIAL

## 2024-12-12 VITALS — HEIGHT: 69 IN | WEIGHT: 205 LBS | BODY MASS INDEX: 30.36 KG/M2

## 2024-12-12 DIAGNOSIS — R13.10 DYSPHAGIA, UNSPECIFIED TYPE: ICD-10-CM

## 2024-12-12 DIAGNOSIS — K44.9 HIATAL HERNIA: Primary | ICD-10-CM

## 2024-12-12 DIAGNOSIS — K21.00 GASTROESOPHAGEAL REFLUX DISEASE WITH ESOPHAGITIS WITHOUT HEMORRHAGE: ICD-10-CM

## 2024-12-12 ASSESSMENT — PAIN SCALES - GENERAL: PAINLEVEL_OUTOF10: NO PAIN (0)

## 2024-12-12 NOTE — LETTER
12/12/2024      Jamal Sanchez Jr.  [address] [facility] [address]      Dear Colleague,    Thank you for referring your patient, Jamal Sanchez Jr., to the [facility] [place]. Please see a copy of my visit note below.    Virtual Visit Details    Type of service:  Video Visit   Video Start Time: 8:33 AM  Video End Time: 9:00 AM    Originating Location (pt. Location): Home    Distant Location (provider location):  Off-site  Platform used for Video Visit: V[employer]    GI VIDEO VISIT    CC: follow up       ASSESSMENT/PLAN:  1. Gastroesophageal reflux disease with esophagitis without hemorrhage  2. Dysphagia, unspecified type  3. Hiatal hernia    Mr. Sanchez is a 69 year old male with pmhx with of coronary artery disease status post PCI with stent on Plavix, hyperlipidemia, GERD and hiatal hernia who presents for follow-up. He has recovered from plication diaphragm thoracotomy and reports he has been doing better. Reflux continues to be well managed with protonix 40 mg twice daily and pepcid 20 milligrams twice daily. There was previous discussion on decreasing doses at this follow up however dose was kept the same during his recovery from surgery.  It is very rare for him to have any breakthrough acid reflux symptoms.  He would like to cut down on his med list if possible.  He does have a history of LA grade B esophagitis without Freire's but he would likely need some PPI indefinitely.      At our last visit, he decreased from protonix 40 mg BID to 20 mg BID. He states he forgets the second dose about 40% of the time and does very well without it. He has also been taking famotidine 20 mg twice daily as well. He would like to cut down on number of medications. We discussed stopping famotidine, and continuing on 20 mg BID protonix. He will monitor for any worsening of symptoms, which we discussed. He denies any dysphagia.     # Colon cancer screening   Colonoscopy was  performed 2022, recommended to repeat in 7 years.    RTC 3 months    It was a pleasure to participate in the care of this patient; please contact us with any further questions.  A total of 29 face-to-face minutes was spent with this patient, >50% of which was counseling regarding the above delineated issues. An additional 11 minutes was spent on the date of the encounter doing chart review, documentation, and further activities as noted above.    Stephanie Nix PA-C  Division of Gastroenterology, Hepatology and Nutrition  [facility]    ---------------------------------------------------------------------------------------------------  HPI:  Jamal Sanchez Jr. is a 69 year old male with past medical history significant for coronary artery disease status post PCI with stent on Plavix, hyperlipidemia, GERD and hiatal hernia  who presents for follow up on reflux and dysphagia.    He was initially evaluated in our GI clinic with Dr. Marisela De Leon in December 2022 and then worked with Narendra Kramer PA-C. Briefly, he has a long history of reflux which unfortunately was not well controlled despite omeprazole 40 mg daily and famotidine 20 mg twice daily.  He continued to have chest discomfort, persistent reflux symptoms as well as difficulty swallowing, often feeling that foods were getting stuck in his chest and more difficult to dislodge.  As a result he has become very mindful of how he was eating and tried to eat smaller bites and chew his foods very well. A prior EGD in January 2022 noted esophageal stenosis treated with dilation, gastritis and medium sized hiatal hernia. Repeat EGD in April 2022 without stricture in the esophagus.     Interval history August 21, 2023  Since his last visit his omeprazole was switched to pantoprazole given an interaction with his Plavix. He has also continued famotidine 20mg twice daily. We updated his EGD last month in July 2023 which noted LA grade B esophagitis, 3 cm  hiatal hernia, and gastric polyps.  Biopsies with mild inflammation but negative for H. pylori and negative for Freire's esophagus.  Gastric polyps were benign. He reports that he does feel further improvement with the pantoprazole however still with reflux symptoms and dysphagia.  Throat feels scratchy all the time, sometimes results in coughing. He is concerned if this could be aspiration occurring.      He does have some acidic foods in his diet. He eats tomatoes about 3-4 times a day and uses garlic regularly in meals. Overall hasn't had an issue with these items. He denies carbonated beverages. He drinks electrolyte water. He does drink regularly. Not necessarily every day however does enjoy 6-8 beers a day max on weekends.      Interval history April 3, 2024   He recently had plication diaphragm thoracotomy last week at [place]. He is still recovering from surgery and has pain in his side. It hurts when he coughs. Reports slowly recovering from his surgery. He is on pain medication currently and has developed constipation as a result. He is able to mange with senna stool softeners. Reflux hasn't been a problem or concern recently. He continues his protonix 40mg twice daily and pepcid twice daily.      Interval history 8/2024:   He has recovered from surgery and he feels he can breathe better. Eats and drinks slowly. Eats somewhat smaller portion sizes. With BID protonix and famotidine, it is rare to have breakthrough symptoms. Takes Protonix 30 mins before eating. Will forget second dose sometimes. Will forget 3-4 times per week and will not typically notice any increase in symptoms. Had once instance with rice that felt lodged in the esophagus that eventually passed after a half an hour. This happened in January.  He makes an effort to take small bites and eats slowly. No dysphagia to liquids . Denies pain or pressure in upper abdomen with meals. Rare regurgitation which happens upright as much as lying down.  "No nocturnal awakenings with symptoms. Not needing tums as much as he used to.     Daily bowel movements, soft and easy to pass. No blood in the stool.     Interval history 12/2024:   Gopal reports he is doing well. He misses his evening dose of protonix about 40% of the time and has not had any trouble with heartburn, acid reflux, dysphagia, abdominal pain. He does take famotidine 20 mg twice daily. He feels things are doing very well.     Wt Readings from Last 10 Encounters:   12/12/24 93 kg (205 lb)   10/01/24 92.9 kg (204 lb 12.8 oz)   08/14/24 88 kg (194 lb)   05/03/24 87.1 kg (192 lb)   04/23/24 87.1 kg (192 lb)   03/20/24 90.4 kg (199 lb 6.4 oz)   11/29/23 93.9 kg (207 lb)   10/05/23 93.9 kg (207 lb 0.2 oz)   09/26/23 93.9 kg (207 lb)   08/23/23 93.9 kg (207 lb)        ROS:    A 10 point review of systems was performed and is negative except as noted in the HPI.     PHYSICAL EXAMINATION:  Vitals Ht 1.753 m (5' 9\")   Wt 93 kg (205 lb)   BMI 30.27 kg/m     Wt   Wt Readings from Last 2 Encounters:   12/12/24 93 kg (205 lb)   10/01/24 92.9 kg (204 lb 12.8 oz)      Constitutional - general appearance is well and in no acute distress. Body habitus normal  Eyes - No redness or discharge  Respiratory - No cough, unlabored breathing  Musculoskeletal - range of motion intact: Neck and arms  Skin - No discoloration or lesions  Neurological - No tremors  Psychiatric - No anxiety, alert & oriented       Again, thank you for allowing me to participate in the care of your patient.        Sincerely,        Stephanie Nix PA-C  "

## 2024-12-12 NOTE — NURSING NOTE
Current patient location: [address] [place] [id number]    Is the patient currently in the state of [place]? YES    Visit mode:VIDEO    If the visit is dropped, the patient can be reconnected by:VIDEO VISIT: Text to cell phone:   Telephone Information:   Mobile [phone]       Will anyone else be joining the visit? NO  (If patient encounters technical issues they should call [phone]    Are changes needed to the allergy or medication list? No    Are refills needed on medications prescribed by this physician? NO    Rooming Documentation:  Questionnaire(s) completed    Reason for visit: ENIO ADAMS

## 2024-12-16 ENCOUNTER — TELEPHONE (OUTPATIENT)
Dept: GASTROENTEROLOGY | Facility: CLINIC | Age: 69
End: 2024-12-16
Payer: MEDICARE

## 2024-12-16 NOTE — TELEPHONE ENCOUNTER
Patient confirmed scheduled appointment:  Date: 3/17/25  Time: 10 am  Visit type: return gi  Provider: Stephanie Nix  Location: virtual  Testing/imaging: NA  Additional notes: NA

## 2024-12-17 DIAGNOSIS — I25.118 CORONARY ARTERY DISEASE OF NATIVE ARTERY OF NATIVE HEART WITH STABLE ANGINA PECTORIS (H): ICD-10-CM

## 2024-12-17 DIAGNOSIS — I25.10 CORONARY ARTERY DISEASE INVOLVING NATIVE CORONARY ARTERY OF NATIVE HEART WITHOUT ANGINA PECTORIS: ICD-10-CM

## 2024-12-17 DIAGNOSIS — R00.0 TACHYCARDIA: ICD-10-CM

## 2024-12-17 RX ORDER — METOPROLOL SUCCINATE 25 MG/1
25 TABLET, EXTENDED RELEASE ORAL DAILY
Qty: 90 TABLET | Refills: 2 | Status: SHIPPED | OUTPATIENT
Start: 2024-12-17

## 2024-12-17 RX ORDER — CLOPIDOGREL BISULFATE 75 MG/1
75 TABLET ORAL DAILY
Qty: 90 TABLET | Refills: 2 | Status: SHIPPED | OUTPATIENT
Start: 2024-12-17

## 2024-12-17 RX ORDER — LISINOPRIL 10 MG/1
10 TABLET ORAL DAILY
Qty: 90 TABLET | Refills: 2 | Status: SHIPPED | OUTPATIENT
Start: 2024-12-17

## 2024-12-17 NOTE — TELEPHONE ENCOUNTER
[facility] Cardiology Refill Guideline reviewed.  Medication meets criteria for refill.    Ayesha Baez, RN

## 2024-12-17 NOTE — TELEPHONE ENCOUNTER
Last Office Visit: 10/1/24 Nancy   Next Office Visit: None scheduled   Last Fill Date: 9/27/24 for all 3     Mary Ahumada CMA 12/17/2024 8:38 AM

## 2025-02-10 ENCOUNTER — OFFICE VISIT (OUTPATIENT)
Dept: DERMATOLOGY | Facility: CLINIC | Age: 70
End: 2025-02-10
Payer: COMMERCIAL

## 2025-02-10 DIAGNOSIS — D18.01 ANGIOMA OF SKIN: ICD-10-CM

## 2025-02-10 DIAGNOSIS — D22.9 NEVUS: Primary | ICD-10-CM

## 2025-02-10 DIAGNOSIS — L82.1 SEBORRHEIC KERATOSIS: ICD-10-CM

## 2025-02-10 DIAGNOSIS — L81.4 LENTIGO: ICD-10-CM

## 2025-02-10 DIAGNOSIS — D48.5 NEOPLASM OF UNCERTAIN BEHAVIOR OF SKIN: ICD-10-CM

## 2025-02-10 PROCEDURE — 11102 TANGNTL BX SKIN SINGLE LES: CPT | Performed by: PHYSICIAN ASSISTANT

## 2025-02-10 PROCEDURE — 99213 OFFICE O/P EST LOW 20 MIN: CPT | Mod: 25 | Performed by: PHYSICIAN ASSISTANT

## 2025-02-10 NOTE — LETTER
2/10/2025      Jamal Sanchez Jr.  [address] [address] [place] [id number]      Dear Colleague,    Thank you for referring your patient, Jamal Sanchez Jr., to the [facility]. Please see a copy of my visit note below.    HPI:   Chief complaints: Jamal Sanchez is a pleasant 70 year old male who presents for Full skin cancer screening to rule out skin cancer   Last Skin Exam: 1 year ago      1st Baseline: no  Personal HX of Skin Cancer: no   Personal HX of Malignant Melanoma: no   Family HX of Skin Cancer / Malignant Melanoma: no  Personal HX of Atypical Moles:   no  Risk factors: history of sun exposure and burns; current tanning bed use  New / Changing lesions: yes spot on the right 4th web space - continue to be there and is tender. Urea maybe helped a little  Social History: likes the warm weather - went to [place] over xmas  On review of systems, there are no further skin complaints, patient is feeling otherwise well.   ROS of the following were done and are negative: Constitutional, Eyes, Ears, Nose,   Mouth, Throat, Cardiovascular, Respiratory, GI, Genitourinary, Musculoskeletal,   Psychiatric, Endocrine, Allergic/Immunologic.    PHYSICAL EXAM:   There were no vitals taken for this visit.  Skin exam performed as follows: Type 2 skin. Mood appropriate  Alert and Oriented X 3. Well developed, well nourished in no distress.  General appearance: Normal  Head including face: Normal  Eyes: conjunctiva and lids: Normal  Mouth: Lips, teeth, gums: Normal  Neck: Normal  Chest-breast/axillae: Normal  Back: Normal  Extremities: digits/nails (clubbing): Normal  Eccrine and Apocrine glands: Normal  Right upper extremity: Normal  Left upper extremity: Normal  Right lower extremity: Normal  Left lower extremity: Normal  Skin: Scalp and body hair: See below    Pt deferred exam of breasts, groin, buttocks: No    Other physical findings:  1. Multiple pigmented macules on extremities and trunk  2.  Multiple pigmented macules on face, trunk and extremities  3. Multiple vascular papules on trunk, arms and legs  4. Multiple scattered keratotic plaques  5. Macerated hyperkeratotic plaque on the right 4th web space       Except as noted above, no other signs of skin cancer or melanoma.     ASSESSMENT/PLAN:   Benign Full skin cancer screening today. . Patient with history of none  Advised on monthly self exams and 1 year  Patient Education: Appropriate brochures given.    Multiple benign appearing melanocytic nevi on arms, legs and trunk. Discussed ABCDEs of melanoma and sunscreen.   Multiple lentigos on arms, legs and trunk. Advised benign, no treatment needed.  Multiple scattered angiomas. Advised benign, no treatment needed.   Seborrheic keratosis on arms, legs and trunk. Advised benign, no treatment needed.  Callus r/o skin malignancy on the right 4th web space. Shave biopsy performed.  Area cleaned and anesthetized with 1% lidocaine with epinephrine.  Dermablade used to remove the lesion and sent to pathology. Bleeding was cauterized. Pt tolerated procedure well with no complications.               Follow-up: yearly/PRN sooner    1.) Patient was asked about new and changing moles. YES  2.) Patient received a complete physical skin examination: YES  3.) Patient was counseled to perform a monthly self skin examination: YES  Scribed By: Stephanie Fair, MS, PAShireenC      Again, thank you for allowing me to participate in the care of your patient.        Sincerely,        Stephanie Fair PA-C    Electronically signed

## 2025-02-10 NOTE — PATIENT INSTRUCTIONS
Wound Care Instructions     FOR SUPERFICIAL WOUNDS     [facility]  [phone]                 AFTER 24 HOURS YOU SHOULD REMOVE THE BANDAGE AND BEGIN DAILY DRESSING CHANGES AS FOLLOWS:     1) Remove Dressing.     2) Clean and dry the area with tap water using a Q-tip or sterile gauze pad.     3) Apply Vaseline, Aquaphor, Polysporin ointment or Bacitracin ointment over entire wound.  Do NOT use Neosporin ointment.     4) Cover the wound with a band-aid, or a sterile non-stick gauze pad and micropore paper tape    REPEAT THESE INSTRUCTIONS AT LEAST ONCE A DAY UNTIL THE WOUND HAS COMPLETELY HEALED.    It is an old wives tale that a wound heals better when it is exposed to air and allowed to dry out. The wound will heal faster with a better cosmetic result if it is kept moist with ointment and covered with a bandage.    **Do not let the wound dry out.**    Supplies Needed:      *Cotton tipped applicators (Q-tips)    *Vaseline, Aquaphor, Polysporin or Bacitracin Ointment (NOT NEOSPORIN)    *Band-aids or non-stick gauze pads and micropore paper tape.      PATIENT INFORMATION:    During the healing process you will notice a number of changes. All wounds develop a small halo of redness surrounding the wound.  This means healing is occurring. Severe itching with extensive redness usually indicates sensitivity to the ointment or bandage tape used to dress the wound.  You should call our office if this develops.      Swelling  and/or discoloration around your surgical site is common, particularly when performed around the eye.    All wounds normally drain.  The larger the wound the more drainage there will be.  After 7-10 days, you will notice the wound beginning to shrink and new skin will begin to grow.  The wound is healed when you can see skin has formed over the entire area.  A healed wound has a healthy, shiny look to the surface and is red to dark pink in color to normalize.  Wounds may  take approximately 4-6 weeks to heal.  Larger wounds may take 6-8 weeks.  After the wound is healed you may discontinue dressing changes.    You may experience a sensation of tightness as your wound heals. This is normal and will gradually subside.    Your healed wound may be sensitive to temperature changes. This sensitivity improves with time, but if you re having a lot of discomfort, try to avoid temperature extremes.    Patients frequently experience itching after their wound appears to have healed because of the continue healing under the skin.  Plain Vaseline will help relieve the itching.      POSSIBLE COMPLICATIONS    BLEEDING:    Leave the bandage in place.  Use tightly rolled up gauze or a cloth to apply direct pressure over the bandage for 30  minutes.  Reapply pressure for an additional 30 minutes if necessary  Use additional gauze and tape to maintain pressure once the bleeding has stopped.

## 2025-02-10 NOTE — PROGRESS NOTES
HPI:   Chief complaints: Jamal Sanchez is a pleasant 70 year old male who presents for Full skin cancer screening to rule out skin cancer   Last Skin Exam: 1 year ago      1st Baseline: no  Personal HX of Skin Cancer: no   Personal HX of Malignant Melanoma: no   Family HX of Skin Cancer / Malignant Melanoma: no  Personal HX of Atypical Moles:   no  Risk factors: history of sun exposure and burns; current tanning bed use  New / Changing lesions: yes spot on the right 4th web space - continue to be there and is tender. Urea maybe helped a little  Social History: likes the warm weather - went to [place] over xmas  On review of systems, there are no further skin complaints, patient is feeling otherwise well.   ROS of the following were done and are negative: Constitutional, Eyes, Ears, Nose,   Mouth, Throat, Cardiovascular, Respiratory, GI, Genitourinary, Musculoskeletal,   Psychiatric, Endocrine, Allergic/Immunologic.    PHYSICAL EXAM:   There were no vitals taken for this visit.  Skin exam performed as follows: Type 2 skin. Mood appropriate  Alert and Oriented X 3. Well developed, well nourished in no distress.  General appearance: Normal  Head including face: Normal  Eyes: conjunctiva and lids: Normal  Mouth: Lips, teeth, gums: Normal  Neck: Normal  Chest-breast/axillae: Normal  Back: Normal  Extremities: digits/nails (clubbing): Normal  Eccrine and Apocrine glands: Normal  Right upper extremity: Normal  Left upper extremity: Normal  Right lower extremity: Normal  Left lower extremity: Normal  Skin: Scalp and body hair: See below    Pt deferred exam of breasts, groin, buttocks: No    Other physical findings:  1. Multiple pigmented macules on extremities and trunk  2. Multiple pigmented macules on face, trunk and extremities  3. Multiple vascular papules on trunk, arms and legs  4. Multiple scattered keratotic plaques  5. Macerated hyperkeratotic plaque on the right 4th web space       Except as noted above, no other  signs of skin cancer or melanoma.     ASSESSMENT/PLAN:   Benign Full skin cancer screening today. . Patient with history of none  Advised on monthly self exams and 1 year  Patient Education: Appropriate brochures given.    Multiple benign appearing melanocytic nevi on arms, legs and trunk. Discussed ABCDEs of melanoma and sunscreen.   Multiple lentigos on arms, legs and trunk. Advised benign, no treatment needed.  Multiple scattered angiomas. Advised benign, no treatment needed.   Seborrheic keratosis on arms, legs and trunk. Advised benign, no treatment needed.  Callus r/o skin malignancy on the right 4th web space. Shave biopsy performed.  Area cleaned and anesthetized with 1% lidocaine with epinephrine.  Dermablade used to remove the lesion and sent to pathology. Bleeding was cauterized. Pt tolerated procedure well with no complications.               Follow-up: yearly/PRN sooner    1.) Patient was asked about new and changing moles. YES  2.) Patient received a complete physical skin examination: YES  3.) Patient was counseled to perform a monthly self skin examination: YES  Scribed By: Stephanie Fair MS, PAANN-MARIE

## 2025-02-17 LAB
PATH REPORT.COMMENTS IMP SPEC: NORMAL
PATH REPORT.FINAL DX SPEC: NORMAL
PATH REPORT.GROSS SPEC: NORMAL
PATH REPORT.MICROSCOPIC SPEC OTHER STN: NORMAL
PATH REPORT.RELEVANT HX SPEC: NORMAL

## 2025-03-10 DIAGNOSIS — N52.9 ERECTILE DYSFUNCTION, UNSPECIFIED ERECTILE DYSFUNCTION TYPE: ICD-10-CM

## 2025-03-10 RX ORDER — TADALAFIL 10 MG/1
TABLET ORAL
Qty: 30 TABLET | Refills: 1 | Status: SHIPPED | OUTPATIENT
Start: 2025-03-10

## 2025-03-10 NOTE — TELEPHONE ENCOUNTER
Requested Prescriptions   Pending Prescriptions Disp Refills    tadalafil (CIALIS) 10 MG tablet 30 tablet 4     Sig: Take 1-2 tablets daily as needed, 30-45 minutes prior to intended sexual activity       There is no refill protocol information for this order        visit: Visit date not found with prescribing provider:  Marilyn Grayson     Future Office Visit:   Next 5 appointments (look out 90 days)      Mar 25, 2025 10:00 AM  [facility] with Almaz Barnes RPH  [facility] ([facility] ) [address] [facility] [address]  [phone]           Lulu Ball   [facility] [place]   [facility]  [phone]

## 2025-03-10 NOTE — TELEPHONE ENCOUNTER
"Refill request for tadalafil 10mg tab: take as needed for sexual activity  Last filled 5/3/2024 for 30 tabs and 4 refills  LOV with [facility] 5/3/2024:  \"Plan:  Continue tamsulosin 0.4 mg daily.   Continue tadalafil 10-20 mg prn for sexual activity.   PSA today for annual prostate cancer screening.   Follow up in one year, sooner if concerns. \"    Writer intended to fill with 1 refill.  High risk drug interaction present.    Routed to [facility] for consideration:  Can patient have refill as pended?    Jose Alfredo GONZALEZ [facility]    "

## 2025-03-16 NOTE — PROGRESS NOTES
Virtual Visit Details    Type of service:  Video Visit   Video Start Time: 10:02 AM  Video End Time: 10:43 AM    Originating Location (pt. Location): Home    Distant Location (provider location):  Off-site  Platform used for Video Visit: Well    GI VIDEO VISIT    CC: Follow up     ASSESSMENT/PLAN:    1. Gastroesophageal reflux disease with esophagitis without hemorrhage    Mr. Sanchez is a 70 year old male with pmhx with of coronary artery disease status post PCI with stent on Plavix, hyperlipidemia, GERD and hiatal hernia who presents for follow-up. He has recovered from plication diaphragm thoracotomy. Reflux has been well managed with protonix 40 mg twice daily and pepcid 20 milligrams twice daily. There was previous discussion on decreasing doses at this follow up however dose was kept the same during his recovery from surgery.  It is very rare for him to have any breakthrough acid reflux symptoms on these doses.  He would like to cut down on his med list if possible.  He does have a history of LA grade B esophagitis without Freire's but he would likely need some PPI indefinitely.      At a previous visit he decreased from protonix 40 mg BID to 20 mg BID. He states he forgets the second dose about 40% of the time and does very well without it. He has also been taking famotidine 20 mg twice daily as well. He forgot to stop this after our last visit. He would like to cut down on number of medications. We discussed stopping famotidine, and continuing on 20 mg BID protonix. He will monitor for any worsening of symptoms, which we discussed. He denies any dysphagia. We discussed diet and lifestyle modifications for GERD as well.    He would like to cut down on his other meds as well, and has a visit coming up with MTM. He plans to follow up soon with his PCP too.    We reviewed the following plan:   - Stop famotidine   - Continue 20 mg pantoprazole twice daily  - Recommended taking your blood pressure at home  after you have been seated and resting for at least 10 minutes. Take your blood pressure once daily for 1-2 weeks and record the readings, and bring these readings along with your home blood pressure device into your primary care clinic for a device check to ensure accuracy. This can help differentiate white coat hypertension from true hypertension.  - Follow up in 4-6 months or sooner if needed         # Colon cancer screening   Colonoscopy was performed 2022, recommended to repeat in 7 years (2029).    RTC 4-6 months     It was a pleasure to participate in the care of this patient; please contact us with any further questions.  55 minutes was spent on the date of the encounter doing chart review, documentation, and further activities as noted above.    Stephanie Nix PA-C  Division of Gastroenterology, Hepatology and Nutrition  [facility]    ---------------------------------------------------------------------------------------------------  HPI:  Jamal Sanchez Jr. is a 70 year old male with past medical history significant for coronary artery disease status post PCI with stent on Plavix, hyperlipidemia, GERD and hiatal hernia  who presents for follow up on reflux and dysphagia.    He was initially evaluated in our GI clinic with Dr. Marisela De Leon in December 2022 and then worked with Narendra Kramer PA-C. Briefly, he has a long history of reflux which was not well controlled despite omeprazole 40 mg daily and famotidine 20 mg twice daily.  He continued to have chest discomfort, persistent reflux symptoms as well as difficulty swallowing, often feeling that foods were getting stuck in his chest and more difficult to dislodge.  As a result he has become very mindful of how he was eating and tried to eat smaller bites and chew his foods very well. A prior EGD in January 2022 noted esophageal stenosis treated with dilation, gastritis and medium sized hiatal hernia. Repeat EGD in April 2022 without stricture  in the esophagus.     Interval history August 21, 2023  Since his last visit his omeprazole was switched to pantoprazole given an interaction with his Plavix. He has also continued famotidine 20mg twice daily. We updated his EGD last month in July 2023 which noted LA grade B esophagitis, 3 cm hiatal hernia, and gastric polyps.  Biopsies with mild inflammation but negative for H. pylori and negative for Freire's esophagus.  Gastric polyps were benign. He reports that he does feel further improvement with the pantoprazole however still with reflux symptoms and dysphagia.  Throat feels scratchy all the time, sometimes results in coughing. He is concerned if this could be aspiration occurring.      He does have some acidic foods in his diet. He eats tomatoes about 3-4 times a day and uses garlic regularly in meals. Overall hasn't had an issue with these items. He denies carbonated beverages. He drinks electrolyte water. He does drink regularly. Not necessarily every day however does enjoy 6-8 beers a day max on weekends.      Interval history April 3, 2024   He recently had plication diaphragm thoracotomy last week at [place]. He is still recovering from surgery and has pain in his side. It hurts when he coughs. Reports slowly recovering from his surgery. He is on pain medication currently and has developed constipation as a result. He is able to mange with senna stool softeners. Reflux hasn't been a problem or concern recently. He continues his protonix 40mg twice daily and pepcid twice daily.      Interval history 8/2024:   He has recovered from surgery and he feels he can breathe better. Eats and drinks slowly. Eats somewhat smaller portion sizes. With BID protonix and famotidine, it is rare to have breakthrough symptoms. Takes Protonix 30 mins before eating. Will forget second dose sometimes. Will forget 3-4 times per week and will not typically notice any increase in symptoms. Had once instance with rice that felt  lodged in the esophagus that eventually passed after a half an hour. This happened in January.  He makes an effort to take small bites and eats slowly. No dysphagia to liquids . Denies pain or pressure in upper abdomen with meals. Rare regurgitation which happens upright as much as lying down. No nocturnal awakenings with symptoms. Not needing tums as much as he used to.     Daily bowel movements, soft and easy to pass. No blood in the stool.     Interval history 12/2024:   Gopal reports he is doing well. He misses his evening dose of protonix about 40% of the time and has not had any trouble with heartburn, acid reflux, dysphagia, abdominal pain. He does take famotidine 20 mg twice daily. He feels things are doing very well.     Interval history 3/2025:  Gopal reports he has been walking and hiking more which seems to help him. He has some shortness of breath which is his main symptom, otherwise feeling pretty well. He has no stopped pepcid. He is taking pantoprazole 20 mg twice daily.     Lately he will take a couple tums at night but doesn't know if he really needs them, he just trieds to do anything he can to fall alseep. He htinkshe has some abdominal discomfort when he eats something he shouldn't. States he goes out to eat frequently, not always routine eating schedule. He is trying to cut down on drinking.    He has tried wedge pillow but can't get comfortable if he's not flat.     Bowel movements have been normal, daily and regular for him.     Denies dysphagia or vomiting. No melena or hematochezia.       ROS:    A 10 point review of systems was performed and is negative except as noted in the HPI.     PHYSICAL EXAMINATION:  Vitals Wt 95.3 kg (210 lb)   BMI 31.01 kg/m     Wt   Wt Readings from Last 2 Encounters:   03/17/25 95.3 kg (210 lb)   12/12/24 93 kg (205 lb)      Constitutional - general appearance is well and in no acute distress. Body habitus normal  Eyes - No redness or discharge  Respiratory - No  cough, unlabored breathing  Musculoskeletal - range of motion intact: Neck and arms  Skin - No discoloration or lesions  Neurological - No tremors  Psychiatric - No anxiety, alert & oriented

## 2025-03-17 ENCOUNTER — VIRTUAL VISIT (OUTPATIENT)
Dept: GASTROENTEROLOGY | Facility: CLINIC | Age: 70
End: 2025-03-17
Attending: STUDENT IN AN ORGANIZED HEALTH CARE EDUCATION/TRAINING PROGRAM
Payer: COMMERCIAL

## 2025-03-17 VITALS — BODY MASS INDEX: 31.01 KG/M2 | WEIGHT: 210 LBS

## 2025-03-17 DIAGNOSIS — I25.118 CORONARY ARTERY DISEASE OF NATIVE ARTERY OF NATIVE HEART WITH STABLE ANGINA PECTORIS: ICD-10-CM

## 2025-03-17 DIAGNOSIS — K21.00 GASTROESOPHAGEAL REFLUX DISEASE WITH ESOPHAGITIS WITHOUT HEMORRHAGE: Primary | ICD-10-CM

## 2025-03-17 PROCEDURE — 1126F AMNT PAIN NOTED NONE PRSNT: CPT | Mod: 95 | Performed by: STUDENT IN AN ORGANIZED HEALTH CARE EDUCATION/TRAINING PROGRAM

## 2025-03-17 PROCEDURE — 98007 SYNCH AUDIO-VIDEO EST HI 40: CPT | Performed by: STUDENT IN AN ORGANIZED HEALTH CARE EDUCATION/TRAINING PROGRAM

## 2025-03-17 RX ORDER — ATORVASTATIN CALCIUM 40 MG/1
40 TABLET, FILM COATED ORAL DAILY
Qty: 90 TABLET | Refills: 2 | Status: SHIPPED | OUTPATIENT
Start: 2025-03-17

## 2025-03-17 ASSESSMENT — PAIN SCALES - GENERAL: PAINLEVEL_OUTOF10: NO PAIN (0)

## 2025-03-17 NOTE — PATIENT INSTRUCTIONS
It was a pleasure meeting with you today and discussing your healthcare plan. Below is a summary of what we covered:    - Stop famotidine   - Continue 20 mg pantoprazole twice daily    - Recommended taking your blood pressure at home after you have been seated and resting for at least 10 minutes. Take your blood pressure once daily for 1-2 weeks and record the readings, and bring these readings along with your home blood pressure device into your primary care clinic for a device check to ensure accuracy. This can help differentiate white coat hypertension from true hypertension.    - Follow up in 4-6 months or sooner if needed    -  If you have any questions, please don't hesitate to contact me through our GI RN Clinic Coordinator, Taylor Nielsen, at [phone].       Please see below for any additional questions and scheduling guidelines.    Sign up for [employer]: [employer] patient portal serves as a secure platform for accessing your medical records from the [facility]. Additionally, [employer] facilitates easy, timely, and secure messaging with your care team. If you have not signed up, you may do so by using the provided code or calling [phone].    Coordinating your care after your visit:  There are multiple options for scheduling your follow-up care based on your provider's recommendation.    How do I schedule a follow-up clinic appointment:   After your appointment, you may receive scheduling assistance with the Clinic Coordinators by having a seat in the waiting room and a Clinic Coordinator will call you up to schedule.  Virtual visits or after you leave the clinic:  Your provider has placed a follow-up order in the [employer] portal for scheduling your return appointment. A member of the scheduling team will contact you to schedule.  Consertt Scheduling: Timely scheduling through [employer] is advised to ensure appointment availability.   Call to schedule: You may schedule your follow-up appointment(s)  by calling [phone], option 1.    How do I schedule my endoscopy or colonoscopy procedure:  If a procedure, such as a colonoscopy or upper endoscopy was ordered by your provider, the scheduling team will contact you to schedule this procedure. Or you may choose to call to schedule at   [phone], option 2.  Please allow 20-30 minutes when scheduling a procedure.    How do I get my blood work done? To get your blood work done, you need to schedule a lab appointment at an [facility]. There are multiple ways to schedule:   At the clinic: The Clinic Coordinator you meet after your visit can help you schedule a lab appointment.   [employer] scheduling: [employer] offers online lab scheduling at all [facility] laboratory locations.   Call to schedule: You can call [phone] to schedule your lab appointment.    How do I schedule my imaging study: To schedule imaging studies, such as CT scans, ultrasounds, MRIs, or X-rays, contact Imaging Services at [phone].    How do I schedule a referral to another doctor: If your provider recommended a referral to another specialist(s), the referral order was placed by your provider. You will receive a phone call to schedule this referral, or you may choose to call the number attached to the referral to self-schedule.    For Post-Visit Question(s):  For any inquiries following today's visit:  Please utilize [employer] messaging and allow 48 hours for reply or contact the Call Center during normal business hours at [phone], option 3.  For Emergent After-hours questions, contact the On-Call GI Fellow through the [facility]  at [phone].  In addition, you may contact your Nurse directly using the provided contact information.    Test Results:  Test results will be accessible via [employer] in compliance with the 21st Century Cures Act. This means that your results will be available to you at the same time as your provider. Often you  may see your results before your provider does. Results are reviewed by staff within two weeks with communication follow-up. Results may be released in the patient portal prior to your care team review.    Prescription Refill(s):  Medication prescribed by your provider will be addressed during your visit. For future refills, please coordinate with your pharmacy. If you have not had a recent clinic visit or routine labs, for your safety, your provider may not be able to refill your prescription.

## 2025-03-17 NOTE — TELEPHONE ENCOUNTER
Last Office Visit: 10/1/24 (Nancy)  Next Office Visit: TBD  Last Fill Date: 12/26/24  Stephanie Parisi LPN Cardiology   3/17/2025 8:10 AM

## 2025-03-17 NOTE — NURSING NOTE
Current patient location: [address] [place] [id number]    Is the patient currently in the state of [place]? YES    Visit mode: VIDEO    If the visit is dropped, the patient can be reconnected by:VIDEO VISIT: Send to e-mail at: [email or web address]    Will anyone else be joining the visit? NO  (If patient encounters technical issues they should call [phone])    Are changes needed to the allergy or medication list? No    Are refills needed on medications prescribed by this physician? NO    Rooming Documentation:  Questionnaire(s) completed    Reason for visit: ENIO ADAMS

## 2025-03-17 NOTE — TELEPHONE ENCOUNTER
[facility] Refill Guideline reviewed.  Medication meets criteria for refill.    Marian Rutherford RN

## 2025-03-17 NOTE — LETTER
3/17/2025      Jamal Sanchez Jr.  [address] [facility] [address]      Dear Colleague,    Thank you for referring your patient, Jamal Sanchez Jr., to the [facility] [place]. Please see a copy of my visit note below.    Virtual Visit Details    Type of service:  Video Visit   Video Start Time: 10:02 AM  Video End Time: 10:43 AM    Originating Location (pt. Location): Home    Distant Location (provider location):  Off-site  Platform used for Video Visit: [employer]    GI VIDEO VISIT    CC: Follow up     ASSESSMENT/PLAN:    1. Gastroesophageal reflux disease with esophagitis without hemorrhage    Mr. Sanchez is a 70 year old male with pmhx with of coronary artery disease status post PCI with stent on Plavix, hyperlipidemia, GERD and hiatal hernia who presents for follow-up. He has recovered from plication diaphragm thoracotomy. Reflux has been well managed with protonix 40 mg twice daily and pepcid 20 milligrams twice daily. There was previous discussion on decreasing doses at this follow up however dose was kept the same during his recovery from surgery.  It is very rare for him to have any breakthrough acid reflux symptoms on these doses.  He would like to cut down on his med list if possible.  He does have a history of LA grade B esophagitis without Freire's but he would likely need some PPI indefinitely.      At a previous visit he decreased from protonix 40 mg BID to 20 mg BID. He states he forgets the second dose about 40% of the time and does very well without it. He has also been taking famotidine 20 mg twice daily as well. He forgot to stop this after our last visit. He would like to cut down on number of medications. We discussed stopping famotidine, and continuing on 20 mg BID protonix. He will monitor for any worsening of symptoms, which we discussed. He denies any dysphagia. We discussed diet and lifestyle modifications for GERD as well.    He would like to  cut down on his other meds as well, and has a visit coming up with MTM. He plans to follow up soon with his PCP too.    We reviewed the following plan:   - Stop famotidine   - Continue 20 mg pantoprazole twice daily  - Recommended taking your blood pressure at home after you have been seated and resting for at least 10 minutes. Take your blood pressure once daily for 1-2 weeks and record the readings, and bring these readings along with your home blood pressure device into your primary care clinic for a device check to ensure accuracy. This can help differentiate white coat hypertension from true hypertension.  - Follow up in 4-6 months or sooner if needed         # Colon cancer screening   Colonoscopy was performed 2022, recommended to repeat in 7 years (2029).    RTC 4-6 months     It was a pleasure to participate in the care of this patient; please contact us with any further questions.  55 minutes was spent on the date of the encounter doing chart review, documentation, and further activities as noted above.    Stephanie Nix PA-C  Division of Gastroenterology, Hepatology and Nutrition  [facility]    ---------------------------------------------------------------------------------------------------  HPI:  Jamal Sanchez Jr. is a 70 year old male with past medical history significant for coronary artery disease status post PCI with stent on Plavix, hyperlipidemia, GERD and hiatal hernia  who presents for follow up on reflux and dysphagia.    He was initially evaluated in our GI clinic with Dr. Marisela De Leon in December 2022 and then worked with Narendra Kramer PA-C. Briefly, he has a long history of reflux which was not well controlled despite omeprazole 40 mg daily and famotidine 20 mg twice daily.  He continued to have chest discomfort, persistent reflux symptoms as well as difficulty swallowing, often feeling that foods were getting stuck in his chest and more difficult to dislodge.  As a result he  has become very mindful of how he was eating and tried to eat smaller bites and chew his foods very well. A prior EGD in January 2022 noted esophageal stenosis treated with dilation, gastritis and medium sized hiatal hernia. Repeat EGD in April 2022 without stricture in the esophagus.     Interval history August 21, 2023  Since his last visit his omeprazole was switched to pantoprazole given an interaction with his Plavix. He has also continued famotidine 20mg twice daily. We updated his EGD last month in July 2023 which noted LA grade B esophagitis, 3 cm hiatal hernia, and gastric polyps.  Biopsies with mild inflammation but negative for H. pylori and negative for Freire's esophagus.  Gastric polyps were benign. He reports that he does feel further improvement with the pantoprazole however still with reflux symptoms and dysphagia.  Throat feels scratchy all the time, sometimes results in coughing. He is concerned if this could be aspiration occurring.      He does have some acidic foods in his diet. He eats tomatoes about 3-4 times a day and uses garlic regularly in meals. Overall hasn't had an issue with these items. He denies carbonated beverages. He drinks electrolyte water. He does drink regularly. Not necessarily every day however does enjoy 6-8 beers a day max on weekends.      Interval history April 3, 2024   He recently had plication diaphragm thoracotomy last week at [place]. He is still recovering from surgery and has pain in his side. It hurts when he coughs. Reports slowly recovering from his surgery. He is on pain medication currently and has developed constipation as a result. He is able to mange with senna stool softeners. Reflux hasn't been a problem or concern recently. He continues his protonix 40mg twice daily and pepcid twice daily.      Interval history 8/2024:   He has recovered from surgery and he feels he can breathe better. Eats and drinks slowly. Eats somewhat smaller portion sizes. With BID  protonix and famotidine, it is rare to have breakthrough symptoms. Takes Protonix 30 mins before eating. Will forget second dose sometimes. Will forget 3-4 times per week and will not typically notice any increase in symptoms. Had once instance with rice that felt lodged in the esophagus that eventually passed after a half an hour. This happened in January.  He makes an effort to take small bites and eats slowly. No dysphagia to liquids . Denies pain or pressure in upper abdomen with meals. Rare regurgitation which happens upright as much as lying down. No nocturnal awakenings with symptoms. Not needing tums as much as he used to.     Daily bowel movements, soft and easy to pass. No blood in the stool.     Interval history 12/2024:   Gopal reports he is doing well. He misses his evening dose of protonix about 40% of the time and has not had any trouble with heartburn, acid reflux, dysphagia, abdominal pain. He does take famotidine 20 mg twice daily. He feels things are doing very well.     Interval history 3/2025:  Gopal reports he has been walking and hiking more which seems to help him. He has some shortness of breath which is his main symptom, otherwise feeling pretty well. He has no stopped pepcid. He is taking pantoprazole 20 mg twice daily.     Lately he will take a couple tums at night but doesn't know if he really needs them, he just trieds to do anything he can to fall alseep. He htinkshe has some abdominal discomfort when he eats something he shouldn't. States he goes out to eat frequently, not always routine eating schedule. He is trying to cut down on drinking.    He has tried wedge pillow but can't get comfortable if he's not flat.     Bowel movements have been normal, daily and regular for him.     Denies dysphagia or vomiting. No melena or hematochezia.       ROS:    A 10 point review of systems was performed and is negative except as noted in the HPI.     PHYSICAL EXAMINATION:  Vitals Wt 95.3 kg (210 lb)    BMI 31.01 kg/m     Wt   Wt Readings from Last 2 Encounters:   03/17/25 95.3 kg (210 lb)   12/12/24 93 kg (205 lb)      Constitutional - general appearance is well and in no acute distress. Body habitus normal  Eyes - No redness or discharge  Respiratory - No cough, unlabored breathing  Musculoskeletal - range of motion intact: Neck and arms  Skin - No discoloration or lesions  Neurological - No tremors  Psychiatric - No anxiety, alert & oriented       Again, thank you for allowing me to participate in the care of your patient.        Sincerely,        Stephanie Nix PA-C    Electronically signed

## 2025-03-25 ENCOUNTER — OFFICE VISIT (OUTPATIENT)
Dept: PHARMACY | Facility: CLINIC | Age: 70
End: 2025-03-25
Payer: COMMERCIAL

## 2025-03-25 VITALS — DIASTOLIC BLOOD PRESSURE: 52 MMHG | SYSTOLIC BLOOD PRESSURE: 110 MMHG

## 2025-03-25 DIAGNOSIS — I25.118 CORONARY ARTERY DISEASE OF NATIVE HEART WITH STABLE ANGINA PECTORIS, UNSPECIFIED VESSEL OR LESION TYPE: ICD-10-CM

## 2025-03-25 DIAGNOSIS — R35.1 BENIGN PROSTATIC HYPERPLASIA WITH NOCTURIA: ICD-10-CM

## 2025-03-25 DIAGNOSIS — I10 HYPERTENSION, GOAL BELOW 140/90: Primary | ICD-10-CM

## 2025-03-25 DIAGNOSIS — K21.9 GASTROESOPHAGEAL REFLUX DISEASE, UNSPECIFIED WHETHER ESOPHAGITIS PRESENT: ICD-10-CM

## 2025-03-25 DIAGNOSIS — N40.1 BENIGN PROSTATIC HYPERPLASIA WITH NOCTURIA: ICD-10-CM

## 2025-03-25 DIAGNOSIS — E78.5 DYSLIPIDEMIA: ICD-10-CM

## 2025-03-25 DIAGNOSIS — Z78.9 TAKES DIETARY SUPPLEMENTS: ICD-10-CM

## 2025-03-25 DIAGNOSIS — R21 RASH: ICD-10-CM

## 2025-03-25 DIAGNOSIS — M54.16 LUMBAR RADICULAR PAIN: ICD-10-CM

## 2025-03-25 PROCEDURE — 3074F SYST BP LT 130 MM HG: CPT | Performed by: PHARMACIST

## 2025-03-25 PROCEDURE — 99607 MTMS BY PHARM ADDL 15 MIN: CPT | Performed by: PHARMACIST

## 2025-03-25 PROCEDURE — 3078F DIAST BP <80 MM HG: CPT | Performed by: PHARMACIST

## 2025-03-25 PROCEDURE — 99605 MTMS BY PHARM NP 15 MIN: CPT | Performed by: PHARMACIST

## 2025-03-25 NOTE — PATIENT INSTRUCTIONS
"Recommendations from today's MTM visit:                                                    MTM (medication therapy management) is a service provided by a clinical pharmacist designed to help you get the most of out of your medicines.   Today we reviewed what your medicines are for, how to know if they are working, that your medicines are safe and how to make your medicine regimen as easy as possible.      Gopal,    It was a pleasure talking with you! We discussed the followin. Start taking lisinopril in the evening - monitor your lightheadedness. If still having problems with lightheadedness - move metoprolol to the evening.     2. Recommend follow up with urology.     Follow-up: yearly    It was great speaking with you today.  I value your experience and would be very thankful for your time in providing feedback in our clinic survey. In the next few days, you may receive an email or text message from [employer] with a link to a survey related to your  clinical pharmacist.\"     To schedule another MTM appointment, please call the clinic directly or you may call the MTM scheduling line at [phone] or toll-free at [phone].     My Clinical Pharmacist's contact information:                                                      Please feel free to contact me with any questions or concerns you have.      Keep up the good work!!    Almaz Barnes, PharmD  [phone] in clinic on Tuesday and Wednesday          "

## 2025-03-25 NOTE — PROGRESS NOTES
Medication Therapy Management (MTM) Encounter    ASSESSMENT:                            Medication Adherence/Access: No issues identified.    Hypertension/CAD blood pressure at goal today. Move lisinopril to the evening to see if lightheadedness improves.     Hyperlipidemia stable    GERD stable    BPH recommend follow up with urology    Back Pain stable    Rash stable    Supplements stable    PLAN:                            1. Start taking lisinopril in the evening - monitor your lightheadedness. If still having problems with lightheadedness - move metoprolol to the evening.     2. Recommend follow up with urology.     Follow-up: yearly    SUBJECTIVE/OBJECTIVE:                          Gopal Sanchez is a 70 year old male seen for a follow-up visit from 2/5/2024 - previous visit with Francesca Canales PharmD.       Reason for visit: follow up MTM visit. Comprehensive Medication Review (CMR)    Allergies/ADRs: Reviewed in chart  Past Medical History: Reviewed in chart  Tobacco: He reports that he has never smoked. He has never used smokeless tobacco.  Alcohol: has a beer about three times a week.      Medication Adherence/Access: no issues reported.    Hypertension/CAD   Clopidogrel 75mg daily.  Lisinopril 10mg daily  Triamterene/hydrochlorothiazide 75-50mg daily  Metoprolol ER 25 mg daily    When leans over can get lightheaded. Prescription haven't changed recently - taking all of his blood pressure medications in the morning.    Has Nitrostat - has not needed to use.   Sees cardiology yearly.     Hyperlipidemia  Atorvastatin 40mg daily    Patient reports no current medication side effects.    GERD  Pantoprazole 40mg twice daily.    Working well - just stopped famotidine a week ago - so far going well. Taking Tums as needed - has not needed to use more often this past week.    Followed by GI - talked with this past week. Feels current therapy is effective. Tolerating pantoprazole well.     BPH   Tamsulosin 0.4mg  daily    Does not feel urination has improved overall. Getting up once to twice a night.   Patient reports no current medication side effects.    Back Pain  Gabapentin 100mg as needed    Using gabapentin very infrequently - maybe if chopping/moving wood and back is sore, will take 1-2 gabapentin. Feels effective - taking infrequently.     Patient reports no current medication side effects.    Rash:   Urea 40% cream on feet as needed  Triamcinolone 0.1% cream as needed for itching in the summer.    No concerns today.    Supplements:  Multivitamin daily.    No concerns today.    Today's Vitals: /52   ----------------    I spent 30 minutes with this patient today. All changes were made via collaborative practice agreement with Raad Benitez DO.     A summary of these recommendations was given to the patient.    Almaz Barnes, PharmD  Medication Therapy Management      Medication Therapy Recommendations  Hypertension, goal below 140/90   1 Current Medication: lisinopril (ZESTRIL) 10 MG tablet   Current Medication Sig: Take 1 tablet (10 mg) by mouth daily. Hold if Systolic Blood Pressure is less than 85.   Rationale: Undesirable effect - Adverse medication event - Safety   Recommendation: Change Administration Time   Status: Patient Agreed - Adherence/Education   Identified Date: 3/25/2025 Completed Date: 3/25/2025

## 2025-04-28 SDOH — HEALTH STABILITY: PHYSICAL HEALTH: ON AVERAGE, HOW MANY MINUTES DO YOU ENGAGE IN EXERCISE AT THIS LEVEL?: 30 MIN

## 2025-04-28 SDOH — HEALTH STABILITY: PHYSICAL HEALTH: ON AVERAGE, HOW MANY DAYS PER WEEK DO YOU ENGAGE IN MODERATE TO STRENUOUS EXERCISE (LIKE A BRISK WALK)?: 4 DAYS

## 2025-04-28 ASSESSMENT — SOCIAL DETERMINANTS OF HEALTH (SDOH): HOW OFTEN DO YOU GET TOGETHER WITH FRIENDS OR RELATIVES?: TWICE A WEEK

## 2025-04-30 ENCOUNTER — OFFICE VISIT (OUTPATIENT)
Dept: FAMILY MEDICINE | Facility: CLINIC | Age: 70
End: 2025-04-30
Payer: COMMERCIAL

## 2025-04-30 VITALS
HEART RATE: 61 BPM | BODY MASS INDEX: 30.35 KG/M2 | RESPIRATION RATE: 20 BRPM | OXYGEN SATURATION: 97 % | WEIGHT: 212 LBS | HEIGHT: 70 IN | DIASTOLIC BLOOD PRESSURE: 77 MMHG | TEMPERATURE: 98.6 F | SYSTOLIC BLOOD PRESSURE: 123 MMHG

## 2025-04-30 DIAGNOSIS — I10 HYPERTENSION, GOAL BELOW 140/90: ICD-10-CM

## 2025-04-30 DIAGNOSIS — R25.1 TREMOR: ICD-10-CM

## 2025-04-30 DIAGNOSIS — H11.31 CONJUNCTIVAL HEMORRHAGE OF RIGHT EYE: ICD-10-CM

## 2025-04-30 DIAGNOSIS — E78.5 HYPERLIPIDEMIA, UNSPECIFIED HYPERLIPIDEMIA TYPE: ICD-10-CM

## 2025-04-30 DIAGNOSIS — Z98.890 S/P PLICATION OF DIAPHRAGM: ICD-10-CM

## 2025-04-30 DIAGNOSIS — I25.118 CORONARY ARTERY DISEASE OF NATIVE HEART WITH STABLE ANGINA PECTORIS, UNSPECIFIED VESSEL OR LESION TYPE: ICD-10-CM

## 2025-04-30 DIAGNOSIS — Z82.0 FAMILY HISTORY OF PARKINSON DISEASE: ICD-10-CM

## 2025-04-30 DIAGNOSIS — G56.01 CARPAL TUNNEL SYNDROME OF RIGHT WRIST: ICD-10-CM

## 2025-04-30 DIAGNOSIS — R73.03 PREDIABETES: ICD-10-CM

## 2025-04-30 DIAGNOSIS — Z00.00 ENCOUNTER FOR MEDICARE ANNUAL WELLNESS EXAM: Primary | ICD-10-CM

## 2025-04-30 PROBLEM — K57.92 ACUTE DIVERTICULITIS: Status: RESOLVED | Noted: 2018-11-22 | Resolved: 2025-04-30

## 2025-04-30 PROBLEM — R00.0 TACHYCARDIA: Status: RESOLVED | Noted: 2023-03-30 | Resolved: 2025-04-30

## 2025-04-30 PROBLEM — K57.32 DIVERTICULITIS OF COLON: Status: RESOLVED | Noted: 2019-01-21 | Resolved: 2025-04-30

## 2025-04-30 LAB
ANION GAP SERPL CALCULATED.3IONS-SCNC: 10 MMOL/L (ref 7–15)
BUN SERPL-MCNC: 15.7 MG/DL (ref 8–23)
CALCIUM SERPL-MCNC: 9.7 MG/DL (ref 8.8–10.4)
CHLORIDE SERPL-SCNC: 101 MMOL/L (ref 98–107)
CHOLEST SERPL-MCNC: 136 MG/DL
CREAT SERPL-MCNC: 0.94 MG/DL (ref 0.67–1.17)
EGFRCR SERPLBLD CKD-EPI 2021: 87 ML/MIN/1.73M2
EST. AVERAGE GLUCOSE BLD GHB EST-MCNC: 117 MG/DL
FASTING STATUS PATIENT QL REPORTED: NO
FASTING STATUS PATIENT QL REPORTED: NO
GLUCOSE SERPL-MCNC: 91 MG/DL (ref 70–99)
HBA1C MFR BLD: 5.7 % (ref 0–5.6)
HCO3 SERPL-SCNC: 26 MMOL/L (ref 22–29)
HDLC SERPL-MCNC: 58 MG/DL
LDLC SERPL CALC-MCNC: 66 MG/DL
NONHDLC SERPL-MCNC: 78 MG/DL
POTASSIUM SERPL-SCNC: 4.2 MMOL/L (ref 3.4–5.3)
SODIUM SERPL-SCNC: 137 MMOL/L (ref 135–145)
TRIGL SERPL-MCNC: 61 MG/DL
TSH SERPL DL<=0.005 MIU/L-ACNC: 1.43 UIU/ML (ref 0.3–4.2)

## 2025-04-30 PROCEDURE — 83036 HEMOGLOBIN GLYCOSYLATED A1C: CPT | Performed by: FAMILY MEDICINE

## 2025-04-30 PROCEDURE — 80061 LIPID PANEL: CPT | Performed by: FAMILY MEDICINE

## 2025-04-30 PROCEDURE — 36415 COLL VENOUS BLD VENIPUNCTURE: CPT | Performed by: FAMILY MEDICINE

## 2025-04-30 PROCEDURE — 80048 BASIC METABOLIC PNL TOTAL CA: CPT | Performed by: FAMILY MEDICINE

## 2025-04-30 PROCEDURE — 84443 ASSAY THYROID STIM HORMONE: CPT | Performed by: FAMILY MEDICINE

## 2025-04-30 RX ORDER — TRIAMTERENE AND HYDROCHLOROTHIAZIDE 75; 50 MG/1; MG/1
1 TABLET ORAL DAILY
Qty: 90 TABLET | Refills: 3 | Status: SHIPPED | OUTPATIENT
Start: 2025-04-30

## 2025-04-30 ASSESSMENT — PAIN SCALES - GENERAL: PAINLEVEL_OUTOF10: NO PAIN (0)

## 2025-04-30 NOTE — PROGRESS NOTES
Preventive Care Visit  [facility]  Raad Benitez DO, Family Medicine  Apr 30, 2025      Assessment & Plan     Encounter for Medicare annual wellness exam  Immunizations: up to date   Cholesterol: atorvastatin 40 mg daily   Diabetes: screen today prediabetes   Colon Cancer: colonoscopy 8/17/2022, repeat 7 years   Diet/Exercise: active    Tobacco: non-user     Prediabetes  - Hemoglobin A1c    Hypertension, goal below 140/90  Stable. Follows with Cardiology as well. Check BMP today. Refilled maxzide today.   - BASIC METABOLIC PANEL  - triamterene-HCTZ (MAXZIDE) 75-50 MG tablet; Take 1 tablet by mouth daily.    Coronary artery disease of native heart with stable angina pectoris, unspecified vessel or lesion type  Check labs today. On statin.   - Lipid panel reflex to direct LDL Non-fasting    Carpal tunnel syndrome of right wrist  Discussed conservative cares. Wrist guards at night. No weakness. Discussed next steps sports med/ ortho hand.     Tremor  Occurs at rest. Very mild. Concerned with family history and wishes to see Neurology. Will check TSH today.   - Adult Neurology  Referral; Future  - TSH with free T4 reflex    Family history of Parkinson disease  - Adult Neurology  Referral; Future    S/P plication of diaphragms  Stable. Surgery in 2024 at [facility].       Conjunctival hemorrhage of right eye  Discussed to monitor.     Patient has been advised of split billing requirements and indicates understanding: Yes    The risks, benefits and treatment options of prescribed medications or other treatments have been discussed with the patient. The patient verbalized their understanding and should call or follow up if no improvement or if they develop further problems.    The longitudinal plan of care for the diagnosis(es)/condition(s) as documented were addressed during this visit. Due to the added complexity in care, I will continue to support patient in the  "subsequent management and with ongoing continuity of care.      BMI  Estimated body mass index is 30.42 kg/m  as calculated from the following:    Height as of this encounter: 1.778 m (5' 10\").    Weight as of this encounter: 96.2 kg (212 lb).   Weight management plan: Discussed healthy diet and exercise guidelines    Counseling  Appropriate preventive services were addressed with this patient via screening, questionnaire, or discussion as appropriate for fall prevention, nutrition, physical activity, Tobacco-use cessation, social engagement, weight loss and cognition.  Checklist reviewing preventive services available has been given to the patient.  Reviewed patient's diet, addressing concerns and/or questions.   The patient was provided with written information regarding signs of hearing loss.   Information on urinary incontinence and treatment options given to patient.         Subjective   Gopal is a 70 year old, presenting for the following:  Physical (Questions about vaccines, bloodwork./Has some worries about alzheimer's and parkinson's, wants to discuss these. ), Eye Problem, and Numbness (Right hand numbness/tingly sensation. )        4/30/2025     1:11 PM   Additional Questions   Roomed by Kizzy Baca   Accompanied by self         4/30/2025     1:11 PM   Patient Reported Additional Medications   Patient reports taking the following new medications none       HPI      Immunizations: up to date   Cholesterol: atorvastatin 40 mg daily   Diabetes: screen today prediabetes   Colon Cancer: colonoscopy 8/17/2022, repeat 7 years   Diet/Exercise: active    Tobacco: non-user       Concern - Right hand numb/tingly  Onset: 6-9 months   Description: tingly, falls asleep if not moving it regularly, holding phone too long   Intensity: mild  Progression of Symptoms:  same  Accompanying Signs & Symptoms: none  Previous history of similar problem: none  Precipitating factors:        Worsened by: not moving it  Alleviating " factors:        Improved by: moving, stretching it out   Therapies tried and outcome: None      Eye(s) Problem  Onset/Duration: Monday  Description: red  Location: Right eye  Pain: No  Redness: YES  Accompanying Signs & Symptoms:  Discharge/mattering: No  Swelling: No  Visual changes: No  Fever: No  Nasal Congestion: No  Bothered by bright lights: YES- has been bothered by bright lights prior to this problem  History:  Trauma: No  Foreign body exposure: No  Wearing contacts: No  Precipitating or alleviating factors: None  Therapies tried and outcome: None      No vision changes.     Concern for Parkinson's   Father with Parkinson's disease   Occasionally will get a tremor in the right hand.   Not interested in seeing Neurology at this time.   Tremor slightly worse after prolonged use of the hand.           Advance Care Planning    Discussed advance care planning with patient; informed AVS has link to [employer].        4/28/2025   General Health   How would you rate your overall physical health? (!) FAIR   Feel stress (tense, anxious, or unable to sleep) Only a little   (!) STRESS CONCERN      4/28/2025   Nutrition   Diet: Regular (no restrictions)         4/28/2025   Exercise   Days per week of moderate/strenous exercise 4 days   Average minutes spent exercising at this level 30 min         4/28/2025   Social Factors   Frequency of gathering with friends or relatives Twice a week   Worry food won't last until get money to buy more No   Food not last or not have enough money for food? No   Do you have housing? (Housing is defined as stable permanent housing and does not include staying outside in a car, in a tent, in an abandoned building, in an overnight shelter, or couch-surfing.) Yes   Are you worried about losing your housing? No   Lack of transportation? No   Unable to get utilities (heat,electricity)? No         4/28/2025   Fall Risk   Fallen 2 or more times in the past year? No   Trouble with walking or  balance? No          4/28/2025   Activities of Daily Living- Home Safety   Needs help with the following daily activites None of the above   Safety concerns in the home None of the above         4/28/2025   Dental   Dentist two times every year? Yes         4/28/2025   Hearing Screening   Hearing concerns? (!) I FEEL THAT PEOPLE ARE MUMBLING OR NOT SPEAKING CLEARLY.    (!) IT'S HARDER TO UNDERSTAND WOMEN'S VOICES THAN MEN'S VOICES.    (!) IT'S HARD TO FOLLOW A CONVERSATION IN A NOISY RESTAURANT OR CROWDED ROOM.    (!) TROUBLE FOLLOWING DIALOGUE IN THE THEATHER.       Multiple values from one day are sorted in reverse-chronological order         4/28/2025   Driving Risk Screening   Patient/family members have concerns about driving No         4/28/2025   General Alertness/Fatigue Screening   Have you been more tired than usual lately? No         4/28/2025   Urinary Incontinence Screening   Bothered by leaking urine in past 6 months Yes         Today's PHQ-2 Score:       4/30/2025     9:00 AM   PHQ-2 ( [facility])   Q1: Little interest or pleasure in doing things 0   Q2: Feeling down, depressed or hopeless 0   PHQ-2 Score 0    Q1: Little interest or pleasure in doing things Not at all   Q2: Feeling down, depressed or hopeless Not at all   PHQ-2 Score 0       Patient-reported           4/28/2025   Substance Use   Alcohol more than 3/day or more than 7/wk No   Do you have a current opioid prescription? No   How severe/bad is pain from 1 to 10? 2/10   Do you use any other substances recreationally? No     Social History     Tobacco Use    Smoking status: Never    Smokeless tobacco: Never   Vaping Use    Vaping status: Never Used   Substance Use Topics    Alcohol use: Yes     Comment: 6 pack beer on weekend.    Drug use: No           4/28/2025   AAA Screening   Family history of Abdominal Aortic Aneurysm (AAA)? Unsure   ASCVD Risk   The 10-year ASCVD risk score (Jose A DK, et al., 2019) is: 16.3%    Values used  to calculate the score:      Age: 70 years      Sex: Male      Is Non- : No      Diabetic: No      Tobacco smoker: No      Systolic Blood Pressure: 123 mmHg      Is BP treated: Yes      HDL Cholesterol: 61 mg/dL      Total Cholesterol: 137 mg/dL            Reviewed and updated as needed this visit by Provider   Tobacco  Allergies  Meds  Problems  Med Hx  Surg Hx  Fam Hx            Current providers sharing in care for this patient include:  Patient Care Team:  Raad Benitez DO as PCP - General (Family Medicine)  Raad Benitez DO as Referring Physician (Family Medicine)  Stephanie Fair PA-C as Physician Assistant (Dermatology)  Raad Benitez DO as Assigned PCP  Tamar Bob PA-C (Inactive) as Physician Assistant (Gastroenterology)  Tamar Bob PA-C (Inactive) as Physician Assistant (Gastroenterology)  Layne Muñoz MA as Audiologist (Audiology)  Marilyn Grayson PA-C as Physician Assistant (Urology)  Hitesh Quintero PA-C as Physician Assistant (Gastroenterology)  Narendra Kramer PA-C as Physician Assistant (Gastroenterology)  Francesca Canales RPH as Pharmacist (Pharmacist)  Dori Tejada MD as MD (Neurology)  MCKENNA Good MD MD as MD (Cardiovascular & Thoracic Surgery)  Katia Chung MD as Assigned Neuroscience Provider  Wilian Cruz MD as Assigned Heart and Vascular Provider  Stephanie Nix PA-C as Assigned Gastroenterology Provider  Stephanie Fair PA-C as Assigned Dermatology Provider  Marilyn Grayson PA-C as Assigned Surgical Provider  Almaz Barnes RPH as Pharmacist (Pharmacist)  Almaz Barnes RPH as Assigned MTM Pharmacist    The following health maintenance items are reviewed in Epic and correct as of today:  Health Maintenance   Topic Date Due    ANNUAL REVIEW OF HM ORDERS  05/06/2023    BMP  04/23/2025    LIPID  04/23/2025    COVID-19 Vaccine (8 - 2024-25 season) 05/15/2025     "MEDICARE ANNUAL WELLNESS VISIT  04/30/2026    FALL RISK ASSESSMENT  04/30/2026    DIABETES SCREENING  04/23/2027    ADVANCE CARE PLANNING  04/23/2029    DTAP/TDAP/TD IMMUNIZATION (3 - Td or Tdap) 07/10/2029    COLORECTAL CANCER SCREENING  08/17/2029    HEPATITIS C SCREENING  Completed    PHQ-2 (once per calendar year)  Completed    INFLUENZA VACCINE  Completed    Pneumococcal Vaccine: 50+ Years  Completed    ZOSTER IMMUNIZATION  Completed    RSV VACCINE  Completed    HPV IMMUNIZATION  Aged Out    MENINGITIS IMMUNIZATION  Aged Out         Review of Systems  Constitutional, HEENT, cardiovascular, pulmonary, gi and gu systems are negative, except as otherwise noted.     Objective    Exam  /77 (BP Location: Right arm, Patient Position: Sitting, Cuff Size: Adult Regular)   Pulse 61   Temp 98.6  F (37  C) (Tympanic)   Resp 20   Ht 1.778 m (5' 10\")   Wt 96.2 kg (212 lb)   SpO2 97%   BMI 30.42 kg/m     Estimated body mass index is 30.42 kg/m  as calculated from the following:    Height as of this encounter: 1.778 m (5' 10\").    Weight as of this encounter: 96.2 kg (212 lb).    Physical Exam  GENERAL: alert and no distress  EYES: right eye conjunctiva erythematous. Vision normal. PERRL and conjunctivae and sclerae normal  HENT: ear canals and TM's normal, nose and mouth without ulcers or lesions  NECK: no adenopathy, no asymmetry, masses, or scars  RESP: lungs clear to auscultation - no rales, rhonchi or wheezes  CV: regular rate and rhythm, normal S1 S2, no S3 or S4, no murmur, click or rub, no peripheral edema  ABDOMEN: soft, nontender, no hepatosplenomegaly, no masses and bowel sounds normal  MS: no gross musculoskeletal defects noted, no edema  SKIN: no suspicious lesions or rashes  NEURO: Normal strength and tone, mentation intact and speech normal  PSYCH: mentation appears normal, affect normal/bright  MSK hand right: deformity from prior surgery.  strength strong. Sensation intact to light touch. " Positive Phalen testing.           4/23/2024   Mini Cog   Clock Draw Score 2 Normal   3 Item Recall 3 objects recalled   Mini Cog Total Score 5              Signed Electronically by: Raad Benitez DO

## 2025-04-30 NOTE — PATIENT INSTRUCTIONS
Patient Education     Wrist guards for the carpal tunnel.   Let me know if want to see sports med or orthopedics for this.     Monitor the eye.     Follow up with Neurology for the hand tremor and family history of parkinson's.     Lab work today.          Preventive Care Advice   This is general advice given by our system to help you stay healthy. However, your care team may have specific advice just for you. Please talk to your care team about your preventive care needs.  Nutrition  Eat 5 or more servings of fruits and vegetables each day.  Try wheat bread, brown rice and whole grain pasta (instead of white bread, rice, and pasta).  Get enough calcium and vitamin D. Check the label on foods and aim for 100% of the RDA (recommended daily allowance).  Lifestyle  Exercise at least 150 minutes each week  (30 minutes a day, 5 days a week).  Do muscle strengthening activities 2 days a week. These help control your weight and prevent disease.  No smoking.  Wear sunscreen to prevent skin cancer.  Have a dental exam and cleaning every 6 months.  Yearly exams  See your health care team every year to talk about:  Any changes in your health.  Any medicines your care team has prescribed.  Preventive care, family planning, and ways to prevent chronic diseases.  Shots (vaccines)   HPV shots (up to age 26), if you've never had them before.  Hepatitis B shots (up to age 59), if you've never had them before.  COVID-19 shot: Get this shot when it's due.  Flu shot: Get a flu shot every year.  Tetanus shot: Get a tetanus shot every 10 years.  Pneumococcal, hepatitis A, and RSV shots: Ask your care team if you need these based on your risk.  Shingles shot (for age 50 and up)  General health tests  Diabetes screening:  Starting at age 35, Get screened for diabetes at least every 3 years.  If you are younger than age 35, ask your care team if you should be screened for diabetes.  Cholesterol test: At age 39, start having a cholesterol  test every 5 years, or more often if advised.  Bone density scan (DEXA): At age 50, ask your care team if you should have this scan for osteoporosis (brittle bones).  Hepatitis C: Get tested at least once in your life.  STIs (sexually transmitted infections)  Before age 24: Ask your care team if you should be screened for STIs.  After age 24: Get screened for STIs if you're at risk. You are at risk for STIs (including HIV) if:  You are sexually active with more than one person.  You don't use condoms every time.  You or a partner was diagnosed with a sexually transmitted infection.  If you are at risk for HIV, ask about PrEP medicine to prevent HIV.  Get tested for HIV at least once in your life, whether you are at risk for HIV or not.  Cancer screening tests  Cervical cancer screening: If you have a cervix, begin getting regular cervical cancer screening tests starting at age 21.  Breast cancer scan (mammogram): If you've ever had breasts, begin having regular mammograms starting at age 40. This is a scan to check for breast cancer.  Colon cancer screening: It is important to start screening for colon cancer at age 45.  Have a colonoscopy test every 10 years (or more often if you're at risk) Or, ask your provider about stool tests like a FIT test every year or Cologuard test every 3 years.  To learn more about your testing options, visit:   .  For help making a decision, visit:   https://bit.ly/tr79119.  Prostate cancer screening test: If you have a prostate, ask your care team if a prostate cancer screening test (PSA) at age 55 is right for you.  Lung cancer screening: If you are a current or former smoker ages 50 to 80, ask your care team if ongoing lung cancer screenings are right for you.  For informational purposes only. Not to replace the advice of your health care provider. Copyright   2023 [employer]. All rights reserved. Clinically reviewed by the [facility].  [employer] [id number] - REV 01/24.  Hearing Loss: Care Instructions  Overview     Hearing loss is a sudden or slow decrease in how well you hear. It can range from slight to profound. Permanent hearing loss can occur with aging. It also can happen when you are exposed long-term to loud noise. Examples include listening to loud music, riding motorcycles, or being around other loud machines.  Hearing loss can affect your work and home life. It can make you feel lonely or depressed. You may feel that you have lost your independence. But hearing aids and other devices can help you hear better and feel connected to others.  Follow-up care is a key part of your treatment and safety. Be sure to make and go to all appointments, and call your doctor if you are having problems. It's also a good idea to know your test results and keep a list of the medicines you take.  How can you care for yourself at home?  Avoid loud noises whenever possible. This helps keep your hearing from getting worse.  Always wear hearing protection around loud noises.  Wear a hearing aid as directed.  A professional can help you pick a hearing aid that will work best for you.  You can also get hearing aids over the counter for mild to moderate hearing loss.  Have hearing tests as your doctor suggests. They can show whether your hearing has changed. Your hearing aid may need to be adjusted.  Use other devices as needed. These may include:  Telephone amplifiers and hearing aids that can connect to a television, stereo, radio, or microphone.  Devices that use lights or vibrations. These alert you to the doorbell, a ringing telephone, or a baby monitor.  Television closed-captioning. This shows the words at the bottom of the screen. Most new TVs can do this.  TTY (text telephone). This lets you type messages back and forth on the telephone instead of talking or listening. These devices are also called TDD. When messages are typed on the keyboard, they are  "sent over the phone line to a receiving TTY. The message is shown on a monitor.  Use text messaging, social media, and email if it is hard for you to communicate by telephone.  Try to learn a listening technique called speechreading. It is not lipreading. You pay attention to people's gestures, expressions, posture, and tone of voice. These clues can help you understand what a person is saying. Face the person you are talking to, and have them face you. Make sure the lighting is good. You need to see the other person's face clearly.  Think about counseling if you need help to adjust to your hearing loss.  When should you call for help?  Watch closely for changes in your health, and be sure to contact your doctor if:    You think your hearing is getting worse.     You have new symptoms, such as dizziness or nausea.   Where can you learn more?  Go to https://www.[employer].Lit[employer]/patiented  Enter R798 in the search box to learn more about \"Hearing Loss: Care Instructions.\"  Current as of: October 27, 2024  Content Version: 14.4    5096-7295 [employer]   Care instructions adapted under license by your healthcare professional. If you have questions about a medical condition or this instruction, always ask your healthcare professional. [employer] disclaims any warranty or liability for your use of this information.    Bladder Training: Care Instructions  Your Care Instructions     Bladder training is used to treat urge incontinence and stress incontinence. Urge incontinence means that the need to urinate comes on so fast that you can't get to a toilet in time. Stress incontinence means that you leak urine because of pressure on your bladder. For example, it may happen when you laugh, cough, or lift something heavy.  Bladder training can increase how long you can wait before you have to urinate. It can also help your bladder hold more urine. And it can give you better control over the urge to " urinate.  It is important to remember that bladder training takes a few weeks to a few months to make a difference. You may not see results right away, but don't give up.  Follow-up care is a key part of your treatment and safety. Be sure to make and go to all appointments, and call your doctor if you are having problems. It's also a good idea to know your test results and keep a list of the medicines you take.  How can you care for yourself at home?  Work with your doctor to come up with a bladder training program that is right for you. You may use one or more of the following methods.  Delayed urination  In the beginning, try to keep from urinating for 5 minutes after you first feel the need to go.  While you wait, take deep, slow breaths to relax. Kegel exercises can also help you delay the need to go to the bathroom.  After some practice, when you can easily wait 5 minutes to urinate, try to wait 10 minutes before you urinate.  Slowly increase the waiting period until you are able to control when you have to urinate.  Scheduled urination  Empty your bladder when you first wake up in the morning.  Schedule times throughout the day when you will urinate.  Start by going to the bathroom every hour, even if you don't need to go.  Slowly increase the time between trips to the bathroom.  When you have found a schedule that works well for you, keep doing it.  If you wake up during the night and have to urinate, do it. Apply your schedule to waking hours only.  Kegel exercises  These tighten and strengthen pelvic muscles, which can help you control the flow of urine. (If doing these exercises causes pain, stop doing them and talk with your doctor.) To do Kegel exercises:  Squeeze your muscles as if you were trying not to pass gas. Or squeeze your muscles as if you were stopping the flow of urine. Your belly, legs, and buttocks shouldn't move.  Hold the squeeze for 3 seconds, then relax for 5 to 10 seconds.  Start with 3  "seconds, then add 1 second each week until you are able to squeeze for 10 seconds.  Repeat the exercise 10 times a session. Do 3 to 8 sessions a day.  When should you call for help?  Watch closely for changes in your health, and be sure to contact your doctor if:    Your incontinence is getting worse.     You do not get better as expected.   Where can you learn more?  Go to https://www.[employer].com.net/patiented  Enter V684 in the search box to learn more about \"Bladder Training: Care Instructions.\"  Current as of: April 30, 2024  Content Version: 14.4    1630-8260 [employer].   Care instructions adapted under license by your healthcare professional. If you have questions about a medical condition or this instruction, always ask your healthcare professional. [employer] disclaims any warranty or liability for your use of this information.       "

## 2025-05-01 ENCOUNTER — PATIENT OUTREACH (OUTPATIENT)
Dept: CARE COORDINATION | Facility: CLINIC | Age: 70
End: 2025-05-01
Payer: MEDICARE

## 2025-05-05 ENCOUNTER — TELEPHONE (OUTPATIENT)
Dept: PHYSICAL MEDICINE AND REHAB | Facility: CLINIC | Age: 70
End: 2025-05-05
Payer: MEDICARE

## 2025-05-05 ENCOUNTER — PATIENT OUTREACH (OUTPATIENT)
Dept: CARE COORDINATION | Facility: CLINIC | Age: 70
End: 2025-05-05
Payer: MEDICARE

## 2025-05-05 DIAGNOSIS — S39.012D: ICD-10-CM

## 2025-05-05 NOTE — TELEPHONE ENCOUNTER
"In review of chart, patient last seen in clinic 6/19/23. Phone call to discuss with patient. Patient states he takes an occasional Gabapentin when his back becomes painful. \"He gave me 90 last June and I have 1 left.\"     Explained that as it has been almost 2 years since last seen, [facility] would not refill. He could request PCP fill it. Also offered to get patient scheduled to be seen per Bernard Camarena DO for when he returns in July. Patient will call back as he was headed to another appointment.   "

## 2025-05-05 NOTE — TELEPHONE ENCOUNTER
Other: Patient would like to see Dr. Camarena before May 12th, to get a refill on his Gabapentin. Patient will be gone from May 12 - July. Please call patient to schedule, next available was not until May 28th.      Could we send this information to you in [employer] or would you prefer to receive a phone call?:   Patient would prefer a phone call   Okay to leave a detailed message?: Yes at Cell number on file:    Telephone Information:   Mobile [phone]

## 2025-05-06 RX ORDER — CYCLOBENZAPRINE HCL 5 MG
5 TABLET ORAL 3 TIMES DAILY PRN
Qty: 30 TABLET | Refills: 1 | OUTPATIENT
Start: 2025-05-06

## 2025-05-07 DIAGNOSIS — L29.9 ITCHING: ICD-10-CM

## 2025-05-07 RX ORDER — TRIAMCINOLONE ACETONIDE 1 MG/G
CREAM TOPICAL 3 TIMES DAILY
Qty: 80 G | Refills: 5 | Status: SHIPPED | OUTPATIENT
Start: 2025-05-07

## 2025-05-07 NOTE — TELEPHONE ENCOUNTER
Requested Prescriptions   Pending Prescriptions Disp Refills    triamcinolone (KENALOG) 0.1 % external cream 80 g 0     Sig: Apply topically 3 times daily.       There is no refill protocol information for this order          Last office visit: 2/10/2025 ; last virtual visit: Visit date not found with prescribing provider:  Stephanie Edgar     Future Office Visit:        Lulu Gtz   Clinic Station    [facility]  [phone]

## 2025-05-10 ENCOUNTER — HEALTH MAINTENANCE LETTER (OUTPATIENT)
Age: 70
End: 2025-05-10

## 2025-05-20 ENCOUNTER — OFFICE VISIT (OUTPATIENT)
Dept: NEUROLOGY | Facility: CLINIC | Age: 70
End: 2025-05-20
Attending: FAMILY MEDICINE
Payer: COMMERCIAL

## 2025-05-20 VITALS
DIASTOLIC BLOOD PRESSURE: 96 MMHG | BODY MASS INDEX: 29.26 KG/M2 | HEIGHT: 70 IN | SYSTOLIC BLOOD PRESSURE: 139 MMHG | WEIGHT: 204.4 LBS | HEART RATE: 82 BPM

## 2025-05-20 DIAGNOSIS — Z82.0 FAMILY HISTORY OF PARKINSON DISEASE: ICD-10-CM

## 2025-05-20 DIAGNOSIS — R20.2 PARESTHESIA: ICD-10-CM

## 2025-05-20 DIAGNOSIS — G20.A1 PARKINSON'S DISEASE WITHOUT DYSKINESIA OR FLUCTUATING MANIFESTATIONS (H): Primary | ICD-10-CM

## 2025-05-20 DIAGNOSIS — M54.16 LUMBAR RADICULAR PAIN: ICD-10-CM

## 2025-05-20 DIAGNOSIS — M51.26 LUMBAR DISC HERNIATION: ICD-10-CM

## 2025-05-20 PROBLEM — R13.10 DYSPHAGIA, UNSPECIFIED TYPE: Status: RESOLVED | Noted: 2023-05-12 | Resolved: 2025-05-20

## 2025-05-20 PROBLEM — R06.09 DYSPNEA ON EXERTION: Status: RESOLVED | Noted: 2022-07-12 | Resolved: 2025-05-20

## 2025-05-20 PROBLEM — R10.84 GENERALIZED ABDOMINAL PAIN: Status: RESOLVED | Noted: 2018-11-22 | Resolved: 2025-05-20

## 2025-05-20 PROBLEM — D50.9 ANEMIA, IRON DEFICIENCY: Status: RESOLVED | Noted: 2021-11-25 | Resolved: 2025-05-20

## 2025-05-20 PROBLEM — G47.33 OSA ON CPAP: Status: ACTIVE | Noted: 2025-05-20

## 2025-05-20 PROBLEM — Z98.890 STATUS POST CORONARY ANGIOGRAM: Status: RESOLVED | Noted: 2022-09-07 | Resolved: 2025-05-20

## 2025-05-20 PROCEDURE — G2211 COMPLEX E/M VISIT ADD ON: HCPCS | Performed by: PSYCHIATRY & NEUROLOGY

## 2025-05-20 PROCEDURE — 3080F DIAST BP >= 90 MM HG: CPT | Performed by: PSYCHIATRY & NEUROLOGY

## 2025-05-20 PROCEDURE — 99215 OFFICE O/P EST HI 40 MIN: CPT | Performed by: PSYCHIATRY & NEUROLOGY

## 2025-05-20 PROCEDURE — 3075F SYST BP GE 130 - 139MM HG: CPT | Performed by: PSYCHIATRY & NEUROLOGY

## 2025-05-20 PROCEDURE — 99417 PROLNG OP E/M EACH 15 MIN: CPT | Performed by: PSYCHIATRY & NEUROLOGY

## 2025-05-20 RX ORDER — FAMOTIDINE 20 MG/1
20 TABLET, FILM COATED ORAL 2 TIMES DAILY
COMMUNITY

## 2025-05-20 RX ORDER — GABAPENTIN 100 MG/1
100 CAPSULE ORAL 3 TIMES DAILY
Qty: 90 CAPSULE | Refills: 5 | Status: SHIPPED | OUTPATIENT
Start: 2025-05-20

## 2025-05-20 NOTE — PROGRESS NOTES
NEUROLOGY CONSULTATION NOTE       [facility] [place]  [address] [place] [address]  Tel: [phone]  Fax: [phone]  [email or web address]     Jamal Sanchez Jr.,  1955, MRN [id number]  PCP: Raad Benitez  Date: 2025     ASSESSMENT & PLAN     Visit Diagnosis  Tremor     R/O Parkinson disease  70-year-old male with history of chronic right hemidiaphragm paralysis, cervical spondylosis, HTN, HLD, ISABELLA on CPAP, prediabetes with right-sided tremor, bradykinesia and decreased arm swing.  He has some early findings to suggest Parkinson disease.  I have recommended:    1.  MRI brain  2.  DaTscan  3.  If above test confirm the diagnosis I will refer him for SPARX3 study as his symptoms are fairly early and he might benefit from exercise regimen    Bilateral carpal tunnel syndrome  Patient also is reporting bilateral hand numbness tingling more so on the right side.  Exam suggests bilateral carpal tunnel syndrome.  I have recommended:    1.  EMG bilateral upper extremities  2.  He also carries a diagnosis of lumbar spondylosis and in the past was seen at spine clinic and takes gabapentin as needed and is requesting a refill  3.  Follow-up today scheduled for EMG    Thank you again for this referral, please feel free to contact me if you have any questions.    Mervin Gonzalez MD  [facility]     REASON FOR CONSULTATION Tremors        HISTORY OF PRESENT ILLNESS     We have been requested by Dr. Benitez to evaluate Jamal Sanchez Jr. who is a 70 year old  male for possible Parkinson disease and carpal tunnel syndrome    Patient is a pleasant 70-year-old male with history of chronic right hemidiaphragm paralysis, cervical spondylosis, HTN, HLD, ISABELLA on CPAP, prediabetes who was referred for evaluation of right sided tremors along with decline in penmanship and bradykinesia.  His dad had Parkinson disease and patient is aware of the symptoms  and started getting concerned as few years ago he noticed right sided resting tremor that tend to get worse with activity.  He also noticed that while walking he tends to lean forward and his penmanship has declined.  He denies any constipation any REM sleep disorder and although he was diagnosed with obstructive sleep apnea he is not convinced and avoids using CPAP.  He denies any visual auditory hallucinations    He also reports right hand numbness and tingling mostly in the first 3 fingers that tend to get worse during activities and also at night occasionally had the similar symptoms on the left.  He has cervical and lumbar spondylosis and in the past was found to have right hemidiaphragm paralysis and had plication of the diaphragm at [facility] that helped.  No etiology for hemidiaphragm paralysis was ascertained and it was felt to be due to cervical spondylosis.  He has lumbar spondylosis and in the past was seen at spine center and prescribed gabapentin that he takes intermittently and is requesting a refill.     PROBLEM LIST   Patient Active Problem List   Diagnosis    Benign prostatic hypertrophy    CARDIOVASCULAR SCREENING; LDL GOAL LESS THAN 160    GERD (gastroesophageal reflux disease)    Hypertension, goal below 140/90    Diverticulosis of large intestine without hemorrhage    ETOH abuse    Hiatal hernia    Status post coronary angiogram    Coronary artery disease of native heart with stable angina pectoris, unspecified vessel or lesion type    Prediabetes    Dysphagia, unspecified type    Hyperlipidemia    S/P plication of diaphragm    Diverticulosis of large intestine without perforation or abscess without bleeding    Hypertensive heart disease without heart failure         PAST MEDICAL & SURGICAL HISTORY     Past Medical History:   Patient  has a past medical history of A-fib (H), Acute diverticulitis (11/22/2018), Appendicitis (01/06/2016), BPH (benign prostatic hyperplasia), Complication of  anesthesia, Diverticulitis, Diverticulitis of colon (01/21/2019), GERD (gastroesophageal reflux disease), Heart disease, HLD (hyperlipidemia), Hypertension, and Tachycardia (03/30/2023).    Surgical History:  He  has a past surgical history that includes colonoscopy (03/05/2008); Esophagoscopy, gastroscopy, duodenoscopy (EGD), combined (10/09/2013); Colonoscopy (10/09/2013); Laparoscopic appendectomy (N/A, 01/07/2016); Laparoscopic herniorrhaphy inguinal bilateral (Bilateral, 12/13/2016); arthroscopy shoulder rt/lt (Right, 01/2018); appendectomy; Arthroscopy shoulder rotator cuff repair (Left); hernia repair; Esophagoscopy, gastroscopy, duodenoscopy (EGD), combined (N/A, 04/06/2022); Colonoscopy (N/A, 08/17/2022); Coronary Angiogram (N/A, 09/07/2022); Left Heart Catheterization (N/A, 09/07/2022); Instantaneous Wave-Free Ratio (N/A, 09/07/2022); Left Ventriculogram (N/A, 09/07/2022); Percutaneous Coronary Intervention Stent (N/A, 09/07/2022); Cardiac surgery (November, 2022); Esophagoscopy, gastroscopy, duodenoscopy (EGD), combined (N/A, 07/10/2023); and surgical history of - .     SOCIAL HISTORY     Reviewed, and he  reports that he has never smoked. He has never used smokeless tobacco. He reports current alcohol use. He reports that he does not use drugs.     FAMILY HISTORY     Reviewed, and family history includes Alcohol/Drug in his brother; Cancer - colorectal in his maternal grandfather; Cerebrovascular Disease in his brother and mother; Diabetes in his brother, father, maternal grandfather, maternal grandmother, mother, and sister; Hypertension in his brother; Neurologic Disorder in his father; Obesity in his sister; Prostate Cancer in his paternal grandmother; Unknown/Adopted in his brother.     ALLERGIES     No Known Allergies      REVIEW OF SYSTEMS     A 12 point review of system was performed and was negative except as outlined in the history of present illness.     HOME MEDICATIONS     Current Outpatient  "Rx   Medication Sig Dispense Refill    atorvastatin (LIPITOR) 40 MG tablet Take 1 tablet (40 mg) by mouth daily. 90 tablet 2    calcium carbonate (TUMS) 500 MG chewable tablet Take 1-2 chew tab by mouth daily as needed      clopidogrel (PLAVIX) 75 MG tablet Take 1 tablet (75 mg) by mouth daily. 90 tablet 2    gabapentin (NEURONTIN) 100 MG capsule Take 1 capsule (100 mg) by mouth 3 times daily 90 capsule 1    lisinopril (ZESTRIL) 10 MG tablet Take 1 tablet (10 mg) by mouth daily. Hold if Systolic Blood Pressure is less than 85. 90 tablet 2    metoprolol succinate ER (TOPROL XL) 25 MG 24 hr tablet Take 1 tablet (25 mg) by mouth daily. 90 tablet 2    multivitamin (CENTRUM SILVER) tablet Take 1 tablet by mouth daily      pantoprazole (PROTONIX) 20 MG EC tablet Take 1 tablet (20 mg) by mouth 2 times daily On an empty stomach about 30 minutes before a meal 180 tablet 3    sildenafil (REVATIO) 20 MG tablet Take 2 to 5 tablets 1 hour prior to intercourse      tadalafil (CIALIS) 10 MG tablet Take 1-2 tablets daily as needed, 30-45 minutes prior to intended sexual activity 30 tablet 1    tamsulosin (FLOMAX) 0.4 MG capsule Take 1 capsule (0.4 mg) by mouth daily 90 capsule 3    triamcinolone (KENALOG) 0.1 % external cream Apply topically 3 times daily. 80 g 5    triamterene-HCTZ (MAXZIDE) 75-50 MG tablet Take 1 tablet by mouth daily. 90 tablet 3    Urea 40 % CREA Apply to feet  g 11    famotidine (PEPCID) 20 MG tablet Take 20 mg by mouth 2 times daily.           PHYSICAL EXAM     Vital signs  BP (!) 139/96 (BP Location: Left arm, Patient Position: Sitting)   Pulse 82   Ht 1.778 m (5' 10\")   Wt 92.7 kg (204 lb 6.4 oz)   BMI 29.33 kg/m      Weight:   204 lbs 6.4 oz    Elderly pleasant gentleman who is alert and oriented vital signs are reviewed and documented in electronic medical record.  Neck supple.  Neurologically speech was normal.  Cranial nerves II through XII are intact no restriction of upgaze.  No cogwheel " rigidity.  He has minimal resting tremors.  No dysmetria noted on finger-nose testing.  Strength is 5/5.  He has positive Silvina sign bilaterally with decreased sensation in median distribution he has decreased arm swing on the right and not unstable while turning     PERTINENT DIAGNOSTIC STUDIES     Following studies were reviewed:     MRI CERVICAL SPINE 4/17/2024  1. Multilevel degenerative changes, as described.  2. Mild spinal canal stenosis at C5-C6 and mild to moderate spinal  canal stenosis C6-C7.  3. Varying degrees of multilevel degenerative neural foraminal  stenosis, as described.    4. Patchy mild edema-like marrow signal changes centered about the  right C3-C4 facet joint (series 4 image 3), nonspecific, but possibly  reactive to degenerative facet disease.     MRI BRAIN 11/4/2021  1. Unremarkable appearance of the visualized cranial nerves VII-VIII  complexes bilaterally. No internal auditory canal or cerebellopontine  angle cistern mass.  2. Brain atrophy and presumed chronic small vessel scanning changes,  as described.  3. No acute intracranial abnormality.       PERTINENT LABS  Following labs were reviewed:  Office Visit on 04/30/2025   Component Date Value Ref Range Status    Sodium 04/30/2025 137  135 - 145 mmol/L Final    Potassium 04/30/2025 4.2  3.4 - 5.3 mmol/L Final    Chloride 04/30/2025 101  98 - 107 mmol/L Final    Carbon Dioxide (CO2) 04/30/2025 26  22 - 29 mmol/L Final    Anion Gap 04/30/2025 10  7 - 15 mmol/L Final    Urea Nitrogen 04/30/2025 15.7  8.0 - 23.0 mg/dL Final    Creatinine 04/30/2025 0.94  0.67 - 1.17 mg/dL Final    GFR Estimate 04/30/2025 87  >60 mL/min/1.73m2 Final    Calcium 04/30/2025 9.7  8.8 - 10.4 mg/dL Final    Glucose 04/30/2025 91  70 - 99 mg/dL Final    Patient Fasting > 8hrs? 04/30/2025 No   Final    Cholesterol 04/30/2025 136  <200 mg/dL Final    Triglycerides 04/30/2025 61  <150 mg/dL Final    Direct Measure HDL 04/30/2025 58  >=40 mg/dL Final    LDL  Cholesterol Calculated 04/30/2025 66  <100 mg/dL Final    Non HDL Cholesterol 04/30/2025 78  <130 mg/dL Final    Patient Fasting > 8hrs? 04/30/2025 No   Final    Estimated Average Glucose 04/30/2025 117 (H)  <117 mg/dL Final    Hemoglobin A1C 04/30/2025 5.7 (H)  0.0 - 5.6 % Final    TSH 04/30/2025 1.43  0.30 - 4.20 uIU/mL Final        Total time spent for face to face visit, reviewing labs/imaging studies, counseling and coordination of care was: 1 Hour spent on the date of the encounter doing chart review, review of outside records, review of test results, interpretation of tests, patient visit, and documentation     The longitudinal plan of care for the diagnosis(es)/condition(s) as documented were addressed during this visit. Due to the added complexity in care, I will continue to support Gopal in the subsequent management and with ongoing continuity of care.    This note was dictated using voice recognition software.  Any grammatical or context distortions are unintentional and inherent to the software.    No orders of the defined types were placed in this encounter.     New Prescriptions    No medications on file      Modified Medications    No medications on file

## 2025-05-20 NOTE — NURSING NOTE
Chief Complaint   Patient presents with    Tremors     Patient is here for right side hand tremors, has been going on for 6 months now, tremors comes and go.  Patient also states that there's some tingling on the right hand too.      Samia Carlos CMA on 5/20/2025 at 8:14 AM

## 2025-05-20 NOTE — LETTER
2025      Jamal Sanchez Jr.  [address] [facility] [address]      Dear Colleague,    Thank you for referring your patient, Jamal Sanchez Jr., to the [facility] [place]. Please see a copy of my visit note below.    NEUROLOGY CONSULTATION NOTE       [facility] [place]  [address] [place] [address]  Tel: [phone]  Fax: [phone]  [email or web address]     Jamal Sanchez Jr.,  1955, MRN [id number]  PCP: Raad Benitez  Date: 2025     ASSESSMENT & PLAN     Visit Diagnosis  Tremor     R/O Parkinson disease  70-year-old male with history of chronic right hemidiaphragm paralysis, cervical spondylosis, HTN, HLD, ISABELLA on CPAP, prediabetes with right-sided tremor, bradykinesia and decreased arm swing.  He has some early findings to suggest Parkinson disease.  I have recommended:    1.  MRI brain  2.  DaTscan  3.  If above test confirm the diagnosis I will refer him for SPARX3 study as his symptoms are fairly early and he might benefit from exercise regimen    Bilateral carpal tunnel syndrome  Patient also is reporting bilateral hand numbness tingling more so on the right side.  Exam suggests bilateral carpal tunnel syndrome.  I have recommended:    1.  EMG bilateral upper extremities  2.  He also carries a diagnosis of lumbar spondylosis and in the past was seen at spine clinic and takes gabapentin as needed and is requesting a refill  3.  Follow-up today scheduled for EMG    Thank you again for this referral, please feel free to contact me if you have any questions.    Mervin Gonzalez MD  [facility], [place]     REASON FOR CONSULTATION Tremors        HISTORY OF PRESENT ILLNESS     We have been requested by Dr. Benitez to evaluate Jamal Sanchez Jr. who is a 70 year old  male for possible Parkinson disease and carpal tunnel syndrome    Patient is a pleasant 70-year-old male with history of chronic  right hemidiaphragm paralysis, cervical spondylosis, HTN, HLD, ISABELLA on CPAP, prediabetes who was referred for evaluation of right sided tremors along with decline in penmanship and bradykinesia.  His dad had Parkinson disease and patient is aware of the symptoms and started getting concerned as few years ago he noticed right sided resting tremor that tend to get worse with activity.  He also noticed that while walking he tends to lean forward and his penmanship has declined.  He denies any constipation any REM sleep disorder and although he was diagnosed with obstructive sleep apnea he is not convinced and avoids using CPAP.  He denies any visual auditory hallucinations    He also reports right hand numbness and tingling mostly in the first 3 fingers that tend to get worse during activities and also at night occasionally had the similar symptoms on the left.  He has cervical and lumbar spondylosis and in the past was found to have right hemidiaphragm paralysis and had plication of the diaphragm at [facility] that helped.  No etiology for hemidiaphragm paralysis was ascertained and it was felt to be due to cervical spondylosis.  He has lumbar spondylosis and in the past was seen at spine center and prescribed gabapentin that he takes intermittently and is requesting a refill.     PROBLEM LIST   Patient Active Problem List   Diagnosis     Benign prostatic hypertrophy     CARDIOVASCULAR SCREENING; LDL GOAL LESS THAN 160     GERD (gastroesophageal reflux disease)     Hypertension, goal below 140/90     Diverticulosis of large intestine without hemorrhage     ETOH abuse     Hiatal hernia     Status post coronary angiogram     Coronary artery disease of native heart with stable angina pectoris, unspecified vessel or lesion type     Prediabetes     Dysphagia, unspecified type     Hyperlipidemia     S/P plication of diaphragm     Diverticulosis of large intestine without perforation or abscess without bleeding      Hypertensive heart disease without heart failure         PAST MEDICAL & SURGICAL HISTORY     Past Medical History:   Patient  has a past medical history of A-fib (H), Acute diverticulitis (11/22/2018), Appendicitis (01/06/2016), BPH (benign prostatic hyperplasia), Complication of anesthesia, Diverticulitis, Diverticulitis of colon (01/21/2019), GERD (gastroesophageal reflux disease), Heart disease, HLD (hyperlipidemia), Hypertension, and Tachycardia (03/30/2023).    Surgical History:  He  has a past surgical history that includes colonoscopy (03/05/2008); Esophagoscopy, gastroscopy, duodenoscopy (EGD), combined (10/09/2013); Colonoscopy (10/09/2013); Laparoscopic appendectomy (N/A, 01/07/2016); Laparoscopic herniorrhaphy inguinal bilateral (Bilateral, 12/13/2016); arthroscopy shoulder rt/lt (Right, 01/2018); appendectomy; Arthroscopy shoulder rotator cuff repair (Left); hernia repair; Esophagoscopy, gastroscopy, duodenoscopy (EGD), combined (N/A, 04/06/2022); Colonoscopy (N/A, 08/17/2022); Coronary Angiogram (N/A, 09/07/2022); Left Heart Catheterization (N/A, 09/07/2022); Instantaneous Wave-Free Ratio (N/A, 09/07/2022); Left Ventriculogram (N/A, 09/07/2022); Percutaneous Coronary Intervention Stent (N/A, 09/07/2022); Cardiac surgery (November, 2022); Esophagoscopy, gastroscopy, duodenoscopy (EGD), combined (N/A, 07/10/2023); and surgical history of - .     SOCIAL HISTORY     Reviewed, and he  reports that he has never smoked. He has never used smokeless tobacco. He reports current alcohol use. He reports that he does not use drugs.     FAMILY HISTORY     Reviewed, and family history includes Alcohol/Drug in his brother; Cancer - colorectal in his maternal grandfather; Cerebrovascular Disease in his brother and mother; Diabetes in his brother, father, maternal grandfather, maternal grandmother, mother, and sister; Hypertension in his brother; Neurologic Disorder in his father; Obesity in his sister; Prostate Cancer  "in his paternal grandmother; Unknown/Adopted in his brother.     ALLERGIES     No Known Allergies      REVIEW OF SYSTEMS     A 12 point review of system was performed and was negative except as outlined in the history of present illness.     HOME MEDICATIONS     Current Outpatient Rx   Medication Sig Dispense Refill     atorvastatin (LIPITOR) 40 MG tablet Take 1 tablet (40 mg) by mouth daily. 90 tablet 2     calcium carbonate (TUMS) 500 MG chewable tablet Take 1-2 chew tab by mouth daily as needed       clopidogrel (PLAVIX) 75 MG tablet Take 1 tablet (75 mg) by mouth daily. 90 tablet 2     gabapentin (NEURONTIN) 100 MG capsule Take 1 capsule (100 mg) by mouth 3 times daily 90 capsule 1     lisinopril (ZESTRIL) 10 MG tablet Take 1 tablet (10 mg) by mouth daily. Hold if Systolic Blood Pressure is less than 85. 90 tablet 2     metoprolol succinate ER (TOPROL XL) 25 MG 24 hr tablet Take 1 tablet (25 mg) by mouth daily. 90 tablet 2     multivitamin (CENTRUM SILVER) tablet Take 1 tablet by mouth daily       pantoprazole (PROTONIX) 20 MG EC tablet Take 1 tablet (20 mg) by mouth 2 times daily On an empty stomach about 30 minutes before a meal 180 tablet 3     sildenafil (REVATIO) 20 MG tablet Take 2 to 5 tablets 1 hour prior to intercourse       tadalafil (CIALIS) 10 MG tablet Take 1-2 tablets daily as needed, 30-45 minutes prior to intended sexual activity 30 tablet 1     tamsulosin (FLOMAX) 0.4 MG capsule Take 1 capsule (0.4 mg) by mouth daily 90 capsule 3     triamcinolone (KENALOG) 0.1 % external cream Apply topically 3 times daily. 80 g 5     triamterene-HCTZ (MAXZIDE) 75-50 MG tablet Take 1 tablet by mouth daily. 90 tablet 3     Urea 40 % CREA Apply to feet  g 11     famotidine (PEPCID) 20 MG tablet Take 20 mg by mouth 2 times daily.           PHYSICAL EXAM     Vital signs  BP (!) 139/96 (BP Location: Left arm, Patient Position: Sitting)   Pulse 82   Ht 1.778 m (5' 10\")   Wt 92.7 kg (204 lb 6.4 oz)   BMI " 29.33 kg/m      Weight:   204 lbs 6.4 oz    Elderly pleasant gentleman who is alert and oriented vital signs are reviewed and documented in electronic medical record.  Neck supple.  Neurologically speech was normal.  Cranial nerves II through XII are intact no restriction of upgaze.  No cogwheel rigidity.  He has minimal resting tremors.  No dysmetria noted on finger-nose testing.  Strength is 5/5.  He has positive Patterson sign bilaterally with decreased sensation in median distribution he has decreased arm swing on the right and not unstable while turning     PERTINENT DIAGNOSTIC STUDIES     Following studies were reviewed:     MRI CERVICAL SPINE 4/17/2024  1. Multilevel degenerative changes, as described.  2. Mild spinal canal stenosis at C5-C6 and mild to moderate spinal  canal stenosis C6-C7.  3. Varying degrees of multilevel degenerative neural foraminal  stenosis, as described.    4. Patchy mild edema-like marrow signal changes centered about the  right C3-C4 facet joint (series 4 image 3), nonspecific, but possibly  reactive to degenerative facet disease.     MRI BRAIN 11/4/2021  1. Unremarkable appearance of the visualized cranial nerves VII-VIII  complexes bilaterally. No internal auditory canal or cerebellopontine  angle cistern mass.  2. Brain atrophy and presumed chronic small vessel scanning changes,  as described.  3. No acute intracranial abnormality.       PERTINENT LABS  Following labs were reviewed:  Office Visit on 04/30/2025   Component Date Value Ref Range Status     Sodium 04/30/2025 137  135 - 145 mmol/L Final     Potassium 04/30/2025 4.2  3.4 - 5.3 mmol/L Final     Chloride 04/30/2025 101  98 - 107 mmol/L Final     Carbon Dioxide (CO2) 04/30/2025 26  22 - 29 mmol/L Final     Anion Gap 04/30/2025 10  7 - 15 mmol/L Final     Urea Nitrogen 04/30/2025 15.7  8.0 - 23.0 mg/dL Final     Creatinine 04/30/2025 0.94  0.67 - 1.17 mg/dL Final     GFR Estimate 04/30/2025 87  >60 mL/min/1.73m2 Final      Calcium 04/30/2025 9.7  8.8 - 10.4 mg/dL Final     Glucose 04/30/2025 91  70 - 99 mg/dL Final     Patient Fasting > 8hrs? 04/30/2025 No   Final     Cholesterol 04/30/2025 136  <200 mg/dL Final     Triglycerides 04/30/2025 61  <150 mg/dL Final     Direct Measure HDL 04/30/2025 58  >=40 mg/dL Final     LDL Cholesterol Calculated 04/30/2025 66  <100 mg/dL Final     Non HDL Cholesterol 04/30/2025 78  <130 mg/dL Final     Patient Fasting > 8hrs? 04/30/2025 No   Final     Estimated Average Glucose 04/30/2025 117 (H)  <117 mg/dL Final     Hemoglobin A1C 04/30/2025 5.7 (H)  0.0 - 5.6 % Final     TSH 04/30/2025 1.43  0.30 - 4.20 uIU/mL Final        Total time spent for face to face visit, reviewing labs/imaging studies, counseling and coordination of care was: 1 Hour spent on the date of the encounter doing chart review, review of outside records, review of test results, interpretation of tests, patient visit, and documentation     The longitudinal plan of care for the diagnosis(es)/condition(s) as documented were addressed during this visit. Due to the added complexity in care, I will continue to support Gopal in the subsequent management and with ongoing continuity of care.    This note was dictated using voice recognition software.  Any grammatical or context distortions are unintentional and inherent to the software.    No orders of the defined types were placed in this encounter.     New Prescriptions    No medications on file      Modified Medications    No medications on file              Again, thank you for allowing me to participate in the care of your patient.        Sincerely,        Mervin Gonzalez MD    Electronically signed

## 2025-06-10 ENCOUNTER — TELEPHONE (OUTPATIENT)
Dept: FAMILY MEDICINE | Facility: CLINIC | Age: 70
End: 2025-06-10
Payer: MEDICARE

## 2025-06-10 NOTE — LETTER
June 17, 2025    Jamal Sanchez Jr.    [address] [facility] [address]    Hello,     Your care team at [facility] values your health and well-being. After reviewing your chart, we have identified recommendation(s) to help you better manage your health.    It's time for a follow-up visit to manage your blood pressure. If you have an electronic home blood pressure monitor, you can share your results by calling us or sending them through [employer]. Alternatively, you can visit your nearest [facility] for a blood pressure check.    If you recently had or are having any of these services soon, please contact the clinic via phone or [employer] so that your care team can update your records.    We look forward to seeing you at your upcoming visit.    If you have any questions or concerns, please contact our clinic. Thank you for continuing to trust us with your care.    Sincerely,    Your [facility] Care Team           Electronically signed

## 2025-06-10 NOTE — TELEPHONE ENCOUNTER
Patient Quality Outreach    Patient is due for the following:   Hypertension -  BP check and Hypertension follow-up visit    Action(s) Taken:   Home bp readings needed    Type of outreach:    Sent [employer] message.    Questions for provider review:    None         Nina Shukla, [facility]  Chart routed to None.

## 2025-06-12 DIAGNOSIS — N52.9 ERECTILE DYSFUNCTION, UNSPECIFIED ERECTILE DYSFUNCTION TYPE: ICD-10-CM

## 2025-06-12 NOTE — TELEPHONE ENCOUNTER
Requested Prescriptions   Pending Prescriptions Disp Refills    tadalafil (CIALIS) 10 MG tablet 30 tablet 1     Sig: Take 1-2 tablets daily as needed, 30-45 minutes prior to intended sexual activity       There is no refill protocol information for this order          Last office visit: 5/3/2024 ; last virtual visit: Visit date not found with prescribing provider:  Marilyn Grayson     Future Office Visit:      Lulu Gtz   [facility] [place]   [facility]  [phone]

## 2025-06-16 DIAGNOSIS — N32.0 BLADDER OUTLET OBSTRUCTION: ICD-10-CM

## 2025-06-16 RX ORDER — TAMSULOSIN HYDROCHLORIDE 0.4 MG/1
0.4 CAPSULE ORAL DAILY
Qty: 90 CAPSULE | Refills: 0 | Status: SHIPPED | OUTPATIENT
Start: 2025-06-16

## 2025-06-16 RX ORDER — TADALAFIL 10 MG/1
TABLET ORAL
Qty: 30 TABLET | Refills: 0 | Status: SHIPPED | OUTPATIENT
Start: 2025-06-16

## 2025-06-16 NOTE — TELEPHONE ENCOUNTER
Medication is being filled for 1 time refill only due to:  Patient needs to be seen because it has been more than one year since last visit. Mychart and letter sent to pt to make follow up appt.  Mirela COVARRUBIAS RN BSN PHN  Specialty Clinics

## 2025-06-16 NOTE — TELEPHONE ENCOUNTER
Pending Prescriptions:                       Disp   Refills    tamsulosin (FLOMAX) 0.4 MG capsule        30 cap*0            Sig: Take 1 capsule (0.4 mg) by mouth daily.

## 2025-06-16 NOTE — LETTER
June 16, 2025      Jamal Sanchez Jr.  [address] [address] [place] [id number]              Dear Jamal,    We have received a refill request for your Tamsulosin.  Many medications require routine follow-up with your doctor for continued refills.     Your prescription(s) have been refilled for 90 days so you may have time to schedule a follow-up appointment.     Please call to schedule soon so we can assure you have an appointment before your next refills are needed. If you have already made a follow up appointment, please disregard this letter.      You can be scheduled with any of our urology team members. Please call [phone] to schedule.       Thank you,   [facility]    Sincerely,      Marilyn Grayson PA-C

## 2025-06-16 NOTE — TELEPHONE ENCOUNTER
Medication is being filled for 1 time refill only due to:  Pt is due for yearly follow up. Letter sent previously to make follow up appt.  Mirela COVARRUBIAS RN BSN PHN  Specialty Clinics

## 2025-08-20 ENCOUNTER — DOCUMENTATION ONLY (OUTPATIENT)
Dept: CARDIOLOGY | Facility: CLINIC | Age: 70
End: 2025-08-20

## 2025-08-20 ENCOUNTER — OFFICE VISIT (OUTPATIENT)
Dept: NEUROLOGY | Facility: CLINIC | Age: 70
End: 2025-08-20
Attending: PSYCHIATRY & NEUROLOGY
Payer: COMMERCIAL

## 2025-08-20 VITALS
BODY MASS INDEX: 29.2 KG/M2 | HEIGHT: 70 IN | HEART RATE: 66 BPM | SYSTOLIC BLOOD PRESSURE: 118 MMHG | WEIGHT: 204 LBS | DIASTOLIC BLOOD PRESSURE: 65 MMHG

## 2025-08-20 DIAGNOSIS — G20.A1 PARKINSON'S DISEASE WITHOUT DYSKINESIA OR FLUCTUATING MANIFESTATIONS (H): Primary | ICD-10-CM

## 2025-08-20 DIAGNOSIS — G56.01 CARPAL TUNNEL SYNDROME OF RIGHT WRIST: ICD-10-CM

## 2025-08-20 DIAGNOSIS — G56.01 CARPAL TUNNEL SYNDROME OF RIGHT WRIST: Primary | ICD-10-CM

## 2025-08-20 DIAGNOSIS — R20.2 PARESTHESIA: ICD-10-CM

## 2025-08-20 PROCEDURE — 99213 OFFICE O/P EST LOW 20 MIN: CPT | Performed by: PSYCHIATRY & NEUROLOGY

## 2025-08-20 PROCEDURE — 3074F SYST BP LT 130 MM HG: CPT | Performed by: PSYCHIATRY & NEUROLOGY

## 2025-08-20 PROCEDURE — G2211 COMPLEX E/M VISIT ADD ON: HCPCS | Performed by: PSYCHIATRY & NEUROLOGY

## 2025-08-20 PROCEDURE — 3078F DIAST BP <80 MM HG: CPT | Performed by: PSYCHIATRY & NEUROLOGY

## 2025-08-26 ENCOUNTER — TRANSFERRED RECORDS (OUTPATIENT)
Dept: HEALTH INFORMATION MANAGEMENT | Facility: CLINIC | Age: 70
End: 2025-08-26
Payer: MEDICARE

## 2025-09-02 ENCOUNTER — ANCILLARY PROCEDURE (OUTPATIENT)
Dept: NUCLEAR MEDICINE | Facility: CLINIC | Age: 70
End: 2025-09-02
Attending: PSYCHIATRY & NEUROLOGY
Payer: MEDICARE

## 2025-09-02 ENCOUNTER — ANCILLARY PROCEDURE (OUTPATIENT)
Dept: GENERAL RADIOLOGY | Facility: CLINIC | Age: 70
End: 2025-09-02
Attending: PSYCHIATRY & NEUROLOGY
Payer: MEDICARE

## 2025-09-02 DIAGNOSIS — G20.A1 PARKINSON'S DISEASE WITHOUT DYSKINESIA OR FLUCTUATING MANIFESTATIONS (H): ICD-10-CM

## 2025-09-02 PROCEDURE — 78803 RP LOCLZJ TUM SPECT 1 AREA: CPT | Mod: GC | Performed by: RADIOLOGY

## 2025-09-02 PROCEDURE — A9584 IODINE I-123 IOFLUPANE: HCPCS | Mod: JZ | Performed by: RADIOLOGY

## 2025-09-02 RX ORDER — IOFLUPANE I-123 2 MCI/ML
4-6 INJECTION, SOLUTION INTRAVENOUS ONCE
Status: COMPLETED | OUTPATIENT
Start: 2025-09-02 | End: 2025-09-02

## 2025-09-02 RX ADMIN — Medication 130 MG: at 10:34

## 2025-09-02 RX ADMIN — IOFLUPANE I-123 4.3 MILLICURIE: 2 INJECTION, SOLUTION INTRAVENOUS at 11:31

## 2025-09-04 ENCOUNTER — OFFICE VISIT (OUTPATIENT)
Dept: UROLOGY | Facility: CLINIC | Age: 70
End: 2025-09-04
Payer: MEDICARE

## 2025-09-04 VITALS
RESPIRATION RATE: 20 BRPM | OXYGEN SATURATION: 98 % | SYSTOLIC BLOOD PRESSURE: 123 MMHG | HEART RATE: 91 BPM | DIASTOLIC BLOOD PRESSURE: 79 MMHG | TEMPERATURE: 97.5 F

## 2025-09-04 DIAGNOSIS — Z12.5 SCREENING FOR PROSTATE CANCER: Primary | ICD-10-CM

## 2025-09-04 DIAGNOSIS — N32.0 BLADDER OUTLET OBSTRUCTION: ICD-10-CM

## 2025-09-04 DIAGNOSIS — N52.9 ERECTILE DYSFUNCTION, UNSPECIFIED ERECTILE DYSFUNCTION TYPE: ICD-10-CM

## 2025-09-04 LAB — PSA SERPL DL<=0.01 NG/ML-MCNC: 2.17 NG/ML (ref 0–6.5)

## 2025-09-04 RX ORDER — TADALAFIL 10 MG/1
TABLET ORAL
Qty: 30 TABLET | Refills: 5 | Status: SHIPPED | OUTPATIENT
Start: 2025-09-04

## 2025-09-04 RX ORDER — TAMSULOSIN HYDROCHLORIDE 0.4 MG/1
0.8 CAPSULE ORAL DAILY
Qty: 180 CAPSULE | Refills: 3 | Status: SHIPPED | OUTPATIENT
Start: 2025-09-04

## (undated) DEVICE — KIT LG BORE TOUHY ACCESS PLUS MAP152

## (undated) DEVICE — CATH LAUNCHER 6FR LA6EBU375

## (undated) DEVICE — GUIDEWIRE VASC 0.014INX190CM J TIP CGRXT190HJ

## (undated) DEVICE — CATH ANGIO JUDKINS JL4 6FRX100CM INFINITI 534620T

## (undated) DEVICE — GW VASC OMNIWIRE J L185CM PRESSURE 89185J

## (undated) DEVICE — SYR RED NITRO PRNT 10CC

## (undated) DEVICE — CATH BALLOON EMERGE 3.0X20MM H7493918920300

## (undated) DEVICE — SLEEVE TR BAND RADIAL COMPRESSION DEVICE 24CM TRB24-REG

## (undated) DEVICE — SPECIMEN TRAP VACUUM SUCTION 003984-901

## (undated) DEVICE — ENDO FORCEP ENDOJAW BIOPSY 2.8MMX230CM FB-220U

## (undated) DEVICE — ENDO SNARE EXACTO COLD 9MM LOOP 2.4MMX230CM 00711115

## (undated) DEVICE — INTRO GLIDESHEATH SLENDER 6FR 10X45CM 60-1060

## (undated) DEVICE — CATH ANGIO INFINITI PIGTAIL 145 6 SH 6FRX110CM  534-652S

## (undated) DEVICE — MANIFOLD KIT ANGIO AUTOMATED 014613

## (undated) DEVICE — CATH ANGIO JUDKINS JL5 6FRX100CM INFINITI 534622T

## (undated) DEVICE — LINE MONITOR NASAL SMART CAPNOLINE ADULT LONG 12463

## (undated) DEVICE — KIT HAND CONTROL ANGIOTOUCH ACIST 65CM AT-P65

## (undated) DEVICE — RAD INFLATOR BASIC COMPAK  IN4130

## (undated) DEVICE — WIRE GUIDE 0.035"X260CM SAFE-T-J EXCHANGE G00517

## (undated) DEVICE — DEFIB PRO-PADZ LVP LQD GEL ADULT 8900-2105-01

## (undated) DEVICE — TOTE ANGIO CORP PC15AT SAN32CC83O

## (undated) DEVICE — CATH ANGIO JUDKINS R4 6FRX100CM INFINITI 534621T

## (undated) RX ORDER — NITROGLYCERIN 5 MG/ML
VIAL (ML) INTRAVENOUS
Status: DISPENSED
Start: 2022-09-07

## (undated) RX ORDER — CLOPIDOGREL 300 MG/1
TABLET, FILM COATED ORAL
Status: DISPENSED
Start: 2022-09-07

## (undated) RX ORDER — HEPARIN SODIUM 1000 [USP'U]/ML
INJECTION, SOLUTION INTRAVENOUS; SUBCUTANEOUS
Status: DISPENSED
Start: 2022-09-07

## (undated) RX ORDER — LIDOCAINE HYDROCHLORIDE 10 MG/ML
INJECTION, SOLUTION EPIDURAL; INFILTRATION; INTRACAUDAL; PERINEURAL
Status: DISPENSED
Start: 2022-09-07

## (undated) RX ORDER — FENTANYL CITRATE 50 UG/ML
INJECTION, SOLUTION INTRAMUSCULAR; INTRAVENOUS
Status: DISPENSED
Start: 2022-09-07

## (undated) RX ORDER — PROPOFOL 10 MG/ML
INJECTION, EMULSION INTRAVENOUS
Status: DISPENSED
Start: 2022-01-03

## (undated) RX ORDER — VERAPAMIL HYDROCHLORIDE 2.5 MG/ML
INJECTION, SOLUTION INTRAVENOUS
Status: DISPENSED
Start: 2022-09-07

## (undated) RX ORDER — HEPARIN SODIUM 200 [USP'U]/100ML
INJECTION, SOLUTION INTRAVENOUS
Status: DISPENSED
Start: 2022-09-07

## (undated) RX ORDER — PROPOFOL 10 MG/ML
INJECTION, EMULSION INTRAVENOUS
Status: DISPENSED
Start: 2022-08-17